# Patient Record
Sex: FEMALE | Race: WHITE | NOT HISPANIC OR LATINO | Employment: OTHER | URBAN - METROPOLITAN AREA
[De-identification: names, ages, dates, MRNs, and addresses within clinical notes are randomized per-mention and may not be internally consistent; named-entity substitution may affect disease eponyms.]

---

## 2017-01-07 DIAGNOSIS — J44.89 ASTHMA WITH COPD: ICD-10-CM

## 2017-01-08 RX ORDER — ALBUTEROL SULFATE 90 UG/1
AEROSOL, METERED RESPIRATORY (INHALATION)
Qty: 18 G | Refills: 11 | Status: ON HOLD | OUTPATIENT
Start: 2017-01-08 | End: 2017-03-11 | Stop reason: HOSPADM

## 2017-01-09 RX ORDER — ROSUVASTATIN CALCIUM 10 MG/1
TABLET, COATED ORAL
Qty: 30 TABLET | Refills: 9 | Status: ON HOLD | OUTPATIENT
Start: 2017-01-09 | End: 2017-03-11 | Stop reason: HOSPADM

## 2017-02-05 ENCOUNTER — NURSE TRIAGE (OUTPATIENT)
Dept: ADMINISTRATIVE | Facility: CLINIC | Age: 82
End: 2017-02-05

## 2017-02-05 NOTE — TELEPHONE ENCOUNTER
"  Reason for Disposition   [1] Longstanding confusion (e.g., dementia, stroke) AND [2] worsening    Answer Assessment - Initial Assessment Questions  1. LEVEL OF CONSCIOUSNESS: "How is he (she, the patient) acting right now?" (e.g., alert-oriented, confused, lethargic, stuporous, comatose)      confused  2. ONSET: "When did the confusion start?"  (minutes, hours, days)      yesterday  3. PATTERN "Does this come and go, or has it been constant since it started?"  "Is it present now?"     Comes and goes  4. ALCOHOL or DRUGS: "Has he been drinking alcohol or taking any drugs?"       no  5. NARCOTIC MEDICATIONS: "Has he been receiving any narcotic medications?" (e.g., morphine, Vicodin)   no  6. CAUSE: "What do you think is causing the confusion?"       Hx of dementia   7. OTHER SYMPTOMS: "Are there any other symptoms?" (e.g., difficulty breathing, headache, fever, weakness)     Not able to speak for a few seconds and blank stare and garbled speech    Protocols used: ST CONFUSION - DELIRIUM-A-AH    "

## 2017-02-06 RX ORDER — METOPROLOL SUCCINATE 25 MG/1
TABLET, EXTENDED RELEASE ORAL
Qty: 30 TABLET | Refills: 2 | Status: ON HOLD | OUTPATIENT
Start: 2017-02-06 | End: 2017-03-11 | Stop reason: HOSPADM

## 2017-02-07 ENCOUNTER — TELEPHONE (OUTPATIENT)
Dept: INTERNAL MEDICINE | Facility: CLINIC | Age: 82
End: 2017-02-07

## 2017-02-07 ENCOUNTER — LAB VISIT (OUTPATIENT)
Dept: LAB | Facility: HOSPITAL | Age: 82
End: 2017-02-07
Attending: FAMILY MEDICINE
Payer: MEDICARE

## 2017-02-07 ENCOUNTER — OFFICE VISIT (OUTPATIENT)
Dept: INTERNAL MEDICINE | Facility: CLINIC | Age: 82
End: 2017-02-07
Payer: MEDICARE

## 2017-02-07 VITALS
HEIGHT: 62 IN | TEMPERATURE: 99 F | BODY MASS INDEX: 23 KG/M2 | SYSTOLIC BLOOD PRESSURE: 116 MMHG | DIASTOLIC BLOOD PRESSURE: 80 MMHG | OXYGEN SATURATION: 97 % | WEIGHT: 125 LBS | HEART RATE: 66 BPM

## 2017-02-07 DIAGNOSIS — G47.9 SLEEP DISTURBANCE: ICD-10-CM

## 2017-02-07 DIAGNOSIS — E03.9 HYPOTHYROIDISM, UNSPECIFIED TYPE: Chronic | ICD-10-CM

## 2017-02-07 DIAGNOSIS — R40.4 ALTERED CONSCIOUSNESS: ICD-10-CM

## 2017-02-07 DIAGNOSIS — I10 ESSENTIAL HYPERTENSION: Chronic | ICD-10-CM

## 2017-02-07 DIAGNOSIS — R47.01 APHASIA: ICD-10-CM

## 2017-02-07 DIAGNOSIS — F33.0 MAJOR DEPRESSIVE DISORDER, RECURRENT EPISODE, MILD: ICD-10-CM

## 2017-02-07 DIAGNOSIS — R40.4 ALTERED CONSCIOUSNESS: Primary | ICD-10-CM

## 2017-02-07 LAB
ALBUMIN SERPL BCP-MCNC: 3.9 G/DL
ALP SERPL-CCNC: 55 U/L
ALT SERPL W/O P-5'-P-CCNC: 8 U/L
ANION GAP SERPL CALC-SCNC: 15 MMOL/L
AST SERPL-CCNC: 17 U/L
BASOPHILS # BLD AUTO: 0.03 K/UL
BASOPHILS NFR BLD: 0.4 %
BILIRUB SERPL-MCNC: 0.3 MG/DL
BUN SERPL-MCNC: 20 MG/DL
CALCIUM SERPL-MCNC: 9.5 MG/DL
CHLORIDE SERPL-SCNC: 102 MMOL/L
CO2 SERPL-SCNC: 20 MMOL/L
CREAT SERPL-MCNC: 0.8 MG/DL
DIFFERENTIAL METHOD: ABNORMAL
EOSINOPHIL # BLD AUTO: 0.1 K/UL
EOSINOPHIL NFR BLD: 0.8 %
ERYTHROCYTE [DISTWIDTH] IN BLOOD BY AUTOMATED COUNT: 14.9 %
EST. GFR  (AFRICAN AMERICAN): >60 ML/MIN/1.73 M^2
EST. GFR  (NON AFRICAN AMERICAN): >60 ML/MIN/1.73 M^2
GLUCOSE SERPL-MCNC: 88 MG/DL
HCT VFR BLD AUTO: 38.3 %
HGB BLD-MCNC: 12.5 G/DL
LYMPHOCYTES # BLD AUTO: 1.7 K/UL
LYMPHOCYTES NFR BLD: 21.2 %
MCH RBC QN AUTO: 31.2 PG
MCHC RBC AUTO-ENTMCNC: 32.6 %
MCV RBC AUTO: 96 FL
MONOCYTES # BLD AUTO: 0.6 K/UL
MONOCYTES NFR BLD: 8.2 %
NEUTROPHILS # BLD AUTO: 5.4 K/UL
NEUTROPHILS NFR BLD: 69.4 %
PLATELET # BLD AUTO: 184 K/UL
PMV BLD AUTO: 11.7 FL
POTASSIUM SERPL-SCNC: 4.2 MMOL/L
PROT SERPL-MCNC: 6.6 G/DL
RBC # BLD AUTO: 4.01 M/UL
SODIUM SERPL-SCNC: 137 MMOL/L
WBC # BLD AUTO: 7.8 K/UL

## 2017-02-07 PROCEDURE — 36415 COLL VENOUS BLD VENIPUNCTURE: CPT | Mod: PO

## 2017-02-07 PROCEDURE — 3074F SYST BP LT 130 MM HG: CPT | Mod: S$GLB,,, | Performed by: PHYSICIAN ASSISTANT

## 2017-02-07 PROCEDURE — 84480 ASSAY TRIIODOTHYRONINE (T3): CPT

## 2017-02-07 PROCEDURE — 84443 ASSAY THYROID STIM HORMONE: CPT

## 2017-02-07 PROCEDURE — 99999 PR PBB SHADOW E&M-EST. PATIENT-LVL V: CPT | Mod: PBBFAC,,, | Performed by: PHYSICIAN ASSISTANT

## 2017-02-07 PROCEDURE — 99213 OFFICE O/P EST LOW 20 MIN: CPT | Mod: S$PBB,,, | Performed by: PHYSICIAN ASSISTANT

## 2017-02-07 PROCEDURE — 3079F DIAST BP 80-89 MM HG: CPT | Mod: S$GLB,,, | Performed by: PHYSICIAN ASSISTANT

## 2017-02-07 PROCEDURE — 85025 COMPLETE CBC W/AUTO DIFF WBC: CPT | Mod: PO

## 2017-02-07 PROCEDURE — 84439 ASSAY OF FREE THYROXINE: CPT

## 2017-02-07 PROCEDURE — 1157F ADVNC CARE PLAN IN RCRD: CPT | Mod: S$GLB,,, | Performed by: PHYSICIAN ASSISTANT

## 2017-02-07 PROCEDURE — 1159F MED LIST DOCD IN RCRD: CPT | Mod: S$GLB,,, | Performed by: PHYSICIAN ASSISTANT

## 2017-02-07 PROCEDURE — 80053 COMPREHEN METABOLIC PANEL: CPT | Mod: PO

## 2017-02-07 PROCEDURE — 1160F RVW MEDS BY RX/DR IN RCRD: CPT | Mod: S$GLB,,, | Performed by: PHYSICIAN ASSISTANT

## 2017-02-07 RX ORDER — TRAZODONE HYDROCHLORIDE 50 MG/1
50 TABLET ORAL NIGHTLY
Qty: 30 TABLET | Refills: 5 | Status: SHIPPED | OUTPATIENT
Start: 2017-02-07 | End: 2017-03-06 | Stop reason: SDUPTHER

## 2017-02-07 RX ORDER — LEVOTHYROXINE SODIUM 25 UG/1
TABLET ORAL
Qty: 30 TABLET | Refills: 9 | Status: SHIPPED | OUTPATIENT
Start: 2017-02-07 | End: 2017-03-04 | Stop reason: SDUPTHER

## 2017-02-07 RX ORDER — SERTRALINE HYDROCHLORIDE 50 MG/1
50 TABLET, FILM COATED ORAL DAILY
Qty: 30 TABLET | Refills: 9 | Status: SHIPPED | OUTPATIENT
Start: 2017-02-07 | End: 2017-03-04 | Stop reason: SDUPTHER

## 2017-02-07 RX ORDER — VERAPAMIL HYDROCHLORIDE 240 MG/1
240 TABLET, FILM COATED, EXTENDED RELEASE ORAL DAILY
Qty: 30 TABLET | Refills: 11 | Status: ON HOLD | OUTPATIENT
Start: 2017-02-07 | End: 2017-03-11 | Stop reason: HOSPADM

## 2017-02-07 RX ORDER — TRAZODONE HYDROCHLORIDE 50 MG/1
50 TABLET ORAL NIGHTLY
Qty: 30 TABLET | Refills: 0 | Status: ON HOLD | OUTPATIENT
Start: 2017-02-07 | End: 2017-03-11 | Stop reason: HOSPADM

## 2017-02-07 RX ORDER — CALCIUM CARBONATE 750 MG/1
TABLET, CHEWABLE ORAL
Status: ON HOLD | COMMUNITY
Start: 2016-11-08 | End: 2017-03-11 | Stop reason: HOSPADM

## 2017-02-07 RX ORDER — DICLOFENAC SODIUM 16.05 MG/ML
2 SOLUTION TOPICAL
Status: ON HOLD | COMMUNITY
End: 2017-03-11 | Stop reason: HOSPADM

## 2017-02-07 NOTE — PROGRESS NOTES
Subjective:      Patient ID: Crystal Romero is a 82 y.o. female.    Chief Complaint: Annual Exam (daughter states mother has been disoriented/incoherent/with garbled speech) and Insomnia    HPI  Here today accompanied by her daughter for evaluation of sudden severe memory loss this past weekend. Has a history of mild-moderate memory loss, however this weekend she had an episode where she did not recognize her home or daughter who she lives with. Memory eventually came back within a couple hours. On Sunday, had an episode where she had a blank stare lasting 3-4 minutes as the daughter was looking at her giving her commands and asking if she was ok. Daughter states that it lasted a couple minutes, when she came to she was unable to speak, her mouth was moving but words were not coming out. Slowly improved. Did not go to the ER. This is the first time she has had an episode like this. No weakness or asymmetry reported by pts daughter who was with her during these episodes.   Also reports that she is unable to swallow verapamil. Blood pressure has been stable. Requesting smaller pill be sent to pharmacy.     Also reports insomnia. No daytime napping. Walks around at night. Denies sleep walking (daughter reports).   Has had a slow decrease in weight over the past year.   Also requesting refills on some of her medications today.     Review of Systems   Constitutional: Positive for appetite change. Negative for activity change, chills, diaphoresis, fatigue, fever and unexpected weight change.   HENT: Negative.    Eyes: Negative for visual disturbance.   Respiratory: Negative for cough, chest tightness, shortness of breath and wheezing.    Cardiovascular: Negative for chest pain, palpitations and leg swelling.   Gastrointestinal: Negative for abdominal distention, abdominal pain, anal bleeding, blood in stool, constipation, diarrhea, nausea, rectal pain and vomiting.   Musculoskeletal: Negative for myalgias.  "  Skin: Negative for rash.   Neurological: Positive for speech difficulty. Negative for dizziness, tremors, seizures, syncope, facial asymmetry, weakness, light-headedness, numbness and headaches.   Psychiatric/Behavioral: Positive for confusion, dysphoric mood and sleep disturbance. Negative for agitation, behavioral problems, hallucinations, self-injury and suicidal ideas. The patient is not nervous/anxious and is not hyperactive.      Objective:     Visit Vitals    /80 (BP Location: Right arm, Patient Position: Sitting, BP Method: Manual)    Pulse 66    Temp 99 °F (37.2 °C) (Tympanic)    Ht 5' 2" (1.575 m)    Wt 56.7 kg (125 lb)    SpO2 97%    BMI 22.86 kg/m2       Physical Exam   Constitutional: She is oriented to person, place, and time. She appears well-developed and well-nourished. No distress.   HENT:   Head: Normocephalic and atraumatic.   Eyes: Pupils are equal, round, and reactive to light.   Neck: Normal range of motion.   Cardiovascular: Normal rate, regular rhythm and normal heart sounds.  Exam reveals no gallop and no friction rub.    No murmur heard.  Pulmonary/Chest: Effort normal and breath sounds normal. No respiratory distress. She has no wheezes. She has no rales.   Lymphadenopathy:     She has no cervical adenopathy.   Neurological: She is alert and oriented to person, place, and time. She has normal strength. She displays no tremor. No cranial nerve deficit or sensory deficit. She exhibits normal muscle tone. Coordination and gait normal.   Skin: She is not diaphoretic.   Psychiatric: She has a normal mood and affect. Her behavior is normal. Judgment and thought content normal.   Nursing note and vitals reviewed.      Assessment:     1. Altered consciousness    2. Aphasia    3. Sleep disturbance    4. Major depressive disorder, recurrent episode, mild    5. Hypothyroidism, unspecified type    6. Essential hypertension      Plan:   Altered consciousness  -     CBC auto " differential; Future; Expected date: 2/7/17  -     Comprehensive metabolic panel; Future  -     Urine culture; Future  -     Urinalysis; Future; Expected date: 2/7/17  -     Ambulatory referral to Neurology  -     MRI Brain W WO Contrast; Future; Expected date: 2/7/17    Aphasia  Comments:  one episode that lasted under 30 minutes 2/5/2017  Orders:  -     Ambulatory referral to Neurology  -     MRI Brain W WO Contrast; Future; Expected date: 2/7/17  -no acute neuro findings on exam today.     Sleep disturbance  -     trazodone (DESYREL) 50 MG tablet; Take 1 tablet (50 mg total) by mouth every evening.  Dispense: 30 tablet; Refill: 5  -     Ambulatory referral to Neurology    Major depressive disorder, recurrent episode, mild  -     sertraline (ZOLOFT) 50 MG tablet; Take 1 tablet (50 mg total) by mouth once daily.  Dispense: 30 tablet; Refill: 9     -refilled as requested  Hypothyroidism, unspecified type  -     levothyroxine (SYNTHROID) 25 MCG tablet; TAKE ONE TABLET BY MOUTH ONE  TIME DAILY BEFORE BREAKFAST  Dispense: 30 tablet; Refill: 9     -refilled as requested  Essential hypertension   -     verapamil (CALAN-SR) 240 MG CR tablet; Take 1 tablet (240 mg total) by mouth once daily. Dispense smallest XR tablet for 240mg verapamil  Dispense: 30 tablet; Refill: 11     -changed from xr capsules to tablets      Return if symptoms worsen or fail to improve.

## 2017-02-07 NOTE — MR AVS SNAPSHOT
[facility] Internal Medicine  [address] [place] [address]  Phone: [phone]  Fax: [phone]                  Crystal Romero   2017 2:00 PM   Office Visit    Description:  Female : 1934   Provider:  Kelsie Claudio PA-C   Department:  [facility] - Internal Medicine           Reason for Visit     Annual Exam     Insomnia           Diagnoses this Visit        Comments    Altered consciousness    -  Primary     Major depressive disorder, recurrent episode, mild         Hypothyroidism, unspecified type         Sleep disturbance         Essential hypertension         Aphasia                To Do List           Future Appointments        Provider Department Dept Phone    2017 3:20 PM LABORATORY, [facility] [phone]    2017 3:40 PM SPECIMEN, [facility] [phone]    2017 4:15 PM [facility] [phone]    2017 4:00 PM Maria Guadalupe Herring MD [facility] Internal Medicine [phone]    2017 4:00 PM Geronimo James NP [facility] [phone]      Goals (5 Years of Data)     None      Follow-Up and Disposition     Return if symptoms worsen or fail to improve.       These Medications        Disp Refills Start End    verapamil (CALAN-SR) 240 MG CR tablet 30 tablet 11 2017    Take 1 tablet (240 mg total) by mouth once daily. Dispense smallest XR tablet for 240mg verapamil - Oral    Pharmacy: [id number] [place]  [address] [address] Ph #: [phone]       sertraline (ZOLOFT) 50 MG tablet 30 tablet 9 2017     Take 1 tablet (50 mg total) by mouth once daily. - Oral    Ph[facility]acy: [facility]  [address] [facility] Ph #: [phone]       levothyroxine (SYNTHROID) 25 MCG tablet 30 tablet 9 2017     TAKE ONE TABLET BY MOUTH ONE  TIME DAILY BEFORE BREAKFAST    Pharmacy: [id number]  [place] - [id number]  [facility] #: [phone]       trazodone (DESYREL) 50 MG tablet 30 tablet 5 2/7/2017     Take 1 tablet (50 mg total) by mouth every evening. One-half tablet by mouth at night - Oral    [id number] [place] [place] [facility] [address] [facility] #: [phone]         [facility]     [facility] On Call Nurse Care Line - 24/7 Assistance  Registered nurses in the [facility] provide clinical advisement, health education, appointment booking, and other advisory services.  Call for this free service at [phone].             Medications           Message regarding Medications     Verify the changes and/or additions to your medication regime listed below are the same as discussed with your clinician today.  If any of these changes or additions are incorrect, please notify your healthcare provider.        START taking these NEW medications        Refills    verapamil (CALAN-SR) 240 MG CR tablet 11    Sig: Take 1 tablet (240 mg total) by mouth once daily. Dispense smallest XR tablet for 240mg verapamil    Class: Normal    Route: Oral      CHANGE how you are taking these medications     Start Taking Instead of    sertraline (ZOLOFT) 50 MG tablet sertraline (ZOLOFT) 50 MG tablet    Dosage:  Take 1 tablet (50 mg total) by mouth once daily. Dosage:  TAKE ONE TABLET BY MOUTH ONE TIME DAILY    Reason for Change:  Reorder     trazodone (DESYREL) 50 MG tablet trazodone (DESYREL) 50 MG tablet    Dosage:  Take 1 tablet (50 mg total) by mouth every evening. One-half tablet by mouth at night Dosage:  Take 50 mg by mouth every evening. One-half tablet by mouth at night    Reason for Change:  Reorder       STOP taking these medications     verapamil (VERELAN) 240 MG C24P TAKE ONE CAPSULE BY MOUTH ONE TIME DAILY           Verify that the below list of medications is an accurate representation of the medications you are currently taking.  If none reported, the list may be blank. If incorrect, please  contact your healthcare provider. Carry this list with you in case of emergency.           Current Medications     acetaminophen (TYLENOL) 500 MG tablet Take 500 mg by mouth 2 (two) times daily.    albuterol (PROVENTIL) 5 mg/mL nebulizer solution Take 0.5 mLs (2.5 mg total) by nebulization every 6 (six) hours as needed.    blood pressure test kit-medium Kit Pt needs device to monitor blood pressure daily    blood pressure test kit-medium Kit Pt needs device to monitor blood pressure daily    budesonide (PULMICORT) 0.5 mg/2 mL nebulizer solution Take 0.5 mg by nebulization once daily.    cetirizine (ZYRTEC) 10 MG tablet Take 10 mg by mouth once daily.    diclofenac sodium 1.5 % Drop 2 %.    fluticasone (FLONASE) 50 mcg/actuation nasal spray 1 spray by Each Nare route once daily.    folic acid (FOLVITE) 1 MG tablet TAKE 1 TABLET (1 MG TOTAL) BY MOUTH ONCE DAILY.    hydroxychloroquine (PLAQUENIL) 200 mg tablet TAKE ONE TABLET BY MOUTH TWICE DAILY    inhalation device (AEROCHAMBER PLUS FLOW-VU) Use as directed for inhalation.    levothyroxine (SYNTHROID) 25 MCG tablet TAKE ONE TABLET BY MOUTH ONE  TIME DAILY BEFORE BREAKFAST    lisinopril 10 MG tablet Take 1 tablet (10 mg total) by mouth once daily.    methotrexate 2.5 MG Tab Take 7 tablets (17.5 mg total) by mouth every 7 days.    metoprolol succinate (TOPROL-XL) 25 MG 24 hr tablet TAKE ONE TABLET BY MOUTH ONE TIME DAILY    multivitamin with minerals tablet Take 1 tablet by mouth once daily. One a day vitamin for seniors    pantoprazole (PROTONIX) 40 MG tablet TAKE ONE TABLET BY MOUTH ONE TIME DAILY    rosuvastatin (CRESTOR) 10 MG tablet TAKE ONE TABLET BY MOUTH ONE TIME DAILY IN THE EVENING    sertraline (ZOLOFT) 50 MG tablet Take 1 tablet (50 mg total) by mouth once daily.    tramadol (ULTRAM) 50 mg tablet Take 1 tablet (50 mg total) by mouth every 6 (six) hours as needed for Pain.    trazodone (DESYREL) 50 MG tablet Take 1 tablet (50 mg total) by mouth every  "evening. One-half tablet by mouth at night    VENTOLIN HFA 90 mcg/actuation inhaler INHALE TWO PUFFS BY MOUTH EVERY SIX HOURS    verapamil (CALAN-SR) 240 MG CR tablet Take 1 tablet (240 mg total) by mouth once daily. Dispense smallest XR tablet for 240mg verapamil           Clinical Reference Information           Your Vitals Were     BP Pulse Temp Height Weight SpO2    116/80 (BP Location: Right arm, Patient Position: Sitting, BP Method: Manual) 66 99 °F (37.2 °C) (Tympanic) 5' 2" (1.575 m) 56.7 kg (125 lb) 97%    BMI                22.86 kg/m2          Blood Pressure          Most Recent Value    BP  116/80      Allergies as of 2/7/2017     Sulfa (Sulfonamide Antibiotics)      Immunizations Administered on Date of Encounter - 2/7/2017     None      Orders Placed During Today's Visit      Normal Orders This Visit    Ambulatory referral to Neurology     Future Labs/Procedures Expected by Expires    CBC auto differential  2/7/2017 2/7/2018    MRI Brain W WO Contrast  2/7/2017 2/8/2018    Urinalysis  2/7/2017 4/8/2018    Comprehensive metabolic panel  As directed 2/7/2018    Urine culture  As directed 2/7/2017      [facility] Sign-Up     Activating your [facility] account is as easy as 1-2-3!     1) Visit [email or web address], select Sign Up Now, enter this activation code and your date of birth, then select Next.  [email or web address]  Expires: 3/24/2017  3:15 PM      2) Create a username and password to use when you visit [facility] in the future and select a security question in case you lose your password and select Next.    3) Enter your e-mail address and click Sign Up!    Additional Information  If you have questions, please e-mail [email or web address] or call [phone] to talk to our [facility] staff. Remember, [facility] is NOT to be used for urgent needs. For medical emergencies, dial 911.         Language Assistance Services     ATTENTION: Language assistance services are available, free of charge. Please call " [phone].      ATENCIÓN: Si habla español, tiene a duncan disposición servicios gratuitos de asistencia lingüística. Llame al [phone].     CHÚ Ý: N?u b?n nói Ti?ng Vi?t, có các d?ch v? h? tr? ngôn ng? mi?n phí dành cho b?n. G?i s? [phone].         [facility] - Internal Medicine complies with applicable Federal civil rights laws and does not discriminate on the basis of race, color, national origin, age, disability, or sex.

## 2017-02-07 NOTE — TELEPHONE ENCOUNTER
Spoke with Maggie with [facility] pharmacy. Frequency for Trazadone 50mg clarified. Per provider TERRI Claudio PA-C patient is to take 1 tablet at bedtime.//ddw

## 2017-02-07 NOTE — TELEPHONE ENCOUNTER
----- Message from Pema Henry sent at 2/7/2017  3:33 PM CST -----  Contact: Maggie Curtis  She needs to verify the directions for Trazdone.  Call her at [phone].                              [email or web address]

## 2017-02-08 LAB
T3 SERPL-MCNC: 87 NG/DL
T4 FREE SERPL-MCNC: 1.09 NG/DL
TSH SERPL DL<=0.005 MIU/L-ACNC: 1.5 UIU/ML

## 2017-02-09 ENCOUNTER — HOSPITAL ENCOUNTER (OUTPATIENT)
Dept: RADIOLOGY | Facility: HOSPITAL | Age: 82
Discharge: HOME | End: 2017-02-09
Attending: FAMILY MEDICINE
Payer: MEDICARE

## 2017-02-09 DIAGNOSIS — R40.4 ALTERED CONSCIOUSNESS: ICD-10-CM

## 2017-02-09 DIAGNOSIS — R47.01 APHASIA: ICD-10-CM

## 2017-02-09 PROCEDURE — A9585 GADOBUTROL INJECTION: HCPCS | Mod: PO | Performed by: FAMILY MEDICINE

## 2017-02-09 PROCEDURE — 70553 MRI BRAIN STEM W/O & W/DYE: CPT | Mod: TC,PO

## 2017-02-09 PROCEDURE — 25500020 PHARM REV CODE 255: Mod: PO | Performed by: FAMILY MEDICINE

## 2017-02-09 PROCEDURE — 70553 MRI BRAIN STEM W/O & W/DYE: CPT | Mod: 26,,, | Performed by: RADIOLOGY

## 2017-02-09 RX ORDER — GADOBUTROL 604.72 MG/ML
5.5 INJECTION INTRAVENOUS
Status: COMPLETED | OUTPATIENT
Start: 2017-02-09 | End: 2017-02-09

## 2017-02-09 RX ADMIN — GADOBUTROL 5.5 ML: 604.72 INJECTION INTRAVENOUS at 04:02

## 2017-02-23 ENCOUNTER — OFFICE VISIT (OUTPATIENT)
Dept: RHEUMATOLOGY | Facility: CLINIC | Age: 82
End: 2017-02-23
Payer: MEDICARE

## 2017-02-23 VITALS
HEART RATE: 65 BPM | WEIGHT: 124.56 LBS | SYSTOLIC BLOOD PRESSURE: 159 MMHG | BODY MASS INDEX: 22.78 KG/M2 | DIASTOLIC BLOOD PRESSURE: 71 MMHG

## 2017-02-23 DIAGNOSIS — M75.01 BILATERAL ADHESIVE CAPSULITIS OF SHOULDERS: Primary | ICD-10-CM

## 2017-02-23 DIAGNOSIS — M75.02 BILATERAL ADHESIVE CAPSULITIS OF SHOULDERS: Primary | ICD-10-CM

## 2017-02-23 DIAGNOSIS — L40.50 PSORIASIS WITH ARTHROPATHY: ICD-10-CM

## 2017-02-23 DIAGNOSIS — M15.9 PRIMARY OSTEOARTHRITIS INVOLVING MULTIPLE JOINTS: ICD-10-CM

## 2017-02-23 PROCEDURE — 99999 PR PBB SHADOW E&M-EST. PATIENT-LVL III: CPT | Mod: PBBFAC,,, | Performed by: INTERNAL MEDICINE

## 2017-02-23 PROCEDURE — 3078F DIAST BP <80 MM HG: CPT | Mod: S$GLB,,, | Performed by: INTERNAL MEDICINE

## 2017-02-23 PROCEDURE — 1125F AMNT PAIN NOTED PAIN PRSNT: CPT | Mod: S$GLB,,, | Performed by: INTERNAL MEDICINE

## 2017-02-23 PROCEDURE — 3077F SYST BP >= 140 MM HG: CPT | Mod: S$GLB,,, | Performed by: INTERNAL MEDICINE

## 2017-02-23 PROCEDURE — 1160F RVW MEDS BY RX/DR IN RCRD: CPT | Mod: S$GLB,,, | Performed by: INTERNAL MEDICINE

## 2017-02-23 PROCEDURE — 1159F MED LIST DOCD IN RCRD: CPT | Mod: S$GLB,,, | Performed by: INTERNAL MEDICINE

## 2017-02-23 PROCEDURE — 99215 OFFICE O/P EST HI 40 MIN: CPT | Mod: 25,S$GLB,, | Performed by: INTERNAL MEDICINE

## 2017-02-23 PROCEDURE — 20610 DRAIN/INJ JOINT/BURSA W/O US: CPT | Mod: 50,S$GLB,, | Performed by: INTERNAL MEDICINE

## 2017-02-23 PROCEDURE — 1157F ADVNC CARE PLAN IN RCRD: CPT | Mod: S$GLB,,, | Performed by: INTERNAL MEDICINE

## 2017-02-23 RX ORDER — TRIAMCINOLONE ACETONIDE 40 MG/ML
40 INJECTION, SUSPENSION INTRA-ARTICULAR; INTRAMUSCULAR
Status: COMPLETED | OUTPATIENT
Start: 2017-02-23 | End: 2017-02-23

## 2017-02-23 RX ORDER — METHOTREXATE 2.5 MG/1
17.5 TABLET ORAL
Qty: 28 TABLET | Refills: 4 | Status: ON HOLD | OUTPATIENT
Start: 2017-02-23 | End: 2017-03-11 | Stop reason: HOSPADM

## 2017-02-23 RX ORDER — HYDROXYCHLOROQUINE SULFATE 200 MG/1
200 TABLET, FILM COATED ORAL 2 TIMES DAILY
Qty: 60 TABLET | Refills: 4 | Status: ON HOLD | OUTPATIENT
Start: 2017-02-23 | End: 2017-03-11 | Stop reason: HOSPADM

## 2017-02-23 RX ADMIN — TRIAMCINOLONE ACETONIDE 40 MG: 40 INJECTION, SUSPENSION INTRA-ARTICULAR; INTRAMUSCULAR at 04:02

## 2017-02-23 NOTE — MR AVS SNAPSHOT
[facility] - Rheumatology  [address] [place] [address]  Phone: [phone]  Fax: [phone]                  Crystal Romero   2017 3:00 PM   Office Visit    Description:  Female : 1934   Provider:  Keegan Yarbrough MD   Department:  [facility] - Rheumatology           Reason for Visit     Arthritis Flare           Diagnoses this Visit        Comments    Bilateral adhesive capsulitis of shoulders    -  Primary     Rheumatoid arthritis involving multiple sites with positive rheumatoid factor         Inflammatory arthritis                To Do List           Future Appointments        Provider Department Dept Phone    3/2/2017 4:00 PM PULMONARY LAB, [facility] [phone]    3/2/2017 4:20 PM Chuck Slater MD [facility] - Pulmonary Services [phone]    3/6/2017 4:00 PM Geronimo James NP [facility] Cardiology [phone]    3/24/2017 9:00 AM Jay Jay Cano MD [facility] - Neurology [phone]      Goals (5 Years of Data)     None      Follow-Up and Disposition     Return in about 3 months (around 2017).       These Medications        Disp Refills Start End    methotrexate 2.5 MG Tab 28 tablet 4 2017     Take 7 tablets (17.5 mg total) by mouth every 7 days. - Oral    Pharmacy: [id number] - [place] - [address] [facility] Ph #: [phone]       baicalin-catechin 500 mg Cap 60 each 11 2017     Take 500 mg by mouth 2 (two) times daily. - Oral    [facility]: [facility] [place]  [address] [address] Ph #: [phone]       hydroxychloroquine (PLAQUENIL) 200 mg tablet 60 tablet 4 2017     Take 1 tablet (200 mg total) by mouth 2 (two) times daily. - Oral    Pharmacy: [facility]  [address] [address] Ph #: [phone]         [facility]     [facility] Care [facility] -  Assistance  Registered nurses in the [facility]  provide clinical advisement, health education, appointment booking, and other advisory services.  Call for this free service at [phone].             Medications           Message regarding Medications     Verify the changes and/or additions to your medication regime listed below are the same as discussed with your clinician today.  If any of these changes or additions are incorrect, please notify your healthcare provider.        START taking these NEW medications        Refills    baicalin-catechin 500 mg Cap 11    Sig: Take 500 mg by mouth 2 (two) times daily.    Class: Normal    Route: Oral      CHANGE how you are taking these medications     Start Taking Instead of    hydroxychloroquine (PLAQUENIL) 200 mg tablet hydroxychloroquine (PLAQUENIL) 200 mg tablet    Dosage:  Take 1 tablet (200 mg total) by mouth 2 (two) times daily. Dosage:  TAKE ONE TABLET BY MOUTH TWICE DAILY    Reason for Change:  Reorder            Verify that the below list of medications is an accurate representation of the medications you are currently taking.  If none reported, the list may be blank. If incorrect, please contact your healthcare provider. Carry this list with you in case of emergency.           Current Medications     acetaminophen (TYLENOL) 500 MG tablet Take 500 mg by mouth 2 (two) times daily.    albuterol (PROVENTIL) 5 mg/mL nebulizer solution Take 0.5 mLs (2.5 mg total) by nebulization every 6 (six) hours as needed.    baicalin-catechin 500 mg Cap Take 500 mg by mouth 2 (two) times daily.    blood pressure test kit-medium Kit Pt needs device to monitor blood pressure daily    blood pressure test kit-medium Kit Pt needs device to monitor blood pressure daily    budesonide (PULMICORT) 0.5 mg/2 mL nebulizer solution Take 0.5 mg by nebulization once daily.    cetirizine (ZYRTEC) 10 MG tablet Take 10 mg by mouth once daily.    diclofenac sodium 1.5 % Drop 2 %.    fluticasone (FLONASE) 50 mcg/actuation nasal spray 1 spray  by Each Nare route once daily.    folic acid (FOLVITE) 1 MG tablet TAKE 1 TABLET (1 MG TOTAL) BY MOUTH ONCE DAILY.    hydroxychloroquine (PLAQUENIL) 200 mg tablet Take 1 tablet (200 mg total) by mouth 2 (two) times daily.    inhalation device (AEROCHAMBER PLUS FLOW-VU) Use as directed for inhalation.    levothyroxine (SYNTHROID) 25 MCG tablet TAKE ONE TABLET BY MOUTH ONE  TIME DAILY BEFORE BREAKFAST    lisinopril 10 MG tablet Take 1 tablet (10 mg total) by mouth once daily.    methotrexate 2.5 MG Tab Take 7 tablets (17.5 mg total) by mouth every 7 days.    metoprolol succinate (TOPROL-XL) 25 MG 24 hr tablet TAKE ONE TABLET BY MOUTH ONE TIME DAILY    multivitamin with minerals tablet Take 1 tablet by mouth once daily. One a day vitamin for seniors    pantoprazole (PROTONIX) 40 MG tablet TAKE ONE TABLET BY MOUTH ONE TIME DAILY    rosuvastatin (CRESTOR) 10 MG tablet TAKE ONE TABLET BY MOUTH ONE TIME DAILY IN THE EVENING    sertraline (ZOLOFT) 50 MG tablet Take 1 tablet (50 mg total) by mouth once daily.    tramadol (ULTRAM) 50 mg tablet Take 1 tablet (50 mg total) by mouth every 6 (six) hours as needed for Pain.    trazodone (DESYREL) 50 MG tablet Take 1 tablet (50 mg total) by mouth every evening. One-half tablet by mouth at night    trazodone (DESYREL) 50 MG tablet Take 1 tablet (50 mg total) by mouth every evening.    VENTOLIN HFA 90 mcg/actuation inhaler INHALE TWO PUFFS BY MOUTH EVERY SIX HOURS    verapamil (CALAN-SR) 240 MG CR tablet Take 1 tablet (240 mg total) by mouth once daily. Dispense smallest XR tablet for 240mg verapamil           Clinical Reference Information           Your Vitals Were     BP Pulse Weight BMI       159/71 65 56.5 kg (124 lb 9 oz) 22.78 kg/m2       Blood Pressure          Most Recent Value    BP  (!)  159/71      Allergies as of 2/23/2017     Sulfa (Sulfonamide Antibiotics)      Immunizations Administered on Date of Encounter - 2/23/2017     None      Orders Placed During Today's Visit      Recurring Lab Work Interval Expires    CBC auto differential   2/23/2018    Comprehensive metabolic panel   2/23/2018      [facility] Sign-Up     Activating your [facility] account is as easy as 1-2-3!     1) Visit [email or web address], select Sign Up Now, enter this activation code and your date of birth, then select Next.  [email or web address]  Expires: 3/24/2017  3:15 PM      2) Create a username and password to use when you visit [facility] in the future and select a security question in case you lose your password and select Next.    3) Enter your e-mail address and click Sign Up!    Additional Information  If you have questions, please e-mail [email or web address] or call [phone] to talk to our [facility] staff. Remember, [facility] is NOT to be used for urgent needs. For medical emergencies, dial 911.         Language Assistance Services     ATTENTION: Language assistance services are available, free of charge. Please call [phone].      ATENCIÓN: Si habla lacie, tiene a duncan disposición servicios gratuitos de asistencia lingüística. Llame al [phone].     MATT Ý: N?u b?n nói Ti?ng Vi?t, có các d?ch v? h? tr? ngôn ng? mi?n phí dành cho b?n. G?i s? [phone].         [facility] complies with applicable Federal civil rights laws and does not discriminate on the basis of race, color, national origin, age, disability, or sex.

## 2017-02-23 NOTE — ASSESSMENT & PLAN NOTE
Severe adhesive capsulitis of bilateral shoulder joints complicated by osteoarthritis and psoriatic arthropathy.  Inject Kenalog.

## 2017-02-23 NOTE — ASSESSMENT & PLAN NOTE
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  Non-steroidal anti-inflammatory medications contraindicated because of history of gastritis.  Try limbrel.

## 2017-02-23 NOTE — ASSESSMENT & PLAN NOTE
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  Safety labs from 2 weeks ago shows normal CBC, CMP.  Check safety labs for methotrexate every 12 weeks.

## 2017-02-23 NOTE — PROGRESS NOTES
RHEUMATOLOGY CLINIC FOLLOW UP VISIT- first visit with me.  Chief complaints:-  My shoulders hurt.    HPI:-  Crystal Blunt a 82 y.o. pleasant female comes in for a follow up visit with above chief complaints.  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  She is on methotrexate and hydroxychloroquine for psoriatic arthritis started by my partner Dr. Armendariz in the past.  She reports doing okay in regards to her small joints of hands and wrists without any significant swelling and thinks the methotrexate and hydroxychloroquine combination has been controlling them.  But she reports significant worsening pain in her bilateral shoulders with stiffness restricting any range of motion.  The pain got worse and after she underwent the recent MRI of her brain where she was waiting on on her sides still for a very long time.  No side effects from medications.    Review of Systems   Constitutional: Positive for malaise/fatigue. Negative for chills and fever.   HENT: Positive for hearing loss. Negative for nosebleeds and sore throat.    Eyes: Positive for blurred vision. Negative for photophobia and redness.   Respiratory: Negative for cough, sputum production and shortness of breath.    Cardiovascular: Negative for chest pain and leg swelling.   Gastrointestinal: Negative for abdominal pain, constipation and diarrhea.   Genitourinary: Negative for dysuria, frequency and urgency.   Musculoskeletal: Positive for back pain, joint pain, myalgias and neck pain. Negative for falls.   Skin: Negative for itching and rash.   Neurological: Positive for headaches. Negative for dizziness, tremors, seizures, loss of consciousness and weakness.   Endo/Heme/Allergies: Negative for environmental allergies. Does not bruise/bleed easily.   Psychiatric/Behavioral: Positive for memory  loss. Negative for hallucinations. The patient is nervous/anxious. The patient does not have insomnia.        Past Medical History   Diagnosis Date    H/O: GI bleed     Hiatal hernia     Hyperlipidemia     Hypertension     Hypothyroid     Rheumatoid arthritis(714.0)        Past Surgical History   Procedure Laterality Date    Tonsillectomy          Social History   Substance Use Topics    Smoking status: Former Smoker     Packs/day: 2.00     Types: Cigarettes     Quit date: 1/1/1950    Smokeless tobacco: None    Alcohol use Yes      Comment: daily , glass of wine daily        Family History   Problem Relation Age of Onset    Cancer Mother      breast, uterine       Review of patient's allergies indicates:   Allergen Reactions    Sulfa (sulfonamide antibiotics) Shortness Of Breath           Physical examination:-    Vitals:    02/23/17 1517   BP: (!) 159/71   Pulse: 65   Weight: 56.5 kg (124 lb 9 oz)   PainSc:   9   PainLoc: Generalized       Physical Exam   Constitutional: She is oriented to person, place, and time and well-developed, well-nourished, and in no distress.   HENT:   Head: Normocephalic.   Mouth/Throat: Oropharynx is clear and moist.   Eyes: Conjunctivae and EOM are normal. Pupils are equal, round, and reactive to light.   Neck: Normal range of motion. Neck supple.   Cardiovascular: Normal rate and intact distal pulses.    Pulmonary/Chest: Effort normal. No respiratory distress.   Abdominal: Soft. There is no tenderness.   Musculoskeletal:   Significant restriction in range of motion of bilateral shoulder joint secondary to pain.  Significant nodular changes over multiple small joints of hands and feet without any active synovitis or dactylitis.  No effusion over knee joints.   Neurological: She is alert and oriented to person, place, and time. No cranial nerve deficit.   Skin: Skin is warm. No rash noted. No erythema.   Psychiatric: Mood and affect normal.   Nursing note and vitals  reviewed.      Labs:-  Results for TITO STAPLES (MRN [id number]) as of 2/23/2017 16:13   Ref. Range 9/23/2014 07:50 11/24/2014 09:18 8/23/2016 15:20   Protein, Serum Latest Ref Range: 6.0 - 8.4 g/dL   5.9 (L)   Albumin grams/dl Latest Ref Range: 3.35 - 5.55 g/dL   3.73   Alpha-1 grams/dl Latest Ref Range: 0.17 - 0.41 g/dL   0.32   Alpha-2 grams/dl Latest Ref Range: 0.43 - 0.99 g/dL   0.63   Beta grams/dl Latest Ref Range: 0.50 - 1.10 g/dL   0.75   Gamma grams/dl Latest Ref Range: 0.67 - 1.58 g/dL   0.48 (L)   Pathologist Interpretation SPE Unknown   REVIEWED   Guthrie Free Light Chains Latest Ref Range: 0.33 - 1.94 mg/dL   1.31   Lambda Free Light Chains Latest Ref Range: 0.57 - 2.63 mg/dL   1.07   Kappa/Lambda FLC Ratio Latest Ref Range: 0.26 - 1.65    1.22   CCP Antibodies Latest Ref Range: <5.0 U/mL <0.5 <0.5    Rheumatoid Factor Latest Ref Range: 0.0 - 15.0 IU/mL 18.0 (H)           Radiographs:-  Independent visualization of images done.  Bone-on-bone osteoarthritis of bilateral shoulders.    Old and Outside medical records :-  Reviewed old and all outside medical records available in Care Everywhere.    Medication List with Changes/Refills   New Medications    BAICALIN-CATECHIN 500 MG CAP    Take 500 mg by mouth 2 (two) times daily.   Current Medications    ACETAMINOPHEN (TYLENOL) 500 MG TABLET    Take 500 mg by mouth 2 (two) times daily.    ALBUTEROL (PROVENTIL) 5 MG/ML NEBULIZER SOLUTION    Take 0.5 mLs (2.5 mg total) by nebulization every 6 (six) hours as needed.    BLOOD PRESSURE TEST KIT-MEDIUM KIT    Pt needs device to monitor blood pressure daily    BLOOD PRESSURE TEST KIT-MEDIUM KIT    Pt needs device to monitor blood pressure daily    BUDESONIDE (PULMICORT) 0.5 MG/2 ML NEBULIZER SOLUTION    Take 0.5 mg by nebulization once daily.    CETIRIZINE (ZYRTEC) 10 MG TABLET    Take 10 mg by mouth once daily.    DICLOFENAC SODIUM 1.5 % DROP    2 %.    FLUTICASONE (FLONASE) 50 MCG/ACTUATION NASAL SPRAY     1 spray by Each Nare route once daily.    FOLIC ACID (FOLVITE) 1 MG TABLET    TAKE 1 TABLET (1 MG TOTAL) BY MOUTH ONCE DAILY.    INHALATION DEVICE (AEROCHAMBER PLUS FLOW-VU)    Use as directed for inhalation.    LEVOTHYROXINE (SYNTHROID) 25 MCG TABLET    TAKE ONE TABLET BY MOUTH ONE  TIME DAILY BEFORE BREAKFAST    LISINOPRIL 10 MG TABLET    Take 1 tablet (10 mg total) by mouth once daily.    METOPROLOL SUCCINATE (TOPROL-XL) 25 MG 24 HR TABLET    TAKE ONE TABLET BY MOUTH ONE TIME DAILY    MULTIVITAMIN WITH MINERALS TABLET    Take 1 tablet by mouth once daily. One a day vitamin for seniors    PANTOPRAZOLE (PROTONIX) 40 MG TABLET    TAKE ONE TABLET BY MOUTH ONE TIME DAILY    ROSUVASTATIN (CRESTOR) 10 MG TABLET    TAKE ONE TABLET BY MOUTH ONE TIME DAILY IN THE EVENING    SERTRALINE (ZOLOFT) 50 MG TABLET    Take 1 tablet (50 mg total) by mouth once daily.    TRAMADOL (ULTRAM) 50 MG TABLET    Take 1 tablet (50 mg total) by mouth every 6 (six) hours as needed for Pain.    TRAZODONE (DESYREL) 50 MG TABLET    Take 1 tablet (50 mg total) by mouth every evening. One-half tablet by mouth at night    TRAZODONE (DESYREL) 50 MG TABLET    Take 1 tablet (50 mg total) by mouth every evening.    VENTOLIN HFA 90 MCG/ACTUATION INHALER    INHALE TWO PUFFS BY MOUTH EVERY SIX HOURS    VERAPAMIL (CALAN-SR) 240 MG CR TABLET    Take 1 tablet (240 mg total) by mouth once daily. Dispense smallest XR tablet for 240mg verapamil   Changed and/or Refilled Medications    Modified Medication Previous Medication    HYDROXYCHLOROQUINE (PLAQUENIL) 200 MG TABLET hydroxychloroquine (PLAQUENIL) 200 mg tablet       Take 1 tablet (200 mg total) by mouth 2 (two) times daily.    TAKE ONE TABLET BY MOUTH TWICE DAILY    METHOTREXATE 2.5 MG TAB methotrexate 2.5 MG Tab       Take 7 tablets (17.5 mg total) by mouth every 7 days.    Take 7 tablets (17.5 mg total) by mouth every 7 days.       Assessment/Plans:-  # Bilateral adhesive capsulitis of shoulders:-  Severe  adhesive capsulitis of bilateral shoulder joints complicated by osteoarthritis and psoriatic arthropathy.  Inject Kenalog.  - triamcinolone acetonide injection 40 mg; Inject 1 mL (40 mg total) into the articular space one time.  - triamcinolone acetonide injection 40 mg; Inject 1 mL (40 mg total) into the articular space one time.    # Osteoarthritis:-  Osteoarthritis of multiple joints with bone-on-bone osteoarthritis over shoulders and knees.  Non-steroidal anti-inflammatory medications contraindicated because of history of gastritis.  Try limbrel.  - baicalin-catechin 500 mg Cap; Take 500 mg by mouth 2 (two) times daily.  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  Safety labs from 2 weeks ago shows normal CBC, CMP.  Check safety labs for methotrexate every 12 weeks.  - methotrexate 2.5 MG Tab; Take 7 tablets (17.5 mg total) by mouth every 7 days.  Dispense: 28 tablet; Refill: 4  - baicalin-catechin 500 mg Cap; Take 500 mg by mouth 2 (two) times daily.  Dispense: 60 each; Refill: 11  - hydroxychloroquine (PLAQUENIL) 200 mg tablet; Take 1 tablet (200 mg total) by mouth 2 (two) times daily.  Dispense: 60 tablet; Refill: 4  - CBC auto differential; Standing  - Comprehensive metabolic panel; Standing    PROCEDURE NOTE:-  Name of the procedure: Injection of bilateral shoulder joints with kenalog .   Patient consent:   Indication, risks(including skin depigmentation, fat atrophy, infection, bleeding, nerve or tendon injury) and alternative to the procedure were explained to to the patient. Patient was given opportunity to ask questions . Then patient gave a verbal consent for the procedure.   Pertinent lab values: None indicated  Type of anesthesia: 2% Lidocaine   Description of procedure : Using sterile technique, the skin over both posterior shoulder joints were cleaned with alcohol  swab and then with chlorhexidine solution. After applying cold spray , the needle was inserted into the skin and into the joint space without any resistance with intermittent lidocaine injection and then 40mg kenalog was injected into the joint space without any resistance.   Complication: None  Estimated blood loss: None  Disposition: Patient tolerated the procedure without any complains and the site was dressed.There were no complications.  Discharge instructions of icing and stretching given.     # Return in about 3 months (around 5/23/2017).    Disclaimer: This note was prepared using voice recognition system and is likely to have sound alike errors and is not proof read.  Please call me with any questions.

## 2017-03-04 DIAGNOSIS — F33.0 MAJOR DEPRESSIVE DISORDER, RECURRENT EPISODE, MILD: ICD-10-CM

## 2017-03-04 DIAGNOSIS — E03.9 HYPOTHYROIDISM, UNSPECIFIED TYPE: Chronic | ICD-10-CM

## 2017-03-06 ENCOUNTER — TELEPHONE (OUTPATIENT)
Dept: CARDIOLOGY | Facility: CLINIC | Age: 82
End: 2017-03-06

## 2017-03-06 DIAGNOSIS — I25.10 CORONARY ARTERY DISEASE, ANGINA PRESENCE UNSPECIFIED, UNSPECIFIED VESSEL OR LESION TYPE, UNSPECIFIED WHETHER NATIVE OR TRANSPLANTED HEART: Primary | ICD-10-CM

## 2017-03-06 RX ORDER — SERTRALINE HYDROCHLORIDE 50 MG/1
TABLET, FILM COATED ORAL
Qty: 30 TABLET | Refills: 8 | Status: ON HOLD | OUTPATIENT
Start: 2017-03-06 | End: 2017-03-11 | Stop reason: HOSPADM

## 2017-03-06 RX ORDER — LEVOTHYROXINE SODIUM 25 UG/1
TABLET ORAL
Qty: 30 TABLET | Refills: 8 | Status: ON HOLD | OUTPATIENT
Start: 2017-03-06 | End: 2017-03-11 | Stop reason: HOSPADM

## 2017-03-10 ENCOUNTER — HOSPITAL ENCOUNTER (INPATIENT)
Facility: HOSPITAL | Age: 82
LOS: 1 days | Discharge: HOME | DRG: 640 | End: 2017-03-11
Attending: EMERGENCY MEDICINE | Admitting: INTERNAL MEDICINE
Payer: MEDICARE

## 2017-03-10 DIAGNOSIS — I50.9 HEART FAILURE: ICD-10-CM

## 2017-03-10 DIAGNOSIS — E87.1 HYPONATREMIA: Primary | ICD-10-CM

## 2017-03-10 DIAGNOSIS — I35.0 NONRHEUMATIC AORTIC VALVE STENOSIS: ICD-10-CM

## 2017-03-10 DIAGNOSIS — R55 SYNCOPE: ICD-10-CM

## 2017-03-10 PROBLEM — G93.40 ACUTE ENCEPHALOPATHY: Status: ACTIVE | Noted: 2017-03-10
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
## 2017-03-10 PROBLEM — F10.20 ETOH DEPENDENCE: Status: ACTIVE | Noted: 2017-03-10

## 2017-03-10 PROBLEM — E86.0 DEHYDRATION: Status: ACTIVE | Noted: 2017-03-10

## 2017-03-10 LAB
ALBUMIN SERPL BCP-MCNC: 4.1 G/DL
ALP SERPL-CCNC: 53 U/L
ALT SERPL W/O P-5'-P-CCNC: 20 U/L
ANION GAP SERPL CALC-SCNC: 16 MMOL/L
AST SERPL-CCNC: 47 U/L
BACTERIA #/AREA URNS HPF: NORMAL /HPF
BASOPHILS # BLD AUTO: 0.02 K/UL
BASOPHILS NFR BLD: 0.1 %
BILIRUB SERPL-MCNC: 0.8 MG/DL
BILIRUB UR QL STRIP: ABNORMAL
BNP SERPL-MCNC: 1208 PG/ML
BUN SERPL-MCNC: 11 MG/DL
CALCIUM SERPL-MCNC: 9.3 MG/DL
CHLORIDE SERPL-SCNC: 94 MMOL/L
CK SERPL-CCNC: 604 U/L
CLARITY UR: CLEAR
CO2 SERPL-SCNC: 22 MMOL/L
COLOR UR: YELLOW
CREAT SERPL-MCNC: 0.7 MG/DL
DIFFERENTIAL METHOD: ABNORMAL
EOSINOPHIL # BLD AUTO: 0 K/UL
EOSINOPHIL NFR BLD: 0 %
ERYTHROCYTE [DISTWIDTH] IN BLOOD BY AUTOMATED COUNT: 15.1 %
EST. GFR  (AFRICAN AMERICAN): >60 ML/MIN/1.73 M^2
EST. GFR  (NON AFRICAN AMERICAN): >60 ML/MIN/1.73 M^2
GLUCOSE SERPL-MCNC: 74 MG/DL
GLUCOSE UR QL STRIP: NEGATIVE
HCT VFR BLD AUTO: 42.2 %
HGB BLD-MCNC: 14 G/DL
HGB UR QL STRIP: NEGATIVE
HYALINE CASTS #/AREA URNS LPF: 0 /LPF
INR PPP: 0.9
KETONES UR QL STRIP: ABNORMAL
LEUKOCYTE ESTERASE UR QL STRIP: NEGATIVE
LYMPHOCYTES # BLD AUTO: 0.7 K/UL
LYMPHOCYTES NFR BLD: 4.2 %
MCH RBC QN AUTO: 30.6 PG
MCHC RBC AUTO-ENTMCNC: 33.2 %
MCV RBC AUTO: 92 FL
MICROSCOPIC COMMENT: NORMAL
MONOCYTES # BLD AUTO: 1.3 K/UL
MONOCYTES NFR BLD: 7.6 %
NEUTROPHILS # BLD AUTO: 15.6 K/UL
NEUTROPHILS NFR BLD: 88.1 %
NITRITE UR QL STRIP: NEGATIVE
PH UR STRIP: 6 [PH] (ref 5–8)
PLATELET # BLD AUTO: 166 K/UL
PMV BLD AUTO: 12.3 FL
POTASSIUM SERPL-SCNC: 4.6 MMOL/L
PROT SERPL-MCNC: 8.1 G/DL
PROT UR QL STRIP: ABNORMAL
PROTHROMBIN TIME: 9.7 SEC
RBC # BLD AUTO: 4.57 M/UL
RBC #/AREA URNS HPF: 0 /HPF (ref 0–4)
SODIUM SERPL-SCNC: 132 MMOL/L
SP GR UR STRIP: >=1.03 (ref 1–1.03)
SQUAMOUS #/AREA URNS HPF: 0 /HPF
TROPONIN I SERPL DL<=0.01 NG/ML-MCNC: 0.03 NG/ML
TROPONIN I SERPL DL<=0.01 NG/ML-MCNC: 0.03 NG/ML
URATE CRY URNS QL MICRO: NORMAL
URN SPEC COLLECT METH UR: ABNORMAL
UROBILINOGEN UR STRIP-ACNC: NEGATIVE EU/DL
WBC # BLD AUTO: 17.69 K/UL
WBC #/AREA URNS HPF: 0 /HPF (ref 0–5)

## 2017-03-10 PROCEDURE — 82550 ASSAY OF CK (CPK): CPT

## 2017-03-10 PROCEDURE — 85025 COMPLETE CBC W/AUTO DIFF WBC: CPT

## 2017-03-10 PROCEDURE — 87040 BLOOD CULTURE FOR BACTERIA: CPT

## 2017-03-10 PROCEDURE — 83880 ASSAY OF NATRIURETIC PEPTIDE: CPT

## 2017-03-10 PROCEDURE — 85610 PROTHROMBIN TIME: CPT

## 2017-03-10 PROCEDURE — 25000003 PHARM REV CODE 250: Performed by: EMERGENCY MEDICINE

## 2017-03-10 PROCEDURE — 87186 SC STD MICRODIL/AGAR DIL: CPT

## 2017-03-10 PROCEDURE — 84484 ASSAY OF TROPONIN QUANT: CPT

## 2017-03-10 PROCEDURE — 96360 HYDRATION IV INFUSION INIT: CPT

## 2017-03-10 PROCEDURE — 93005 ELECTROCARDIOGRAM TRACING: CPT

## 2017-03-10 PROCEDURE — 81000 URINALYSIS NONAUTO W/SCOPE: CPT

## 2017-03-10 PROCEDURE — 21400001 HC TELEMETRY ROOM

## 2017-03-10 PROCEDURE — G0378 HOSPITAL OBSERVATION PER HR: HCPCS

## 2017-03-10 PROCEDURE — 93010 ELECTROCARDIOGRAM REPORT: CPT | Mod: ,,, | Performed by: INTERNAL MEDICINE

## 2017-03-10 PROCEDURE — 99285 EMERGENCY DEPT VISIT HI MDM: CPT | Mod: 25

## 2017-03-10 PROCEDURE — 87077 CULTURE AEROBIC IDENTIFY: CPT

## 2017-03-10 PROCEDURE — 80053 COMPREHEN METABOLIC PANEL: CPT

## 2017-03-10 RX ORDER — SODIUM CHLORIDE 9 MG/ML
INJECTION, SOLUTION INTRAVENOUS CONTINUOUS
Status: DISCONTINUED | OUTPATIENT
Start: 2017-03-10 | End: 2017-03-11 | Stop reason: HOSPADM

## 2017-03-10 RX ORDER — TRAZODONE HYDROCHLORIDE 50 MG/1
50 TABLET ORAL NIGHTLY
Status: CANCELLED | OUTPATIENT
Start: 2017-03-10

## 2017-03-10 RX ADMIN — SODIUM CHLORIDE 500 ML: 0.9 INJECTION, SOLUTION INTRAVENOUS at 07:03

## 2017-03-10 NOTE — IP AVS SNAPSHOT
[address] [place] [address]           Patient Discharge Instructions     Our goal is to set you up for success. This packet includes information on your condition, medications, and your home care. It will help you to care for yourself so you don't get sicker and need to go back to the hospital.     Please ask your nurse if you have any questions.        There are many details to remember when preparing to leave the hospital. Here is what you will need to do:    1. Take your medicine. If you are prescribed medications, review your Medication List in the following pages. You may have new medications to  at the pharmacy and others that you'll need to stop taking. Review the instructions for how and when to take your medications. Talk with your doctor or nurses if you are unsure of what to do.     2. Go to your follow-up appointments. Specific follow-up information is listed in the following pages. Your may be contacted by a transition nurse or clinical provider about future appointments. Be sure we have all of the phone numbers to reach you, if needed. Please contact your provider's office if you are unable to make an appointment.     3. Watch for warning signs. Your doctor or nurse will give you detailed warning signs to watch for and when to call for assistance. These instructions may also include educational information about your condition. If you experience any of warning signs to your health, call your doctor.               ** Verify the list of medication(s) below is accurate and up to date. Carry this with you in case of emergency. If your medications have changed, please notify your healthcare provider.             Medication List      STOP taking these medications     acetaminophen 500 MG tablet   Commonly known as:  TYLENOL       albuterol 5 mg/mL nebulizer solution   Commonly known as:  PROVENTIL       baicalin-catechin 500 mg Cap       blood  pressure test kit-medium Kit   You were prescribed two or more home medications with a similar name. You should completely stop taking this medication.       budesonide 0.5 mg/2 mL nebulizer solution   Commonly known as:  PULMICORT       cetirizine 10 MG tablet   Commonly known as:  ZYRTEC       diclofenac sodium 1.5 % Drop       fluticasone 50 mcg/actuation nasal spray   Commonly known as:  FLONASE       folic acid 1 MG tablet   Commonly known as:  FOLVITE       hydroxychloroquine 200 mg tablet   Commonly known as:  PLAQUENIL       inhalation device   Commonly known as:  AEROCHAMBER PLUS FLOW-VU       levothyroxine 25 MCG tablet   Commonly known as:  SYNTHROID       lisinopril 10 MG tablet       methotrexate 2.5 MG Tab       metoprolol succinate 25 MG 24 hr tablet   Commonly known as:  TOPROL-XL       multivitamin with minerals tablet       pantoprazole 40 MG tablet   Commonly known as:  PROTONIX       rosuvastatin 10 MG tablet   Commonly known as:  CRESTOR       sertraline 50 MG tablet   Commonly known as:  ZOLOFT       tramadol 50 mg tablet   Commonly known as:  ULTRAM       trazodone 50 MG tablet   Commonly known as:  DESYREL       VENTOLIN HFA 90 mcg/actuation inhaler   Generic drug:  albuterol       verapamil 240 MG CR tablet   Commonly known as:  CALAN-SR                  Please bring to all follow up appointments:    1. A copy of your discharge instructions.  2. All medicines you are currently taking in their original bottles.  3. Identification and insurance card.    Please arrive 15 minutes ahead of scheduled appointment time.    Please call 24 hours in advance if you must reschedule your appointment and/or time.        Your Scheduled Appointments     Mar 24, 2017  9:00 AM CDT   Consult with Jay Jay Cano MD   [facility] ([facility]    [address] [facility] [address]   [phone]            Apr 19, 2017  8:00 AM CDT   Spirometry with PULMONARY LAB, [facility] ([facility])    [address] [place] [address]   [phone]            Apr 19, 2017  8:20 AM CDT   Established Patient Visit with Chuck Slater MD   [facility] - Pulmonary Services ([facility])    [address] [place] [address]   [phone]            May 25, 2017  4:00 PM CDT   Non-Fasting Lab with [facility]ORAT[facility], [facility] ([facility]    [address] [place] [address]   [phone]            May 25, 2017  4:30 PM CDT   Established Patient Visit with Keegan Yarbrough MD   [facility] - Rheumatology ([facility])    [address] [place] [address]   [phone]              Follow-up Information     Follow up with Geronimo James NP In 5 days.    Specialty:  Cardiology    Why:  Hospital follow up.  Symptomatic aortic stenosis.    Contact information:    [address]  [place] [address]  [phone]          Follow up with Maria Guadalupe Herring MD In 1 week.    Specialty:  Family Medicine    Why:  Hospital follow up.    Contact information:    [facility] [place] [address] [phone]          Discharge Instructions     Future Orders    Diet Cardiac     Diet Cardiac     Diet general     Questions:    Total calories:      Fat restriction, if any:      Protein restriction, if any:      Na restriction, if any:      Fluid restriction:      Additional restrictions:          Primary Diagnosis     Your primary diagnosis was:  Low Sodium Levels      Admission Information     Date & Time Provider Department CSN    3/10/2017  6:27 PM Wilfred Fitzpatrick MD [facility] [id number]      Care Providers     Provider Role Specialty Primary office phone    Wilfred Fitzpatrick MD Attending Provider Internal Medicine [phone]    Wilfred Fitzpatrick MD Team Attending  Internal Medicine [phone]    Jose Antonio Stoner MD Consulting Physician  Internal Medicine [phone]      Your Vitals Were     BP  "Pulse Temp Resp Height Weight    132/77 73 98.5 °F (36.9 °C) (Oral) 16 5' 2" (1.575 m) 54 kg (119 lb 0.8 oz)    SpO2 BMI             96% 21.77 kg/m2         Recent Lab Values     No lab values to display.      Pending Labs     Order Current Status    Blood culture Preliminary result    Blood culture Preliminary result      Allergies as of 3/11/2017        Reactions    Sulfa (Sulfonamide Antibiotics) Shortness Of Breath      [facility]     [facility] Nurse Care Line - 24/7 Assistance  Unless otherwise directed by your provider, please contact [facility], our nurse care line that is available for 24/7 assistance.     Registered nurses in the [facility] provide clinical advisement, health education, appointment booking, and other advisory services.  Call for this free service at [phone].        Advance Directives     An advance directive is a document which, in the event you are no longer able to make decisions for yourself, tells your healthcare team what kind of treatment you do or do not want to receive, or who you would like to make those decisions for you.  If you do not currently have an advance directive, [facility] encourages you to create one.  For more information call:  [phone]), [phone] ([phone]),  or log on to [email or web address].        Language Assistance Services     ATTENTION: Language assistance services are available, free of charge. Please call [phone].      ATENCIÓN: Si habla español, tiene a duncan disposición servicios gratuitos de asistencia lingüística. Llame al [phone].     CHÚ Ý: N?u b?n nói Ti?ng Vi?t, có các d?ch v? h? tr? ngôn ng? mi?n phí dành cho b?n. G?i s? [phone].        [facility] Sign-Up     Activating your [facility] account is as easy as 1-2-3!     1) Visit [email or web address], select Sign Up Now, enter this activation code and your date of birth, then select Next.  [email or web address]  Expires: 3/24/2017  " 3:15 PM      2) Create a username and password to use when you visit [facility] in the future and select a security question in case you lose your password and select Next.    3) Enter your e-mail address and click Sign Up!    Additional Information  If you have questions, please e-mail [email or web address] or call [phone] to talk to our [facility] staff. Remember, [facility] is NOT to be used for urgent needs. For medical emergencies, dial 911.          [facility]  complies with applicable Federal civil rights laws and does not discriminate on the basis of race, color, national origin, age, disability, or sex.

## 2017-03-11 VITALS
TEMPERATURE: 99 F | OXYGEN SATURATION: 98 % | HEIGHT: 62 IN | SYSTOLIC BLOOD PRESSURE: 132 MMHG | RESPIRATION RATE: 18 BRPM | WEIGHT: 119.06 LBS | BODY MASS INDEX: 21.91 KG/M2 | HEART RATE: 64 BPM | DIASTOLIC BLOOD PRESSURE: 77 MMHG

## 2017-03-11 LAB
ALBUMIN SERPL BCP-MCNC: 3.6 G/DL
ALP SERPL-CCNC: 51 U/L
ALT SERPL W/O P-5'-P-CCNC: 16 U/L
ANION GAP SERPL CALC-SCNC: 15 MMOL/L
AORTIC VALVE REGURGITATION: ABNORMAL
AORTIC VALVE STENOSIS: ABNORMAL
AST SERPL-CCNC: 28 U/L
BASOPHILS # BLD AUTO: 0.01 K/UL
BASOPHILS NFR BLD: 0.1 %
BILIRUB SERPL-MCNC: 0.7 MG/DL
BUN SERPL-MCNC: 11 MG/DL
CALCIUM SERPL-MCNC: 8.7 MG/DL
CHLORIDE SERPL-SCNC: 100 MMOL/L
CO2 SERPL-SCNC: 20 MMOL/L
CREAT SERPL-MCNC: 0.7 MG/DL
DIASTOLIC DYSFUNCTION: YES
DIFFERENTIAL METHOD: ABNORMAL
EOSINOPHIL # BLD AUTO: 0 K/UL
EOSINOPHIL NFR BLD: 0 %
ERYTHROCYTE [DISTWIDTH] IN BLOOD BY AUTOMATED COUNT: 15.1 %
EST. GFR  (AFRICAN AMERICAN): >60 ML/MIN/1.73 M^2
EST. GFR  (NON AFRICAN AMERICAN): >60 ML/MIN/1.73 M^2
ESTIMATED PA SYSTOLIC PRESSURE: 18.84
ETHANOL SERPL-MCNC: <10 MG/DL
GLUCOSE SERPL-MCNC: 71 MG/DL
HCT VFR BLD AUTO: 38.6 %
HGB BLD-MCNC: 12.6 G/DL
LYMPHOCYTES # BLD AUTO: 1.2 K/UL
LYMPHOCYTES NFR BLD: 9 %
MAGNESIUM SERPL-MCNC: 1.8 MG/DL
MCH RBC QN AUTO: 30.7 PG
MCHC RBC AUTO-ENTMCNC: 32.6 %
MCV RBC AUTO: 94 FL
MONOCYTES # BLD AUTO: 0.9 K/UL
MONOCYTES NFR BLD: 6.6 %
NEUTROPHILS # BLD AUTO: 11 K/UL
NEUTROPHILS NFR BLD: 84.3 %
PLATELET # BLD AUTO: 168 K/UL
PMV BLD AUTO: 12 FL
POTASSIUM SERPL-SCNC: 3 MMOL/L
PROT SERPL-MCNC: 6.3 G/DL
RBC # BLD AUTO: 4.11 M/UL
RETIRED EF AND QEF - SEE NOTES: 70 (ref 55–65)
SODIUM SERPL-SCNC: 135 MMOL/L
TRICUSPID VALVE REGURGITATION: ABNORMAL
WBC # BLD AUTO: 12.99 K/UL

## 2017-03-11 PROCEDURE — 63600175 PHARM REV CODE 636 W HCPCS: Performed by: INTERNAL MEDICINE

## 2017-03-11 PROCEDURE — 80053 COMPREHEN METABOLIC PANEL: CPT

## 2017-03-11 PROCEDURE — 25000242 PHARM REV CODE 250 ALT 637 W/ HCPCS: Performed by: NURSE PRACTITIONER

## 2017-03-11 PROCEDURE — 25000003 PHARM REV CODE 250: Performed by: INTERNAL MEDICINE

## 2017-03-11 PROCEDURE — 36415 COLL VENOUS BLD VENIPUNCTURE: CPT

## 2017-03-11 PROCEDURE — 25000003 PHARM REV CODE 250: Performed by: NURSE PRACTITIONER

## 2017-03-11 PROCEDURE — 94640 AIRWAY INHALATION TREATMENT: CPT

## 2017-03-11 PROCEDURE — G8988 SELF CARE GOAL STATUS: HCPCS | Mod: CJ

## 2017-03-11 PROCEDURE — 93306 TTE W/DOPPLER COMPLETE: CPT | Mod: 26,,, | Performed by: INTERNAL MEDICINE

## 2017-03-11 PROCEDURE — 25000003 PHARM REV CODE 250: Performed by: EMERGENCY MEDICINE

## 2017-03-11 PROCEDURE — 63600175 PHARM REV CODE 636 W HCPCS: Performed by: EMERGENCY MEDICINE

## 2017-03-11 PROCEDURE — 97165 OT EVAL LOW COMPLEX 30 MIN: CPT

## 2017-03-11 PROCEDURE — 83735 ASSAY OF MAGNESIUM: CPT

## 2017-03-11 PROCEDURE — 93306 TTE W/DOPPLER COMPLETE: CPT

## 2017-03-11 PROCEDURE — 80320 DRUG SCREEN QUANTALCOHOLS: CPT

## 2017-03-11 PROCEDURE — G8987 SELF CARE CURRENT STATUS: HCPCS | Mod: CK

## 2017-03-11 PROCEDURE — 85025 COMPLETE CBC W/AUTO DIFF WBC: CPT

## 2017-03-11 RX ORDER — ROSUVASTATIN CALCIUM 10 MG/1
10 TABLET, COATED ORAL NIGHTLY
Status: DISCONTINUED | OUTPATIENT
Start: 2017-03-11 | End: 2017-03-11 | Stop reason: HOSPADM

## 2017-03-11 RX ORDER — SODIUM CHLORIDE 9 MG/ML
INJECTION, SOLUTION INTRAVENOUS CONTINUOUS
Status: DISCONTINUED | OUTPATIENT
Start: 2017-03-11 | End: 2017-03-11 | Stop reason: HOSPADM

## 2017-03-11 RX ORDER — ACETAMINOPHEN 325 MG/1
650 TABLET ORAL EVERY 6 HOURS PRN
Status: DISCONTINUED | OUTPATIENT
Start: 2017-03-11 | End: 2017-03-11 | Stop reason: HOSPADM

## 2017-03-11 RX ORDER — IPRATROPIUM BROMIDE AND ALBUTEROL SULFATE 2.5; .5 MG/3ML; MG/3ML
3 SOLUTION RESPIRATORY (INHALATION)
Status: DISCONTINUED | OUTPATIENT
Start: 2017-03-11 | End: 2017-03-11 | Stop reason: HOSPADM

## 2017-03-11 RX ORDER — POTASSIUM CHLORIDE 7.45 MG/ML
20 INJECTION INTRAVENOUS ONCE
Status: DISCONTINUED | OUTPATIENT
Start: 2017-03-11 | End: 2017-03-11 | Stop reason: SDUPTHER

## 2017-03-11 RX ORDER — PANTOPRAZOLE SODIUM 40 MG/1
40 TABLET, DELAYED RELEASE ORAL DAILY
Status: DISCONTINUED | OUTPATIENT
Start: 2017-03-11 | End: 2017-03-11 | Stop reason: HOSPADM

## 2017-03-11 RX ORDER — FOLIC ACID 1 MG/1
1 TABLET ORAL DAILY
Status: DISCONTINUED | OUTPATIENT
Start: 2017-03-11 | End: 2017-03-11 | Stop reason: HOSPADM

## 2017-03-11 RX ORDER — THIAMINE HCL 100 MG
100 TABLET ORAL DAILY
Status: DISCONTINUED | OUTPATIENT
Start: 2017-03-11 | End: 2017-03-11 | Stop reason: HOSPADM

## 2017-03-11 RX ORDER — POLYETHYLENE GLYCOL 3350 17 G/17G
17 POWDER, FOR SOLUTION ORAL 2 TIMES DAILY PRN
Status: DISCONTINUED | OUTPATIENT
Start: 2017-03-11 | End: 2017-03-11 | Stop reason: HOSPADM

## 2017-03-11 RX ORDER — BUDESONIDE 0.5 MG/2ML
0.5 INHALANT ORAL EVERY 12 HOURS
Status: DISCONTINUED | OUTPATIENT
Start: 2017-03-11 | End: 2017-03-11 | Stop reason: HOSPADM

## 2017-03-11 RX ORDER — ENOXAPARIN SODIUM 100 MG/ML
40 INJECTION SUBCUTANEOUS EVERY 24 HOURS
Status: DISCONTINUED | OUTPATIENT
Start: 2017-03-11 | End: 2017-03-11 | Stop reason: HOSPADM

## 2017-03-11 RX ORDER — POTASSIUM CHLORIDE 7.45 MG/ML
10 INJECTION INTRAVENOUS ONCE
Status: COMPLETED | OUTPATIENT
Start: 2017-03-11 | End: 2017-03-11

## 2017-03-11 RX ORDER — METOPROLOL SUCCINATE 25 MG/1
25 TABLET, EXTENDED RELEASE ORAL DAILY
Status: DISCONTINUED | OUTPATIENT
Start: 2017-03-11 | End: 2017-03-11 | Stop reason: HOSPADM

## 2017-03-11 RX ORDER — RAMELTEON 8 MG/1
8 TABLET ORAL NIGHTLY PRN
Status: DISCONTINUED | OUTPATIENT
Start: 2017-03-11 | End: 2017-03-11 | Stop reason: HOSPADM

## 2017-03-11 RX ORDER — TRAMADOL HYDROCHLORIDE 50 MG/1
50 TABLET ORAL EVERY 6 HOURS PRN
Status: DISCONTINUED | OUTPATIENT
Start: 2017-03-11 | End: 2017-03-11 | Stop reason: HOSPADM

## 2017-03-11 RX ORDER — SERTRALINE HYDROCHLORIDE 50 MG/1
50 TABLET, FILM COATED ORAL DAILY
Status: DISCONTINUED | OUTPATIENT
Start: 2017-03-11 | End: 2017-03-11 | Stop reason: HOSPADM

## 2017-03-11 RX ORDER — CETIRIZINE HYDROCHLORIDE 10 MG/1
10 TABLET ORAL DAILY
Status: DISCONTINUED | OUTPATIENT
Start: 2017-03-11 | End: 2017-03-11 | Stop reason: HOSPADM

## 2017-03-11 RX ORDER — ONDANSETRON 2 MG/ML
4 INJECTION INTRAMUSCULAR; INTRAVENOUS EVERY 8 HOURS PRN
Status: DISCONTINUED | OUTPATIENT
Start: 2017-03-11 | End: 2017-03-11

## 2017-03-11 RX ORDER — IBUPROFEN 200 MG
24 TABLET ORAL
Status: DISCONTINUED | OUTPATIENT
Start: 2017-03-11 | End: 2017-03-11 | Stop reason: HOSPADM

## 2017-03-11 RX ORDER — HYDROXYCHLOROQUINE SULFATE 200 MG/1
200 TABLET, FILM COATED ORAL 2 TIMES DAILY
Status: DISCONTINUED | OUTPATIENT
Start: 2017-03-11 | End: 2017-03-11 | Stop reason: HOSPADM

## 2017-03-11 RX ORDER — SODIUM,POTASSIUM PHOSPHATES 280-250MG
1 POWDER IN PACKET (EA) ORAL
Status: DISCONTINUED | OUTPATIENT
Start: 2017-03-11 | End: 2017-03-11 | Stop reason: HOSPADM

## 2017-03-11 RX ORDER — MAGNESIUM SULFATE 1 G/100ML
1 INJECTION INTRAVENOUS
Status: DISPENSED | OUTPATIENT
Start: 2017-03-11 | End: 2017-03-11

## 2017-03-11 RX ORDER — LEVOTHYROXINE SODIUM 25 UG/1
25 TABLET ORAL
Status: DISCONTINUED | OUTPATIENT
Start: 2017-03-11 | End: 2017-03-11 | Stop reason: HOSPADM

## 2017-03-11 RX ORDER — ONDANSETRON 2 MG/ML
4 INJECTION INTRAMUSCULAR; INTRAVENOUS EVERY 12 HOURS PRN
Status: DISCONTINUED | OUTPATIENT
Start: 2017-03-11 | End: 2017-03-11 | Stop reason: HOSPADM

## 2017-03-11 RX ORDER — VERAPAMIL HYDROCHLORIDE 240 MG/1
240 TABLET, FILM COATED, EXTENDED RELEASE ORAL DAILY
Status: DISCONTINUED | OUTPATIENT
Start: 2017-03-11 | End: 2017-03-11 | Stop reason: HOSPADM

## 2017-03-11 RX ORDER — IBUPROFEN 200 MG
16 TABLET ORAL
Status: DISCONTINUED | OUTPATIENT
Start: 2017-03-11 | End: 2017-03-11 | Stop reason: HOSPADM

## 2017-03-11 RX ORDER — AMOXICILLIN 250 MG
1 CAPSULE ORAL 2 TIMES DAILY PRN
Status: DISCONTINUED | OUTPATIENT
Start: 2017-03-11 | End: 2017-03-11 | Stop reason: HOSPADM

## 2017-03-11 RX ORDER — GLUCAGON 1 MG
1 KIT INJECTION
Status: DISCONTINUED | OUTPATIENT
Start: 2017-03-11 | End: 2017-03-11 | Stop reason: HOSPADM

## 2017-03-11 RX ADMIN — POTASSIUM & SODIUM PHOSPHATES POWDER PACK 280-160-250 MG 1 PACKET: 280-160-250 PACK at 05:03

## 2017-03-11 RX ADMIN — SODIUM CHLORIDE: 0.9 INJECTION, SOLUTION INTRAVENOUS at 02:03

## 2017-03-11 RX ADMIN — IPRATROPIUM BROMIDE AND ALBUTEROL SULFATE 3 ML: .5; 3 SOLUTION RESPIRATORY (INHALATION) at 01:03

## 2017-03-11 RX ADMIN — IPRATROPIUM BROMIDE AND ALBUTEROL SULFATE 3 ML: .5; 3 SOLUTION RESPIRATORY (INHALATION) at 08:03

## 2017-03-11 RX ADMIN — FOLIC ACID 1 MG: 1 TABLET ORAL at 09:03

## 2017-03-11 RX ADMIN — VERAPAMIL HYDROCHLORIDE 240 MG: 240 TABLET, FILM COATED, EXTENDED RELEASE ORAL at 09:03

## 2017-03-11 RX ADMIN — POTASSIUM & SODIUM PHOSPHATES POWDER PACK 280-160-250 MG 1 PACKET: 280-160-250 PACK at 12:03

## 2017-03-11 RX ADMIN — CETIRIZINE HYDROCHLORIDE 10 MG: 10 TABLET, FILM COATED ORAL at 09:03

## 2017-03-11 RX ADMIN — ENOXAPARIN SODIUM 40 MG: 100 INJECTION SUBCUTANEOUS at 12:03

## 2017-03-11 RX ADMIN — BUDESONIDE 0.5 MG: 0.5 SUSPENSION RESPIRATORY (INHALATION) at 07:03

## 2017-03-11 RX ADMIN — MAGNESIUM SULFATE IN DEXTROSE 1 G: 10 INJECTION, SOLUTION INTRAVENOUS at 09:03

## 2017-03-11 RX ADMIN — PANTOPRAZOLE SODIUM 40 MG: 40 TABLET, DELAYED RELEASE ORAL at 09:03

## 2017-03-11 RX ADMIN — METOPROLOL SUCCINATE 25 MG: 25 TABLET, EXTENDED RELEASE ORAL at 09:03

## 2017-03-11 RX ADMIN — TRAMADOL HYDROCHLORIDE 50 MG: 50 TABLET, COATED ORAL at 02:03

## 2017-03-11 RX ADMIN — POTASSIUM CHLORIDE 10 MEQ: 10 INJECTION, SOLUTION INTRAVENOUS at 12:03

## 2017-03-11 RX ADMIN — POTASSIUM & SODIUM PHOSPHATES POWDER PACK 280-160-250 MG 1 PACKET: 280-160-250 PACK at 09:03

## 2017-03-11 RX ADMIN — SERTRALINE HYDROCHLORIDE 50 MG: 50 TABLET, FILM COATED ORAL at 09:03

## 2017-03-11 RX ADMIN — HYDROXYCHLOROQUINE SULFATE 200 MG: 200 TABLET, FILM COATED ORAL at 09:03

## 2017-03-11 RX ADMIN — ROSUVASTATIN CALCIUM 10 MG: 10 TABLET, FILM COATED ORAL at 02:03

## 2017-03-11 RX ADMIN — LEVOTHYROXINE SODIUM 25 MCG: 25 TABLET ORAL at 06:03

## 2017-03-11 RX ADMIN — BUDESONIDE 0.5 MG: 0.5 SUSPENSION RESPIRATORY (INHALATION) at 08:03

## 2017-03-11 RX ADMIN — IPRATROPIUM BROMIDE AND ALBUTEROL SULFATE 3 ML: .5; 3 SOLUTION RESPIRATORY (INHALATION) at 07:03

## 2017-03-11 RX ADMIN — Medication 100 MG: at 09:03

## 2017-03-11 NOTE — ASSESSMENT & PLAN NOTE
COPD exacerbation/Acute on chronic Respiratory Failure  -Duonebs  -bronchodilators  -Oxygen prn

## 2017-03-11 NOTE — PLAN OF CARE
Problem: Patient Care Overview  Goal: Plan of Care Review  Outcome: Ongoing (interventions implemented as appropriate)  Pt oriented to self and place, reoriented during hourly rounding. Stable on RA. NS @ 50 ml/hr. Incont pad changed. Pt remained free of falls during shift, bed alarm set, room near nurses station, call light in reach, room free of clutter, side rails up X2, pt on telemetry monitor SR, will continue to monitor.

## 2017-03-11 NOTE — PT/OT/SLP EVAL
Occupational Therapy  Evaluation    Crystal Romero   MRN: [id number]   Admitting Diagnosis: Hyponatremia    OT Date of Treatment: 17   OT Start Time: 0850  OT Stop Time: 905  OT Total Time (min): 15 min    Billable Minutes:  Evaluation 15    Diagnosis: Hyponatremia   OT Tx Dx: Debility    Past Medical History:   Diagnosis Date    Arthritis     Coronary artery disease     Depression     Encounter for blood transfusion     H/O: GI bleed     Hiatal hernia     Hyperlipidemia     Hypertension     Hypothyroid     Rheumatoid arthritis(714.0)     Stroke       Past Surgical History:   Procedure Laterality Date    TONSILLECTOMY         Referring physician: Dr. Frost  Date referred to OT: 3/11/17    General Precautions: Standard, fall  Orthopedic Precautions: N/A  Braces: N/A          Patient History:  Living Environment  Lives With: child(rosaura), adult  Living Arrangements: house  Home Layout: Able to live on 1st floor  Stair Railings at Home: none  Transportation Available: family or friend will provide  Living Environment Comment: Pt lives with daughter in 1-story home, no steps. Functionally independent prior to admit. Alone ~8 hr during week (daughter works)  Equipment Currently Used at Home: shower chair    Prior level of function:   Bed Mobility/Transfers: independent  Grooming: independent  Bathing: independent  Upper Body Dressing: independent  Lower Body Dressing: independent  Toileting: independent  Home Management Skills: independent  Homemaking Responsibilities: Yes  Driving License: No  Mode of Transportation: Family  Occupation: Retired     Dominant hand: right    Subjective:  Communicated with RN prior to session.    Chief Complaint: Impaired balance  Patient/Family stated goals: Return to PLOF    Pain Ratin/10              Pain Rating Post-Intervention: 0/10    Objective:  Patient found with: telemetry, peripheral IV    Cognitive Exam:  Oriented to: Person and Place  Follows  Commands/attention: Follows two-step commands  Communication: clear/fluent  Memory:  Impaired STM and Poor immediate recall  Safety awareness/insight to disability: impaired  Coping skills/emotional control: Appropriate to situation    Visual/perceptual:  Intact    Physical Exam:  Postural examination/scapula alignment: Rounded shoulder and Head forward  Skin integrity: Visible skin intact  Edema: None noted     Sensation:   Intact    Upper Extremity Range of Motion:  Right Upper Extremity: WFL except shoulder 50%  Left Upper Extremity: WFL except shoulder 50%    Upper Extremity Strength:  Right Upper Extremity: Grossly 3/5  Left Upper Extremity: Grossly 3/5   Strength: Fair +    Fine motor coordination:   Intact, with slight limitations due to arthritis       Functional Mobility:  Bed Mobility:  Rolling/Turning to Left: Minimum assistance  Rolling/Turning Right: Minimum assistance  Scooting/Bridging: Minimum Assistance  Supine to Sit: Minimum Assistance  Sit to Supine: Minimum Assistance    Transfers:  Sit <> Stand Assistance: Minimum Assistance  Sit <> Stand Assistive Device: No Assistive Device (HHA)  Bed <> Chair Technique: Stand Pivot  Bed <> Chair Transfer Assistance: Minimum Assistance  Bed <> Chair Assistive Device: No Assistive Device (HHA)    Functional Ambulation: Transferred bed to chair min A, with HHA.     Activities of Daily Living:  Feeding Level of Assistance: Set-up Assistance    LE Dressing Level of Assistance: Minimum assistance    Grooming Position: Seated  Grooming Level of Assistance: Supervision    Balance:   Static Sit: GOOD: Takes MODERATE challenges from all directions  Dynamic Sit: FAIR+: Maintains balance through MINIMAL excursions of active trunk motion  Static Stand: FAIR: Maintains without assist but unable to take challenges  Dynamic stand: FAIR: Needs CONTACT GUARD during gait    AM-PAC 6 CLICK ADL  How much help from another person does this patient currently need?  1 = Unable,  "Total/Dependent Assistance  2 = A lot, Maximum/Moderate Assistance  3 = A little, Minimum/Contact Guard/Supervision  4 = None, Modified [place]/Independent         AM-PAC Raw Score CMS "G-Code Modifier Level of Impairment Assistance   6 % Total / Unable   7 - 9 CM 80 - 100% Maximal Assist   10 - 14 CL 60 - 80% Moderate Assist   15 - 19 CK 40 - 60% Moderate Assist   20 - 22 CJ 20 - 40% Minimal Assist   23 CI 1-20% SBA / CGA   24 CH 0% Independent/ Mod I       Patient left up in chair with all lines intact, call button in reach and daughter present    Assessment:  Crystal Romero is a 82 y.o. female with a medical diagnosis of Hyponatremia and presents with debility.    Rehab identified problem list/impairments: Rehab identified problem list/impairments: impaired endurance, weakness, impaired self care skills, impaired functional mobilty, gait instability, impaired balance, decreased coordination, decreased safety awareness    Rehab potential is good.    Activity tolerance: Good    Discharge recommendations: Discharge Facility/Level Of Care Needs: home health OT     Barriers to discharge: Barriers to Discharge: None    Equipment recommendations: none     GOALS:   Occupational Therapy Goals        Problem: Occupational Therapy Goal    Goal Priority Disciplines Outcome Interventions   Occupational Therapy Goal     OT, PT/OT     Description:  Goals to be met by: 3/18/17     Patient will increase functional independence with ADLs by performing:    LE Dressing with Set-up Assistance.  Grooming while standing at sink with Set-up Assistance.  Toileting from toilet with Supervision for hygiene and clothing management.   Toilet transfer to toilet with Stand-by Assistance.  Upper extremity exercise program x10 reps per handout, with assistance as needed within available ranges.                PLAN:  Patient to be seen 3 x/week to address the above listed problems via self-care/home management, therapeutic " activities, therapeutic exercises  Plan of Care expires: 03/18/17  Plan of Care reviewed with: patient, daughter    OT G-codes  Functional Assessment Tool Used: FIM-ADL  Score: 4  Functional Limitation: Self care  Self Care Current Status (): CK  Self Care Goal Status (): NENA Hinojosa OT  03/11/2017

## 2017-03-11 NOTE — ED PROVIDER NOTES
"SCRIBE #1 NOTE: I, Shira Carrera, am scribing for, and in the presence of, Oleg Claudio Jr., MD. I have scribed the entire note.        History      Chief Complaint   Patient presents with    Fall     pt found on floor this evening - unwitnessed fall- c/o L shoulder pain with movement.         Review of patient's allergies indicates:   Allergen Reactions    Sulfa (sulfonamide antibiotics) Shortness Of Breath        HPI   HPI    3/10/2017, 7:11 PM   History obtained from the daughter      History of Present Illness: Crystal Romero is a 82 y.o. female patient, with a hx of dementia, who presents to the Emergency Department for an unwitnessed fall which onset suddenly earlier today. Daughter reports finding pt on the floor next to her bed.  Pt states that she does not remember falling and is not sure whether she hit her head or not. Pt is complaining of neck pain. Sx have been constant and moderate in severity. No modifying factors noted. Daughter states that pt has been weak and "not acting herself." No other associated sx included. Daughter denies any fever, chills, CP, SOB, n/v, visual disturbance, speech difficulty, or weakness/numbness. No further complaints at this time.       Arrival mode: EMS    PCP: Maria Guadalupe Herring MD       Past Medical History:  Past Medical History:   Diagnosis Date    Arthritis     Coronary artery disease     Depression     Encounter for blood transfusion     H/O: GI bleed     Hiatal hernia     Hyperlipidemia     Hypertension     Hypothyroid     Rheumatoid arthritis(714.0)     Stroke        Past Surgical History:  Past Surgical History:   Procedure Laterality Date    TONSILLECTOMY           Family History:  Family History   Problem Relation Age of Onset    Cancer Mother      breast, uterine       Social History:  Social History     Social History Main Topics    Smoking status: Former Smoker     Packs/day: 2.00     Types: Cigarettes     Quit date: 1/1/1950    " Smokeless tobacco: Unknown     Alcohol use 1.8 oz/week     3 Glasses of wine per week      Comment: daily , glass of wine daily     Drug use: No    Sexual activity: Unknown        ROS   Review of Systems   Constitutional: Negative for chills, diaphoresis and fever.        (+) generalized weakness   HENT: Negative for sore throat.    Eyes: Negative for visual disturbance.   Respiratory: Negative for shortness of breath.    Cardiovascular: Negative for chest pain.   Gastrointestinal: Negative for abdominal pain, blood in stool, constipation, diarrhea, nausea and vomiting.   Genitourinary: Negative for dysuria.   Musculoskeletal: Positive for neck pain. Negative for neck stiffness.   Skin: Negative for rash.   Neurological: Negative for dizziness, speech difficulty, weakness, light-headedness and numbness.   Hematological: Does not bruise/bleed easily.       Physical Exam    Initial Vitals   BP Pulse Resp Temp SpO2   03/10/17 1828 03/10/17 1828 03/10/17 1828 03/10/17 1828 03/10/17 1828   152/62 70 18 98.4 °F (36.9 °C) 97 %      Physical Exam  Nursing Notes and Vital Signs Reviewed.  Constitutional: Patient is in no acute distress. Awake and alert. Elderly.   Head:  Normocephalic. Occipital tenderness. Midface is stable. No facial tenderness.   Eyes: PERRL. EOM intact. Conjunctivae are not pale. No scleral icterus.  ENT: Mucous membranes are moist. Oropharynx is clear and symmetric.    Neck: Supple. Full ROM. No lymphadenopathy. C-spine is TTP. No deformities, step-offs, dislocations, or palpable fractures appreciated. No ecchymosis.   Cardiovascular: Regular rate. Regular rhythm. No murmurs, rubs, or gallops. Distal pulses are 2+ and symmetric.  Pulmonary/Chest: No respiratory distress. Clear to auscultation bilaterally. No wheezing, rales, or rhonchi. Chest wall is non-tender. No crepitus. No asymmetric rise. No flail segment.   Abdominal: Soft and non-distended.  There is no tenderness.  No rebound, guarding, or  rigidity.   Back: No midline bony tenderness, deformities, or step-offs of the T-spine or L-spine. Skin appears normal without abrasions or bruising. No erythema, induration, or fluctuance.   Musculoskeletal: Moves all extremities. No obvious deformities. Limited ROM to bilateral shoulders due to chronic diagnosis of frozen shoulder. Pelvis is non-tender and stable to compression. No deformities or bony tenderness of extremities.   Skin: Warm and dry. No abrasions, lacerations, or ecchymosis.   Neurological:  Alert, awake, and appropriate.  Normal speech. Strength is normal, equal, 5/5 in bilateral upper and lower extremities. No acute focal neurological deficits are appreciated.    ED Course    Procedures  ED Vital Signs:  Vitals:    03/10/17 1828 03/10/17 1839 03/10/17 1850 03/10/17 1851   BP: (!) 152/62 (!) 162/74  (!) 177/66   Pulse: 70 74 73 71   Resp: 18   16   Temp: 98.4 °F (36.9 °C)      TempSrc: Oral      SpO2: 97% 98%  98%       Abnormal Lab Results:  Labs Reviewed   CBC W/ AUTO DIFFERENTIAL - Abnormal; Notable for the following:        Result Value    WBC 17.69 (*)     RDW 15.1 (*)     Gran # 15.6 (*)     Lymph # 0.7 (*)     Mono # 1.3 (*)     Gran% 88.1 (*)     Lymph% 4.2 (*)     All other components within normal limits    Narrative:     PLEASE REVIEW ORDER START TIME BEFORE MARKING SPECIMEN  COLLECTED.   COMPREHENSIVE METABOLIC PANEL - Abnormal; Notable for the following:     Sodium 132 (*)     Chloride 94 (*)     CO2 22 (*)     Alkaline Phosphatase 53 (*)     AST 47 (*)     All other components within normal limits    Narrative:     PLEASE REVIEW ORDER START TIME BEFORE MARKING SPECIMEN  COLLECTED.   B-TYPE NATRIURETIC PEPTIDE - Abnormal; Notable for the following:     BNP 1208 (*)     All other components within normal limits    Narrative:     PLEASE REVIEW ORDER START TIME BEFORE MARKING SPECIMEN  COLLECTED.   CK - Abnormal; Notable for the following:      (*)     All other components within  normal limits   PROTIME-INR    Narrative:     PLEASE REVIEW ORDER START TIME BEFORE MARKING SPECIMEN  COLLECTED.   TROPONIN I    Narrative:     PLEASE REVIEW ORDER START TIME BEFORE MARKING SPECIMEN  COLLECTED.   TROPONIN I    Narrative:     PLEASE REVIEW ORDER START TIME BEFORE MARKING SPECIMEN  COLLECTED.   URINALYSIS   URINALYSIS   URINALYSIS        All Lab Results:  Results for orders placed or performed during the hospital encounter of 03/10/17   CBC auto differential   Result Value Ref Range    WBC 17.69 (H) 3.90 - 12.70 K/uL    RBC 4.57 4.00 - 5.40 M/uL    Hemoglobin 14.0 12.0 - 16.0 g/dL    Hematocrit 42.2 37.0 - 48.5 %    MCV 92 82 - 98 fL    MCH 30.6 27.0 - 31.0 pg    MCHC 33.2 32.0 - 36.0 %    RDW 15.1 (H) 11.5 - 14.5 %    Platelets 166 150 - 350 K/uL    MPV 12.3 9.2 - 12.9 fL    Gran # 15.6 (H) 1.8 - 7.7 K/uL    Lymph # 0.7 (L) 1.0 - 4.8 K/uL    Mono # 1.3 (H) 0.3 - 1.0 K/uL    Eos # 0.0 0.0 - 0.5 K/uL    Baso # 0.02 0.00 - 0.20 K/uL    Gran% 88.1 (H) 38.0 - 73.0 %    Lymph% 4.2 (L) 18.0 - 48.0 %    Mono% 7.6 4.0 - 15.0 %    Eosinophil% 0.0 0.0 - 8.0 %    Basophil% 0.1 0.0 - 1.9 %    Differential Method Automated    Comprehensive metabolic panel   Result Value Ref Range    Sodium 132 (L) 136 - 145 mmol/L    Potassium 4.6 3.5 - 5.1 mmol/L    Chloride 94 (L) 95 - 110 mmol/L    CO2 22 (L) 23 - 29 mmol/L    Glucose 74 70 - 110 mg/dL    BUN, Bld 11 8 - 23 mg/dL    Creatinine 0.7 0.5 - 1.4 mg/dL    Calcium 9.3 8.7 - 10.5 mg/dL    Total Protein 8.1 6.0 - 8.4 g/dL    Albumin 4.1 3.5 - 5.2 g/dL    Total Bilirubin 0.8 0.1 - 1.0 mg/dL    Alkaline Phosphatase 53 (L) 55 - 135 U/L    AST 47 (H) 10 - 40 U/L    ALT 20 10 - 44 U/L    Anion Gap 16 8 - 16 mmol/L    eGFR if African American >60 >60 mL/min/1.73 m^2    eGFR if non African American >60 >60 mL/min/1.73 m^2   Protime-INR   Result Value Ref Range    Prothrombin Time 9.7 9.0 - 12.5 sec    INR 0.9 0.8 - 1.2   Troponin I   Result Value Ref Range    Troponin I 0.025  0.000 - 0.026 ng/mL   Troponin I   Result Value Ref Range    Troponin I 0.025 0.000 - 0.026 ng/mL   B-Type natriuretic peptide (BNP)   Result Value Ref Range    BNP 1208 (H) 0 - 99 pg/mL   CK   Result Value Ref Range     (H) 20 - 180 U/L         Imaging Results:  Imaging Results         CT Cervical Spine Without Contrast (Final result) Result time:  03/10/17 20:05:00    Final result by Torsten Salas MD (03/10/17 20:05:00)    Impression:         No acute findings noted involving the cervical spine.  Multilevel disc space narrowing and osteoarthritis.    All CT scans at this facility use dose modulation, iterative reconstruction, and/or weight based dosing when appropriate to reduce radiation dose to as low as reasonably achievable.      Electronically signed by: TORSTEN SALAS MD  Date:     03/10/17  Time:    20:05     Narrative:    Exam: CT scan of the cervical spine without contrast    History:     Neck pain, syncope and collapse  There is disc space narrowing and osteoarthritis at C5-C6 and C6-C7.  No fracture or acute bony abnormality identified.  Arthritic changes involving facets at C3-C4 through C6-C7 bilaterally with multilevel bilateral bony foraminal narrowing.  No spinal canal stenosis is identified.            CT Head Without Contrast (Final result) Result time:  03/10/17 19:57:13    Final result by Torsten Salas MD (03/10/17 19:57:13)    Impression:         Cerebral atrophy. No acute intracranial findings are identified.    All CT scans at this facility use dose modulation, iterative reconstruction, and/or weight based dosing when appropriate to reduce radiation dose to as low as reasonably achievable.      Electronically signed by: TORSTEN SALAS MD  Date:     03/10/17  Time:    19:57     Narrative:    Exam: Noncontrast CT head    Clinical History:     Syncope and collapse    Findings:     Note made of generalized cerebral and cerebellar atrophy . Low density changes in the periventricular white matter  consistent with small vessel ischemic change. No intracranial hemorrhage or acute intracranial abnormality is identified. The calvarium is intact. Visualized paranasal sinuses and mastoid air cells are clear.            X-Ray Chest AP Portable (Final result) Result time:  03/10/17 19:16:11    Final result by Torsten Rosen MD (03/10/17 19:16:11)    Impression:     No acute cardiopulmonary disease.      Electronically signed by: TORSTEN ROSEN MD  Date:     03/10/17  Time:    19:16     Narrative:    Exam: Portable chest xray    History:    Syncope, fall    Findings:     The heart is normal and the lungs are clear.  Aortic atherosclerosis.  Hiatal hernia.             The EKG was ordered, reviewed, and independently interpreted by the ED provider.  Interpretation time: 19:20  Rate: 77 BPM  Rhythm: sinus rhythm with premature supraventricular complexes  Interpretation: Minimal voltage criteria for LVH. No STEMI.         The Emergency Provider reviewed the vital signs and test results, which are outlined above.    ED Discussion     8:20 PM: Dr. Claudio discussed pt's case with Myrna Wright NP ([facility]) who recommends ordering a UA and calling back concerning admission.     8:28 PM: Discussed case with Dr. Frost ([facility]). Dr. Frost agrees with current care and management of pt and accepts admission.   Admitting Service: [facility]   Admitting Physician: Dr. Frost  Admit to: Observation/Telemetry     8:29 PM: Re-evaluated pt. I have discussed test results, shared treatment plan, and the need for admission with patient and family at bedside. Pt and family express understanding at this time and agree with all information. All questions answered. Pt and family have no further questions or concerns at this time. Pt is ready for admit.      ED Medication(s):  Medications   sodium chloride 0.9% bolus 500 mL (500 mLs Intravenous New Bag 3/10/17 1959)       Medical Decision Making    Medical Decision  Making:   Clinical Tests:   Lab Tests: Ordered and Reviewed  Radiological Study: Ordered and Reviewed  Medical Tests: Ordered and Reviewed           Scribe Attestation:   Scribe #1: I performed the above scribed service and the documentation accurately describes the services I performed. I attest to the accuracy of the note.    Attending:   Physician Attestation Statement for Scribe #1: I, Oleg Claudio Jr., MD, personally performed the services described in this documentation, as scribed by Shira Carrera, in my presence, and it is both accurate and complete.          Clinical Impression       ICD-10-CM ICD-9-CM   1. Hyponatremia E87.1 276.1   2. Syncope R55 780.2       Disposition:   Disposition: Placed in Observation  Condition: Fair         Oleg Claudio Jr., MD  03/10/17 2035

## 2017-03-11 NOTE — SUBJECTIVE & OBJECTIVE
Past Medical History:   Diagnosis Date    Arthritis     Coronary artery disease     Depression     Encounter for blood transfusion     H/O: GI bleed     Hiatal hernia     Hyperlipidemia     Hypertension     Hypothyroid     Rheumatoid arthritis(714.0)     Stroke        Past Surgical History:   Procedure Laterality Date    TONSILLECTOMY         Review of patient's allergies indicates:   Allergen Reactions    Sulfa (sulfonamide antibiotics) Shortness Of Breath       No current facility-administered medications on file prior to encounter.      Current Outpatient Prescriptions on File Prior to Encounter   Medication Sig    acetaminophen (TYLENOL) 500 MG tablet Take 500 mg by mouth 2 (two) times daily.    albuterol (PROVENTIL) 5 mg/mL nebulizer solution Take 0.5 mLs (2.5 mg total) by nebulization every 6 (six) hours as needed.    baicalin-catechin 500 mg Cap Take 500 mg by mouth 2 (two) times daily.    blood pressure test kit-medium Kit Pt needs device to monitor blood pressure daily    blood pressure test kit-medium Kit Pt needs device to monitor blood pressure daily    budesonide (PULMICORT) 0.5 mg/2 mL nebulizer solution Take 0.5 mg by nebulization once daily.    cetirizine (ZYRTEC) 10 MG tablet Take 10 mg by mouth once daily.    diclofenac sodium 1.5 % Drop 2 %.    fluticasone (FLONASE) 50 mcg/actuation nasal spray 1 spray by Each Nare route once daily.    folic acid (FOLVITE) 1 MG tablet TAKE 1 TABLET (1 MG TOTAL) BY MOUTH ONCE DAILY.    hydroxychloroquine (PLAQUENIL) 200 mg tablet Take 1 tablet (200 mg total) by mouth 2 (two) times daily.    inhalation device (AEROCHAMBER PLUS FLOW-VU) Use as directed for inhalation.    levothyroxine (SYNTHROID) 25 MCG tablet TAKE ONE TABLET BY MOUTH ONE TIME DAILY BEFORE BREAKFAST    lisinopril 10 MG tablet Take 1 tablet (10 mg total) by mouth once daily.    methotrexate 2.5 MG Tab Take 7 tablets (17.5 mg total) by mouth every 7 days.    metoprolol  succinate (TOPROL-XL) 25 MG 24 hr tablet TAKE ONE TABLET BY MOUTH ONE TIME DAILY    multivitamin with minerals tablet Take 1 tablet by mouth once daily. One a day vitamin for seniors    pantoprazole (PROTONIX) 40 MG tablet TAKE ONE TABLET BY MOUTH ONE TIME DAILY    rosuvastatin (CRESTOR) 10 MG tablet TAKE ONE TABLET BY MOUTH ONE TIME DAILY IN THE EVENING    sertraline (ZOLOFT) 50 MG tablet TAKE ONE TABLET BY MOUTH ONE TIME DAILY    tramadol (ULTRAM) 50 mg tablet Take 1 tablet (50 mg total) by mouth every 6 (six) hours as needed for Pain.    trazodone (DESYREL) 50 MG tablet Take 1 tablet (50 mg total) by mouth every evening.    VENTOLIN HFA 90 mcg/actuation inhaler INHALE TWO PUFFS BY MOUTH EVERY SIX HOURS    verapamil (CALAN-SR) 240 MG CR tablet Take 1 tablet (240 mg total) by mouth once daily. Dispense smallest XR tablet for 240mg verapamil     Family History     Problem Relation (Age of Onset)    Cancer Mother        Social History Main Topics    Smoking status: Former Smoker     Packs/day: 2.00     Years: 16.00     Types: Cigarettes     Quit date: 1/1/1950    Smokeless tobacco: Not on file    Alcohol use 1.8 oz/week     3 Glasses of wine per week      Comment: daily 1/2 bottle wine    Drug use: No    Sexual activity: Not on file     Review of Systems   Reason unable to perform ROS: weak, disoriented, confused.     Objective:     Vital Signs (Most Recent):  Temp: 98.4 °F (36.9 °C) (03/10/17 1828)  Pulse: 71 (03/10/17 1851)  Resp: 16 (03/10/17 1851)  BP: (!) 177/66 (03/10/17 1851)  SpO2: 98 % (03/10/17 1851) Vital Signs (24h Range):  Temp:  [98.4 °F (36.9 °C)] 98.4 °F (36.9 °C)  Pulse:  [70-74] 71  Resp:  [16-18] 16  SpO2:  [97 %-98 %] 98 %  BP: (152-177)/(62-74) 177/66        There is no height or weight on file to calculate BMI.    Physical Exam   Constitutional: She is oriented to person, place, and time. She appears well-developed and well-nourished. No distress.   elderly   HENT:   Head:  Normocephalic and atraumatic.   Nose: Nose normal.   Eyes: Conjunctivae and EOM are normal. Pupils are equal, round, and reactive to light. Right eye exhibits no discharge. Left eye exhibits no discharge.   Neck: Normal range of motion. Neck supple. No JVD present. No tracheal deviation present. No thyromegaly present.   Cardiovascular: Normal rate and regular rhythm.  Exam reveals no gallop and no friction rub.    Murmur (3/6 SPENCER) heard.  Pulmonary/Chest: Effort normal. No stridor. No respiratory distress. She has no wheezes. She has no rales. She exhibits no tenderness.   Decreased breathe sounds   Abdominal: Soft. Bowel sounds are normal. She exhibits no distension and no mass. There is no tenderness. There is no guarding.   Musculoskeletal: Normal range of motion. She exhibits no edema, tenderness or deformity.   Lymphadenopathy:     She has no cervical adenopathy.   Neurological: She is alert and oriented to person, place, and time. She has normal reflexes. No cranial nerve deficit. Coordination normal.   Skin: Skin is warm and dry. No rash noted. She is not diaphoretic. No erythema. No pallor.   Psychiatric:   confused   Nursing note and vitals reviewed.       Significant Labs:   CBC:   Recent Labs  Lab 03/10/17  1901   WBC 17.69*   HGB 14.0   HCT 42.2        CMP:   Recent Labs  Lab 03/10/17  1901   *   K 4.6   CL 94*   CO2 22*   GLU 74   BUN 11   CREATININE 0.7   CALCIUM 9.3   PROT 8.1   ALBUMIN 4.1   BILITOT 0.8   ALKPHOS 53*   AST 47*   ALT 20   ANIONGAP 16   EGFRNONAA >60       Significant Imaging: I have reviewed all pertinent imaging results/findings within the past 24 hours.   Imaging Results         CT Cervical Spine Without Contrast (Final result) Result time:  03/10/17 20:05:00    Final result by Octaviano Rosen MD (03/10/17 20:05:00)    Impression:         No acute findings noted involving the cervical spine.  Multilevel disc space narrowing and osteoarthritis.    All CT scans at this  facility use dose modulation, iterative reconstruction, and/or weight based dosing when appropriate to reduce radiation dose to as low as reasonably achievable.      Electronically signed by: TORSTEN SALAS MD  Date:     03/10/17  Time:    20:05     Narrative:    Exam: CT scan of the cervical spine without contrast    History:     Neck pain, syncope and collapse  There is disc space narrowing and osteoarthritis at C5-C6 and C6-C7.  No fracture or acute bony abnormality identified.  Arthritic changes involving facets at C3-C4 through C6-C7 bilaterally with multilevel bilateral bony foraminal narrowing.  No spinal canal stenosis is identified.            CT Head Without Contrast (Final result) Result time:  03/10/17 19:57:13    Final result by Torsten Salas MD (03/10/17 19:57:13)    Impression:         Cerebral atrophy. No acute intracranial findings are identified.    All CT scans at this facility use dose modulation, iterative reconstruction, and/or weight based dosing when appropriate to reduce radiation dose to as low as reasonably achievable.      Electronically signed by: TORSTEN SALAS MD  Date:     03/10/17  Time:    19:57     Narrative:    Exam: Noncontrast CT head    Clinical History:     Syncope and collapse    Findings:     Note made of generalized cerebral and cerebellar atrophy . Low density changes in the periventricular white matter consistent with small vessel ischemic change. No intracranial hemorrhage or acute intracranial abnormality is identified. The calvarium is intact. Visualized paranasal sinuses and mastoid air cells are clear.            X-Ray Chest AP Portable (Final result) Result time:  03/10/17 19:16:11    Final result by Torsten Salas MD (03/10/17 19:16:11)    Impression:     No acute cardiopulmonary disease.      Electronically signed by: TORSTEN SALAS MD  Date:     03/10/17  Time:    19:16     Narrative:    Exam: Portable chest xray    History:    Syncope, fall    Findings:     The heart is  normal and the lungs are clear.  Aortic atherosclerosis.  Hiatal hernia.

## 2017-03-11 NOTE — ED NOTES
BLood drawn for cultures x 2.  Resting quietly, face flushed, skin hot and dry. T 99.7Ax. Daughter at bedside. Awaiting bed for admission.  Pt easily arouses with verbal stimuli, cooperative and alert.

## 2017-03-11 NOTE — ED NOTES
Pt awake and alert, cooperative, NAVAS to command.  Skin warm and dry. On cardiac monitor. Family at bedside.  HL patent to [facility]. SR up x 2. Dr. Claudio at bedside.  Orders noted.

## 2017-03-11 NOTE — PLAN OF CARE
Problem: Patient Care Overview  Goal: Plan of Care Review  Outcome: Ongoing (interventions implemented as appropriate)  Pt oriented to self and place, reoriented during hourly rounding. Stable on RA. NS @ 50 ml/hr. Incont pad changed. Pt c/o bilateral shoulder pain, encouraged pt to reposition, pt remained free of falls during shift, bed alarm set, room near nurses station, call light in reach, room free of clutter, side rails up X2, pt on telemetry monitor SR, will continue to monitor.

## 2017-03-11 NOTE — H&P
[facility] Hospital Medicine  History & Physical    Patient Name: Crystal Romero  MRN: [id number]  Admission Date: 3/10/2017  Attending Physician: Dr. Santiago Frost  Primary Care Provider: Maria Guadalupe Herring MD         Patient information was obtained from spouse/SO, past medical records and ER records.     Subjective:     Principal Problem:Hyponatremia    Chief Complaint:   Chief Complaint   Patient presents with    Fall     pt found on floor this evening - unwitnessed fall- c/o L shoulder pain with movement.          HPI: Crystal Romero is an 83 yo female with PMHx of HTN, HLP, RA, GI bleed, daily ETOH use who presented to ER with c/o unwitnessed fall. Her daughter came home and found her on the floor weak, altered mental status. Daughter reports patient drinks 1/2 bottle wine daily and has had a poor appetite over the past few days. In ER, WBC 17.6, Na+ 132, BNP 1208, . She is admitted with hyponatremia, dehydration, and fall.     Past Medical History:   Diagnosis Date    Arthritis     Coronary artery disease     Depression     Encounter for blood transfusion     H/O: GI bleed     Hiatal hernia     Hyperlipidemia     Hypertension     Hypothyroid     Rheumatoid arthritis(714.0)     Stroke        Past Surgical History:   Procedure Laterality Date    TONSILLECTOMY         Review of patient's allergies indicates:   Allergen Reactions    Sulfa (sulfonamide antibiotics) Shortness Of Breath       No current facility-administered medications on file prior to encounter.      Current Outpatient Prescriptions on File Prior to Encounter   Medication Sig    acetaminophen (TYLENOL) 500 MG tablet Take 500 mg by mouth 2 (two) times daily.    albuterol (PROVENTIL) 5 mg/mL nebulizer solution Take 0.5 mLs (2.5 mg total) by nebulization every 6 (six) hours as needed.    baicalin-catechin 500 mg Cap Take 500 mg by mouth 2 (two) times daily.    blood pressure test kit-medium Kit Pt needs  device to monitor blood pressure daily    blood pressure test kit-medium Kit Pt needs device to monitor blood pressure daily    budesonide (PULMICORT) 0.5 mg/2 mL nebulizer solution Take 0.5 mg by nebulization once daily.    cetirizine (ZYRTEC) 10 MG tablet Take 10 mg by mouth once daily.    diclofenac sodium 1.5 % Drop 2 %.    fluticasone (FLONASE) 50 mcg/actuation nasal spray 1 spray by Each Nare route once daily.    folic acid (FOLVITE) 1 MG tablet TAKE 1 TABLET (1 MG TOTAL) BY MOUTH ONCE DAILY.    hydroxychloroquine (PLAQUENIL) 200 mg tablet Take 1 tablet (200 mg total) by mouth 2 (two) times daily.    inhalation device (AEROCHAMBER PLUS FLOW-VU) Use as directed for inhalation.    levothyroxine (SYNTHROID) 25 MCG tablet TAKE ONE TABLET BY MOUTH ONE TIME DAILY BEFORE BREAKFAST    lisinopril 10 MG tablet Take 1 tablet (10 mg total) by mouth once daily.    methotrexate 2.5 MG Tab Take 7 tablets (17.5 mg total) by mouth every 7 days.    metoprolol succinate (TOPROL-XL) 25 MG 24 hr tablet TAKE ONE TABLET BY MOUTH ONE TIME DAILY    multivitamin with minerals tablet Take 1 tablet by mouth once daily. One a day vitamin for seniors    pantoprazole (PROTONIX) 40 MG tablet TAKE ONE TABLET BY MOUTH ONE TIME DAILY    rosuvastatin (CRESTOR) 10 MG tablet TAKE ONE TABLET BY MOUTH ONE TIME DAILY IN THE EVENING    sertraline (ZOLOFT) 50 MG tablet TAKE ONE TABLET BY MOUTH ONE TIME DAILY    tramadol (ULTRAM) 50 mg tablet Take 1 tablet (50 mg total) by mouth every 6 (six) hours as needed for Pain.    trazodone (DESYREL) 50 MG tablet Take 1 tablet (50 mg total) by mouth every evening.    VENTOLIN HFA 90 mcg/actuation inhaler INHALE TWO PUFFS BY MOUTH EVERY SIX HOURS    verapamil (CALAN-SR) 240 MG CR tablet Take 1 tablet (240 mg total) by mouth once daily. Dispense smallest XR tablet for 240mg verapamil     Family History     Problem Relation (Age of Onset)    Cancer Mother        Social History Main Topics     Smoking status: Former Smoker     Packs/day: 2.00     Years: 16.00     Types: Cigarettes     Quit date: 1/1/1950    Smokeless tobacco: Not on file    Alcohol use 1.8 oz/week     3 Glasses of wine per week      Comment: daily 1/2 bottle wine    Drug use: No    Sexual activity: Not on file     Review of Systems   Reason unable to perform ROS: weak, disoriented, confused.     Objective:     Vital Signs (Most Recent):  Temp: 98.4 °F (36.9 °C) (03/10/17 1828)  Pulse: 71 (03/10/17 1851)  Resp: 16 (03/10/17 1851)  BP: (!) 177/66 (03/10/17 1851)  SpO2: 98 % (03/10/17 1851) Vital Signs (24h Range):  Temp:  [98.4 °F (36.9 °C)] 98.4 °F (36.9 °C)  Pulse:  [70-74] 71  Resp:  [16-18] 16  SpO2:  [97 %-98 %] 98 %  BP: (152-177)/(62-74) 177/66        There is no height or weight on file to calculate BMI.    Physical Exam   Constitutional: She is oriented to person, place, and time. She appears well-developed and well-nourished. No distress.   elderly   HENT:   Head: Normocephalic and atraumatic.   Nose: Nose normal.   Eyes: Conjunctivae and EOM are normal. Pupils are equal, round, and reactive to light. Right eye exhibits no discharge. Left eye exhibits no discharge.   Neck: Normal range of motion. Neck supple. No JVD present. No tracheal deviation present. No thyromegaly present.   Cardiovascular: Normal rate and regular rhythm.  Exam reveals no gallop and no friction rub.    Murmur (3/6 SPENCER) heard.  Pulmonary/Chest: Effort normal. No stridor. No respiratory distress. She has no wheezes. She has no rales. She exhibits no tenderness.   Decreased breathe sounds   Abdominal: Soft. Bowel sounds are normal. She exhibits no distension and no mass. There is no tenderness. There is no guarding.   Musculoskeletal: Normal range of motion. She exhibits no edema, tenderness or deformity.   Lymphadenopathy:     She has no cervical adenopathy.   Neurological: She is alert and oriented to person, place, and time. She has normal reflexes. No  cranial nerve deficit. Coordination normal.   Skin: Skin is warm and dry. No rash noted. She is not diaphoretic. No erythema. No pallor.   Psychiatric:   confused   Nursing note and vitals reviewed.       Significant Labs:   CBC:   Recent Labs  Lab 03/10/17  1901   WBC 17.69*   HGB 14.0   HCT 42.2        CMP:   Recent Labs  Lab 03/10/17  1901   *   K 4.6   CL 94*   CO2 22*   GLU 74   BUN 11   CREATININE 0.7   CALCIUM 9.3   PROT 8.1   ALBUMIN 4.1   BILITOT 0.8   ALKPHOS 53*   AST 47*   ALT 20   ANIONGAP 16   EGFRNONAA >60       Significant Imaging: I have reviewed all pertinent imaging results/findings within the past 24 hours.   Imaging Results         CT Cervical Spine Without Contrast (Final result) Result time:  03/10/17 20:05:00    Final result by Torsten Salas MD (03/10/17 20:05:00)    Impression:         No acute findings noted involving the cervical spine.  Multilevel disc space narrowing and osteoarthritis.    All CT scans at this facility use dose modulation, iterative reconstruction, and/or weight based dosing when appropriate to reduce radiation dose to as low as reasonably achievable.      Electronically signed by: TORSTEN SALAS MD  Date:     03/10/17  Time:    20:05     Narrative:    Exam: CT scan of the cervical spine without contrast    History:     Neck pain, syncope and collapse  There is disc space narrowing and osteoarthritis at C5-C6 and C6-C7.  No fracture or acute bony abnormality identified.  Arthritic changes involving facets at C3-C4 through C6-C7 bilaterally with multilevel bilateral bony foraminal narrowing.  No spinal canal stenosis is identified.            CT Head Without Contrast (Final result) Result time:  03/10/17 19:57:13    Final result by Torsten Salas MD (03/10/17 19:57:13)    Impression:         Cerebral atrophy. No acute intracranial findings are identified.    All CT scans at this facility use dose modulation, iterative reconstruction, and/or weight based dosing when  appropriate to reduce radiation dose to as low as reasonably achievable.      Electronically signed by: TORSTEN SALAS MD  Date:     03/10/17  Time:    19:57     Narrative:    Exam: Noncontrast CT head    Clinical History:     Syncope and collapse    Findings:     Note made of generalized cerebral and cerebellar atrophy . Low density changes in the periventricular white matter consistent with small vessel ischemic change. No intracranial hemorrhage or acute intracranial abnormality is identified. The calvarium is intact. Visualized paranasal sinuses and mastoid air cells are clear.            X-Ray Chest AP Portable (Final result) Result time:  03/10/17 19:16:11    Final result by Torsten Salas MD (03/10/17 19:16:11)    Impression:     No acute cardiopulmonary disease.      Electronically signed by: TORSTEN SALAS MD  Date:     03/10/17  Time:    19:16     Narrative:    Exam: Portable chest xray    History:    Syncope, fall    Findings:     The heart is normal and the lungs are clear.  Aortic atherosclerosis.  Hiatal hernia.                Assessment/Plan:     * Hyponatremia  -gentle IV fluids        Dehydration  Likely due to ETOH  Gentle IV fluids      Acute encephalopathy  Fall precautions  Neuro checks  Labs in am        Fall  PT/OT eval      EtOH dependence  Monitor for DT's  -no librium for now      Asthma with COPD  COPD exacerbation/Acute on chronic Respiratory Failure  -Duonebs  -bronchodilators  -Oxygen prn             Bilateral adhesive capsulitis of shoulders  Outpatient management      VTE Risk Mitigation     None        Klarissa Wright NP  Department of Hospital Medicine   [facility]

## 2017-03-12 ENCOUNTER — DOCUMENTATION ONLY (OUTPATIENT)
Dept: HEPATOLOGY | Facility: HOSPITAL | Age: 82
End: 2017-03-12

## 2017-03-12 ENCOUNTER — TELEPHONE (OUTPATIENT)
Dept: HEPATOLOGY | Facility: HOSPITAL | Age: 82
End: 2017-03-12

## 2017-03-12 RX ORDER — LEVOFLOXACIN 750 MG/1
750 TABLET ORAL DAILY
Qty: 7 TABLET | Refills: 0 | Status: SHIPPED | OUTPATIENT
Start: 2017-03-12 | End: 2017-03-22

## 2017-03-12 NOTE — TELEPHONE ENCOUNTER
I spoke to patients daughter Abdiaziz at the number listed on face sheet. Taisha stated Ms. Romero was in her usual state of health although maybe a little tired. I told Taisha about the positive blood culture . I explained that this may be contaminant vs a true infection , and that I recommended she be brought to the ER for evaluation , and likely observation admission and re culture. I explained the risk of bacteremia and sepsis , and Taisha understood. She stated she did not want to bring her mother in immediately , and that she would consider coming in the AM . She understands risks/benefits of waiting vs eval.

## 2017-03-12 NOTE — PROGRESS NOTES
Spoke to the patient's daughter Shannan ([phone]) regarding the positive blood culture that resulted last night.  Her mom had a set of blood cultures drawn at admission and one of them showed gram positive cocci resembling strep.  I believe it is unlikely she has a true bacteremia given her workup and history during this admission.  Since arriving home, her mother has been tired but otherwise well.  Nevertheless, I talked to her about empirically treating for a possible occult pneumonia with 7 days of Levofloxacin 750 mg that I called in to a  Local pharmacy.  We also discussed common signs of illness to look for in her mother over the next few days and to continue with as planned follow up with her PCP this week.    Wilfred Fitzpatrick MD  Internal Medicine

## 2017-03-13 ENCOUNTER — OFFICE VISIT (OUTPATIENT)
Dept: INTERNAL MEDICINE | Facility: CLINIC | Age: 82
End: 2017-03-13
Payer: MEDICARE

## 2017-03-13 ENCOUNTER — LAB VISIT (OUTPATIENT)
Dept: LAB | Facility: HOSPITAL | Age: 82
End: 2017-03-13
Attending: FAMILY MEDICINE
Payer: MEDICARE

## 2017-03-13 VITALS
TEMPERATURE: 99 F | BODY MASS INDEX: 22.19 KG/M2 | HEART RATE: 89 BPM | HEIGHT: 62 IN | SYSTOLIC BLOOD PRESSURE: 132 MMHG | WEIGHT: 120.56 LBS | DIASTOLIC BLOOD PRESSURE: 86 MMHG | OXYGEN SATURATION: 96 %

## 2017-03-13 DIAGNOSIS — J44.89 ASTHMA WITH COPD: Chronic | ICD-10-CM

## 2017-03-13 DIAGNOSIS — M12.811 ROTATOR CUFF ARTHROPATHY, RIGHT: ICD-10-CM

## 2017-03-13 DIAGNOSIS — E87.1 HYPONATREMIA: Primary | ICD-10-CM

## 2017-03-13 DIAGNOSIS — Z09 HOSPITAL DISCHARGE FOLLOW-UP: ICD-10-CM

## 2017-03-13 DIAGNOSIS — E87.1 HYPONATREMIA: ICD-10-CM

## 2017-03-13 DIAGNOSIS — R78.81 POSITIVE BLOOD CULTURE: ICD-10-CM

## 2017-03-13 DIAGNOSIS — E86.0 DEHYDRATION: ICD-10-CM
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
## 2017-03-13 DIAGNOSIS — I10 ESSENTIAL HYPERTENSION: Chronic | ICD-10-CM

## 2017-03-13 DIAGNOSIS — I35.0 NONRHEUMATIC AORTIC VALVE STENOSIS: ICD-10-CM

## 2017-03-13 PROBLEM — G93.40 ACUTE ENCEPHALOPATHY: Status: RESOLVED | Noted: 2017-03-10 | Resolved: 2017-03-13

## 2017-03-13 LAB
ANION GAP SERPL CALC-SCNC: 12 MMOL/L
BASOPHILS # BLD AUTO: 0.02 K/UL
BASOPHILS NFR BLD: 0.3 %
BNP SERPL-MCNC: 150 PG/ML
BUN SERPL-MCNC: 13 MG/DL
CALCIUM SERPL-MCNC: 9.3 MG/DL
CHLORIDE SERPL-SCNC: 101 MMOL/L
CK SERPL-CCNC: 713 U/L
CO2 SERPL-SCNC: 27 MMOL/L
CREAT SERPL-MCNC: 0.9 MG/DL
DIFFERENTIAL METHOD: ABNORMAL
EOSINOPHIL # BLD AUTO: 0 K/UL
EOSINOPHIL NFR BLD: 0.1 %
ERYTHROCYTE [DISTWIDTH] IN BLOOD BY AUTOMATED COUNT: 15.1 %
EST. GFR  (AFRICAN AMERICAN): >60 ML/MIN/1.73 M^2
EST. GFR  (NON AFRICAN AMERICAN): 59.7 ML/MIN/1.73 M^2
GLUCOSE SERPL-MCNC: 77 MG/DL
HCT VFR BLD AUTO: 42 %
HGB BLD-MCNC: 13.6 G/DL
LYMPHOCYTES # BLD AUTO: 0.8 K/UL
LYMPHOCYTES NFR BLD: 11.5 %
MCH RBC QN AUTO: 30.3 PG
MCHC RBC AUTO-ENTMCNC: 32.4 %
MCV RBC AUTO: 94 FL
MONOCYTES # BLD AUTO: 0.7 K/UL
MONOCYTES NFR BLD: 10.3 %
NEUTROPHILS # BLD AUTO: 5.2 K/UL
NEUTROPHILS NFR BLD: 77.7 %
PLATELET # BLD AUTO: 175 K/UL
PMV BLD AUTO: 12.9 FL
POTASSIUM SERPL-SCNC: 3.7 MMOL/L
RBC # BLD AUTO: 4.49 M/UL
SODIUM SERPL-SCNC: 140 MMOL/L
WBC # BLD AUTO: 6.68 K/UL

## 2017-03-13 PROCEDURE — 83880 ASSAY OF NATRIURETIC PEPTIDE: CPT

## 2017-03-13 PROCEDURE — 99999 PR PBB SHADOW E&M-EST. PATIENT-LVL III: CPT | Mod: PBBFAC,,, | Performed by: FAMILY MEDICINE

## 2017-03-13 PROCEDURE — 3075F SYST BP GE 130 - 139MM HG: CPT | Mod: S$GLB,,, | Performed by: FAMILY MEDICINE

## 2017-03-13 PROCEDURE — 1157F ADVNC CARE PLAN IN RCRD: CPT | Mod: S$GLB,,, | Performed by: FAMILY MEDICINE

## 2017-03-13 PROCEDURE — 80048 BASIC METABOLIC PNL TOTAL CA: CPT

## 2017-03-13 PROCEDURE — 82550 ASSAY OF CK (CPK): CPT

## 2017-03-13 PROCEDURE — 1159F MED LIST DOCD IN RCRD: CPT | Mod: S$GLB,,, | Performed by: FAMILY MEDICINE

## 2017-03-13 PROCEDURE — 3079F DIAST BP 80-89 MM HG: CPT | Mod: S$GLB,,, | Performed by: FAMILY MEDICINE

## 2017-03-13 PROCEDURE — 85025 COMPLETE CBC W/AUTO DIFF WBC: CPT

## 2017-03-13 PROCEDURE — 99214 OFFICE O/P EST MOD 30 MIN: CPT | Mod: S$GLB,,, | Performed by: FAMILY MEDICINE

## 2017-03-13 PROCEDURE — 36415 COLL VENOUS BLD VENIPUNCTURE: CPT | Mod: PO

## 2017-03-13 PROCEDURE — 1160F RVW MEDS BY RX/DR IN RCRD: CPT | Mod: S$GLB,,, | Performed by: FAMILY MEDICINE

## 2017-03-13 RX ORDER — METOPROLOL SUCCINATE 25 MG/1
25 TABLET, EXTENDED RELEASE ORAL DAILY
COMMUNITY
End: 2017-04-23 | Stop reason: SDUPTHER

## 2017-03-13 RX ORDER — FLUTICASONE PROPIONATE 50 MCG
1 SPRAY, SUSPENSION (ML) NASAL DAILY
COMMUNITY
End: 2017-04-19 | Stop reason: SDUPTHER

## 2017-03-13 RX ORDER — VERAPAMIL HYDROCHLORIDE 240 MG/1
240 CAPSULE, EXTENDED RELEASE ORAL DAILY
COMMUNITY
End: 2017-07-11 | Stop reason: SDUPTHER

## 2017-03-13 RX ORDER — DICLOFENAC SODIUM 16.05 MG/ML
2 SOLUTION TOPICAL
COMMUNITY
End: 2017-12-26 | Stop reason: ALTCHOICE

## 2017-03-13 RX ORDER — SERTRALINE HYDROCHLORIDE 50 MG/1
50 TABLET, FILM COATED ORAL DAILY
COMMUNITY
End: 2018-03-18 | Stop reason: SDUPTHER

## 2017-03-13 RX ORDER — ALBUTEROL SULFATE 5 MG/ML
2.5 SOLUTION RESPIRATORY (INHALATION) EVERY 6 HOURS PRN
COMMUNITY
End: 2017-10-19

## 2017-03-13 RX ORDER — TRAMADOL HYDROCHLORIDE 50 MG/1
50 TABLET ORAL 2 TIMES DAILY PRN
COMMUNITY
End: 2017-05-24 | Stop reason: SDUPTHER

## 2017-03-13 RX ORDER — FOLIC ACID 1 MG/1
1 TABLET ORAL DAILY
COMMUNITY
End: 2017-07-14 | Stop reason: SDUPTHER

## 2017-03-13 RX ORDER — ROSUVASTATIN CALCIUM 10 MG/1
10 TABLET, COATED ORAL DAILY
COMMUNITY
End: 2017-12-09 | Stop reason: SDUPTHER

## 2017-03-13 RX ORDER — PANTOPRAZOLE SODIUM 40 MG/1
40 TABLET, DELAYED RELEASE ORAL DAILY
COMMUNITY
End: 2017-04-09 | Stop reason: SDUPTHER

## 2017-03-13 RX ORDER — BUDESONIDE 0.5 MG/2ML
0.5 INHALANT ORAL DAILY
COMMUNITY
End: 2017-10-19

## 2017-03-13 RX ORDER — LISINOPRIL 10 MG/1
10 TABLET ORAL DAILY
COMMUNITY
End: 2017-07-11 | Stop reason: SDUPTHER

## 2017-03-13 RX ORDER — METHOTREXATE 2.5 MG/1
2.5 TABLET ORAL
COMMUNITY
End: 2017-07-14 | Stop reason: SDUPTHER

## 2017-03-13 RX ORDER — ACETAMINOPHEN 500 MG
500 TABLET ORAL EVERY 6 HOURS PRN
COMMUNITY

## 2017-03-13 RX ORDER — LEVOTHYROXINE SODIUM 25 UG/1
25 TABLET ORAL DAILY
COMMUNITY
End: 2018-03-18 | Stop reason: SDUPTHER

## 2017-03-13 RX ORDER — CETIRIZINE HYDROCHLORIDE 10 MG/1
10 TABLET ORAL DAILY
COMMUNITY
End: 2018-11-13

## 2017-03-13 RX ORDER — HYDROXYCHLOROQUINE SULFATE 200 MG/1
200 TABLET, FILM COATED ORAL DAILY
COMMUNITY
End: 2017-07-14 | Stop reason: ALTCHOICE

## 2017-03-13 RX ORDER — TRAZODONE HYDROCHLORIDE 50 MG/1
50 TABLET ORAL NIGHTLY
COMMUNITY
End: 2017-10-23 | Stop reason: SDUPTHER

## 2017-03-13 NOTE — PROGRESS NOTES
Subjective:       Patient ID: Crystal Romero is a 82 y.o. female.    Chief Complaint: Hospital Follow Up    HPI Comments: 82-year-old female patient accompanied by her daughter with Patient Active Problem List:     Rheumatoid arthritis     Hiatal hernia     Asthma with COPD     Hyperlipidemia     Hypertension     Diastolic dysfunction     Osteoarthritis     Rotator cuff arthropathy     H/O: GI bleed     Arthritis of left wrist     Dysplastic colon polyp     Hypothyroid     Nonrheumatic aortic valve stenosis     Nonrheumatic aortic valve insufficiency     Impaired cognitionpatient had     Gastroesophageal reflux disease without esophagitis     Major depressive disorder, recurrent episode, mild     Psoriasis with arthropathy     Sleep disturbance     Bilateral adhesive capsulitis of shoulders     Hyponatremia     Dehydration     Fall     EtOH dependence  Here for hospital discharge follow-up from 3/10/17-3/11/17 .  Patient had unwitnessed fall for which she has been taken to ER , secondary to decreased appetite had complete workup done and was admitted for hyponatremia  dehydration .  Patient had blood cultures positive for strep , and was started on Levaquin yesterday , initially was advised by the hospitalist to come in and get admitted again , but patient refused , and was started on Levaquin 750 mg for a week yesterday , patient took first dose yesterday evening and second dose this morning .   Denies of any fever , but has been having mild shortness of breath .   Patient has appointment with cardiology tomorrow , and has appointment with neurology next week .   Patient reports that she has been feeling better today , denies of any leg swelling , orthopnea , chest pain or palpitations   Denies of any wheezing        Review of Systems   Constitutional: Positive for appetite change. Negative for chills, fatigue, fever and unexpected weight change.   HENT: Negative for congestion.    Eyes: Negative for  "visual disturbance.   Respiratory: Positive for shortness of breath.         With exertion   Cardiovascular: Negative for chest pain.   Gastrointestinal: Negative for abdominal pain, nausea and vomiting.   Musculoskeletal: Positive for myalgias.   Skin: Negative for rash.   Neurological: Positive for syncope. Negative for light-headedness and headaches.   Psychiatric/Behavioral: Negative for sleep disturbance.         /86  Pulse 89  Temp 99.3 °F (37.4 °C) (Tympanic)   Ht 5' 2" (1.575 m)  Wt 54.7 kg (120 lb 9.5 oz)  SpO2 96%  BMI 22.06 kg/m2  Objective:      Physical Exam   Constitutional: She is oriented to person, place, and time. She appears well-developed and well-nourished.   HENT:   Head: Normocephalic and atraumatic.   Mouth/Throat: Oropharynx is clear and moist.   Cardiovascular: Normal rate and regular rhythm.    Murmur heard.  Pulmonary/Chest: Effort normal and breath sounds normal. She has no wheezes.   Abdominal: Soft. Bowel sounds are normal. There is no tenderness.   Musculoskeletal: She exhibits no edema or tenderness.   Neurological: She is alert and oriented to person, place, and time.   Skin: Skin is warm and dry. No rash noted.   Psychiatric: She has a normal mood and affect.         Assessment:       1. Hyponatremia    2. Dehydration    3. Fall, initial encounter    4. Positive blood culture    5. Hospital discharge follow-up    6. Asthma with COPD    7. Essential hypertension    8. Rotator cuff arthropathy, right    9. Nonrheumatic aortic valve stenosis        Plan:   Hyponatremia  -     Basic metabolic panel; Future; Expected date: 3/13/17  Dehydration  -     CBC auto differential; Future; Expected date: 3/13/17  -     Basic metabolic panel; Future; Expected date: 3/13/17  -     CK; Future; Expected date: 3/13/17  -     Brain natriuretic peptide; Future; Expected date: 3/13/17  Fall, initial encounter  Positive blood culture  Hospital discharge follow-up  Asthma with COPD  Essential " hypertension  Rotator cuff arthropathy, right  Nonrheumatic aortic valve stenosis    Reviewed hospital records in detail, patient was advised to take Levaquin as prescribed.  Will recheck labs today encouraged to keep appointments with cardiology tomorrow and urology next week.  Pulmonary exam look stable today  Advised to take fall precautions and be cautious with gait.   Drink adequate fluids  Vital signs stable today with normal blood pressure  If any worsening patient to call us back immediately  Continue current regimen

## 2017-03-13 NOTE — MR AVS SNAPSHOT
[facility] Internal Medicine  [address] [place] [address]  Phone: [phone]  Fax: [phone]                  Crystal Romero   3/13/2017 10:00 AM   Office Visit    Description:  Female : 1934   Provider:  Maria Guadalupe Herring MD   Department:  [facility] - Internal Medicine           Reason for Visit     Hospital Follow Up           Diagnoses this Visit        Comments    Hyponatremia    -  Primary     Dehydration         Fall, initial encounter         Positive blood culture         Hospital discharge follow-up         Asthma with COPD         Essential hypertension         Rotator cuff arthropathy, right         Nonrheumatic aortic valve stenosis                To Do List           Future Appointments        Provider Department Dept Phone    3/13/2017 2:05 PM LABORATORY, [facility] [phone]    3/14/2017 8:00 AM Orlando Hassan MD [facility] Cardiology [phone]    3/24/2017 9:00 AM Jay Jay Cano MD [facility] - Neurology [phone]    2017 8:00 AM PULMONARY LAB, [facility]- Pulmonary Function [facility] [phone]    2017 8:20 AM Chuck Slater MD [facility] Pulmonary Services [phone]      Goals (5 Years of Data)     None      Follow-Up and Disposition     Return if symptoms worsen or fail to improve.      [facility]     [facility] Care [facility] -  Assistance  Registered nurses in the [facility] provide clinical advisement, health education, appointment booking, and other advisory services.  Call for this free service at [phone].             Medications           Message regarding Medications     Verify the changes and/or additions to your medication regime listed below are the same as discussed with your clinician today.  If any of these changes or additions are incorrect, please notify your healthcare provider.             Verify that the below list of medications is an accurate representation of the medications  you are currently taking.  If none reported, the list may be blank. If incorrect, please contact your healthcare provider. Carry this list with you in case of emergency.           Current Medications     acetaminophen (TYLENOL) 500 MG tablet Take 500 mg by mouth every 6 (six) hours as needed for Pain.    albuterol (PROVENTIL) 5 mg/mL nebulizer solution Take 2.5 mg by nebulization every 6 (six) hours as needed for Wheezing. Rescue    baicalin-catechin-citrated znc 500-50 mg Cap Take by mouth 2 (two) times daily.    budesonide (PULMICORT) 0.5 mg/2 mL nebulizer solution Take 0.5 mg by nebulization once daily. Controller    cetirizine (ZYRTEC) 10 MG tablet Take 10 mg by mouth once daily.    diclofenac sodium 1.5 % Drop Apply 2 % topically as needed.    fluticasone (FLONASE) 50 mcg/actuation nasal spray 1 spray by Each Nare route once daily.    folic acid (FOLVITE) 1 MG tablet Take 1 mg by mouth once daily.    hydroxychloroquine (PLAQUENIL) 200 mg tablet Take 200 mg by mouth once daily.    INHALER, ASSIST DEVICES (AEROCHAMBER PLUS FLOW-VU MISC) by Misc.(Non-Drug; Combo Route) route.    levoFLOXacin (LEVAQUIN) 750 MG tablet Take 1 tablet (750 mg total) by mouth once daily.    levothyroxine (SYNTHROID) 25 MCG tablet Take 25 mcg by mouth once daily.    lisinopril 10 MG tablet Take 10 mg by mouth once daily.    methotrexate 2.5 MG Tab Take 2.5 mg by mouth every 7 days.    metoprolol succinate (TOPROL-XL) 25 MG 24 hr tablet Take 25 mg by mouth once daily.    pantoprazole (PROTONIX) 40 MG tablet Take 40 mg by mouth once daily.    rosuvastatin (CRESTOR) 10 MG tablet Take 10 mg by mouth once daily.    sertraline (ZOLOFT) 50 MG tablet Take 50 mg by mouth once daily.    tramadol (ULTRAM) 50 mg tablet Take 50 mg by mouth every 6 (six) hours as needed for Pain.    trazodone (DESYREL) 50 MG tablet Take 50 mg by mouth every evening.    verapamil (VERELAN) 240 MG C24P Take 240 mg by mouth once daily.           Clinical Reference  "Information           Your Vitals Were     BP Pulse Temp Height Weight SpO2    132/86 89 99.3 °F (37.4 °C) (Tympanic) 5' 2" (1.575 m) 54.7 kg (120 lb 9.5 oz) 96%    BMI                22.06 kg/m2          Blood Pressure          Most Recent Value    BP  132/86      Allergies as of 3/13/2017     Sulfa (Sulfonamide Antibiotics)      Immunizations Administered on Date of Encounter - 3/13/2017     None      Orders Placed During Today's Visit     Future Labs/Procedures Expected by Expires    Basic metabolic panel  3/13/2017 5/12/2018    Brain natriuretic peptide  3/13/2017 5/12/2018    CBC auto differential  3/13/2017 5/12/2018    CK  3/13/2017 5/12/2018      [facility] Sign-Up     Activating your [facility] account is as easy as 1-2-3!     1) Visit [email or web address], select Sign Up Now, enter this activation code and your date of birth, then select Next.  [email or web address]  Expires: 3/24/2017  4:15 PM      2) Create a username and password to use when you visit [facility] in the future and select a security question in case you lose your password and select Next.    3) Enter your e-mail address and click Sign Up!    Additional Information  If you have questions, please e-mail [email or web address] or call [phone] to talk to our [facility] staff. Remember, [facility] is NOT to be used for urgent needs. For medical emergencies, dial 911.         Language Assistance Services     ATTENTION: Language assistance services are available, free of charge. Please call [phone].      ATENCIÓN: Si habla español, tiene a duncan disposición servicios gratuitos de asistencia lingüística. Llame al [phone].     CHÚ Ý: N?u b?n nói Ti?ng Vi?t, có các d?ch v? h? tr? ngôn ng? mi?n phí dành cho b?n. G?i s? [phone].         [facility] complies with applicable Federal civil rights laws and does not discriminate on the basis of race, color, national origin, age, disability, or sex.        "

## 2017-03-14 ENCOUNTER — OFFICE VISIT (OUTPATIENT)
Dept: CARDIOLOGY | Facility: CLINIC | Age: 82
End: 2017-03-14
Payer: MEDICARE

## 2017-03-14 ENCOUNTER — TELEPHONE (OUTPATIENT)
Dept: INTERNAL MEDICINE | Facility: CLINIC | Age: 82
End: 2017-03-14

## 2017-03-14 VITALS
TEMPERATURE: 98 F | WEIGHT: 115.75 LBS | HEIGHT: 62 IN | SYSTOLIC BLOOD PRESSURE: 136 MMHG | HEART RATE: 80 BPM | DIASTOLIC BLOOD PRESSURE: 70 MMHG | BODY MASS INDEX: 21.3 KG/M2

## 2017-03-14 DIAGNOSIS — I35.1 NONRHEUMATIC AORTIC VALVE INSUFFICIENCY: ICD-10-CM

## 2017-03-14 DIAGNOSIS — E78.2 MIXED HYPERLIPIDEMIA: Chronic | ICD-10-CM

## 2017-03-14 DIAGNOSIS — R41.89 IMPAIRED COGNITION: ICD-10-CM

## 2017-03-14 DIAGNOSIS — I35.0 NONRHEUMATIC AORTIC VALVE STENOSIS: Primary | ICD-10-CM
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
## 2017-03-14 DIAGNOSIS — I10 ESSENTIAL HYPERTENSION: Chronic | ICD-10-CM

## 2017-03-14 LAB
BACTERIA BLD CULT: NORMAL

## 2017-03-14 PROCEDURE — 3075F SYST BP GE 130 - 139MM HG: CPT | Mod: S$GLB,,, | Performed by: INTERNAL MEDICINE

## 2017-03-14 PROCEDURE — 99999 PR PBB SHADOW E&M-EST. PATIENT-LVL III: CPT | Mod: PBBFAC,,, | Performed by: INTERNAL MEDICINE

## 2017-03-14 PROCEDURE — 3078F DIAST BP <80 MM HG: CPT | Mod: S$GLB,,, | Performed by: INTERNAL MEDICINE

## 2017-03-14 PROCEDURE — 1160F RVW MEDS BY RX/DR IN RCRD: CPT | Mod: S$GLB,,, | Performed by: INTERNAL MEDICINE

## 2017-03-14 PROCEDURE — 99214 OFFICE O/P EST MOD 30 MIN: CPT | Mod: S$GLB,,, | Performed by: INTERNAL MEDICINE

## 2017-03-14 PROCEDURE — 1157F ADVNC CARE PLAN IN RCRD: CPT | Mod: S$GLB,,, | Performed by: INTERNAL MEDICINE

## 2017-03-14 PROCEDURE — 1159F MED LIST DOCD IN RCRD: CPT | Mod: S$GLB,,, | Performed by: INTERNAL MEDICINE

## 2017-03-14 NOTE — TELEPHONE ENCOUNTER
----- Message from Maria Guadalupe Herring MD sent at 3/14/2017  8:00 AM CDT -----  Please notify patient that her muscle enzyme levels remain elevated, otherwise sodium and potassium levels have started to normalize.    fluid levels are within the normal range.  Drink adequate fluids  Follow-up with cardiology as scheduled today

## 2017-03-14 NOTE — PROGRESS NOTES
Subjective:   Patient ID:  Crsytal Romero is a 82 y.o. female who presents for follow up of Loss of Consciousness      HPI Comments: 82, yo female, came in for discharge f/u.  PMH moderate AI, and moderate AS, dementia 3 yrs, HTN, HLD, RA, TIA x3 in 4 months  In , was admitted to [facility] for unwitnessed fall with confusion at least for few hours at home. Blood culture + strep. Elevated BNP and CK. Na level was low. Elevated WBC. CXR clear. Had IVF and ATx. Fever and low Na resolved.  Today, denied leg swelling, MOLINA, chest pain, and fainting. Has Poor appetite, and no fever/cough.   Repeat ECHO in , showed normal EF, DD, moderate AI and functional severe AS. MG 40.         Past Medical History:   Diagnosis Date    Arthritis     Coronary artery disease     Depression     Encounter for blood transfusion     H/O: GI bleed     Hiatal hernia     Hyperlipidemia     Hypertension     Hypothyroid     Rheumatoid arthritis(714.0)     Stroke     Syncope and collapse        Past Surgical History:   Procedure Laterality Date    TONSILLECTOMY         Social History   Substance Use Topics    Smoking status: Former Smoker     Packs/day: 2.00     Years: 16.00     Types: Cigarettes     Quit date: 1/1/1950    Smokeless tobacco: None    Alcohol use 1.8 oz/week     3 Glasses of wine per week      Comment: daily 1/2 bottle wine       Family History   Problem Relation Age of Onset    Cancer Mother      breast, uterine         Review of Systems   Constitution: Positive for weakness.   HENT: Negative.    Eyes: Negative.    Cardiovascular: Positive for dyspnea on exertion. Negative for chest pain.   Respiratory: Negative.    Endocrine: Negative.    Hematologic/Lymphatic: Negative.    Skin: Negative.    Musculoskeletal: Positive for back pain and joint pain.   Gastrointestinal: Negative.    Genitourinary: Negative.    Psychiatric/Behavioral: Negative.    Allergic/Immunologic: Negative.        Objective:    Physical Exam   Constitutional: She is oriented to person, place, and time. She appears well-nourished.   HENT:   Head: Normocephalic.   Eyes: Pupils are equal, round, and reactive to light.   Neck: Normal carotid pulses and no JVD present. Carotid bruit is not present. No thyromegaly present.   Cardiovascular: Normal rate, regular rhythm and normal pulses.   No extrasystoles are present. PMI is not displaced.  Exam reveals no gallop and no S3.    Murmur heard.  Pulses:       Radial pulses are 2+ on the right side, and 2+ on the left side.   3/6 ESM on RUSB, S2 not audible.   Pulmonary/Chest: Breath sounds normal. No stridor. No respiratory distress.   Abdominal: Soft. Bowel sounds are normal. There is no tenderness. There is no rebound.   Musculoskeletal: Normal range of motion.   Neurological: She is alert and oriented to person, place, and time.   Skin: Skin is intact. No rash noted.   Psychiatric: Her behavior is normal.       Lab Results   Component Value Date    CHOL 180 06/17/2016    CHOL 210 (H) 02/03/2015    CHOL 197 07/25/2014     Lab Results   Component Value Date    HDL 87 (H) 06/17/2016    HDL 75 02/03/2015    HDL 88 (H) 07/25/2014     Lab Results   Component Value Date    LDLCALC 78.6 06/17/2016    LDLCALC 113.4 02/03/2015    LDLCALC 90.4 07/25/2014     Lab Results   Component Value Date    TRIG 72 06/17/2016    TRIG 108 02/03/2015    TRIG 93 07/25/2014     Lab Results   Component Value Date    CHOLHDL 48.3 06/17/2016    CHOLHDL 35.7 02/03/2015    CHOLHDL 44.7 07/25/2014       Chemistry        Component Value Date/Time     03/13/2017 1106    K 3.7 03/13/2017 1106     03/13/2017 1106    CO2 27 03/13/2017 1106    BUN 13 03/13/2017 1106    CREATININE 0.9 03/13/2017 1106    GLU 77 03/13/2017 1106        Component Value Date/Time    CALCIUM 9.3 03/13/2017 1106    ALKPHOS 51 (L) 03/11/2017 0447    AST 28 03/11/2017 0447    ALT 16 03/11/2017 0447    BILITOT 0.7 03/11/2017 0447          Lab  Results   Component Value Date    TSH 1.504 02/07/2017     Lab Results   Component Value Date    INR 0.9 03/10/2017    INR 1.0 09/23/2014     Lab Results   Component Value Date    WBC 6.68 03/13/2017    HGB 13.6 03/13/2017    HCT 42.0 03/13/2017    MCV 94 03/13/2017     03/13/2017     BMP  Sodium   Date Value Ref Range Status   03/13/2017 140 136 - 145 mmol/L Final     Potassium   Date Value Ref Range Status   03/13/2017 3.7 3.5 - 5.1 mmol/L Final     Chloride   Date Value Ref Range Status   03/13/2017 101 95 - 110 mmol/L Final     CO2   Date Value Ref Range Status   03/13/2017 27 23 - 29 mmol/L Final     BUN, Bld   Date Value Ref Range Status   03/13/2017 13 8 - 23 mg/dL Final     Creatinine   Date Value Ref Range Status   03/13/2017 0.9 0.5 - 1.4 mg/dL Final     Calcium   Date Value Ref Range Status   03/13/2017 9.3 8.7 - 10.5 mg/dL Final     Anion Gap   Date Value Ref Range Status   03/13/2017 12 8 - 16 mmol/L Final     eGFR if    Date Value Ref Range Status   03/13/2017 >60.0 >60 mL/min/1.73 m^2 Final     eGFR if non    Date Value Ref Range Status   03/13/2017 59.7 (A) >60 mL/min/1.73 m^2 Final     Comment:     Calculation used to obtain the estimated glomerular filtration  rate (eGFR) is the CKD-EPI equation. Since race is unknown   in our information system, the eGFR values for   -American and Non--American patients are given   for each creatinine result.       Estimated Creatinine Clearance: 38.1 mL/min (based on Cr of 0.9).     Assessment:      1. Nonrheumatic aortic valve stenosis    2. Nonrheumatic aortic valve insufficiency    3. Essential hypertension    4. Mixed hyperlipidemia    5. Fall, subsequent encounter    6. Impaired cognition        Multifactorial etiologies for falling.  Pt and family refused valve intervention.   Clinically no CHF.  Plan:   Continue  mg daily, toproXl 25 mg, lisinopril 10 mg and Crestor 10 mg .  Fall precaution.  DASh  and fluid restriction.  RTC in 4 months

## 2017-03-15 ENCOUNTER — TELEPHONE (OUTPATIENT)
Dept: PEDIATRICS | Facility: CLINIC | Age: 82
End: 2017-03-15

## 2017-03-15 ENCOUNTER — TELEPHONE (OUTPATIENT)
Dept: HEPATOLOGY | Facility: HOSPITAL | Age: 82
End: 2017-03-15

## 2017-03-15 NOTE — TELEPHONE ENCOUNTER
Spoke with pt daughter she stated that patient was seen at [facility] and her blood cultures grew ENTEROCOCCUS FAECALIS and Dr Fitzpatrick stated that he wanted her mom to go back to the ER for treatment , Shannan stated that her mom is not interested in going back to the ER she wants to know if there is an oral medicine she can take? Her mom has dementia and it takes her a while to get back to her routine when she gets home and the hospital stresses her

## 2017-03-15 NOTE — TELEPHONE ENCOUNTER
----- Message from Laura Hung sent at 3/15/2017  4:35 PM CDT -----  Contact: Shannan - Daughter  States  advised that pt has tested positive for having stomach bacteria , it may be in blood, daughter wants to be advised, can be reached at [phone]///[email or web address]

## 2017-03-16 LAB — BACTERIA BLD CULT: NORMAL

## 2017-03-16 NOTE — TELEPHONE ENCOUNTER
MD Wilfred Jurado MD Cc: ANA LILIA Lerma Staff        Caller: Unspecified (Yesterday, 11:54 PM)                     Discussed with patient and son-in-law today over the phone few minutes ago , and reinforced regarding patient going to ER for admission secondary to blood cultures growing Enterococcus faecalis, sensitive to only IV antibiotics.  Son-in-law mentioned that her wife has gone out of town and will be back tomorrow, and he will monitor her today.  Patient is not interested to come into ER as she has been feeling better, and has finished Levaquin as prescribed at the time of discharge.  Patient feels stressed out when she comes to the ER and does not want to go through that again with her heart condition.  Son-in-law mentioned that he will monitor her today and if she starts to run any fever or any change in mental status will bring in sooner of will make decisions  tomorrow after patient's daughter comes into town.   Patient does understand the risk of not being treated at this time, including son-in-law.     Dr. Herring            Previous Messages       ----- Message -----      From: Wilfred Fitzpatrick MD      Sent: 3/16/2017  12:01 AM        To: Maria Guadalupe Herring MD     ----- Message from Wilfred Fitzpatrick MD sent at 3/16/2017 12:01 AM CDT -----   I called Shannan, Ms. Romero's daughter, and informed her that her mother's blood culture ultimately showed E.faecalis and that I also discussed the case with our Infectious Disease doctor and recommended her mother come back to the ER to be admitted.  I spoke to her earlier in the week to inform her that the blood culture showed gram positive cocci and that this was likely a contaminant but the final culture is different.  I explained to her that there is still a chance it could be a contaminant but there is also good reason to think it could be genuine given her mother's history of alcohol use and aortic valve disease.  We also  discussed her mother's current health and she said her mother has been getting better each day since discharge although she is sleeping more than usual.  Given that her mother seems to be well I told her it would be safe for her to bring her mother back tomorrow morning if she wanted some time to make arrangements and she seemed to prefer that option.  I also told her I would inform the staff to expect her in the ER and we might be able to expedite her admission.     Wilfred Fitzpatrick MD   Internal Medicine

## 2017-03-25 NOTE — DISCHARGE SUMMARY
[facility] Hospital Medicine  Discharge Summary      Patient Name: Crystal Romero  MRN: [id number]  Admission Date: 3/10/2017  Hospital Length of Stay: 1 days  Discharge Date and Time: 3/11/2017  9:43 PM  Attending Physician: No att. providers found   Discharging Provider: Wilfred Fitzpatrick MD  Primary Care Provider: Maria Guadalupe Herring MD      HPI:   Crystal Romero is an 81 yo female with PMHx of HTN, HLP, RA, GI bleed, daily ETOH use who presented to ER with c/o unwitnessed fall. Her daughter came home and found her on the floor weak, altered mental status. Daughter reports patient drinks 1/2 bottle wine daily and has had a poor appetite over the past few days. In ER, WBC 17.6, Na+ 132, BNP 1208, . She is admitted with hyponatremia, dehydration, and fall.     * No surgery found *      Indwelling Lines/Drains at time of discharge:   Lines/Drains/Airways          No matching active lines, drains, or airways        Hospital Course:   Admitted for observation and workup.  Started on IV normal saline to correct dehydration and hyponatremia.  Repeated orthostatic vital signs which were normal.  Symptomatically improved.  Her daughter cares for her at home and she has an advanced dementia at baseline.  She also continues to drink wine most days.  Her cardiac echo showed a progression of Aortic stenosis to severe.  Her syncopal episode could well be cardiogenic from aortic stenosis in the setting of volume depletion.  As was noted in previous encounters with her Cardiologist, she does not want any interventions.  I have seen and examined Ms. Romero on the day of discharge and deemed her safe to return home.     Consults:     Significant Diagnostic Studies: Labs: CMP No results for input(s): NA, K, CL, CO2, GLU, BUN, CREATININE, CALCIUM, PROT, ALBUMIN, BILITOT, ALKPHOS, AST, ALT, ANIONGAP, ESTGFRAFRICA, EGFRNONAA in the last 48 hours. and CBC No results for input(s): WBC, HGB, HCT, PLT  in the last 48 hours.    Pending Diagnostic Studies:     None        Final Active Diagnoses:    Diagnosis Date Noted POA    PRINCIPAL PROBLEM:  Hyponatremia [E87.1] 03/10/2017 Yes    Dehydration [E86.0] 03/10/2017 Yes    Fall [W19.XXXA] 03/10/2017 Yes    EtOH dependence [F10.20] 03/10/2017 Yes    Bilateral adhesive capsulitis of shoulders [M75.01, M75.02] 02/23/2017 Yes    Nonrheumatic aortic valve stenosis [I35.0] 12/28/2015 Yes    Asthma with COPD [J44.9, J45.909] 07/18/2013 Yes     Chronic      Problems Resolved During this Admission:    Diagnosis Date Noted Date Resolved POA    Acute encephalopathy [G93.40] 03/10/2017 03/13/2017 Unknown      No new Assessment & Plan notes have been filed under this hospital service since the last note was generated.  Service: Hospital Medicine      Discharged Condition: good    Disposition: Home or Self Care    Follow Up:  Follow-up Information     Follow up with Geronimo James NP In 5 days.    Specialty:  Cardiology    Why:  Hospital follow up.  Symptomatic aortic stenosis.    Contact information:    [address]  [place] [address] [phone]          Follow up with Maria Guadalupe Herring MD In 1 week.    Specialty:  Family Medicine    Why:  Hospital follow up.    Contact information:    [address] [address] [phone]          Patient Instructions:     Diet general     Diet Cardiac     Diet Cardiac       Medications:  Reconciled Home Medications:   Discharge Medication List as of 3/11/2017  6:58 PM      STOP taking these medications       acetaminophen (TYLENOL) 500 MG tablet Comments:   Reason for Stopping:         albuterol (PROVENTIL) 5 mg/mL nebulizer solution Comments:   Reason for Stopping:         baicalin-catechin 500 mg Cap Comments:   Reason for Stopping:         blood pressure test kit-medium Kit Comments:   Reason for Stopping:         blood pressure test kit-medium Kit Comments:   Reason for Stopping:         budesonide  (PULMICORT) 0.5 mg/2 mL nebulizer solution Comments:   Reason for Stopping:         cetirizine (ZYRTEC) 10 MG tablet Comments:   Reason for Stopping:         diclofenac sodium 1.5 % Drop Comments:   Reason for Stopping:         fluticasone (FLONASE) 50 mcg/actuation nasal spray Comments:   Reason for Stopping:         folic acid (FOLVITE) 1 MG tablet Comments:   Reason for Stopping:         hydroxychloroquine (PLAQUENIL) 200 mg tablet Comments:   Reason for Stopping:         inhalation device (AEROCHAMBER PLUS FLOW-VU) Comments:   Reason for Stopping:         levothyroxine (SYNTHROID) 25 MCG tablet Comments:   Reason for Stopping:         lisinopril 10 MG tablet Comments:   Reason for Stopping:         methotrexate 2.5 MG Tab Comments:   Reason for Stopping:         metoprolol succinate (TOPROL-XL) 25 MG 24 hr tablet Comments:   Reason for Stopping:         multivitamin with minerals tablet Comments:   Reason for Stopping:         pantoprazole (PROTONIX) 40 MG tablet Comments:   Reason for Stopping:         rosuvastatin (CRESTOR) 10 MG tablet Comments:   Reason for Stopping:         sertraline (ZOLOFT) 50 MG tablet Comments:   Reason for Stopping:         tramadol (ULTRAM) 50 mg tablet Comments:   Reason for Stopping:         trazodone (DESYREL) 50 MG tablet Comments:   Reason for Stopping:         VENTOLIN HFA 90 mcg/actuation inhaler Comments:   Reason for Stopping:         verapamil (CALAN-SR) 240 MG CR tablet Comments:   Reason for Stopping:             Time spent on the discharge of patient: 30 minutes    Wilfred Fitzpatrick MD  Department of Hospital Medicine  [facility]

## 2017-04-10 RX ORDER — PANTOPRAZOLE SODIUM 40 MG/1
TABLET, DELAYED RELEASE ORAL
Qty: 30 TABLET | Refills: 2 | Status: SHIPPED | OUTPATIENT
Start: 2017-04-10 | End: 2017-07-11 | Stop reason: SDUPTHER

## 2017-04-19 ENCOUNTER — PROCEDURE VISIT (OUTPATIENT)
Dept: PULMONOLOGY | Facility: CLINIC | Age: 82
End: 2017-04-19
Payer: MEDICARE

## 2017-04-19 ENCOUNTER — OFFICE VISIT (OUTPATIENT)
Dept: PULMONOLOGY | Facility: CLINIC | Age: 82
End: 2017-04-19
Payer: MEDICARE

## 2017-04-19 ENCOUNTER — OFFICE VISIT (OUTPATIENT)
Dept: NEUROLOGY | Facility: CLINIC | Age: 82
End: 2017-04-19
Payer: MEDICARE

## 2017-04-19 ENCOUNTER — LAB VISIT (OUTPATIENT)
Dept: LAB | Facility: HOSPITAL | Age: 82
End: 2017-04-19
Attending: PSYCHIATRY & NEUROLOGY
Payer: MEDICARE

## 2017-04-19 VITALS
SYSTOLIC BLOOD PRESSURE: 180 MMHG | BODY MASS INDEX: 23.08 KG/M2 | HEART RATE: 60 BPM | WEIGHT: 125.44 LBS | DIASTOLIC BLOOD PRESSURE: 90 MMHG | HEIGHT: 62 IN

## 2017-04-19 VITALS
OXYGEN SATURATION: 97 % | HEIGHT: 62 IN | DIASTOLIC BLOOD PRESSURE: 74 MMHG | SYSTOLIC BLOOD PRESSURE: 122 MMHG | HEART RATE: 60 BPM | BODY MASS INDEX: 23 KG/M2 | WEIGHT: 125 LBS

## 2017-04-19 DIAGNOSIS — R78.81 BACTEREMIA DUE TO ENTEROCOCCUS: ICD-10-CM

## 2017-04-19 DIAGNOSIS — R41.3 MEMORY LOSS: ICD-10-CM

## 2017-04-19 DIAGNOSIS — B95.2 BACTEREMIA DUE TO ENTEROCOCCUS: ICD-10-CM

## 2017-04-19 DIAGNOSIS — J43.8 OTHER EMPHYSEMA: Primary | ICD-10-CM

## 2017-04-19 DIAGNOSIS — I67.3 BINSWANGER'S DEMENTIA: ICD-10-CM

## 2017-04-19 DIAGNOSIS — F01.50 DEMENTIA, VASCULAR, MIXED, WITHOUT BEHAVIORAL DISTURBANCE: ICD-10-CM

## 2017-04-19 DIAGNOSIS — R41.89 IMPAIRED COGNITION: ICD-10-CM

## 2017-04-19 DIAGNOSIS — M62.81 HAND MUSCLE WEAKNESS: ICD-10-CM

## 2017-04-19 DIAGNOSIS — R41.89 IMPAIRED COGNITION: Primary | ICD-10-CM

## 2017-04-19 DIAGNOSIS — J44.89 ASTHMA WITH COPD: ICD-10-CM

## 2017-04-19 DIAGNOSIS — J30.1 SEASONAL ALLERGIC RHINITIS DUE TO POLLEN: ICD-10-CM

## 2017-04-19 PROBLEM — J43.9 PULMONARY EMPHYSEMA: Status: ACTIVE | Noted: 2017-04-19

## 2017-04-19 LAB
FOLATE SERPL-MCNC: 16.9 NG/ML
VIT B12 SERPL-MCNC: 266 PG/ML

## 2017-04-19 PROCEDURE — 99205 OFFICE O/P NEW HI 60 MIN: CPT | Mod: S$GLB,,, | Performed by: PSYCHIATRY & NEUROLOGY

## 2017-04-19 PROCEDURE — 3077F SYST BP >= 140 MM HG: CPT | Mod: S$GLB,,, | Performed by: PSYCHIATRY & NEUROLOGY

## 2017-04-19 PROCEDURE — 99999 PR PBB SHADOW E&M-EST. PATIENT-LVL IV: CPT | Mod: PBBFAC,,, | Performed by: INTERNAL MEDICINE

## 2017-04-19 PROCEDURE — 1159F MED LIST DOCD IN RCRD: CPT | Mod: S$GLB,,, | Performed by: PSYCHIATRY & NEUROLOGY

## 2017-04-19 PROCEDURE — 94060 EVALUATION OF WHEEZING: CPT | Mod: S$GLB,,, | Performed by: INTERNAL MEDICINE

## 2017-04-19 PROCEDURE — 1159F MED LIST DOCD IN RCRD: CPT | Mod: S$GLB,,, | Performed by: INTERNAL MEDICINE

## 2017-04-19 PROCEDURE — 99214 OFFICE O/P EST MOD 30 MIN: CPT | Mod: 25,S$GLB,, | Performed by: INTERNAL MEDICINE

## 2017-04-19 PROCEDURE — 1160F RVW MEDS BY RX/DR IN RCRD: CPT | Mod: S$GLB,,, | Performed by: INTERNAL MEDICINE

## 2017-04-19 PROCEDURE — 1125F AMNT PAIN NOTED PAIN PRSNT: CPT | Mod: S$GLB,,, | Performed by: PSYCHIATRY & NEUROLOGY

## 2017-04-19 PROCEDURE — 99999 PR PBB SHADOW E&M-EST. PATIENT-LVL IV: CPT | Mod: PBBFAC,,, | Performed by: PSYCHIATRY & NEUROLOGY

## 2017-04-19 PROCEDURE — 3078F DIAST BP <80 MM HG: CPT | Mod: S$GLB,,, | Performed by: INTERNAL MEDICINE

## 2017-04-19 PROCEDURE — 1125F AMNT PAIN NOTED PAIN PRSNT: CPT | Mod: S$GLB,,, | Performed by: INTERNAL MEDICINE

## 2017-04-19 PROCEDURE — 3080F DIAST BP >= 90 MM HG: CPT | Mod: S$GLB,,, | Performed by: PSYCHIATRY & NEUROLOGY

## 2017-04-19 PROCEDURE — 1160F RVW MEDS BY RX/DR IN RCRD: CPT | Mod: S$GLB,,, | Performed by: PSYCHIATRY & NEUROLOGY

## 2017-04-19 PROCEDURE — 3074F SYST BP LT 130 MM HG: CPT | Mod: S$GLB,,, | Performed by: INTERNAL MEDICINE

## 2017-04-19 RX ORDER — FLUTICASONE PROPIONATE 50 MCG
2 SPRAY, SUSPENSION (ML) NASAL DAILY
Qty: 1 BOTTLE | Refills: 11 | Status: SHIPPED | OUTPATIENT
Start: 2017-04-19 | End: 2018-06-13 | Stop reason: SDUPTHER

## 2017-04-19 RX ORDER — DONEPEZIL HYDROCHLORIDE 5 MG/1
5 TABLET, FILM COATED ORAL NIGHTLY
Qty: 30 TABLET | Refills: 11 | Status: SHIPPED | OUTPATIENT
Start: 2017-04-19 | End: 2017-06-19 | Stop reason: SDUPTHER

## 2017-04-19 NOTE — MR AVS SNAPSHOT
[facility]  [address]  [place] [address]  Phone: [phone]  Fax: [phone]                  Crystal Romero   2017 3:40 PM   Office Visit    Description:  Female : 1934   Provider:  Chuck Slater MD   Department:  [facility]           Reason for Visit     Asthma           Diagnoses this Visit        Comments    Other emphysema    -  Primary     Seasonal allergic rhinitis due to pollen                To Do List           Future Appointments        Provider Department Dept Phone    2017 8:45 AM [facility] MRI [facility] [phone]    2017 4:00 PM LABORATORY, [facility] [phone]    2017 4:30 PM Keegan Yarbrough MD [facility] - Rheumatology [phone]    2017 4:20 PM Mendez Rosa MD [facility] Neurology [phone]    2018 10:00 AM [facility] XR2 [facility] [phone]      Goals (5 Years of Data)     None      Follow-Up and Disposition     Return in about 1 year (around 2018) for CXR.       These Medications        Disp Refills Start End    fluticasone (FLONASE) 50 mcg/actuation nasal spray 1 Bottle 11 2017     2 sprays by Each Nare route once daily. - Each Nare    Pharmacy: [id number] - [place] - [id number] [facility] Ph #: [phone]         [facility]     [facility] Nurse Care Line -  Assistance  Unless otherwise directed by your provider, please contact [facility], our nurse care line that is available for  assistance.     Registered nurses in the [facility] provide: appointment scheduling, clinical advisement, health education, and other advisory services.  Call: [phone] (toll free)               Medications           Message regarding Medications     Verify the changes and/or additions to your medication regime listed below are the same as discussed with your clinician today.  If  any of these changes or additions are incorrect, please notify your healthcare provider.        CHANGE how you are taking these medications     Start Taking Instead of    fluticasone (FLONASE) 50 mcg/actuation nasal spray fluticasone (FLONASE) 50 mcg/actuation nasal spray    Dosage:  2 sprays by Each Nare route once daily. Dosage:  1 spray by Each Nare route once daily.    Reason for Change:  Reorder            Verify that the below list of medications is an accurate representation of the medications you are currently taking.  If none reported, the list may be blank. If incorrect, please contact your healthcare provider. Carry this list with you in case of emergency.           Current Medications     acetaminophen (TYLENOL) 500 MG tablet Take 500 mg by mouth every 6 (six) hours as needed for Pain.    albuterol (PROVENTIL) 5 mg/mL nebulizer solution Take 2.5 mg by nebulization every 6 (six) hours as needed for Wheezing. Rescue    baicalin-catechin-citrated znc 500-50 mg Cap Take by mouth 2 (two) times daily.    budesonide (PULMICORT) 0.5 mg/2 mL nebulizer solution Take 0.5 mg by nebulization once daily. Controller    cetirizine (ZYRTEC) 10 MG tablet Take 10 mg by mouth once daily.    diclofenac sodium 1.5 % Drop Apply 2 % topically as needed.    donepezil (ARICEPT) 5 MG tablet Take 1 tablet (5 mg total) by mouth every evening.    fluticasone (FLONASE) 50 mcg/actuation nasal spray 2 sprays by Each Nare route once daily.    folic acid (FOLVITE) 1 MG tablet Take 1 mg by mouth once daily.    hydroxychloroquine (PLAQUENIL) 200 mg tablet Take 200 mg by mouth once daily.    INHALER, ASSIST DEVICES (AEROCHAMBER PLUS FLOW-VU MISC) by Misc.(Non-Drug; Combo Route) route.    levothyroxine (SYNTHROID) 25 MCG tablet Take 25 mcg by mouth once daily.    lisinopril 10 MG tablet Take 10 mg by mouth once daily.    methotrexate 2.5 MG Tab Take 2.5 mg by mouth every 7 days.    metoprolol succinate (TOPROL-XL) 25 MG 24 hr tablet Take 25  "mg by mouth once daily.    pantoprazole (PROTONIX) 40 MG tablet TAKE ONE TABLET BY MOUTH ONE TIME DAILY    rosuvastatin (CRESTOR) 10 MG tablet Take 10 mg by mouth once daily.    sertraline (ZOLOFT) 50 MG tablet Take 50 mg by mouth once daily.    tramadol (ULTRAM) 50 mg tablet Take 50 mg by mouth 2 (two) times daily as needed for Pain.     trazodone (DESYREL) 50 MG tablet Take 50 mg by mouth every evening.    verapamil (VERELAN) 240 MG C24P Take 240 mg by mouth once daily.           Clinical Reference Information           Your Vitals Were     BP Pulse Height Weight SpO2 BMI    122/74 60 5' 2" (1.575 m) 56.7 kg (125 lb) 97% 22.86 kg/m2      Blood Pressure          Most Recent Value    BP  122/74      Allergies as of 4/19/2017     Sulfa (Sulfonamide Antibiotics)      Immunizations Administered on Date of Encounter - 4/19/2017     None      Orders Placed During Today's Visit     Future Labs/Procedures Expected by Expires    X-Ray Chest PA And Lateral  As directed 4/19/2018      [facility] Sign-Up     Activating your [facility] account is as easy as 1-2-3!     1) Visit [email or web address], select Sign Up Now, enter this activation code and your date of birth, then select Next.  [email or web address]  Expires: 6/3/2017 10:32 AM      2) Create a username and password to use when you visit [facility] in the future and select a security question in case you lose your password and select Next.    3) Enter your e-mail address and click Sign Up!    Additional Information  If you have questions, please e-mail [email or web address] or call [phone] to talk to our [facility] staff. Remember, [facility] is NOT to be used for urgent needs. For medical emergencies, dial 911.         Language Assistance Services     ATTENTION: Language assistance services are available, free of charge. Please call [phone].      ATENCIÓN: Si habla español, tiene a duncan disposición servicios gratuitos de asistencia lingüística. Llame al " [phone].     MATT Ý: N?u b?n nói Ti?ng Vi?t, có các d?ch v? h? tr? ngôn ng? mi?n phí dành cho b?n. G?i s? [phone].         [facility] complies with applicable Federal civil rights laws and does not discriminate on the basis of race, color, national origin, age, disability, or sex.

## 2017-04-19 NOTE — LETTER
April 19, 2017      Maria Guadalupe Herring MD  [address] [facility] [address] [address]           [facility]  [address] [place], [place] [address]on [facility] [address]  Phone: [phone]  Fax: [phone]          Patient: Crystal Romero   MR Number: [id number]   YOB: 1934   Date of Visit: 4/19/2017       Dear Dr. Maria Guadalupe Herring:    Thank you for referring Crystal Romero to me for evaluation. Attached you will find relevant portions of my assessment and plan of care.    If you have questions, please do not hesitate to call me. I look forward to following Crystal Romero along with you.    Sincerely,    Mendez Rosa MD    Enclosure  CC:  No Recipients    If you would like to receive this communication electronically, please contact [email or web address] or [phone] to request more information on [employer] [facility] access.    For providers and/or their staff who would like to refer a patient to [facility], please contact us through our one-stop-shop provider referral line, [facility], at [phone].    If you feel you have received this communication in error or would no longer like to receive these types of communications, please e-mail [email or web address]

## 2017-04-19 NOTE — PROGRESS NOTES
Subjective:       Patient ID: Crystal Romero is a 82 y.o. female.    Chief Complaint: She       Asthma    HPI     See in Emergency Room for syncope, hospitalized overnight and released  Called about positive blood culture  Did not want to be re-hospitalized  Took oral antibiotics for 7 days - levofloxin  No fever now - feeling well.  Hx obtained from daughter  Patient is demented and unable to give history      Blood cultures positive for enterococcus  1mo ago      Blood Culture, Routine Gram stain aer bottle: Gram positive cocci in chains resembling Strep    Blood Culture, Routine Results called to and read back by:Dr Urban Frost 03/11/2017 22:55   Blood Culture, Routine ENTEROCOCCUS FAECALIS   Resulting Agency [facility]   Susceptibility       Enterococcus faecalis       CULTURE, BLOOD       Ampicillin <=2  Sensitive       Gentamicin Synergy Screen >500  Resistant       Tetracycline >8  Resistant       Vancomycin 2  Sensitive         REVIEW OF HOSPITALIZATION:  [facility] Hospital Medicine  Discharge Summary        Patient Name: Crystal Romero  MRN: [id number]  Admission Date: 3/10/2017  Hospital Length of Stay: 1 days  Discharge Date and Time: 3/11/2017 9:43 PM  Attending Physician: No att. providers found   Discharging Provider: Wilfred Fitzpatrick MD  Primary Care Provider: Maria Guadlaupe Herring MD     HPI:   Crystal Romero is an 83 yo female with PMHx of HTN, HLP, RA, GI bleed, daily ETOH use who presented to ER with c/o unwitnessed fall. Her daughter came home and found her on the floor weak, altered mental status. Daughter reports patient drinks 1/2 bottle wine daily and has had a poor appetite over the past few days. In ER, WBC 17.6, Na+ 132, BNP 1208, . She is admitted with hyponatremia, dehydration, and fall.      * No surgery found *       Indwelling Lines/Drains at time of discharge:   Lines/Drains/Airways            No matching active lines, drains, or airways          Hospital Course:   Admitted for observation and workup. Started on IV normal saline to correct dehydration and hyponatremia. Repeated orthostatic vital signs which were normal. Symptomatically improved. Her daughter cares for her at home and she has an advanced dementia at baseline. She also continues to drink wine most days. Her cardiac echo showed a progression of Aortic stenosis to severe. Her syncopal episode could well be cardiogenic from aortic stenosis in the setting of volume depletion. As was noted in previous encounters with her Cardiologist, she does not want any interventions. I have seen and examined Ms. Romero on the day of discharge and deemed her safe to return home.      Past Medical History:   Diagnosis Date    Arthritis     Coronary artery disease     Depression     Encounter for blood transfusion     H/O: GI bleed     Hiatal hernia     Hyperlipidemia     Hypertension     Hypothyroid     Rheumatoid arthritis     Stroke     Syncope and collapse      Past Surgical History:   Procedure Laterality Date    TONSILLECTOMY       Social History     Social History    Marital status:      Spouse name: N/A    Number of children: 2    Years of education: N/A     Occupational History    Not on file.     Social History Main Topics    Smoking status: Former Smoker     Packs/day: 2.00     Years: 16.00     Types: Cigarettes     Quit date: 1/1/1950    Smokeless tobacco: Never Used    Alcohol use 1.8 oz/week     3 Glasses of wine per week      Comment: daily 1/2 bottle wine    Drug use: No    Sexual activity: No     Other Topics Concern    Not on file     Social History Narrative    Lives with daughter     Review of Systems   Constitutional: Positive for fatigue. Negative for fever.   Respiratory: Positive for dyspnea on extertion.    Musculoskeletal: Positive for arthralgias.   All other systems reviewed and are negative.      Objective:      Physical Exam   Constitutional: She  is oriented to person, place, and time. She appears well-developed and well-nourished.   HENT:   Head: Normocephalic and atraumatic.   Mouth/Throat: Oropharyngeal exudate present.   Eyes: Conjunctivae are normal. Pupils are equal, round, and reactive to light.   Neck: Neck supple. No JVD present. No tracheal deviation present. No thyromegaly present.   Cardiovascular: Normal rate and regular rhythm.    Murmur heard.   Systolic murmur is present with a grade of 3/6   Pulmonary/Chest: Effort normal. No respiratory distress. She has decreased breath sounds. She has wheezes in the right lower field and the left lower field. She has no rhonchi. She has no rales. She exhibits no tenderness.   Abdominal: Soft. Bowel sounds are normal.   Musculoskeletal: Normal range of motion. She exhibits no edema.   Lymphadenopathy:     She has no cervical adenopathy.   Neurological: She is alert and oriented to person, place, and time.   Skin: Skin is warm and dry. Bruising noted. No petechiae noted. There is pallor.   Nursing note and vitals reviewed.    Personal Diagnostic Review  Chest x-ray: hyperinflation - stable  Radiology Result       Name:  :  Patient MRN:   Crystal Romero 1934 [id number]   Account Number: Room & Bed Accession Number:   [id number]   [id number]   Authorizing Physician: Patient Class: Diagnosis:   Oleg Owenschristelle Morales. Emergency     Procedure: Exam Date: Reason for Exam:   X-Ray Chest AP Portable 03/10/2017 fall, syncope          RESULTS:  Exam: Portable chest xray     History: Syncope, fall     Findings: The heart is normal and the lungs are clear. Aortic atherosclerosis. Hiatal hernia.  IMPRESSION:   No acute cardiopulmonary disease.        Electronically signed by: TORSTEN ROSEN MD  Date:     03/10/17  Time:    19:16        Signed By: Torsten Rosen MD on 3/10/2017 7:16 PM              No flowsheet data found.      Assessment:       1. Other emphysema    2. Bacteremia due to Enterococcus - 3/10/2017     3. Seasonal allergic rhinitis due to pollen        Outpatient Encounter Prescriptions as of 4/19/2017   Medication Sig Dispense Refill    acetaminophen (TYLENOL) 500 MG tablet Take 500 mg by mouth every 6 (six) hours as needed for Pain.      albuterol (PROVENTIL) 5 mg/mL nebulizer solution Take 2.5 mg by nebulization every 6 (six) hours as needed for Wheezing. Rescue      baicalin-catechin-citrated znc 500-50 mg Cap Take by mouth 2 (two) times daily.      budesonide (PULMICORT) 0.5 mg/2 mL nebulizer solution Take 0.5 mg by nebulization once daily. Controller      cetirizine (ZYRTEC) 10 MG tablet Take 10 mg by mouth once daily.      diclofenac sodium 1.5 % Drop Apply 2 % topically as needed.      fluticasone (FLONASE) 50 mcg/actuation nasal spray 2 sprays by Each Nare route once daily. 1 Bottle 11    folic acid (FOLVITE) 1 MG tablet Take 1 mg by mouth once daily.      hydroxychloroquine (PLAQUENIL) 200 mg tablet Take 200 mg by mouth once daily.      INHALER, ASSIST DEVICES (AEROCHAMBER PLUS FLOW-VU MISC) by Misc.(Non-Drug; Combo Route) route.      levothyroxine (SYNTHROID) 25 MCG tablet Take 25 mcg by mouth once daily.      lisinopril 10 MG tablet Take 10 mg by mouth once daily.      methotrexate 2.5 MG Tab Take 2.5 mg by mouth every 7 days.      metoprolol succinate (TOPROL-XL) 25 MG 24 hr tablet Take 25 mg by mouth once daily.      pantoprazole (PROTONIX) 40 MG tablet TAKE ONE TABLET BY MOUTH ONE TIME DAILY 30 tablet 2    rosuvastatin (CRESTOR) 10 MG tablet Take 10 mg by mouth once daily.      sertraline (ZOLOFT) 50 MG tablet Take 50 mg by mouth once daily.      tramadol (ULTRAM) 50 mg tablet Take 50 mg by mouth 2 (two) times daily as needed for Pain.       trazodone (DESYREL) 50 MG tablet Take 50 mg by mouth every evening.      verapamil (VERELAN) 240 MG C24P Take 240 mg by mouth once daily.      [DISCONTINUED] fluticasone (FLONASE) 50 mcg/actuation nasal spray 1 spray by Each Nare route  once daily.      donepezil (ARICEPT) 5 MG tablet Take 1 tablet (5 mg total) by mouth every evening. 30 tablet 11     No facility-administered encounter medications on file as of 2017.      Orders Placed This Encounter   Procedures    X-Ray Chest PA And Lateral     Standing Status:   Future     Standing Expiration Date:   2018     Order Specific Question:   Reason for Exam:     Answer:   SOB     Plan:       Requested Prescriptions     Signed Prescriptions Disp Refills    fluticasone (FLONASE) 50 mcg/actuation nasal spray 1 Bottle 11     Si sprays by Each Nare route once daily.     Other emphysema  -     X-Ray Chest PA And Lateral; Future    Bacteremia due to Enterococcus - 3/10/2017    Seasonal allergic rhinitis due to pollen  -     fluticasone (FLONASE) 50 mcg/actuation nasal spray; 2 sprays by Each Nare route once daily.  Dispense: 1 Bottle; Refill: 11           Return in about 1 year (around 2018) for CXR.    Review of medical records from hospital. Medical records from hospital were reviewed during office visit - these included but were not limited to review of radiographic studies and /or radiologists reports, laboratory studies, discharge summaries, procedure notes, consultations and other transcribed notes.        MEDICAL DECISION MAKING: Moderate to high complexity.  Overall, the multiple problems listed are of moderate to high severity that may impact quality of life and activities of daily living. Side effects of medications, treatment plan as well as options and alternatives reviewed and discussed with patient. There was counseling of patient concerning these issues.    Total time spent in face to face counseling and coordination of care - 40  minutes over 50% of time was used in discussion of prognosis, risks, benefits of treatment, instructions and compliance with regimen . Discussion with other physicians or health care providers (DME, NP, pharmacy, respiratory therapy)  occurred.

## 2017-04-19 NOTE — MR AVS SNAPSHOT
[facility]  [address] [facility] [address]  Phone: [phone]  Fax: [phone]                  Crystal Romero   2017 9:00 AM   Office Visit    Description:  Female : 1934   Provider:  Mendez Rosa MD   Department:  [facility]           Reason for Visit     Memory Loss           Diagnoses this Visit        Comments    Impaired cognition    -  Primary     Dementia, vascular, mixed, without behavioral disturbance         Binswanger's dementia         Hand muscle weakness         Memory loss                To Do List           Future Appointments        Provider Department Dept Phone    2017 1:00 PM LAB, SAME DAY [facility] [phone]    2017 3:10 PM PULMONARY LAB, [facility] [phone]    2017 3:40 PM Chuck Slater MD [facility] [phone]    2017 8:45 AM [facility] [phone]    2017 4:00 PM LABORATORY, [facility] [phone]      Goals (5 Years of Data)     None      Follow-Up and Disposition     Return in about 1 month (around 2017).       These Medications        Disp Refills Start End    donepezil (ARICEPT) 5 MG tablet 30 tablet 11 2017    Take 1 tablet (5 mg total) by mouth every evening. - Oral    Pharmacy: [id number] - [facility] [address] [facility] Ph #: [phone]         [facility]     [facility] Nurse Care Line - 24/ Assistance  Unless otherwise directed by your provider, please contact [facility], our nurse care line that is available for / assistance.     Registered nurses in the [facility] provide: appointment scheduling, clinical advisement, health education, and other advisory services.  Call: [phone] (toll free)               Medications           Message regarding Medications     Verify the  changes and/or additions to your medication regime listed below are the same as discussed with your clinician today.  If any of these changes or additions are incorrect, please notify your healthcare provider.        START taking these NEW medications        Refills    donepezil (ARICEPT) 5 MG tablet 11    Sig: Take 1 tablet (5 mg total) by mouth every evening.    Class: Normal    Route: Oral           Verify that the below list of medications is an accurate representation of the medications you are currently taking.  If none reported, the list may be blank. If incorrect, please contact your healthcare provider. Carry this list with you in case of emergency.           Current Medications     acetaminophen (TYLENOL) 500 MG tablet Take 500 mg by mouth every 6 (six) hours as needed for Pain.    albuterol (PROVENTIL) 5 mg/mL nebulizer solution Take 2.5 mg by nebulization every 6 (six) hours as needed for Wheezing. Rescue    baicalin-catechin-citrated znc 500-50 mg Cap Take by mouth 2 (two) times daily.    budesonide (PULMICORT) 0.5 mg/2 mL nebulizer solution Take 0.5 mg by nebulization once daily. Controller    cetirizine (ZYRTEC) 10 MG tablet Take 10 mg by mouth once daily.    diclofenac sodium 1.5 % Drop Apply 2 % topically as needed.    donepezil (ARICEPT) 5 MG tablet Take 1 tablet (5 mg total) by mouth every evening.    fluticasone (FLONASE) 50 mcg/actuation nasal spray 1 spray by Each Nare route once daily.    folic acid (FOLVITE) 1 MG tablet Take 1 mg by mouth once daily.    hydroxychloroquine (PLAQUENIL) 200 mg tablet Take 200 mg by mouth once daily.    INHALER, ASSIST DEVICES (AEROCHAMBER PLUS FLOW-VU MISC) by Misc.(Non-Drug; Combo Route) route.    levothyroxine (SYNTHROID) 25 MCG tablet Take 25 mcg by mouth once daily.    lisinopril 10 MG tablet Take 10 mg by mouth once daily.    methotrexate 2.5 MG Tab Take 2.5 mg by mouth every 7 days.    metoprolol succinate (TOPROL-XL) 25 MG 24 hr tablet Take 25 mg by mouth  "once daily.    pantoprazole (PROTONIX) 40 MG tablet TAKE ONE TABLET BY MOUTH ONE TIME DAILY    rosuvastatin (CRESTOR) 10 MG tablet Take 10 mg by mouth once daily.    sertraline (ZOLOFT) 50 MG tablet Take 50 mg by mouth once daily.    tramadol (ULTRAM) 50 mg tablet Take 50 mg by mouth 2 (two) times daily as needed for Pain.     trazodone (DESYREL) 50 MG tablet Take 50 mg by mouth every evening.    verapamil (VERELAN) 240 MG C24P Take 240 mg by mouth once daily.           Clinical Reference Information           Your Vitals Were     BP Pulse Height Weight BMI    180/90 60 5' 2" (1.575 m) 56.9 kg (125 lb 7.1 oz) 22.94 kg/m2      Blood Pressure          Most Recent Value    BP  (!)  180/90      Allergies as of 4/19/2017     Sulfa (Sulfonamide Antibiotics)      Immunizations Administered on Date of Encounter - 4/19/2017     None      Orders Placed During Today's Visit     Future Labs/Procedures Expected by Expires    Folate  4/19/2017 6/18/2018    Methylmalonic acid, serum  4/19/2017 6/18/2018    MRI Cervical Spine Without Contrast  4/19/2017 4/19/2018    Vitamin B12  4/19/2017 6/18/2018      [facility] Sign-Up     Activating your [facility] account is as easy as 1-2-3!     1) Visit [email or web address], select Sign Up Now, enter this activation code and your date of birth, then select Next.  [email or web address]  Expires: 6/3/2017 10:32 AM      2) Create a username and password to use when you visit [facility] in the future and select a security question in case you lose your password and select Next.    3) Enter your e-mail address and click Sign Up!    Additional Information  If you have questions, please e-mail [email or web address] or call [phone] to talk to our [facility] staff. Remember, [facility] is NOT to be used for urgent needs. For medical emergencies, dial 911.         Language Assistance Services     ATTENTION: Language assistance services are available, free of charge. Please call [phone].      ATENCIÓN: Si " habla lacie, tiene a duncan disposición servicios gratuitos de asistencia lingüística. Llame al [phone].     CHÚ Ý: N?u b?n nói Ti?ng Vi?t, có các d?ch v? h? tr? ngôn ng? mi?n phí dành cho b?n. G?i s? [phone].         [facility] complies with applicable Federal civil rights laws and does not discriminate on the basis of race, color, national origin, age, disability, or sex.

## 2017-04-19 NOTE — PROGRESS NOTES
Consult Requested By: No att. providers found  Reason for Consult: Dementia    SUBJECTIVE:       HPI:   Dementia  She is here for evaluation and treatment of cognitive problems. She is accompanied by patient and daughter. Primary caregiver is patient and daughter. The family and the patient identify problems with changes in short term memory, day/night changes, getting disoriented outside of familiar environment, repetition of questions and wandering. Family and patient report problems with none. Family and patient are concerned about  none, however, they are not concerned about medication errors, wandering, driving, cooking, inability to maintain adequate nutrition and she under close supervision of her daughter.. Medication administration: family monitors medication usage    Daughter said that last 5 and half years she is showing declining cognition. 2 years ago, she became disoriented and was taken to the hospital. She used to drink for long time and her father, brother all were alcoholic.  Functional Assessment:   Activities of Daily Living (ADLs):    She is independent in the following: ambulation, bathing and hygiene, feeding, continence, grooming, toileting and dressing  Requires assistance with the following: none  Instrumental Activities of Daily Living (IADLs):    She is independent in the following: knitting   Requires assistance with the following: driving.      Past Medical History:   Diagnosis Date    Arthritis     Coronary artery disease     Depression     Encounter for blood transfusion     H/O: GI bleed     Hiatal hernia     Hyperlipidemia     Hypertension     Hypothyroid     Rheumatoid arthritis     Stroke     Syncope and collapse      Past Surgical History:   Procedure Laterality Date    TONSILLECTOMY       Family History   Problem Relation Age of Onset    Cancer Mother      breast, uterine     Social History   Substance Use Topics    Smoking status: Former Smoker     Packs/day:  2.00     Years: 16.00     Types: Cigarettes     Quit date: 1/1/1950    Smokeless tobacco: Never Used    Alcohol use 1.8 oz/week     3 Glasses of wine per week      Comment: daily 1/2 bottle wine     Review of patient's allergies indicates:   Allergen Reactions    Sulfa (sulfonamide antibiotics) Shortness Of Breath       Review of Systems   Constitutional: Positive for malaise/fatigue. Negative for fever and weight loss.   HENT: Negative for hearing loss.    Eyes: Negative for blurred vision, double vision, photophobia and pain.   Respiratory: Negative for cough.    Cardiovascular: Negative for chest pain.   Gastrointestinal: Negative for abdominal pain, nausea and vomiting.   Genitourinary: Negative for dysuria, frequency and urgency.   Musculoskeletal: Positive for joint pain, myalgias and neck pain. Negative for back pain and falls.   Skin: Negative for itching and rash.   Neurological: Positive for sensory change. Negative for tingling and headaches.   Psychiatric/Behavioral: Positive for memory loss. Negative for depression.       OBJECTIVE:     Vital Signs (Most Recent)  Pulse: 60 (04/19/17 0912)  BP: (!) 180/90 (04/19/17 0912)    Physical Exam   Constitutional: She is oriented to person, place, and time. She appears well-developed and well-nourished.   HENT:   Head: Normocephalic and atraumatic.   Eyes: Conjunctivae and EOM are normal. Pupils are equal, round, and reactive to light.   Neck: Normal range of motion. Neck supple. No JVD present. No tracheal deviation present. No thyromegaly present.   Cardiovascular: Normal rate, regular rhythm and normal heart sounds.    Pulmonary/Chest: Effort normal and breath sounds normal.   Abdominal: She exhibits no distension. There is no tenderness.   Musculoskeletal: She exhibits no edema.        Right shoulder: She exhibits decreased range of motion, bony tenderness, pain and spasm.        Left shoulder: She exhibits decreased range of motion, tenderness, bony  "tenderness, pain and spasm.   Bilateral shoulder frozen.   Neurological: She is alert and oriented to person, place, and time. She has normal strength and normal reflexes. She displays normal reflexes. No cranial nerve deficit or sensory deficit. She exhibits normal muscle tone. She displays a negative Romberg sign. Coordination and gait normal.   Reflex Scores:       Tricep reflexes are 2+ on the right side and 2+ on the left side.       Bicep reflexes are 2+ on the right side and 2+ on the left side.       Brachioradialis reflexes are 2+ on the right side and 2+ on the left side.       Patellar reflexes are 2+ on the right side and 2+ on the left side.       Achilles reflexes are 2+ on the right side and 2+ on the left side.  MINI-MENTAL STATE EXAM    What is the (year), (season), (date), (day), (month)?4 points   Where are we (state), (parish), (town), (hospital), (floor)? 4 points  Name 3 objects: 1 second to say each, then ask the patient to repeat all three after you have said them.  Repeat initially until he/she learns all three. 3 points  Serial 7's. 1 point for each answer, stop after 5.  Alternatively, spell "world" backwards.  Must be in the correct sequence.  5 points  Show 2 common objects (pencil and watch).  Ask patient to name. 2 points  Ask the patient to repeat No ifs, ands or buts. 1 point  Follow a 3 stage command: "Take this paper in your right hand, fold it in half, and put it on the floor". 3 points  Read and obey: "Close your eyes". 1 poinr  Ask the patient to recall the three words you previously asked. 0 points  Give pt. paper and ask to write a sentence. 1 point  Show pt. drawing of intersecting pentagons. Ask pt. to draw. 1 point  What was the mini mental exam score? 25 /30  Level of consciousness: alert  Describe if abnormal:   Fund of knowledge: Normal  General appearance: Normal     Skin: Skin is warm and dry. No rash noted.   Psychiatric: She has a normal mood and affect. Her speech " is normal and behavior is normal. Judgment and thought content normal. Cognition and memory are impaired. She does not express inappropriate judgment. She exhibits abnormal recent memory.         Strength  Deltoids Triceps Biceps Wrist Extension Wrist Flexion Hand    Upper: R 5/5 5/5 5/5 5/5 5/5 4/5    L 5/5 5/5 5/5 5/5 5/5 4/5     Iliopsoas Quadriceps Knee  Flexion Tibialis  anterior Gastro- cnemius EHL   Lower: R 5/5 5/5 5/5 5/5 5/5 5/5    L 5/5 5/5 5/5 5/5 5/5 5/5     Laboratory:  Lab Results   Component Value Date    WBC 6.68 03/13/2017    HGB 13.6 03/13/2017    HCT 42.0 03/13/2017     03/13/2017    CHOL 180 06/17/2016    TRIG 72 06/17/2016    HDL 87 (H) 06/17/2016    ALT 16 03/11/2017    AST 28 03/11/2017     03/13/2017    K 3.7 03/13/2017     03/13/2017    CREATININE 0.9 03/13/2017    BUN 13 03/13/2017    CO2 27 03/13/2017    TSH 1.504 02/07/2017    INR 0.9 03/10/2017         Diagnostic Results:        MRI of the brain: 2/9/2017          Impression           No evidence of recent infarction or other acute intracranial findings.    Fairly extensive nonspecific white matter signal changes, likely representing sequela of chronic microvascular ischemic disease.    Moderate diffuse parenchymal atrophy.           ASSESSMENT/PLAN:     Primary Diagnoses:  1. 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   2. History of alcohol abuse.  3. Severe osteoarthritis under care of Rheumatology.  4. Gait problem and hand weakness .      Patient Active Problem List   Diagnosis    Rheumatoid arthritis    Hiatal hernia    Asthma with COPD    Hyperlipidemia    Hypertension    Diastolic dysfunction    Osteoarthritis    Rotator cuff arthropathy    H/O: GI bleed    Arthritis of left wrist    Dysplastic colon polyp    Hypothyroid    Nonrheumatic aortic valve stenosis    Nonrheumatic aortic valve insufficiency    Impaired cognition    Gastroesophageal reflux disease without esophagitis    Major  depressive disorder, recurrent episode, mild    Psoriasis with arthropathy    Sleep disturbance    Bilateral adhesive capsulitis of shoulders    Hyponatremia    Dehydration    Fall    EtOH dependence        Plan: Aricept , Blood work and MRI of c-spine.  Time spent: 30 minutes in face to face discussion concerning diagnosis, prognosis, review of lab and test results, benefits of treatment as well as management of disease, counseling of patient and coordination of care between various health care providers . Greater than half the time spent was used for coordination of care and counseling of patient.

## 2017-04-22 LAB — METHYLMALONATE SERPL-SCNC: 0.19 UMOL/L

## 2017-04-23 RX ORDER — METOPROLOL SUCCINATE 25 MG/1
TABLET, EXTENDED RELEASE ORAL
Qty: 30 TABLET | Refills: 3 | Status: SHIPPED | OUTPATIENT
Start: 2017-04-23 | End: 2017-08-12 | Stop reason: SDUPTHER

## 2017-04-25 ENCOUNTER — TELEPHONE (OUTPATIENT)
Dept: NEUROLOGY | Facility: CLINIC | Age: 82
End: 2017-04-25

## 2017-04-28 LAB
POST FEF 25 75: 0.41 L/S (ref 0.61–1.76)
POST FET 100: 8.16 S
POST FEV1 FVC: 51 %
POST FEV1: 1.07 L (ref 1.39–1.94)
POST FIF 50: 2.81 L/S
POST FVC: 2.08 L (ref 1.94–2.58)
POST PEF: 2.74 L/S (ref 3.47–5.06)
PRE FEF 25 75: 0.28 L/S (ref 0.61–1.76)
PRE FET 100: 9.7 S
PRE FEV1 FVC: 42 %
PRE FEV1: 0.78 L (ref 1.39–1.94)
PRE FIF 50: 1.75 L/S
PRE FVC: 1.86 L (ref 1.94–2.58)
PRE PEF: 1.9 L/S (ref 3.47–5.06)
PREDICTED FEV1 FVC: 73.38 % (ref 68.49–78.28)
PREDICTED FEV1: 1.67 L (ref 1.39–1.94)
PREDICTED FVC: 2.26 L (ref 1.94–2.58)
PROVOCATION PROTOCOL: ABNORMAL

## 2017-05-05 ENCOUNTER — TELEPHONE (OUTPATIENT)
Dept: RADIOLOGY | Facility: HOSPITAL | Age: 82
End: 2017-05-05

## 2017-05-24 ENCOUNTER — TELEPHONE (OUTPATIENT)
Dept: INTERNAL MEDICINE | Facility: CLINIC | Age: 82
End: 2017-05-24

## 2017-05-24 RX ORDER — TRAMADOL HYDROCHLORIDE 50 MG/1
50 TABLET ORAL EVERY 12 HOURS PRN
Qty: 30 TABLET | Refills: 0 | Status: SHIPPED | OUTPATIENT
Start: 2017-05-24 | End: 2017-07-14 | Stop reason: SDUPTHER

## 2017-05-24 NOTE — TELEPHONE ENCOUNTER
Pt is requesting for Dr Herring to refill the medication Tramadal which  that was previously prescribed by Dr Lita Fowler at [facility], hat dr will not refill it because she is in a different practice now. Please advise

## 2017-05-24 NOTE — TELEPHONE ENCOUNTER
----- Message from Love Sorenson sent at 5/24/2017  9:03 AM CDT -----  Contact: Daughter  Shannan, daughter [phone], Calling about mother, patient, and Rx for Tramadal that was previously prescribed by Dr Lita Fowler at [facility], doctor has changed her practice and will no longer see this patient or prescribe medication. Could you please refill the script. Patient will be out of medication in five days.    Tramadol HCL 50 mg , one tablet by mouth every six hours as needed for pain. Qty of 90    [id number]   [address] [facility] [address]  Phone: [phone] Fax: [phone]    Please advise. Thanks.

## 2017-05-24 NOTE — TELEPHONE ENCOUNTER
Is patient taking tramadol for her rheumatoid arthritis, if so we can establish care with another rheumatologist here, for further refills.   Only 30 tablets  can be prescribed to her today

## 2017-05-24 NOTE — TELEPHONE ENCOUNTER
Spoke to pt daughter. informed pt daughter that a 30 day refill was given but pt will have to see a rheumatologist for further refills. Pt has a appt with DR. RICCI bryan at [facility]. Pt daughter verbalized understanding

## 2017-06-19 ENCOUNTER — OFFICE VISIT (OUTPATIENT)
Dept: NEUROLOGY | Facility: CLINIC | Age: 82
End: 2017-06-19
Payer: MEDICARE

## 2017-06-19 VITALS
HEART RATE: 60 BPM | BODY MASS INDEX: 22.88 KG/M2 | DIASTOLIC BLOOD PRESSURE: 74 MMHG | SYSTOLIC BLOOD PRESSURE: 138 MMHG | HEIGHT: 62 IN | WEIGHT: 124.31 LBS

## 2017-06-19 DIAGNOSIS — R41.89 IMPAIRED COGNITION: ICD-10-CM

## 2017-06-19 DIAGNOSIS — F01.50 DEMENTIA, VASCULAR, MIXED, WITHOUT BEHAVIORAL DISTURBANCE: ICD-10-CM

## 2017-06-19 DIAGNOSIS — I67.3 BINSWANGER'S DEMENTIA: ICD-10-CM

## 2017-06-19 DIAGNOSIS — R41.3 MEMORY LOSS: ICD-10-CM

## 2017-06-19 DIAGNOSIS — M62.81 HAND MUSCLE WEAKNESS: ICD-10-CM

## 2017-06-19 PROCEDURE — 99215 OFFICE O/P EST HI 40 MIN: CPT | Mod: S$GLB,,, | Performed by: PSYCHIATRY & NEUROLOGY

## 2017-06-19 PROCEDURE — 99999 PR PBB SHADOW E&M-EST. PATIENT-LVL IV: CPT | Mod: PBBFAC,,, | Performed by: PSYCHIATRY & NEUROLOGY

## 2017-06-19 PROCEDURE — 1126F AMNT PAIN NOTED NONE PRSNT: CPT | Mod: S$GLB,,, | Performed by: PSYCHIATRY & NEUROLOGY

## 2017-06-19 PROCEDURE — 1159F MED LIST DOCD IN RCRD: CPT | Mod: S$GLB,,, | Performed by: PSYCHIATRY & NEUROLOGY

## 2017-06-19 RX ORDER — DONEPEZIL HYDROCHLORIDE 10 MG/1
10 TABLET, FILM COATED ORAL NIGHTLY
Qty: 30 TABLET | Refills: 11 | Status: SHIPPED | OUTPATIENT
Start: 2017-06-19 | End: 2018-06-13 | Stop reason: SDUPTHER

## 2017-06-19 NOTE — PROGRESS NOTES
Follow up: Dementia    SUBJECTIVE:       HPI:   She is stable. C-spine MRI was not done .    Past Medical History:   Diagnosis Date    Arthritis     Coronary artery disease     Depression     Encounter for blood transfusion     H/O: GI bleed     Hiatal hernia     Hyperlipidemia     Hypertension     Hypothyroid     Rheumatoid arthritis     Stroke     Syncope and collapse      Past Surgical History:   Procedure Laterality Date    TONSILLECTOMY       Family History   Problem Relation Age of Onset    Cancer Mother      breast, uterine     Social History   Substance Use Topics    Smoking status: Former Smoker     Packs/day: 2.00     Years: 16.00     Types: Cigarettes     Quit date: 1/1/1950    Smokeless tobacco: Never Used    Alcohol use 1.8 oz/week     3 Glasses of wine per week      Comment: daily 1/2 bottle wine     Review of patient's allergies indicates:   Allergen Reactions    Sulfa (sulfonamide antibiotics) Shortness Of Breath       Review of Systems   Constitutional: Positive for malaise/fatigue. Negative for fever and weight loss.   HENT: Negative for hearing loss.    Eyes: Negative for blurred vision, double vision, photophobia and pain.   Respiratory: Negative for cough.    Cardiovascular: Negative for chest pain.   Gastrointestinal: Negative for abdominal pain, nausea and vomiting.   Genitourinary: Negative for dysuria, frequency and urgency.   Musculoskeletal: Positive for joint pain, myalgias and neck pain. Negative for back pain and falls.   Skin: Negative for itching and rash.   Neurological: Positive for sensory change. Negative for tingling and headaches.   Psychiatric/Behavioral: Positive for memory loss. Negative for depression.       OBJECTIVE:     Physical Exam   Constitutional: She is oriented to person, place, and time. She appears well-developed and well-nourished.   HENT:   Head: Normocephalic and atraumatic.   Eyes: Conjunctivae and EOM are normal. Pupils are equal, round, and  reactive to light.   Neck: Normal range of motion. Neck supple. No JVD present. No tracheal deviation present. No thyromegaly present.   Cardiovascular: Normal rate, regular rhythm and normal heart sounds.    Pulmonary/Chest: Effort normal and breath sounds normal.   Abdominal: She exhibits no distension. There is no tenderness.   Musculoskeletal: She exhibits no edema.        Right shoulder: She exhibits decreased range of motion, bony tenderness, pain and spasm.        Left shoulder: She exhibits decreased range of motion, tenderness, bony tenderness, pain and spasm.   Bilateral shoulder frozen.   Neurological: She is alert and oriented to person, place, and time. She has normal strength and normal reflexes. She displays normal reflexes. No cranial nerve deficit or sensory deficit. She exhibits normal muscle tone. She displays a negative Romberg sign. Coordination and gait normal.   Reflex Scores:       Tricep reflexes are 2+ on the right side and 2+ on the left side.       Bicep reflexes are 2+ on the right side and 2+ on the left side.       Brachioradialis reflexes are 2+ on the right side and 2+ on the left side.       Patellar reflexes are 2+ on the right side and 2+ on the left side.       Achilles reflexes are 2+ on the right side and 2+ on the left side.  Skin: Skin is warm and dry. No rash noted.   Psychiatric: She has a normal mood and affect. Her speech is normal and behavior is normal. Judgment and thought content normal. Cognition and memory are impaired. She does not express inappropriate judgment. She exhibits abnormal recent memory.         Strength  Deltoids Triceps Biceps Wrist Extension Wrist Flexion Hand    Upper: R 5/5 5/5 5/5 5/5 5/5 4/5    L 5/5 5/5 5/5 5/5 5/5 4/5     Iliopsoas Quadriceps Knee  Flexion Tibialis  anterior Gastro- cnemius EHL   Lower: R 5/5 5/5 5/5 5/5 5/5 5/5    L 5/5 5/5 5/5 5/5 5/5 5/5     Laboratory:  Lab Results   Component Value Date    WBC 6.68 03/13/2017    HGB  13.6 03/13/2017    HCT 42.0 03/13/2017     03/13/2017    CHOL 180 06/17/2016    TRIG 72 06/17/2016    HDL 87 (H) 06/17/2016    ALT 16 03/11/2017    AST 28 03/11/2017     03/13/2017    K 3.7 03/13/2017     03/13/2017    CREATININE 0.9 03/13/2017    BUN 13 03/13/2017    CO2 27 03/13/2017    TSH 1.504 02/07/2017    INR 0.9 03/10/2017   Results for TITO STAPLES (MRN [id number]) as of 6/19/2017 11:13   Ref. Range 2/7/2017 15:30 3/10/2017 19:01 3/11/2017 04:47 3/13/2017 11:06 4/19/2017 10:38   Folate Latest Ref Range: 4.0 - 24.0 ng/mL     16.9   Vitamin B-12 Latest Ref Range: 210 - 950 pg/mL     266   Methlymalonic Acid Latest Ref Range: <0.40 umol/L     0.19   Protime Latest Ref Range: 9.0 - 12.5 sec  9.7      Coumadin Monitoring INR Latest Ref Range: 0.8 - 1.2   0.9            Diagnostic Results:        MRI of the brain: 2/9/2017      Impression           No evidence of recent infarction or other acute intracranial findings.    Fairly extensive nonspecific white matter signal changes, likely representing sequela of chronic microvascular ischemic disease.    Moderate diffuse parenchymal atrophy.           ASSESSMENT/PLAN:     Primary Diagnoses:  1. 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   2. History of alcohol abuse.  3. Severe osteoarthritis under care of Rheumatology.  4. Gait problem and hand weakness .      Patient Active Problem List   Diagnosis    Rheumatoid arthritis    Hiatal hernia    Asthma with COPD    Hyperlipidemia    Hypertension    Diastolic dysfunction    Osteoarthritis    Rotator cuff arthropathy    H/O: GI bleed    Arthritis of left wrist    Dysplastic colon polyp    Hypothyroid    Nonrheumatic aortic valve stenosis    Nonrheumatic aortic valve insufficiency    Impaired cognition    Gastroesophageal reflux disease without esophagitis    Major depressive disorder, recurrent episode, mild    Psoriasis with arthropathy    Sleep disturbance     Bilateral adhesive capsulitis of shoulders    Hyponatremia    Dehydration    Fall    EtOH dependence        Plan: continue Aricept and will do  MRI of c-spine.  Time spent: 30 minutes in face to face discussion concerning diagnosis, prognosis, review of lab and test results, benefits of treatment as well as management of disease, counseling of patient and coordination of care between various health care providers . Greater than half the time spent was used for coordination of care and counseling of patient.

## 2017-06-30 ENCOUNTER — TELEPHONE (OUTPATIENT)
Dept: RADIOLOGY | Facility: HOSPITAL | Age: 82
End: 2017-06-30

## 2017-07-03 ENCOUNTER — HOSPITAL ENCOUNTER (OUTPATIENT)
Dept: RADIOLOGY | Facility: HOSPITAL | Age: 82
Discharge: HOME | End: 2017-07-03
Attending: PSYCHIATRY & NEUROLOGY
Payer: MEDICARE

## 2017-07-03 DIAGNOSIS — I67.3 BINSWANGER'S DEMENTIA: ICD-10-CM

## 2017-07-03 DIAGNOSIS — R41.89 IMPAIRED COGNITION: ICD-10-CM

## 2017-07-03 DIAGNOSIS — R41.3 MEMORY LOSS: ICD-10-CM

## 2017-07-03 DIAGNOSIS — F01.50 DEMENTIA, VASCULAR, MIXED, WITHOUT BEHAVIORAL DISTURBANCE: ICD-10-CM

## 2017-07-03 DIAGNOSIS — M62.81 HAND MUSCLE WEAKNESS: ICD-10-CM

## 2017-07-03 PROCEDURE — 72141 MRI NECK SPINE W/O DYE: CPT | Mod: 26,,, | Performed by: RADIOLOGY

## 2017-07-03 PROCEDURE — 72141 MRI NECK SPINE W/O DYE: CPT | Mod: TC,PO

## 2017-07-05 ENCOUNTER — TELEPHONE (OUTPATIENT)
Dept: NEUROLOGY | Facility: CLINIC | Age: 82
End: 2017-07-05

## 2017-07-05 NOTE — TELEPHONE ENCOUNTER
----- Message from Amena Hung sent at 7/5/2017 11:36 AM CDT -----  Shannan, daughter, #[phone] is returning a missed call

## 2017-07-05 NOTE — TELEPHONE ENCOUNTER
----- Message from Mendez Rosa MD sent at 7/4/2017  9:50 AM CDT -----  Your test result shows some age related changes . Will talk on return visits.

## 2017-07-07 ENCOUNTER — PATIENT MESSAGE (OUTPATIENT)
Dept: INTERNAL MEDICINE | Facility: CLINIC | Age: 82
End: 2017-07-07

## 2017-07-11 ENCOUNTER — LAB VISIT (OUTPATIENT)
Dept: LAB | Facility: HOSPITAL | Age: 82
End: 2017-07-11
Attending: FAMILY MEDICINE
Payer: MEDICARE

## 2017-07-11 ENCOUNTER — OFFICE VISIT (OUTPATIENT)
Dept: INTERNAL MEDICINE | Facility: CLINIC | Age: 82
End: 2017-07-11
Payer: MEDICARE

## 2017-07-11 VITALS
TEMPERATURE: 98 F | BODY MASS INDEX: 22.11 KG/M2 | SYSTOLIC BLOOD PRESSURE: 180 MMHG | HEIGHT: 62 IN | DIASTOLIC BLOOD PRESSURE: 80 MMHG | HEART RATE: 68 BPM | OXYGEN SATURATION: 98 % | WEIGHT: 120.13 LBS

## 2017-07-11 DIAGNOSIS — R53.1 WEAKNESS: Primary | ICD-10-CM

## 2017-07-11 DIAGNOSIS — K21.9 GASTROESOPHAGEAL REFLUX DISEASE, ESOPHAGITIS PRESENCE NOT SPECIFIED: ICD-10-CM

## 2017-07-11 DIAGNOSIS — I10 ESSENTIAL HYPERTENSION: ICD-10-CM

## 2017-07-11 DIAGNOSIS — E78.2 MIXED HYPERLIPIDEMIA: Chronic | ICD-10-CM

## 2017-07-11 DIAGNOSIS — R63.0 ANOREXIA: ICD-10-CM

## 2017-07-11 DIAGNOSIS — R53.1 WEAKNESS: ICD-10-CM

## 2017-07-11 DIAGNOSIS — R19.7 DIARRHEA, UNSPECIFIED TYPE: ICD-10-CM

## 2017-07-11 DIAGNOSIS — E03.9 HYPOTHYROIDISM, UNSPECIFIED TYPE: Chronic | ICD-10-CM

## 2017-07-11 DIAGNOSIS — J44.89 ASTHMA WITH COPD: Chronic | ICD-10-CM

## 2017-07-11 LAB
BACTERIA #/AREA URNS HPF: NORMAL /HPF
BILIRUB UR QL STRIP: ABNORMAL
CLARITY UR: ABNORMAL
COLOR UR: ABNORMAL
GLUCOSE UR QL STRIP: NEGATIVE
HGB UR QL STRIP: NEGATIVE
HYALINE CASTS #/AREA URNS LPF: NORMAL /LPF
KETONES UR QL STRIP: ABNORMAL
LEUKOCYTE ESTERASE UR QL STRIP: NEGATIVE
MICROSCOPIC COMMENT: NORMAL
NITRITE UR QL STRIP: NEGATIVE
PH UR STRIP: 6 [PH] (ref 5–8)
PROT UR QL STRIP: ABNORMAL
RBC #/AREA URNS HPF: 4 /HPF (ref 0–4)
SP GR UR STRIP: >=1.03 (ref 1–1.03)
SQUAMOUS #/AREA URNS HPF: 5 /HPF
URN SPEC COLLECT METH UR: ABNORMAL
WBC #/AREA URNS HPF: 2 /HPF (ref 0–5)

## 2017-07-11 PROCEDURE — 81000 URINALYSIS NONAUTO W/SCOPE: CPT | Mod: PO

## 2017-07-11 PROCEDURE — 1126F AMNT PAIN NOTED NONE PRSNT: CPT | Mod: S$GLB,,, | Performed by: PHYSICIAN ASSISTANT

## 2017-07-11 PROCEDURE — 1159F MED LIST DOCD IN RCRD: CPT | Mod: S$GLB,,, | Performed by: PHYSICIAN ASSISTANT

## 2017-07-11 PROCEDURE — 99999 PR PBB SHADOW E&M-EST. PATIENT-LVL V: CPT | Mod: PBBFAC,,, | Performed by: PHYSICIAN ASSISTANT

## 2017-07-11 PROCEDURE — 99213 OFFICE O/P EST LOW 20 MIN: CPT | Mod: S$GLB,,, | Performed by: PHYSICIAN ASSISTANT

## 2017-07-11 RX ORDER — LISINOPRIL 10 MG/1
10 TABLET ORAL DAILY
Qty: 90 TABLET | Refills: 0 | Status: SHIPPED | OUTPATIENT
Start: 2017-07-11 | End: 2017-08-14 | Stop reason: SDUPTHER

## 2017-07-11 RX ORDER — PANTOPRAZOLE SODIUM 40 MG/1
40 TABLET, DELAYED RELEASE ORAL DAILY
Qty: 90 TABLET | Refills: 0 | Status: SHIPPED | OUTPATIENT
Start: 2017-07-11 | End: 2017-07-15 | Stop reason: SDUPTHER

## 2017-07-11 RX ORDER — VERAPAMIL HYDROCHLORIDE 240 MG/1
240 CAPSULE, EXTENDED RELEASE ORAL DAILY
Qty: 90 CAPSULE | Refills: 0 | Status: SHIPPED | OUTPATIENT
Start: 2017-07-11 | End: 2017-10-23 | Stop reason: SDUPTHER

## 2017-07-11 NOTE — PROGRESS NOTES
Subjective:      Patient ID: Crystal Romero is a 82 y.o. female.    Chief Complaint: Fatigue; Anorexia; Diarrhea; and Hypertension    Pt has had diarrhea for the past two days. Watery. No blood or mucous.       Fatigue   This is a recurrent problem. The current episode started in the past 7 days. The problem occurs constantly. The problem has been unchanged. Associated symptoms include anorexia, a change in bowel habit (diarrhea), fatigue and weakness. Pertinent negatives include no abdominal pain, arthralgias, chest pain, chills, congestion, coughing, diaphoresis, fever, headaches, joint swelling, myalgias, nausea, neck pain, numbness, rash, sore throat, swollen glands, urinary symptoms, vertigo, visual change or vomiting. Nothing aggravates the symptoms. She has tried nothing for the symptoms.   Hypertension   This is a chronic problem. The current episode started today. Associated symptoms include malaise/fatigue. Pertinent negatives include no anxiety, blurred vision, chest pain, headaches, neck pain, orthopnea, palpitations, peripheral edema, PND, shortness of breath or sweats. Agents associated with hypertension include thyroid hormones. Risk factors for coronary artery disease include sedentary lifestyle and dyslipidemia. Past treatments include ACE inhibitors and calcium channel blockers. The current treatment provides moderate improvement. Compliance problems include psychosocial issues.  Hypertensive end-organ damage includes a thyroid problem.      Unknown if patient took her blood pressure medication this morning. Refills requested as well.   Scheduled to see cardiology on Thursday.    Patient Active Problem List   Diagnosis    Rheumatoid arthritis    Hiatal hernia    Asthma with COPD    Hyperlipidemia    Hypertension    Diastolic dysfunction    Osteoarthritis    Rotator cuff arthropathy    H/O: GI bleed    Arthritis of left wrist    Dysplastic colon polyp    Hypothyroid     Nonrheumatic aortic valve stenosis    Nonrheumatic aortic valve insufficiency    Impaired cognition    Gastroesophageal reflux disease without esophagitis    Major depressive disorder, recurrent episode, mild    Psoriasis with arthropathy    Sleep disturbance    Bilateral adhesive capsulitis of shoulders    Hyponatremia    Dehydration    Fall    EtOH dependence    Bacteremia due to Enterococcus    Pulmonary emphysema       Review of Systems   Constitutional: Positive for activity change, appetite change, fatigue, malaise/fatigue and unexpected weight change. Negative for chills, diaphoresis and fever.   HENT: Positive for postnasal drip and rhinorrhea. Negative for congestion, hearing loss, sore throat, trouble swallowing and voice change.    Eyes: Negative.  Negative for blurred vision and visual disturbance.   Respiratory: Negative.  Negative for cough, choking, chest tightness and shortness of breath.    Cardiovascular: Negative for chest pain, palpitations, orthopnea, leg swelling and PND.   Gastrointestinal: Positive for anorexia, change in bowel habit (diarrhea) and diarrhea. Negative for abdominal distention, abdominal pain, blood in stool, constipation, nausea, rectal pain and vomiting.   Endocrine: Negative for cold intolerance, heat intolerance, polydipsia and polyuria.   Genitourinary: Negative.  Negative for difficulty urinating and frequency.   Musculoskeletal: Negative for arthralgias, back pain, gait problem, joint swelling, myalgias and neck pain.   Skin: Negative for color change, pallor, rash and wound.   Neurological: Positive for weakness. Negative for dizziness, vertigo, tremors, light-headedness, numbness and headaches.   Hematological: Negative for adenopathy.   Psychiatric/Behavioral: Positive for confusion and decreased concentration. Negative for agitation, behavioral problems, self-injury, sleep disturbance and suicidal ideas. The patient is not nervous/anxious.   "    Objective:   BP (!) 180/80   Pulse 68   Temp 97.9 °F (36.6 °C) (Tympanic)   Ht 5' 2" (1.575 m)   Wt 54.5 kg (120 lb 2.4 oz)   SpO2 98%   BMI 21.98 kg/m²     Physical Exam   Constitutional: She is oriented to person, place, and time. She appears well-developed and well-nourished.   HENT:   Head: Normocephalic and atraumatic.   Right Ear: External ear normal.   Left Ear: External ear normal.   Nose: Nose normal.   Mouth/Throat: Oropharynx is clear and moist.   Eyes: Conjunctivae and EOM are normal. Pupils are equal, round, and reactive to light.   Neck: Normal range of motion. Neck supple.   Cardiovascular: Normal rate and regular rhythm.  Exam reveals no gallop and no friction rub.    Murmur heard.  Pulmonary/Chest: Effort normal. No respiratory distress. She has wheezes (faint). She has no rales. She exhibits no tenderness.   Abdominal: Soft. She exhibits no distension. There is no tenderness. There is no guarding.   Musculoskeletal: Normal range of motion.        Right ankle: She exhibits no swelling.        Left ankle: She exhibits no swelling.        Right lower leg: She exhibits no swelling.        Left lower leg: She exhibits no swelling.   Lymphadenopathy:     She has no cervical adenopathy.   Neurological: She is alert and oriented to person, place, and time.   Skin: Skin is warm and dry.   Psychiatric: She has a normal mood and affect. Her behavior is normal. Judgment and thought content normal.   Vitals reviewed.    Lab Visit on 07/11/2017   Component Date Value Ref Range Status    WBC 07/11/2017 8.66  3.90 - 12.70 K/uL Final    RBC 07/11/2017 4.26  4.00 - 5.40 M/uL Final    Hemoglobin 07/11/2017 12.9  12.0 - 16.0 g/dL Final    Hematocrit 07/11/2017 40.3  37.0 - 48.5 % Final    MCV 07/11/2017 95  82 - 98 fL Final    MCH 07/11/2017 30.3  27.0 - 31.0 pg Final    MCHC 07/11/2017 32.0  32.0 - 36.0 % Final    RDW 07/11/2017 13.9  11.5 - 14.5 % Final    Platelets 07/11/2017 254  150 - 350 K/uL " Final    MPV 07/11/2017 11.1  9.2 - 12.9 fL Final    Gran # 07/11/2017 6.4  1.8 - 7.7 K/uL Final    Lymph # 07/11/2017 1.4  1.0 - 4.8 K/uL Final    Mono # 07/11/2017 0.8  0.3 - 1.0 K/uL Final    Eos # 07/11/2017 0.0  0.0 - 0.5 K/uL Final    Baso # 07/11/2017 0.02  0.00 - 0.20 K/uL Final    Gran% 07/11/2017 73.9* 38.0 - 73.0 % Final    Lymph% 07/11/2017 16.3* 18.0 - 48.0 % Final    Mono% 07/11/2017 9.5  4.0 - 15.0 % Final    Eosinophil% 07/11/2017 0.1  0.0 - 8.0 % Final    Basophil% 07/11/2017 0.2  0.0 - 1.9 % Final    Differential Method 07/11/2017 Automated   Final    Sodium 07/11/2017 139  136 - 145 mmol/L Final    Potassium 07/11/2017 3.5  3.5 - 5.1 mmol/L Final    Chloride 07/11/2017 98  95 - 110 mmol/L Final    CO2 07/11/2017 29  23 - 29 mmol/L Final    Glucose 07/11/2017 116* 70 - 110 mg/dL Final    BUN, Bld 07/11/2017 6* 8 - 23 mg/dL Final    Creatinine 07/11/2017 0.8  0.5 - 1.4 mg/dL Final    Calcium 07/11/2017 9.5  8.7 - 10.5 mg/dL Final    Total Protein 07/11/2017 6.7  6.0 - 8.4 g/dL Final    Albumin 07/11/2017 3.3* 3.5 - 5.2 g/dL Final    Total Bilirubin 07/11/2017 0.4  0.1 - 1.0 mg/dL Final    Comment: For infants and newborns, interpretation of results should be based  on gestational age, weight and in agreement with clinical  observations.  Premature Infant recommended reference ranges:  Up to 24 hours.............<8.0 mg/dL  Up to 48 hours............<12.0 mg/dL  3-5 days..................<15.0 mg/dL  6-29 days.................<15.0 mg/dL      Alkaline Phosphatase 07/11/2017 61  55 - 135 U/L Final    AST 07/11/2017 12  10 - 40 U/L Final    ALT 07/11/2017 6* 10 - 44 U/L Final    Anion Gap 07/11/2017 12  8 - 16 mmol/L Final    eGFR if African American 07/11/2017 >60  >60 mL/min/1.73 m^2 Final    eGFR if non African American 07/11/2017 >60  >60 mL/min/1.73 m^2 Final    Comment: Calculation used to obtain the estimated glomerular filtration  rate (eGFR) is the CKD-EPI  equation. Since race is unknown   in our information system, the eGFR values for   -American and Non--American patients are given   for each creatinine result.      Cholesterol 07/11/2017 169  120 - 199 mg/dL Final    Comment: The National Cholesterol Education Program (NCEP) has set the  following guidelines (reference ranges) for Cholesterol:  Optimal.....................<200 mg/dL  Borderline High.............200-239 mg/dL  High........................> or = 240 mg/dL      Triglycerides 07/11/2017 79  30 - 150 mg/dL Final    Comment: The National Cholesterol Education Program (NCEP) has set the  following guidelines (reference values) for triglycerides:  Normal......................<150 mg/dL  Borderline High.............150-199 mg/dL  High........................200-499 mg/dL      HDL 07/11/2017 58  40 - 75 mg/dL Final    Comment: The National Cholesterol Education Program (NCEP) has set the  following guidelines (reference values) for HDL Cholesterol:  Low...............<40 mg/dL  Optimal...........>60 mg/dL      LDL Cholesterol 07/11/2017 95.2  63.0 - 159.0 mg/dL Final    Comment: The National Cholesterol Education Program (NCEP) has set the  following guidelines (reference values) for LDL Cholesterol:  Optimal.......................<130 mg/dL  Borderline High...............130-159 mg/dL  High..........................160-189 mg/dL  Very High.....................>190 mg/dL      HDL/Chol Ratio 07/11/2017 34.3  20.0 - 50.0 % Final    Total Cholesterol/HDL Ratio 07/11/2017 2.9  2.0 - 5.0 Final    Non-HDL Cholesterol 07/11/2017 111  mg/dL Final    Comment: Risk category and Non-HDL cholesterol goals:  Coronary heart disease (CHD)or equivalent (10-year risk of CHD >20%):  Non-HDL cholesterol goal     <130 mg/dL  Two or more CHD risk factors and 10-year risk of CHD <= 20%:  Non-HDL cholesterol goal     <160 mg/dL  0 to 1 CHD risk factor:  Non-HDL cholesterol goal     <190 mg/dL     Lab Visit on  07/11/2017   Component Date Value Ref Range Status    Specimen UA 07/11/2017 Urine, Clean Catch   Final    Color, UA 07/11/2017 Delma  Yellow, Straw, Delma Final    Appearance, UA 07/11/2017 Hazy* Clear Final    pH, UA 07/11/2017 6.0  5.0 - 8.0 Final    Specific Gravity, UA 07/11/2017 >=1.030* 1.005 - 1.030 Final    Protein, UA 07/11/2017 1+* Negative Final    Comment: Recommend a 24 hour urine protein or a urine   protein/creatinine ratio if globulin induced proteinuria is  clinically suspected.      Glucose, UA 07/11/2017 Negative  Negative Final    Ketones, UA 07/11/2017 Trace* Negative Final    Bilirubin (UA) 07/11/2017 1+* Negative Final    Comment: Positive urine bilirubin is not confirmed. Correlate with   serum bilirubin and clinical presentation.      Occult Blood UA 07/11/2017 Negative  Negative Final    Nitrite, UA 07/11/2017 Negative  Negative Final    Leukocytes, UA 07/11/2017 Negative  Negative Final    RBC, UA 07/11/2017 4  0 - 4 /hpf Final    WBC, UA 07/11/2017 2  0 - 5 /hpf Final    Bacteria, UA 07/11/2017 Rare  None-Occ /hpf Final    Squam Epithel, UA 07/11/2017 5  /hpf Final    Hyaline Casts, UA 07/11/2017 none  0-1/lpf /lpf Final    Microscopic Comment 07/11/2017 SEE COMMENT   Final    Comment: Other formed elements not mentioned in the report are not   present in the microscopic examination.          Assessment:     1. Weakness    2. Diarrhea, unspecified type    3. Anorexia    4. Gastroesophageal reflux disease, esophagitis presence not specified    5. Essential hypertension    6. Hypothyroidism, unspecified type    7. Mixed hyperlipidemia    8. Asthma with COPD      Plan:   Weakness  -     Urinalysis; Future; Expected date: 07/11/2017  -     CBC auto differential; Future; Expected date: 07/11/2017  -     TSH; Future    Diarrhea, unspecified type  -     Urinalysis; Future; Expected date: 07/11/2017  -     Comprehensive metabolic panel; Future  -     TSH; Future  -bland diet.  Increase fluids. Broth, mashed potatoes.   -no acute findings in labs today.     Anorexia  -     TSH; Future  -chicken broth. Try to increase fluids with electrolytes.     Gastroesophageal reflux disease, esophagitis presence not specified  -     pantoprazole (PROTONIX) 40 MG tablet; Take 1 tablet (40 mg total) by mouth once daily.  Dispense: 90 tablet; Refill: 0    Essential hypertension  -     lisinopril 10 MG tablet; Take 1 tablet (10 mg total) by mouth once daily.  Dispense: 90 tablet; Refill: 0  -     verapamil (VERELAN) 240 MG C24P; Take 1 capsule (240 mg total) by mouth once daily.  Dispense: 90 capsule; Refill: 0  -     Lipid panel; Future; Expected date: 07/11/2017  -scheduled to see cardiology on Thursday. Will monitor blood pressure for now.     Hypothyroidism, unspecified type  -     TSH; Future    Mixed hyperlipidemia  -     Lipid panel; Future; Expected date: 07/11/2017    Asthma with COPD  -faint wheezing on exam today. Pt denies any shortness of breath.     Return if symptoms worsen or fail to improve.

## 2017-07-11 NOTE — PATIENT INSTRUCTIONS
Treating Diarrhea    Diarrhea happens when you have loose, watery, or frequent bowel movements. It is a common problem with many causes. Most cases of diarrhea clear up on their own. But certain cases may need treatment. Be sure to see your healthcare provider if your symptoms do not improve within a few days.  Getting relief  Treatment of diarrhea depends on its cause. Diarrhea caused by bacterial or parasite infection is often treated with antibiotics. Diarrhea caused by other factors, such as a stomach virus, often improves with simple home treatment. The tips below may also help relieve your symptoms.  · Drink plenty of fluids. This helps prevent too much fluid loss (dehydration). Water, clear soups, and electrolyte solutions are good choices. Avoid alcohol, coffee, tea, and milk. These can irritate your intestines and make symptoms worse.  · Suck on ice chips if drinking makes you queasy.  · Return to your normal diet slowly. You may want to eat bland foods at first, such as rice and toast. Also, you may need to avoid certain foods for a while, such as dairy products. These can make symptoms worse. Ask your healthcare provider if there are any other foods you should avoid.  · If you were prescribed antibiotics, take them as directed.  · Do not take anti-diarrhea medicines without asking your healthcare provider first.  Call your healthcare provider   Call your healthcare provider if you have any of the following:   · A fever of 100.4°F (38.0°C) or higher, or as directed by your healthcare provider  · Severe pain  · Worsening diarrhea or diarrhea for more than 2 days  · Bloody vomit or stool  · Signs of dehydration (dizziness, dry mouth and tongue, rapid pulse, dark urine)  Date Last Reviewed: 7/1/2016 © 2000-2016 [employer]. [address] [place] [address], [place] [address]. All rights reserved. This information is not intended as a substitute for professional medical care. Always follow your  healthcare professional's instructions.        Diet for Vomiting or Diarrhea (Adult)    Your symptoms may return or get worse after eating certain foods listed below. If this happens, stop eating these foods until your symptoms ease and you feel better.  Once the vomiting stops, follow the steps below.   During the first 12 to 24 hours  During the first 12 to 24 hours, follow this diet:  · Drinks. Plain water, sport drinks like electrolyte solutions, soft drinks without caffeine, mineral water (plain or flavored), clear fruit juices, and decaffeinated tea and coffee.  · Soups. Clear broth.  · Desserts. Plain gelatin, popsicles, and fruit juice bars. As you feel better, you may add 6 to 8 ounces of yogurt per day. If you have diarrhea, don't have foods or drinks that contain sugar, high-fructose corn syrup, or sugar alcohols.  During the next 24 hours  During the next 24 hours you may add the following to the above:  · Hot cereal, plain toast, bread, rolls, and crackers  · Plain noodles, rice, mashed potatoes, and chicken noodle or rice soup  · Unsweetened canned fruit (but not pineapple) and bananas  Don't eat more than 15 grams of fat a day. Do this by staying away from margarine, butter, oils, mayonnaise, sauces, gravies, fried foods, peanut butter, meat, poultry, and fish.  Don't eat much fiber. Stay away from raw or cooked vegetables, fresh fruits (except bananas), and bran cereals.  Limit how much caffeine and chocolate you have. Do not use any spices or seasonings except salt.  During the next 24 hours  Slowly go back to your normal diet, as you feel better and your symptoms ease.  Date Last Reviewed: 8/1/2016  © 8172-7517 [employer]. [address] [place] [address], [place] [address]. All rights reserved. This information is not intended as a substitute for professional medical care. Always follow your healthcare professional's instructions.

## 2017-07-13 ENCOUNTER — TELEPHONE (OUTPATIENT)
Dept: RHEUMATOLOGY | Facility: CLINIC | Age: 82
End: 2017-07-13

## 2017-07-13 ENCOUNTER — OFFICE VISIT (OUTPATIENT)
Dept: CARDIOLOGY | Facility: CLINIC | Age: 82
End: 2017-07-13
Payer: MEDICARE

## 2017-07-13 VITALS
DIASTOLIC BLOOD PRESSURE: 74 MMHG | WEIGHT: 121.06 LBS | HEART RATE: 64 BPM | BODY MASS INDEX: 22.28 KG/M2 | SYSTOLIC BLOOD PRESSURE: 124 MMHG | HEIGHT: 62 IN

## 2017-07-13 DIAGNOSIS — I35.0 NONRHEUMATIC AORTIC VALVE STENOSIS: ICD-10-CM

## 2017-07-13 DIAGNOSIS — R41.89 IMPAIRED COGNITION: ICD-10-CM

## 2017-07-13 DIAGNOSIS — I10 ESSENTIAL HYPERTENSION: Primary | Chronic | ICD-10-CM

## 2017-07-13 DIAGNOSIS — R63.0 POOR APPETITE: ICD-10-CM

## 2017-07-13 PROCEDURE — 99214 OFFICE O/P EST MOD 30 MIN: CPT | Mod: S$GLB,,, | Performed by: INTERNAL MEDICINE

## 2017-07-13 PROCEDURE — 99999 PR PBB SHADOW E&M-EST. PATIENT-LVL III: CPT | Mod: PBBFAC,,, | Performed by: INTERNAL MEDICINE

## 2017-07-13 PROCEDURE — 1159F MED LIST DOCD IN RCRD: CPT | Mod: S$GLB,,, | Performed by: INTERNAL MEDICINE

## 2017-07-13 RX ORDER — METHYLPHENIDATE HYDROCHLORIDE 5 MG/1
2.5 TABLET ORAL DAILY
Qty: 10 TABLET | Refills: 0 | Status: SHIPPED | OUTPATIENT
Start: 2017-07-13 | End: 2017-10-19

## 2017-07-13 NOTE — PROGRESS NOTES
Subjective:   Patient ID:  Crystal Romero is a 82 y.o. female who presents for follow up of Aortic Stenosis      82, yo female, came in for discharge f/u.  PMH moderate AI, and moderate AS, dementia 3 yrs, HTN, HLD, RA, TIA x3 in 4 months  In , was admitted to [facility] for unwitnessed fall with confusion at least for few hours at home. Blood culture + strep. Elevated BNP and CK. Na level was low. Elevated WBC. CXR clear. Had IVF and ATx. Fever and low Na resolved.  C/o fatigue, decreased appetite related to dementia.   Denied chest pain, dyspnea  Repeat ECHO in , showed normal EF, DD, moderate AI and functional severe AS. MG 40.           Past Medical History:   Diagnosis Date    Arthritis     Coronary artery disease     Depression     Encounter for blood transfusion     H/O: GI bleed     Hiatal hernia     Hyperlipidemia     Hypertension     Hypothyroid     Rheumatoid arthritis(714.0)     Stroke     Syncope and collapse        Past Surgical History:   Procedure Laterality Date    TONSILLECTOMY         Social History   Substance Use Topics    Smoking status: Former Smoker     Packs/day: 2.00     Years: 16.00     Types: Cigarettes     Quit date: 1/1/1950    Smokeless tobacco: Never Used    Alcohol use 1.8 oz/week     3 Glasses of wine per week      Comment: daily 1/2 bottle wine       Family History   Problem Relation Age of Onset    Cancer Mother      breast, uterine         Review of Systems   Constitution: Positive for decreased appetite and fever.   HENT: Negative.    Eyes: Negative.    Cardiovascular: Positive for dyspnea on exertion. Negative for chest pain.   Respiratory: Negative.    Endocrine: Negative.    Hematologic/Lymphatic: Negative.    Skin: Negative.    Musculoskeletal: Positive for back pain and joint pain.   Gastrointestinal: Negative.    Genitourinary: Negative.    Psychiatric/Behavioral: Negative.    Allergic/Immunologic: Negative.        Objective:   Physical  Exam   Constitutional: She is oriented to person, place, and time. She appears well-nourished.   HENT:   Head: Normocephalic.   Eyes: Pupils are equal, round, and reactive to light.   Neck: Normal carotid pulses and no JVD present. Carotid bruit is not present. No thyromegaly present.   Cardiovascular: Normal rate, regular rhythm and normal pulses.   No extrasystoles are present. PMI is not displaced.  Exam reveals no gallop and no S3.    Murmur heard.  Pulses:       Radial pulses are 2+ on the right side, and 2+ on the left side.   3/6 ESM on RUSB, S2 not audible.   Pulmonary/Chest: Breath sounds normal. No stridor. No respiratory distress.   Abdominal: Soft. Bowel sounds are normal. There is no tenderness. There is no rebound.   Musculoskeletal: Normal range of motion.   Neurological: She is alert and oriented to person, place, and time.   Skin: Skin is intact. No rash noted.   Psychiatric: Her behavior is normal.       Lab Results   Component Value Date    CHOL 169 07/11/2017    CHOL 180 06/17/2016    CHOL 210 (H) 02/03/2015     Lab Results   Component Value Date    HDL 58 07/11/2017    HDL 87 (H) 06/17/2016    HDL 75 02/03/2015     Lab Results   Component Value Date    LDLCALC 95.2 07/11/2017    LDLCALC 78.6 06/17/2016    LDLCALC 113.4 02/03/2015     Lab Results   Component Value Date    TRIG 79 07/11/2017    TRIG 72 06/17/2016    TRIG 108 02/03/2015     Lab Results   Component Value Date    CHOLHDL 34.3 07/11/2017    CHOLHDL 48.3 06/17/2016    CHOLHDL 35.7 02/03/2015       Chemistry        Component Value Date/Time     07/11/2017 1052    K 3.5 07/11/2017 1052    CL 98 07/11/2017 1052    CO2 29 07/11/2017 1052    BUN 6 (L) 07/11/2017 1052    CREATININE 0.8 07/11/2017 1052     (H) 07/11/2017 1052        Component Value Date/Time    CALCIUM 9.5 07/11/2017 1052    ALKPHOS 61 07/11/2017 1052    AST 12 07/11/2017 1052    ALT 6 (L) 07/11/2017 1052    BILITOT 0.4 07/11/2017 1052    ESTGFRAFRICA >60  07/11/2017 1052    EGFRNONAA >60 07/11/2017 1052          Lab Results   Component Value Date    TSH 1.010 07/11/2017     Lab Results   Component Value Date    INR 0.9 03/10/2017    INR 1.0 09/23/2014     Lab Results   Component Value Date    WBC 8.66 07/11/2017    HGB 12.9 07/11/2017    HCT 40.3 07/11/2017    MCV 95 07/11/2017     07/11/2017     BMP  Sodium   Date Value Ref Range Status   07/11/2017 139 136 - 145 mmol/L Final     Potassium   Date Value Ref Range Status   07/11/2017 3.5 3.5 - 5.1 mmol/L Final     Chloride   Date Value Ref Range Status   07/11/2017 98 95 - 110 mmol/L Final     CO2   Date Value Ref Range Status   07/11/2017 29 23 - 29 mmol/L Final     BUN, Bld   Date Value Ref Range Status   07/11/2017 6 (L) 8 - 23 mg/dL Final     Creatinine   Date Value Ref Range Status   07/11/2017 0.8 0.5 - 1.4 mg/dL Final     Calcium   Date Value Ref Range Status   07/11/2017 9.5 8.7 - 10.5 mg/dL Final     Anion Gap   Date Value Ref Range Status   07/11/2017 12 8 - 16 mmol/L Final     eGFR if    Date Value Ref Range Status   07/11/2017 >60 >60 mL/min/1.73 m^2 Final     eGFR if non    Date Value Ref Range Status   07/11/2017 >60 >60 mL/min/1.73 m^2 Final     Comment:     Calculation used to obtain the estimated glomerular filtration  rate (eGFR) is the CKD-EPI equation. Since race is unknown   in our information system, the eGFR values for   -American and Non--American patients are given   for each creatinine result.       Estimated Creatinine Clearance: 42.9 mL/min (based on Cr of 0.8).     Assessment:      1. Essential hypertension    2. Nonrheumatic aortic valve stenosis    3. Impaired cognition    4. Poor appetite      Declined the possibility of surgical repair of aortic valve if indicated.  Plan:   Add Ritalin 2.5 mg daily for poor appetite  Explained the benefits and risk of medication, her daughter voiced the understanding.  Continue current meds.  Leslie.  Risk modification.   RTC in 6 months    I have reviewed all pertinent labs and cardiac studies. Plans and recommendations have been formulated under my direct supervision. All questions answered and patient voiced understanding. Patient to continue current medications.

## 2017-07-14 ENCOUNTER — OFFICE VISIT (OUTPATIENT)
Dept: RHEUMATOLOGY | Facility: CLINIC | Age: 82
End: 2017-07-14
Payer: MEDICARE

## 2017-07-14 ENCOUNTER — LAB VISIT (OUTPATIENT)
Dept: LAB | Facility: HOSPITAL | Age: 82
End: 2017-07-14
Attending: INTERNAL MEDICINE
Payer: MEDICARE

## 2017-07-14 VITALS
SYSTOLIC BLOOD PRESSURE: 152 MMHG | BODY MASS INDEX: 21.98 KG/M2 | HEART RATE: 55 BPM | DIASTOLIC BLOOD PRESSURE: 77 MMHG | WEIGHT: 120.13 LBS

## 2017-07-14 DIAGNOSIS — L40.50 PSORIASIS WITH ARTHROPATHY: Primary | ICD-10-CM

## 2017-07-14 DIAGNOSIS — L40.50 PSORIASIS WITH ARTHROPATHY: ICD-10-CM

## 2017-07-14 PROCEDURE — 1159F MED LIST DOCD IN RCRD: CPT | Mod: S$GLB,,, | Performed by: INTERNAL MEDICINE

## 2017-07-14 PROCEDURE — 99214 OFFICE O/P EST MOD 30 MIN: CPT | Mod: S$GLB,,, | Performed by: INTERNAL MEDICINE

## 2017-07-14 PROCEDURE — 99999 PR PBB SHADOW E&M-EST. PATIENT-LVL III: CPT | Mod: PBBFAC,,, | Performed by: INTERNAL MEDICINE

## 2017-07-14 PROCEDURE — 1126F AMNT PAIN NOTED NONE PRSNT: CPT | Mod: S$GLB,,, | Performed by: INTERNAL MEDICINE

## 2017-07-14 RX ORDER — TRAMADOL HYDROCHLORIDE 50 MG/1
50 TABLET ORAL
Qty: 90 TABLET | Refills: 1 | Status: SHIPPED | OUTPATIENT
Start: 2017-07-14 | End: 2018-01-17 | Stop reason: SDUPTHER

## 2017-07-14 RX ORDER — FOLIC ACID 1 MG/1
1 TABLET ORAL DAILY
Qty: 90 TABLET | Refills: 1 | Status: SHIPPED | OUTPATIENT
Start: 2017-07-14 | End: 2018-01-15 | Stop reason: SDUPTHER

## 2017-07-14 RX ORDER — METHOTREXATE 2.5 MG/1
5 TABLET ORAL
Qty: 24 TABLET | Refills: 1 | Status: SHIPPED | OUTPATIENT
Start: 2017-07-14 | End: 2018-06-14 | Stop reason: ALTCHOICE

## 2017-07-14 NOTE — ASSESSMENT & PLAN NOTE
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  With recent worsening of her cognition and high risk for infections along with no significant inflammatory arthritis on exam, discontinue Plaquenil and reduce methotrexate to only 5 mg every week.  Continue daily folic acid.  Continue tramadol at bedtime.  I will write a prescription since her doctor retired from practice.  Take tramadol cautiously once at bedtime.  Refer to hand surgeon for stenosing tenosynovitis over right hand associated with contractures.

## 2017-07-14 NOTE — PROGRESS NOTES
RHEUMATOLOGY CLINIC FOLLOW UP VISIT  Chief complaints:-  Follow-up for arthritis.  History obtained from her daughter because of patient's worsening dementia.    HPI:-  Crystal Blunt a 82 y.o. pleasant female comes in for a follow up visit with above chief complaints.  She has psoriatic arthritis diagnosed by prior rheumatologists here in the rheumatology clinic and she is on methotrexate and Plaquenil therapy.  Her daughter reports that her dementia have been worsening in the past months.  No change in her joint pain and no swelling in the past year.  She has contracture deformities over her right hand from trigger finger.      Review of Systems   Constitutional: Positive for malaise/fatigue. Negative for chills and fever.   HENT: Positive for hearing loss. Negative for nosebleeds and sore throat.    Eyes: Positive for blurred vision. Negative for photophobia and redness.   Respiratory: Negative for cough, sputum production and shortness of breath.    Cardiovascular: Negative for chest pain and leg swelling.   Gastrointestinal: Negative for abdominal pain, constipation and diarrhea.   Genitourinary: Negative for dysuria, frequency and urgency.   Musculoskeletal: Positive for back pain and joint pain. Negative for falls, myalgias and neck pain.   Skin: Negative for itching and rash.   Neurological: Positive for headaches. Negative for dizziness, tremors, seizures, loss of consciousness and weakness.   Endo/Heme/Allergies: Negative for environmental allergies. Does not bruise/bleed easily.   Psychiatric/Behavioral: Positive for memory loss. Negative for hallucinations. The patient is nervous/anxious. The patient does not have insomnia.        Past Medical History:   Diagnosis Date    Arthritis     Coronary artery disease     Depression     Encounter for blood transfusion     H/O: GI bleed     Hiatal hernia     Hyperlipidemia     Hypertension      Hypothyroid     Rheumatoid arthritis(714.0)     Stroke     Syncope and collapse        Past Surgical History:   Procedure Laterality Date    TONSILLECTOMY          Social History   Substance Use Topics    Smoking status: Former Smoker     Packs/day: 2.00     Years: 16.00     Types: Cigarettes     Quit date: 1/1/1950    Smokeless tobacco: Never Used    Alcohol use 1.8 oz/week     3 Glasses of wine per week      Comment: daily 1/2 bottle wine       Family History   Problem Relation Age of Onset    Cancer Mother      breast, uterine       Review of patient's allergies indicates:   Allergen Reactions    Sulfa (sulfonamide antibiotics) Shortness Of Breath           Physical examination:-    Vitals:    07/14/17 1632   BP: (!) 152/77   Pulse: (!) 55   Weight: 54.5 kg (120 lb 2.4 oz)   PainSc: 0-No pain       Physical Exam   Constitutional: She is oriented to person, place, and time and well-developed, well-nourished, and in no distress.   HENT:   Head: Normocephalic.   Mouth/Throat: Oropharynx is clear and moist.   Eyes: Conjunctivae and EOM are normal. Pupils are equal, round, and reactive to light.   Neck: Normal range of motion. Neck supple.   Cardiovascular: Normal rate and intact distal pulses.    Pulmonary/Chest: Effort normal. No respiratory distress.   Abdominal: Soft. There is no tenderness.   Musculoskeletal:   Significant restriction in range of motion of bilateral shoulder joints.  Significant nodular changes over multiple small joints of hands and feet without any active synovitis or dactylitis.  No effusion over knee joints.   Neurological: She is alert and oriented to person, place, and time. No cranial nerve deficit.   Skin: Skin is warm. No rash noted. No erythema.   Psychiatric: Mood and affect normal.   Nursing note and vitals reviewed.        Medication List with Changes/Refills   Current Medications    ACETAMINOPHEN (TYLENOL) 500 MG TABLET    Take 500 mg by mouth every 6 (six) hours as  needed for Pain.    ALBUTEROL (PROVENTIL) 5 MG/ML NEBULIZER SOLUTION    Take 2.5 mg by nebulization every 6 (six) hours as needed for Wheezing. Rescue    BAICALIN-CATECHIN-CITRATED -50 MG CAP    Take by mouth 2 (two) times daily.    BUDESONIDE (PULMICORT) 0.5 MG/2 ML NEBULIZER SOLUTION    Take 0.5 mg by nebulization once daily. Controller    CETIRIZINE (ZYRTEC) 10 MG TABLET    Take 10 mg by mouth once daily.    DICLOFENAC SODIUM 1.5 % DROP    Apply 2 % topically as needed.    DONEPEZIL (ARICEPT) 10 MG TABLET    Take 1 tablet (10 mg total) by mouth every evening.    FLUTICASONE (FLONASE) 50 MCG/ACTUATION NASAL SPRAY    2 sprays by Each Nare route once daily.    INHALER, ASSIST DEVICES (AEROCHAMBER PLUS FLOW-VU MISC)    by Misc.(Non-Drug; Combo Route) route.    LEVOTHYROXINE (SYNTHROID) 25 MCG TABLET    Take 25 mcg by mouth once daily.    LISINOPRIL 10 MG TABLET    Take 1 tablet (10 mg total) by mouth once daily.    METHYLPHENIDATE (RITALIN) 5 MG TABLET    Take 0.5 tablets (2.5 mg total) by mouth once daily at 6am.    METOPROLOL SUCCINATE (TOPROL-XL) 25 MG 24 HR TABLET    TAKE ONE TABLET BY MOUTH ONE TIME DAILY    PANTOPRAZOLE (PROTONIX) 40 MG TABLET    Take 1 tablet (40 mg total) by mouth once daily.    ROSUVASTATIN (CRESTOR) 10 MG TABLET    Take 10 mg by mouth once daily.    SERTRALINE (ZOLOFT) 50 MG TABLET    Take 50 mg by mouth once daily.    TRAZODONE (DESYREL) 50 MG TABLET    Take 50 mg by mouth every evening.    VERAPAMIL (VERELAN) 240 MG C24P    Take 1 capsule (240 mg total) by mouth once daily.    VITAMIN B COMPLEX (SUPER B COMPLEX-B-12 ORAL)    Take by mouth.   Changed and/or Refilled Medications    Modified Medication Previous Medication    FOLIC ACID (FOLVITE) 1 MG TABLET folic acid (FOLVITE) 1 MG tablet       Take 1 tablet (1 mg total) by mouth once daily.    Take 1 mg by mouth once daily.    METHOTREXATE 2.5 MG TAB methotrexate 2.5 MG Tab       Take 2 tablets (5 mg total) by mouth every 7 days.     Take 2.5 mg by mouth every 7 days.    TRAMADOL (ULTRAM) 50 MG TABLET tramadol (ULTRAM) 50 mg tablet       Take 1 tablet (50 mg total) by mouth every 24 hours as needed for Pain.    Take 1 tablet (50 mg total) by mouth every 12 (twelve) hours as needed for Pain.   Discontinued Medications    HYDROXYCHLOROQUINE (PLAQUENIL) 200 MG TABLET    Take 200 mg by mouth once daily.       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  With recent worsening of her cognition and high risk for infections along with no significant inflammatory arthritis on exam, discontinue Plaquenil and reduce methotrexate to only 5 mg every week.  Continue daily folic acid.  Continue tramadol at bedtime.  I will write a prescription since her doctor retired from practice.  Take tramadol cautiously once at bedtime.  Refer to hand surgeon for stenosing tenosynovitis over right hand associated with contractures.    - methotrexate 2.5 MG Tab; Take 2 tablets (5 mg total) by mouth every 7 days.  Dispense: 24 tablet; Refill: 1  - folic acid (FOLVITE) 1 MG tablet; Take 1 tablet (1 mg total) by mouth once daily.  Dispense: 90 tablet; Refill: 1  - tramadol (ULTRAM) 50 mg tablet; Take 1 tablet (50 mg total) by mouth every 24 hours as needed for Pain.  Dispense: 90 tablet; Refill: 1     # Return in about 4 months (around 11/14/2017).    Disclaimer: This note was prepared using voice recognition system and is likely to have sound alike errors and is not proof read.  Please call me with any questions.

## 2017-07-15 DIAGNOSIS — K21.9 GASTROESOPHAGEAL REFLUX DISEASE, ESOPHAGITIS PRESENCE NOT SPECIFIED: ICD-10-CM

## 2017-07-15 LAB
ALBUMIN SERPL BCP-MCNC: 3.4 G/DL
ALP SERPL-CCNC: 59 U/L
ALT SERPL W/O P-5'-P-CCNC: 5 U/L
ANION GAP SERPL CALC-SCNC: 10 MMOL/L
AST SERPL-CCNC: 10 U/L
BASOPHILS # BLD AUTO: 0.02 K/UL
BASOPHILS NFR BLD: 0.3 %
BILIRUB SERPL-MCNC: 0.2 MG/DL
BUN SERPL-MCNC: 6 MG/DL
CALCIUM SERPL-MCNC: 9.1 MG/DL
CHLORIDE SERPL-SCNC: 103 MMOL/L
CO2 SERPL-SCNC: 29 MMOL/L
CREAT SERPL-MCNC: 0.8 MG/DL
DIFFERENTIAL METHOD: ABNORMAL
EOSINOPHIL # BLD AUTO: 0.1 K/UL
EOSINOPHIL NFR BLD: 0.8 %
ERYTHROCYTE [DISTWIDTH] IN BLOOD BY AUTOMATED COUNT: 14.1 %
EST. GFR  (AFRICAN AMERICAN): >60 ML/MIN/1.73 M^2
EST. GFR  (NON AFRICAN AMERICAN): >60 ML/MIN/1.73 M^2
GLUCOSE SERPL-MCNC: 87 MG/DL
HCT VFR BLD AUTO: 40.6 %
HGB BLD-MCNC: 12.9 G/DL
LYMPHOCYTES # BLD AUTO: 1.7 K/UL
LYMPHOCYTES NFR BLD: 21.2 %
MCH RBC QN AUTO: 30.7 PG
MCHC RBC AUTO-ENTMCNC: 31.8 %
MCV RBC AUTO: 97 FL
MONOCYTES # BLD AUTO: 0.7 K/UL
MONOCYTES NFR BLD: 9.4 %
NEUTROPHILS # BLD AUTO: 5.3 K/UL
NEUTROPHILS NFR BLD: 68.6 %
PLATELET # BLD AUTO: 316 K/UL
PMV BLD AUTO: 11.8 FL
POTASSIUM SERPL-SCNC: 3.4 MMOL/L
PROT SERPL-MCNC: 6.4 G/DL
RBC # BLD AUTO: 4.2 M/UL
SODIUM SERPL-SCNC: 142 MMOL/L
WBC # BLD AUTO: 7.77 K/UL

## 2017-07-15 RX ORDER — PANTOPRAZOLE SODIUM 40 MG/1
TABLET, DELAYED RELEASE ORAL
Qty: 30 TABLET | Refills: 1 | Status: SHIPPED | OUTPATIENT
Start: 2017-07-15 | End: 2017-10-11 | Stop reason: SDUPTHER

## 2017-07-17 ENCOUNTER — TELEPHONE (OUTPATIENT)
Dept: RHEUMATOLOGY | Facility: CLINIC | Age: 82
End: 2017-07-17

## 2017-07-17 DIAGNOSIS — M62.40 CONTRACTURE OF TENDON SHEATH: Primary | ICD-10-CM

## 2017-07-21 ENCOUNTER — TELEPHONE (OUTPATIENT)
Dept: RHEUMATOLOGY | Facility: CLINIC | Age: 82
End: 2017-07-21

## 2017-07-21 NOTE — TELEPHONE ENCOUNTER
----- Message from Neal Christiansen LPN sent at 7/21/2017  9:10 AM CDT -----  Regarding: Notification of Unviewed Test Results  Contact: [phone]  Entered by Keegan Yarbrough MD at 7/17/2017  4:37 PM  [facility] labs.

## 2017-08-13 RX ORDER — METOPROLOL SUCCINATE 25 MG/1
TABLET, EXTENDED RELEASE ORAL
Qty: 30 TABLET | Refills: 2 | Status: SHIPPED | OUTPATIENT
Start: 2017-08-13 | End: 2017-11-12 | Stop reason: SDUPTHER

## 2017-08-14 DIAGNOSIS — I10 ESSENTIAL HYPERTENSION: ICD-10-CM

## 2017-08-14 RX ORDER — LISINOPRIL 10 MG/1
10 TABLET ORAL DAILY
Qty: 90 TABLET | Refills: 0 | Status: SHIPPED | OUTPATIENT
Start: 2017-08-14 | End: 2017-12-08 | Stop reason: SDUPTHER

## 2017-10-11 DIAGNOSIS — K21.9 GASTROESOPHAGEAL REFLUX DISEASE, ESOPHAGITIS PRESENCE NOT SPECIFIED: ICD-10-CM

## 2017-10-11 RX ORDER — PANTOPRAZOLE SODIUM 40 MG/1
40 TABLET, DELAYED RELEASE ORAL DAILY
Qty: 30 TABLET | Refills: 1 | Status: SHIPPED | OUTPATIENT
Start: 2017-10-11 | End: 2018-02-11 | Stop reason: SDUPTHER

## 2017-10-19 ENCOUNTER — TELEPHONE (OUTPATIENT)
Dept: INTERNAL MEDICINE | Facility: CLINIC | Age: 82
End: 2017-10-19

## 2017-10-19 ENCOUNTER — OFFICE VISIT (OUTPATIENT)
Dept: INTERNAL MEDICINE | Facility: CLINIC | Age: 82
End: 2017-10-19
Payer: MEDICARE

## 2017-10-19 ENCOUNTER — HOSPITAL ENCOUNTER (OUTPATIENT)
Dept: RADIOLOGY | Facility: HOSPITAL | Age: 82
Discharge: HOME | End: 2017-10-19
Attending: PHYSICIAN ASSISTANT
Payer: MEDICARE

## 2017-10-19 VITALS
SYSTOLIC BLOOD PRESSURE: 146 MMHG | OXYGEN SATURATION: 98 % | DIASTOLIC BLOOD PRESSURE: 106 MMHG | HEIGHT: 62 IN | HEART RATE: 50 BPM | TEMPERATURE: 98 F | BODY MASS INDEX: 22.28 KG/M2 | WEIGHT: 121.06 LBS

## 2017-10-19 DIAGNOSIS — J01.90 ACUTE SINUSITIS, RECURRENCE NOT SPECIFIED, UNSPECIFIED LOCATION: Primary | ICD-10-CM

## 2017-10-19 DIAGNOSIS — J01.90 ACUTE SINUSITIS, RECURRENCE NOT SPECIFIED, UNSPECIFIED LOCATION: ICD-10-CM

## 2017-10-19 DIAGNOSIS — R11.10 VOMITING, INTRACTABILITY OF VOMITING NOT SPECIFIED, PRESENCE OF NAUSEA NOT SPECIFIED, UNSPECIFIED VOMITING TYPE: ICD-10-CM

## 2017-10-19 PROCEDURE — 99999 PR PBB SHADOW E&M-EST. PATIENT-LVL V: CPT | Mod: PBBFAC,,, | Performed by: PHYSICIAN ASSISTANT

## 2017-10-19 PROCEDURE — 99213 OFFICE O/P EST LOW 20 MIN: CPT | Mod: S$GLB,,, | Performed by: PHYSICIAN ASSISTANT

## 2017-10-19 PROCEDURE — 71020 XR CHEST PA AND LATERAL: CPT | Mod: TC,PO

## 2017-10-19 PROCEDURE — 71020 XR CHEST PA AND LATERAL: CPT | Mod: 26,,, | Performed by: RADIOLOGY

## 2017-10-19 RX ORDER — ALBUTEROL SULFATE 90 UG/1
2 AEROSOL, METERED RESPIRATORY (INHALATION)
COMMUNITY
End: 2018-02-11 | Stop reason: SDUPTHER

## 2017-10-19 NOTE — TELEPHONE ENCOUNTER
Spoke to patient's daughter. States she was informed by provider that if pt does not feel well tomorrow labs can be ordered. Pt's daughter request labs to be put in today due to difficulty in getting pt to office due to fatigue. States pt had trouble with obtaining urine lab but that she will take her to have her xray then return to give the urine lab a second try. Please advise.//ddw

## 2017-10-19 NOTE — TELEPHONE ENCOUNTER
----- Message from Kalyn Chavez sent at 10/19/2017  4:26 PM CDT -----  Contact: self   Patient would like to consult with nurse regarding blood work.. Please call back at [phone].        Thanks,  Kalyn Chavez

## 2017-10-19 NOTE — PROGRESS NOTES
Subjective:      Patient ID: Crystal Romero is a 82 y.o. female.    Chief Complaint: Cough; Fever; Rhinitis; Diarrhea; and Anorexia    URI    This is a new problem. The current episode started in the past 7 days. The problem has been gradually worsening. There has been no fever (low grade fever). Associated symptoms include congestion, coughing, diarrhea, nausea, rhinorrhea, a sore throat and vomiting. Pertinent negatives include no abdominal pain, chest pain, dysuria, ear pain, headaches, joint pain, joint swelling, neck pain, plugged ear sensation, rash, sinus pain, sneezing, swollen glands or wheezing. Treatments tried: coricidin HBP, ricola. The treatment provided mild relief.    Blood pressure elevated today. Did not take her blood pressure medication today because gagging due to drainage.     Review of Systems   Constitutional: Positive for activity change, appetite change, chills, fatigue and fever (under 100.3). Negative for diaphoresis and unexpected weight change.   HENT: Positive for congestion, postnasal drip, rhinorrhea, sinus pressure and sore throat. Negative for dental problem, drooling, ear discharge, ear pain, facial swelling, hearing loss, mouth sores, nosebleeds, sinus pain, sneezing and trouble swallowing.    Eyes: Negative for pain, discharge, redness, itching and visual disturbance.   Respiratory: Positive for cough. Negative for chest tightness, shortness of breath and wheezing.    Cardiovascular: Negative for chest pain, palpitations and leg swelling.   Gastrointestinal: Positive for diarrhea, nausea and vomiting. Negative for abdominal pain and constipation.   Endocrine: Negative.    Genitourinary: Negative.  Negative for difficulty urinating and dysuria.   Musculoskeletal: Positive for myalgias. Negative for joint pain, neck pain and neck stiffness.   Skin: Negative for rash.   Allergic/Immunologic: Negative for environmental allergies, food allergies and immunocompromised  "state.   Neurological: Negative for dizziness, weakness and headaches.   Psychiatric/Behavioral: Positive for behavioral problems.     Objective:   BP (!) 146/106   Pulse (!) 50   Temp 97.6 °F (36.4 °C) (Tympanic)   Ht 5' 2" (1.575 m)   Wt 54.9 kg (121 lb 0.5 oz)   SpO2 98%   BMI 22.14 kg/m²     Physical Exam   Constitutional: She is oriented to person, place, and time. She appears well-developed and well-nourished.  Non-toxic appearance. She does not have a sickly appearance. She does not appear ill. No distress.   HENT:   Head: Normocephalic and atraumatic.   Right Ear: Hearing, tympanic membrane, external ear and ear canal normal. No tenderness.   Left Ear: Hearing, tympanic membrane, external ear and ear canal normal. No tenderness.   Nose: Mucosal edema and rhinorrhea present. No sinus tenderness. Right sinus exhibits no maxillary sinus tenderness and no frontal sinus tenderness. Left sinus exhibits no maxillary sinus tenderness and no frontal sinus tenderness.   Mouth/Throat: Uvula is midline and mucous membranes are normal. No oral lesions. Normal dentition. No dental abscesses, uvula swelling or dental caries. Oropharyngeal exudate and posterior oropharyngeal erythema present. No posterior oropharyngeal edema or tonsillar abscesses. No tonsillar exudate.   Eyes: Conjunctivae, EOM and lids are normal. Pupils are equal, round, and reactive to light. Right eye exhibits discharge (clear). Left eye exhibits discharge (clear).   Neck: Normal range of motion and full passive range of motion without pain. Neck supple.   Cardiovascular: Normal rate and regular rhythm.  Exam reveals no gallop and no friction rub.    Murmur heard.  Pulmonary/Chest: Effort normal. No accessory muscle usage. No respiratory distress. She has decreased breath sounds. She has no wheezes. She has no rales.   Lymphadenopathy:     She has no cervical adenopathy.   Neurological: She is alert and oriented to person, place, and time. "   Skin: Skin is warm. Capillary refill takes less than 2 seconds. No rash noted. No erythema. No pallor.   Psychiatric: She has a normal mood and affect. Her behavior is normal. Judgment and thought content normal.   Nursing note and vitals reviewed.    Pt actively vomiting clear foul smelling fluid (nasal drainage) after examining her mouth. (gag reflex hypersensitive)    Assessment:     1. Acute sinusitis, recurrence not specified, unspecified location    2. Vomiting, intractability of vomiting not specified, presence of nausea not specified, unspecified vomiting type      Plan:   Acute sinusitis, recurrence not specified, unspecified location  -     X-Ray Chest PA And Lateral; Future; Expected date: 10/19/2017  -     Urinalysis; Future; Expected date: 10/19/2017    Vomiting, intractability of vomiting not specified, presence of nausea not specified, unspecified vomiting type  -     CBC auto differential; Future; Expected date: 10/19/2017  -     Comprehensive metabolic panel; Future    -Monitor closely. If symptoms worsen, RTC or go to ER if severe.  -increase fluids. Pedialyte.    -discussed importance of taking medications as prescribed. Will recheck blood pressure in 1 week.     Return in about 7 days (around 10/26/2017), or if symptoms worsen or fail to improve.

## 2017-10-20 ENCOUNTER — TELEPHONE (OUTPATIENT)
Dept: INTERNAL MEDICINE | Facility: CLINIC | Age: 82
End: 2017-10-20

## 2017-10-20 NOTE — TELEPHONE ENCOUNTER
Patient has been advised to go to ER for critical potassium level-2.6. Provider is aware of lab value and that pt is at [facility] ER.

## 2017-10-20 NOTE — TELEPHONE ENCOUNTER
Patient's daughter called. Stated that an on-call doctor called her and told her to get the pt to the ER due to critical lab for potassium level-2.6. Patient's daughter request that xray results also be given. Please advise.//ddw

## 2017-10-23 ENCOUNTER — TELEPHONE (OUTPATIENT)
Dept: FAMILY MEDICINE | Facility: CLINIC | Age: 82
End: 2017-10-23

## 2017-10-23 DIAGNOSIS — I10 ESSENTIAL HYPERTENSION: ICD-10-CM

## 2017-10-23 RX ORDER — VERAPAMIL HYDROCHLORIDE 240 MG/1
240 CAPSULE, EXTENDED RELEASE ORAL DAILY
Qty: 90 CAPSULE | Refills: 0 | Status: SHIPPED | OUTPATIENT
Start: 2017-10-23 | End: 2018-02-04 | Stop reason: SDUPTHER

## 2017-10-23 RX ORDER — TRAZODONE HYDROCHLORIDE 50 MG/1
50 TABLET ORAL NIGHTLY
Qty: 30 TABLET | Refills: 0 | Status: SHIPPED | OUTPATIENT
Start: 2017-10-23 | End: 2017-12-02 | Stop reason: SDUPTHER

## 2017-10-23 NOTE — TELEPHONE ENCOUNTER
Trinidad Davis MD at 10/19/2017  6:30 PM     Status: Sign at close encounter      Spoke with pt daughter regarding low K . Er nikita recommended . She will take her

## 2017-10-23 NOTE — TELEPHONE ENCOUNTER
----- Message from Citlalli Rodriguez sent at 10/23/2017  3:17 PM CDT -----  Contact: Shannan/daughter/caretaker  States a request was sent from the pharmacy on 10/15. States she was just discharged from the hospital last night and she needs these medications (verapamil and trazodone). Pt uses     [id number] - [place], [place] - [address] [address]  [address]  [place] [address]  Phone: [phone] Fax: [phone]    Please call Shannan at [phone]. Thank you

## 2017-10-30 ENCOUNTER — LAB VISIT (OUTPATIENT)
Dept: LAB | Facility: HOSPITAL | Age: 82
End: 2017-10-30
Attending: FAMILY MEDICINE
Payer: MEDICARE

## 2017-10-30 ENCOUNTER — OFFICE VISIT (OUTPATIENT)
Dept: INTERNAL MEDICINE | Facility: CLINIC | Age: 82
End: 2017-10-30
Payer: MEDICARE

## 2017-10-30 VITALS
SYSTOLIC BLOOD PRESSURE: 139 MMHG | DIASTOLIC BLOOD PRESSURE: 79 MMHG | BODY MASS INDEX: 22.52 KG/M2 | HEART RATE: 62 BPM | WEIGHT: 122.38 LBS | OXYGEN SATURATION: 98 % | TEMPERATURE: 98 F | HEIGHT: 62 IN

## 2017-10-30 DIAGNOSIS — I10 ESSENTIAL HYPERTENSION: Chronic | ICD-10-CM

## 2017-10-30 DIAGNOSIS — Z23 NEED FOR PROPHYLACTIC VACCINATION WITH STREPTOCOCCUS PNEUMONIAE (PNEUMOCOCCUS) AND INFLUENZA VACCINES: ICD-10-CM

## 2017-10-30 DIAGNOSIS — E87.6 HYPOKALEMIA: Primary | ICD-10-CM

## 2017-10-30 DIAGNOSIS — Z09 HOSPITAL DISCHARGE FOLLOW-UP: ICD-10-CM

## 2017-10-30 DIAGNOSIS — E87.1 HYPONATREMIA: ICD-10-CM

## 2017-10-30 DIAGNOSIS — R41.89 IMPAIRED COGNITION: ICD-10-CM

## 2017-10-30 DIAGNOSIS — I35.0 NONRHEUMATIC AORTIC VALVE STENOSIS: ICD-10-CM

## 2017-10-30 DIAGNOSIS — E87.6 HYPOKALEMIA: ICD-10-CM

## 2017-10-30 PROCEDURE — 99214 OFFICE O/P EST MOD 30 MIN: CPT | Mod: 25,S$GLB,, | Performed by: FAMILY MEDICINE

## 2017-10-30 PROCEDURE — 99999 PR PBB SHADOW E&M-EST. PATIENT-LVL III: CPT | Mod: PBBFAC,,, | Performed by: FAMILY MEDICINE

## 2017-10-30 PROCEDURE — 90732 PPSV23 VACC 2 YRS+ SUBQ/IM: CPT | Mod: S$GLB,,, | Performed by: FAMILY MEDICINE

## 2017-10-30 PROCEDURE — 36415 COLL VENOUS BLD VENIPUNCTURE: CPT | Mod: PO

## 2017-10-30 PROCEDURE — 80048 BASIC METABOLIC PNL TOTAL CA: CPT

## 2017-10-30 PROCEDURE — 83735 ASSAY OF MAGNESIUM: CPT

## 2017-10-30 PROCEDURE — G0009 ADMIN PNEUMOCOCCAL VACCINE: HCPCS | Mod: S$GLB,,, | Performed by: FAMILY MEDICINE

## 2017-10-30 NOTE — PROGRESS NOTES
Subjective:       Patient ID: Crystal Romero is a 82 y.o. female.    Chief Complaint: Follow-up (hospitalf/u)    82-year-old female patient accompanied by her daughter with Patient Active Problem List:     Rheumatoid arthritis     Hiatal hernia     Asthma with COPD     Hyperlipidemia     Hypertension     Diastolic dysfunction     Osteoarthritis     Rotator cuff arthropathy     H/O: GI bleed     Arthritis of left wrist     Dysplastic colon polyp     Hypothyroid     Nonrheumatic aortic valve stenosis     Nonrheumatic aortic valve insufficiency     Impaired cognition     Gastroesophageal reflux disease without esophagitis     Major depressive disorder, recurrent episode, mild     Psoriasis with arthropathy     Sleep disturbance     Bilateral adhesive capsulitis of shoulders     Hyponatremia     Dehydration     Fall     EtOH dependence     Bacteremia due to Enterococcus     Pulmonary emphysema     Poor appetite     Contracture of tendon sheath  Here for hospital discharge follow-up from  [facility],  10/22/17-10/26/17.  Patient was seen here and had labs done for sinus infection, was advised to go to ER for low potassium.  Patient denies of any leg cramps, chest pain or palpitations, reports that she was treated for low potassium and low sodium and magnesium, and was sent home after giving flu shot.  Patient was not discharged on any new medications or potassium supplements.   Patient's daughter informs that she had EKG done during hospitalization and was normal.   Did not have any medical records from [facility] for review today    Patient has been clinically doing well informs that her appetite has improved some when compared to last week, was sleeping more but has been doing a little bit better lately.  Denies of any other complaints today  Patient having impaired cognition, most of the history is obtained by her daughter          Review of Systems   Constitutional: Negative for chills,  "fatigue and fever.   HENT: Negative for congestion, postnasal drip and sinus pressure.    Eyes: Negative for visual disturbance.   Respiratory: Negative for cough and shortness of breath.    Cardiovascular: Negative for chest pain, palpitations and leg swelling.   Gastrointestinal: Negative for abdominal pain, nausea and vomiting.   Musculoskeletal: Negative for myalgias.   Skin: Negative for rash.   Neurological: Negative for light-headedness and headaches.   Psychiatric/Behavioral: Positive for sleep disturbance. Negative for decreased concentration.         BP (!) 141/79 (BP Location: Right arm, Patient Position: Sitting)   Pulse 62   Temp 98.3 °F (36.8 °C) (Tympanic)   Ht 5' 2" (1.575 m)   Wt 55.5 kg (122 lb 5.7 oz)   SpO2 98%   BMI 22.38 kg/m²   Objective:      Physical Exam   Constitutional: She is oriented to person, place, and time. She appears well-developed and well-nourished.   HENT:   Head: Normocephalic and atraumatic.   Mouth/Throat: Oropharynx is clear and moist.   Cardiovascular: Normal rate and regular rhythm.    Murmur heard.  Positive for systolic murmur   Pulmonary/Chest: Effort normal and breath sounds normal. She has no wheezes.   Abdominal: Soft. Bowel sounds are normal. There is no tenderness.   Musculoskeletal: She exhibits no edema.   Neurological: She is alert and oriented to person, place, and time.   Skin: Skin is warm and dry. No rash noted.   Psychiatric: She has a normal mood and affect.         Assessment:       1. Hypokalemia    2. Hyponatremia    3. Hospital discharge follow-up    4. Essential hypertension    5. Nonrheumatic aortic valve stenosis    6. Impaired cognition    7. Need for prophylactic vaccination with Streptococcus pneumoniae (Pneumococcus) and Influenza vaccines        Plan:   Hypokalemia  -     Basic metabolic panel; Future; Expected date: 10/30/2017  -     Magnesium; Future; Expected date: 11/13/2017  Hyponatremia  -     Basic metabolic panel; Future; " Expected date: 10/30/2017  -     Magnesium; Future; Expected date: 11/13/2017  Hospital discharge follow-up    Reviewed most recent labs done here showing low potassium and sodium, will plan to repeat labs.   Will try to obtain records from [facility] for further evaluation  Patient was encouraged to eat protein rich diet and potassium supplements.     Essential hypertension-blood pressure stable today on verapamil 240 mg and lisinopril 10 mg daily     Nonrheumatic aortic valve stenosis-patient was advised to follow-up with cardiology, clinically asymptomatic at this time    Impaired cognition-no worsening noted    Need for prophylactic vaccination with Streptococcus pneumoniae (Pneumococcus) and Influenza vaccines  -     (In Office Administered) Pneumococcal Polysaccharide Vaccine (23 Valent) (SQ/IM)  Pneumovax given today to finish pneumococcal series    Patient's daughter informed that she has got  flu shot at [facility]. at the time of discharge recently    Reviewed hospital records from [facility] in [facility] showing patient was admitted for metabolic encephalopathy with hyponatremia and hypokalemia, patient had sodium of 118 and potassium of 2.2, during hospitalization.   EKG showed normal sinus rhythm rightward axis with left anterior fascicular block and with minimal voltage criteria for LVH, U waves noted.  No ST segment changes noted, diffuse flattening of T waves, consistent with hypokalemia.   Patient was discharged home with no new medications and was advised to follow-up with PCP  Patient was given flu shot and Pneumovax on 10/20/17 at the time of discharge

## 2017-10-31 LAB
ANION GAP SERPL CALC-SCNC: 10 MMOL/L
BUN SERPL-MCNC: 12 MG/DL
CALCIUM SERPL-MCNC: 9.2 MG/DL
CHLORIDE SERPL-SCNC: 104 MMOL/L
CO2 SERPL-SCNC: 26 MMOL/L
CREAT SERPL-MCNC: 0.8 MG/DL
EST. GFR  (AFRICAN AMERICAN): >60 ML/MIN/1.73 M^2
EST. GFR  (NON AFRICAN AMERICAN): >60 ML/MIN/1.73 M^2
GLUCOSE SERPL-MCNC: 87 MG/DL
MAGNESIUM SERPL-MCNC: 2 MG/DL
POTASSIUM SERPL-SCNC: 4.2 MMOL/L
SODIUM SERPL-SCNC: 140 MMOL/L

## 2017-11-12 RX ORDER — METOPROLOL SUCCINATE 25 MG/1
TABLET, EXTENDED RELEASE ORAL
Qty: 30 TABLET | Refills: 1 | Status: SHIPPED | OUTPATIENT
Start: 2017-11-12 | End: 2018-01-15 | Stop reason: SDUPTHER

## 2017-12-03 RX ORDER — TRAZODONE HYDROCHLORIDE 50 MG/1
TABLET ORAL
Qty: 30 TABLET | Refills: 0 | Status: SHIPPED | OUTPATIENT
Start: 2017-12-03 | End: 2017-12-09 | Stop reason: SDUPTHER

## 2017-12-08 DIAGNOSIS — I10 ESSENTIAL HYPERTENSION: ICD-10-CM

## 2017-12-08 RX ORDER — LISINOPRIL 10 MG/1
10 TABLET ORAL DAILY
Qty: 90 TABLET | Refills: 0 | Status: SHIPPED | OUTPATIENT
Start: 2017-12-08 | End: 2018-01-09 | Stop reason: SDUPTHER

## 2017-12-09 RX ORDER — ROSUVASTATIN CALCIUM 10 MG/1
TABLET, COATED ORAL
Qty: 30 TABLET | Refills: 8 | Status: SHIPPED | OUTPATIENT
Start: 2017-12-09 | End: 2018-06-13 | Stop reason: SDUPTHER

## 2017-12-09 RX ORDER — TRAZODONE HYDROCHLORIDE 50 MG/1
TABLET ORAL
Qty: 30 TABLET | Refills: 0 | Status: SHIPPED | OUTPATIENT
Start: 2017-12-09 | End: 2018-02-11 | Stop reason: SDUPTHER

## 2017-12-26 ENCOUNTER — OFFICE VISIT (OUTPATIENT)
Dept: NEUROLOGY | Facility: CLINIC | Age: 82
End: 2017-12-26
Payer: MEDICARE

## 2017-12-26 ENCOUNTER — CLINICAL SUPPORT (OUTPATIENT)
Dept: CARDIOLOGY | Facility: CLINIC | Age: 82
End: 2017-12-26
Payer: MEDICARE

## 2017-12-26 ENCOUNTER — OFFICE VISIT (OUTPATIENT)
Dept: INTERNAL MEDICINE | Facility: CLINIC | Age: 82
End: 2017-12-26
Payer: MEDICARE

## 2017-12-26 ENCOUNTER — HOSPITAL ENCOUNTER (OUTPATIENT)
Dept: RADIOLOGY | Facility: HOSPITAL | Age: 82
Discharge: HOME | End: 2017-12-26
Attending: FAMILY MEDICINE
Payer: MEDICARE

## 2017-12-26 VITALS
OXYGEN SATURATION: 97 % | WEIGHT: 125.44 LBS | HEIGHT: 62 IN | HEART RATE: 72 BPM | DIASTOLIC BLOOD PRESSURE: 86 MMHG | TEMPERATURE: 99 F | SYSTOLIC BLOOD PRESSURE: 136 MMHG | BODY MASS INDEX: 23.08 KG/M2

## 2017-12-26 VITALS
WEIGHT: 125.88 LBS | HEART RATE: 72 BPM | SYSTOLIC BLOOD PRESSURE: 170 MMHG | BODY MASS INDEX: 23.17 KG/M2 | HEIGHT: 62 IN | DIASTOLIC BLOOD PRESSURE: 90 MMHG

## 2017-12-26 DIAGNOSIS — R41.89 IMPAIRED COGNITION: ICD-10-CM

## 2017-12-26 DIAGNOSIS — R53.1 GENERALIZED WEAKNESS: ICD-10-CM

## 2017-12-26 DIAGNOSIS — F33.0 MAJOR DEPRESSIVE DISORDER, RECURRENT EPISODE, MILD: ICD-10-CM

## 2017-12-26 DIAGNOSIS — J44.89 ASTHMA WITH COPD: Chronic | ICD-10-CM

## 2017-12-26 DIAGNOSIS — E03.9 HYPOTHYROIDISM, UNSPECIFIED TYPE: Chronic | ICD-10-CM

## 2017-12-26 DIAGNOSIS — I10 ESSENTIAL HYPERTENSION: Chronic | ICD-10-CM

## 2017-12-26 DIAGNOSIS — F01.50 DEMENTIA, VASCULAR, MIXED, WITHOUT BEHAVIORAL DISTURBANCE: ICD-10-CM

## 2017-12-26 DIAGNOSIS — R41.82 ALTERED MENTAL STATUS, UNSPECIFIED ALTERED MENTAL STATUS TYPE: Primary | ICD-10-CM

## 2017-12-26 DIAGNOSIS — R41.82 ALTERED MENTAL STATUS, UNSPECIFIED ALTERED MENTAL STATUS TYPE: ICD-10-CM

## 2017-12-26 DIAGNOSIS — R41.89 IMPAIRED COGNITION: Primary | ICD-10-CM

## 2017-12-26 PROBLEM — B95.2 BACTEREMIA DUE TO ENTEROCOCCUS: Status: RESOLVED | Noted: 2017-04-19 | Resolved: 2017-12-26

## 2017-12-26 PROBLEM — E87.1 HYPONATREMIA: Status: RESOLVED | Noted: 2017-03-10 | Resolved: 2017-12-26

## 2017-12-26 PROBLEM — R78.81 BACTEREMIA DUE TO ENTEROCOCCUS: Status: RESOLVED | Noted: 2017-04-19 | Resolved: 2017-12-26

## 2017-12-26 PROBLEM — E86.0 DEHYDRATION: Status: RESOLVED | Noted: 2017-03-10 | Resolved: 2017-12-26

## 2017-12-26 PROBLEM — F10.20 ETOH DEPENDENCE: Status: RESOLVED | Noted: 2017-03-10 | Resolved: 2017-12-26
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
## 2017-12-26 PROBLEM — R63.0 POOR APPETITE: Status: RESOLVED | Noted: 2017-07-13 | Resolved: 2017-12-26

## 2017-12-26 PROCEDURE — 99214 OFFICE O/P EST MOD 30 MIN: CPT | Mod: S$GLB,,, | Performed by: FAMILY MEDICINE

## 2017-12-26 PROCEDURE — 93000 ELECTROCARDIOGRAM COMPLETE: CPT | Mod: S$GLB,,, | Performed by: INTERNAL MEDICINE

## 2017-12-26 PROCEDURE — 71020 XR CHEST PA AND LATERAL: CPT | Mod: TC,PO

## 2017-12-26 PROCEDURE — 99999 PR PBB SHADOW E&M-EST. PATIENT-LVL IV: CPT | Mod: PBBFAC,,, | Performed by: PSYCHIATRY & NEUROLOGY

## 2017-12-26 PROCEDURE — 99215 OFFICE O/P EST HI 40 MIN: CPT | Mod: S$GLB,,, | Performed by: PSYCHIATRY & NEUROLOGY

## 2017-12-26 PROCEDURE — 99999 PR PBB SHADOW E&M-EST. PATIENT-LVL III: CPT | Mod: PBBFAC,,, | Performed by: FAMILY MEDICINE

## 2017-12-26 PROCEDURE — 71020 XR CHEST PA AND LATERAL: CPT | Mod: 26,,, | Performed by: RADIOLOGY

## 2017-12-26 NOTE — PROGRESS NOTES
Subjective:       Patient ID: Crystal Romero is a 83 y.o. female.    Chief Complaint: Anorexia (x 1 week)    83-year-old female patient accompanied by her daughter with Patient Active Problem List:     Rheumatoid arthritis     Hiatal hernia     Asthma with COPD     Hyperlipidemia     Hypertension     Diastolic dysfunction     Osteoarthritis     Rotator cuff arthropathy     H/O: GI bleed     Arthritis of left wrist     Dysplastic colon polyp     Hypothyroid     Nonrheumatic aortic valve stenosis     Nonrheumatic aortic valve insufficiency     Impaired cognition     Gastroesophageal reflux disease without esophagitis     Major depressive disorder, recurrent episode, mild     Psoriasis with arthropathy     Sleep disturbance     Bilateral adhesive capsulitis of shoulders     Pulmonary emphysema     Contracture of tendon sheath  Reports that she was not eating any, and was having change in altered mental status, was not sleeping, and was not in her usual behavior of weeks ago, but gradually since making her drink adequate fluids, has been started to improve a little bit with drinking water and eating some.   Patient daughter reports that she has been gradually getting better but she was weak, and not sure what triggered.   Patient has appointment with neurology today  Has been taking her medications regularly        Review of Systems   Constitutional: Positive for appetite change and fatigue. Negative for chills and fever.   Eyes: Negative for visual disturbance.   Respiratory: Negative for cough.    Cardiovascular: Negative for chest pain and leg swelling.   Gastrointestinal: Negative for abdominal pain, nausea and vomiting.        Decreased appetite   Musculoskeletal: Negative for myalgias.   Skin: Negative for rash.   Neurological: Negative for light-headedness and headaches.   Psychiatric/Behavioral: Positive for confusion and sleep disturbance.         /86 (BP Location: Left arm, Patient Position:  "Sitting)   Pulse 72   Temp 99 °F (37.2 °C) (Tympanic)   Ht 5' 2" (1.575 m)   Wt 56.9 kg (125 lb 7.1 oz)   SpO2 97%   BMI 22.94 kg/m²   Objective:      Physical Exam   Constitutional: She is oriented to person, place, and time. She appears well-developed and well-nourished.   HENT:   Head: Normocephalic and atraumatic.   Right Ear: External ear normal.   Left Ear: External ear normal.   Mouth/Throat: Oropharynx is clear and moist.   Neck: Neck supple.   Cardiovascular: Normal rate, regular rhythm and normal heart sounds.    No murmur heard.  Pulmonary/Chest: Effort normal and breath sounds normal. She has no wheezes.   Abdominal: Soft. Bowel sounds are normal. There is no tenderness.   Musculoskeletal: She exhibits no edema.   Lymphadenopathy:     She has no cervical adenopathy.   Neurological: She is alert and oriented to person, place, and time.   Skin: Skin is warm and dry. No rash noted.   Psychiatric: She has a normal mood and affect.         Assessment:       1. Altered mental status, unspecified altered mental status type    2. Generalized weakness    3. Essential hypertension    4. Hypothyroidism, unspecified type    5. Asthma with COPD    6. Major depressive disorder, recurrent episode, mild    7. Impaired cognition        Plan:   Altered mental status, unspecified altered mental status type  -     CBC auto differential; Future; Expected date: 12/26/2017  -     Basic metabolic panel; Future; Expected date: 12/26/2017  -     Urinalysis; Future; Expected date: 12/26/2017  -     X-Ray Chest PA And Lateral; Future; Expected date: 12/26/2017  -     EKG 12-lead; Future  Generalized weakness  -     Basic metabolic panel; Future; Expected date: 12/26/2017  -     Urinalysis; Future; Expected date: 12/26/2017  Patient gradually improving at this time.   Encouraged to drink adequate fluids and eat appropriately, try over-the-counter protein supplements again like Ensure or  Glucerna .   Will check further labs to " identify any source of infection     Essential hypertension  -     Basic metabolic panel; Future; Expected date: 12/26/2017  -     Hypertension Digital Medicine (HDMP) Enrollment Order   blood pressure mildly elevated currently taking lisinopril 10 mg and metoprolol 100 mg, verapamil 240 mg daily  Will enroll in Hypertension Digital  program and advised to follow-up with cardiology as scheduled     Hypothyroidism, unspecified type  -     TSH; Future; Expected date: 12/26/2017    Asthma with COPD  -     X-Ray Chest PA And Lateral; Future; Expected date: 12/26/2017   stable    Major depressive disorder, recurrent episode, mild- currently taking Zoloft and trazodone     Impaired cognition-on Aricept for to be followed by neurology today

## 2017-12-26 NOTE — PROGRESS NOTES
Follow up: Dementia    SUBJECTIVE:       HPI:    Recently couple of days, she did not sleep and was confused. Her eating habit has declined.    Past Medical History:   Diagnosis Date    Arthritis     Coronary artery disease     Depression     Encounter for blood transfusion     H/O: GI bleed     Hiatal hernia     Hyperlipidemia     Hypertension     Hypothyroid     Rheumatoid arthritis     Stroke     Syncope and collapse      Past Surgical History:   Procedure Laterality Date    TONSILLECTOMY       Family History   Problem Relation Age of Onset    Cancer Mother      breast, uterine     Social History   Substance Use Topics    Smoking status: Former Smoker     Packs/day: 2.00     Years: 16.00     Types: Cigarettes     Quit date: 1/1/1950    Smokeless tobacco: Never Used    Alcohol use 1.8 oz/week     3 Glasses of wine per week      Comment: daily 1/2 bottle wine     Review of patient's allergies indicates:   Allergen Reactions    Sulfa (sulfonamide antibiotics) Shortness Of Breath       Review of Systems   Constitutional: Positive for malaise/fatigue. Negative for fever and weight loss.   HENT: Negative for hearing loss.    Eyes: Negative for blurred vision, double vision, photophobia and pain.   Respiratory: Negative for cough.    Cardiovascular: Negative for chest pain.   Gastrointestinal: Negative for abdominal pain, nausea and vomiting.   Genitourinary: Negative for dysuria, frequency and urgency.   Musculoskeletal: Positive for joint pain, myalgias and neck pain. Negative for back pain and falls.   Skin: Negative for itching and rash.   Neurological: Positive for sensory change. Negative for tingling and headaches.   Psychiatric/Behavioral: Positive for memory loss. Negative for depression.       OBJECTIVE:     Physical Exam   Constitutional: She is oriented to person, place, and time. She appears well-developed and well-nourished.   HENT:   Head: Normocephalic and atraumatic.   Eyes:  Conjunctivae and EOM are normal. Pupils are equal, round, and reactive to light.   Neck: Normal range of motion. Neck supple. No JVD present. No tracheal deviation present. No thyromegaly present.   Cardiovascular: Normal rate, regular rhythm and normal heart sounds.    Pulmonary/Chest: Effort normal and breath sounds normal.   Abdominal: She exhibits no distension. There is no tenderness.   Musculoskeletal: She exhibits no edema.        Right shoulder: She exhibits decreased range of motion, bony tenderness, pain and spasm.        Left shoulder: She exhibits decreased range of motion, tenderness, bony tenderness, pain and spasm.   Bilateral shoulder frozen.   Neurological: She is alert and oriented to person, place, and time. She has normal strength and normal reflexes. She displays normal reflexes. No cranial nerve deficit or sensory deficit. She exhibits normal muscle tone. She displays a negative Romberg sign. Coordination and gait normal.   Reflex Scores:       Tricep reflexes are 2+ on the right side and 2+ on the left side.       Bicep reflexes are 2+ on the right side and 2+ on the left side.       Brachioradialis reflexes are 2+ on the right side and 2+ on the left side.       Patellar reflexes are 2+ on the right side and 2+ on the left side.       Achilles reflexes are 2+ on the right side and 2+ on the left side.  Skin: Skin is warm and dry. No rash noted.   Psychiatric: She has a normal mood and affect. Her speech is normal and behavior is normal. Judgment and thought content normal. Cognition and memory are impaired. She does not express inappropriate judgment. She exhibits abnormal recent memory.         Strength  Deltoids Triceps Biceps Wrist Extension Wrist Flexion Hand    Upper: R 5/5 5/5 5/5 5/5 5/5 4/5    L 5/5 5/5 5/5 5/5 5/5 4/5     Iliopsoas Quadriceps Knee  Flexion Tibialis  anterior Gastro- cnemius EHL   Lower: R 5/5 5/5 5/5 5/5 5/5 5/5    L 5/5 5/5 5/5 5/5 5/5 5/5     Laboratory:  Lab  Results   Component Value Date    WBC 6.68 03/13/2017    HGB 13.6 03/13/2017    HCT 42.0 03/13/2017     03/13/2017    CHOL 180 06/17/2016    TRIG 72 06/17/2016    HDL 87 (H) 06/17/2016    ALT 16 03/11/2017    AST 28 03/11/2017     03/13/2017    K 3.7 03/13/2017     03/13/2017    CREATININE 0.9 03/13/2017    BUN 13 03/13/2017    CO2 27 03/13/2017    TSH 1.504 02/07/2017    INR 0.9 03/10/2017   Results for TITO STAPLES (MRN [id number]) as of 6/19/2017 11:13   Ref. Range 2/7/2017 15:30 3/10/2017 19:01 3/11/2017 04:47 3/13/2017 11:06 4/19/2017 10:38   Folate Latest Ref Range: 4.0 - 24.0 ng/mL     16.9   Vitamin B-12 Latest Ref Range: 210 - 950 pg/mL     266   Methlymalonic Acid Latest Ref Range: <0.40 umol/L     0.19   Protime Latest Ref Range: 9.0 - 12.5 sec  9.7      Coumadin Monitoring INR Latest Ref Range: 0.8 - 1.2   0.9            Diagnostic Results:        MRI of the brain: 2/9/2017      Impression           No evidence of recent infarction or other acute intracranial findings.    Fairly extensive nonspecific white matter signal changes, likely representing sequela of chronic microvascular ischemic disease.    Moderate diffuse parenchymal atrophy.           ASSESSMENT/PLAN:     Primary Diagnoses:  1. 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   2. History of alcohol abuse.  3. Severe osteoarthritis under care of Rheumatology.  4. Gait problem and hand weakness .      Patient Active Problem List   Diagnosis    Rheumatoid arthritis    Hiatal hernia    Asthma with COPD    Hyperlipidemia    Hypertension    Diastolic dysfunction    Osteoarthritis    Rotator cuff arthropathy    H/O: GI bleed    Arthritis of left wrist    Dysplastic colon polyp    Hypothyroid    Nonrheumatic aortic valve stenosis    Nonrheumatic aortic valve insufficiency    Impaired cognition    Gastroesophageal reflux disease without esophagitis    Major depressive disorder, recurrent episode, mild     Psoriasis with arthropathy    Sleep disturbance    Bilateral adhesive capsulitis of shoulders    Hyponatremia    Dehydration    Fall    EtOH dependence        Plan: continue Aricept   Time spent: 30 minutes in face to face discussion concerning diagnosis, prognosis, review of lab and test results, benefits of treatment as well as management of disease, counseling of patient and coordination of care between various health care providers . Greater than half the time spent was used for coordination of care and counseling of patient.

## 2018-01-09 ENCOUNTER — TELEPHONE (OUTPATIENT)
Dept: CARDIOLOGY | Facility: CLINIC | Age: 83
End: 2018-01-09

## 2018-01-09 ENCOUNTER — OFFICE VISIT (OUTPATIENT)
Dept: CARDIOLOGY | Facility: CLINIC | Age: 83
End: 2018-01-09
Payer: MEDICARE

## 2018-01-09 VITALS
DIASTOLIC BLOOD PRESSURE: 90 MMHG | HEIGHT: 61 IN | BODY MASS INDEX: 25.18 KG/M2 | WEIGHT: 133.38 LBS | HEART RATE: 64 BPM | SYSTOLIC BLOOD PRESSURE: 170 MMHG

## 2018-01-09 DIAGNOSIS — R41.89 IMPAIRED COGNITION: ICD-10-CM

## 2018-01-09 DIAGNOSIS — I35.0 NONRHEUMATIC AORTIC VALVE STENOSIS: Primary | ICD-10-CM

## 2018-01-09 DIAGNOSIS — E78.2 MIXED HYPERLIPIDEMIA: Chronic | ICD-10-CM

## 2018-01-09 DIAGNOSIS — I35.1 NONRHEUMATIC AORTIC VALVE INSUFFICIENCY: ICD-10-CM

## 2018-01-09 DIAGNOSIS — I10 ESSENTIAL HYPERTENSION: Chronic | ICD-10-CM

## 2018-01-09 PROCEDURE — 99999 PR PBB SHADOW E&M-EST. PATIENT-LVL III: CPT | Mod: PBBFAC,,, | Performed by: INTERNAL MEDICINE

## 2018-01-09 PROCEDURE — 99214 OFFICE O/P EST MOD 30 MIN: CPT | Mod: S$GLB,,, | Performed by: INTERNAL MEDICINE

## 2018-01-09 RX ORDER — LISINOPRIL 10 MG/1
10 TABLET ORAL 2 TIMES DAILY
Qty: 180 TABLET | Refills: 3 | Status: SHIPPED | OUTPATIENT
Start: 2018-01-09 | End: 2018-03-18 | Stop reason: SDUPTHER

## 2018-01-09 NOTE — TELEPHONE ENCOUNTER
----- Message from Orlando Hassan MD sent at 1/9/2018  2:28 PM CST -----  Contact: [facility]  [place].  ----- Message -----  From: Kristy Ramos MA  Sent: 1/9/2018   1:49 PM  To: MD MORIS Coulter....they are sending her to [facility] as they no longer have [facility].    ----- Message -----  From: Fidelia Loaiza  Sent: 1/9/2018   1:08 PM  To: [facility] Staff    Ms Rahman has a question about a referral that was sent to them, please call her back at [phone]. Thank you

## 2018-01-09 NOTE — PROGRESS NOTES
Subjective:   Patient ID:  Crystal Romero is a 83 y.o. female who presents for follow up of Hypertension and Follow-up      82, yo female, came in for routine f/u. Severe dementia and discussed the her Shannan Lockwood.  PMH moderate AI, and moderate AS, dementia 3 yrs, HTN, HLD, RA, TIA x3 in 4 months  In , was admitted to [facility] for unwitnessed fall with confusion at least for few hours at home. Blood culture + strep. Elevated BNP and CK. Na level was low. Elevated WBC. CXR clear. Had IVF and ATx. Fever and low Na resolved.  C/o fatigue, decreased appetite related to dementia.   Denied chest pain, dyspnea  Repeat ECHO in , showed normal EF, DD, moderate AI and functional severe AS. MG 40.   Refused surgery for valve.  Decreased activity, appetite, sleeps 18 hours a day.  No leg swelling, chest pain,and syncope  Dyspnea during the dressing. Easily tired,           Past Medical History:   Diagnosis Date    Arthritis     Coronary artery disease     Depression     Encounter for blood transfusion     H/O: GI bleed     Hiatal hernia     Hyperlipidemia     Hypertension     Hypothyroid     Rheumatoid arthritis(714.0)     Stroke     Syncope and collapse        Past Surgical History:   Procedure Laterality Date    TONSILLECTOMY         Social History   Substance Use Topics    Smoking status: Former Smoker     Packs/day: 2.00     Years: 16.00     Types: Cigarettes     Quit date: 1/1/1950    Smokeless tobacco: Never Used    Alcohol use 1.8 oz/week     3 Glasses of wine per week      Comment: daily 1/2 bottle wine       Family History   Problem Relation Age of Onset    Cancer Mother      breast, uterine         Review of Systems   Constitution: Positive for decreased appetite and fever.   HENT: Negative.    Eyes: Negative.    Cardiovascular: Positive for dyspnea on exertion. Negative for chest pain.   Respiratory: Negative.    Endocrine: Negative.    Hematologic/Lymphatic: Negative.     Skin: Negative.    Musculoskeletal: Positive for back pain and joint pain.   Gastrointestinal: Negative.    Genitourinary: Negative.    Psychiatric/Behavioral: Negative.    Allergic/Immunologic: Negative.        Objective:   Physical Exam   Constitutional: She is oriented to person, place, and time. She appears well-nourished.   HENT:   Head: Normocephalic.   Eyes: Pupils are equal, round, and reactive to light.   Neck: Normal carotid pulses and no JVD present. Carotid bruit is not present. No thyromegaly present.   Cardiovascular: Normal rate, regular rhythm and normal pulses.   No extrasystoles are present. PMI is not displaced.  Exam reveals no gallop and no S3.    Murmur heard.  Pulses:       Radial pulses are 2+ on the right side, and 2+ on the left side.   3/6 ESM on RUSB, S2 not audible.   Pulmonary/Chest: Breath sounds normal. No stridor. No respiratory distress.   Abdominal: Soft. Bowel sounds are normal. There is no tenderness. There is no rebound.   Musculoskeletal: Normal range of motion.   Neurological: She is alert and oriented to person, place, and time.   Skin: Skin is intact. No rash noted.   Psychiatric: Her behavior is normal.       Lab Results   Component Value Date    CHOL 169 07/11/2017    CHOL 180 06/17/2016    CHOL 210 (H) 02/03/2015     Lab Results   Component Value Date    HDL 58 07/11/2017    HDL 87 (H) 06/17/2016    HDL 75 02/03/2015     Lab Results   Component Value Date    LDLCALC 95.2 07/11/2017    LDLCALC 78.6 06/17/2016    LDLCALC 113.4 02/03/2015     Lab Results   Component Value Date    TRIG 79 07/11/2017    TRIG 72 06/17/2016    TRIG 108 02/03/2015     Lab Results   Component Value Date    CHOLHDL 34.3 07/11/2017    CHOLHDL 48.3 06/17/2016    CHOLHDL 35.7 02/03/2015       Chemistry        Component Value Date/Time     12/26/2017 1628    K 4.4 12/26/2017 1628     12/26/2017 1628    CO2 28 12/26/2017 1628    BUN 25 (H) 12/26/2017 1628    CREATININE 0.9 12/26/2017 1628     GLU 94 12/26/2017 1628        Component Value Date/Time    CALCIUM 9.7 12/26/2017 1628    ALKPHOS 52 (L) 10/19/2017 1713    AST 20 10/19/2017 1713    ALT 13 10/19/2017 1713    BILITOT 0.8 10/19/2017 1713    ESTGFRAFRICA >60.0 12/26/2017 1628    EGFRNONAA 59.3 (A) 12/26/2017 1628          Lab Results   Component Value Date    TSH 1.029 12/26/2017     Lab Results   Component Value Date    INR 0.9 03/10/2017    INR 1.0 09/23/2014     Lab Results   Component Value Date    WBC 8.54 12/26/2017    HGB 12.3 12/26/2017    HCT 40.6 12/26/2017    MCV 95 12/26/2017     12/26/2017     BMP  Sodium   Date Value Ref Range Status   12/26/2017 144 136 - 145 mmol/L Final     Potassium   Date Value Ref Range Status   12/26/2017 4.4 3.5 - 5.1 mmol/L Final     Chloride   Date Value Ref Range Status   12/26/2017 106 95 - 110 mmol/L Final     CO2   Date Value Ref Range Status   12/26/2017 28 23 - 29 mmol/L Final     BUN, Bld   Date Value Ref Range Status   12/26/2017 25 (H) 8 - 23 mg/dL Final     Creatinine   Date Value Ref Range Status   12/26/2017 0.9 0.5 - 1.4 mg/dL Final     Calcium   Date Value Ref Range Status   12/26/2017 9.7 8.7 - 10.5 mg/dL Final     Anion Gap   Date Value Ref Range Status   12/26/2017 10 8 - 16 mmol/L Final     eGFR if    Date Value Ref Range Status   12/26/2017 >60.0 >60 mL/min/1.73 m^2 Final     eGFR if non    Date Value Ref Range Status   12/26/2017 59.3 (A) >60 mL/min/1.73 m^2 Final     Comment:     Calculation used to obtain the estimated glomerular filtration  rate (eGFR) is the CKD-EPI equation.        CrCl cannot be calculated (Patient's most recent lab result is older than the maximum 7 days allowed.).     Assessment:      1. Nonrheumatic aortic valve stenosis    2. Nonrheumatic aortic valve insufficiency    3. Essential hypertension    4. Mixed hyperlipidemia    5. Impaired cognition      Decreased exercise and functional capacity, due to severe aortic disorders  and advanced dementia    Plan:   Increase lisinopril to 10 mg bid for better HTN control, check BP at home  Discussed the code status and advised DNR  Refer to home health  Improve the nutrition  Fall precaution  RTC in 6 months

## 2018-01-15 DIAGNOSIS — L40.50 PSORIASIS WITH ARTHROPATHY: ICD-10-CM

## 2018-01-15 RX ORDER — METOPROLOL SUCCINATE 25 MG/1
TABLET, EXTENDED RELEASE ORAL
Qty: 30 TABLET | Refills: 0 | Status: SHIPPED | OUTPATIENT
Start: 2018-01-15 | End: 2018-02-11 | Stop reason: SDUPTHER

## 2018-01-17 RX ORDER — FOLIC ACID 1 MG/1
TABLET ORAL
Qty: 90 TABLET | Refills: 0 | Status: SHIPPED | OUTPATIENT
Start: 2018-01-17 | End: 2018-05-13 | Stop reason: SDUPTHER

## 2018-01-17 RX ORDER — TRAMADOL HYDROCHLORIDE 50 MG/1
TABLET ORAL
Qty: 90 TABLET | Refills: 0 | Status: SHIPPED | OUTPATIENT
Start: 2018-01-17 | End: 2018-04-14 | Stop reason: SDUPTHER

## 2018-02-01 ENCOUNTER — TELEPHONE (OUTPATIENT)
Dept: INTERNAL MEDICINE | Facility: CLINIC | Age: 83
End: 2018-02-01

## 2018-02-01 NOTE — TELEPHONE ENCOUNTER
Leigha from [facility] called regarding pt Crystal Romero. Just wanted to inform that pt's daughter stated that will be out of town and they wouldn't be able to see her this week. TNJ

## 2018-02-02 ENCOUNTER — TELEPHONE (OUTPATIENT)
Dept: INTERNAL MEDICINE | Facility: CLINIC | Age: 83
End: 2018-02-02

## 2018-02-02 NOTE — TELEPHONE ENCOUNTER
Spoke with Leigha at [facility], informed that Dr. Herring said it was ok to skip nurse visit with pt due to her being out of town. Also informed that in order to have a referral for OT she would a copy of the evaluation notes, Leigha states that she doesn't have the evaluation notes because she hasn't been able to see the pt yet but the pt's daughter just requested OT referral. Informed that pt would need to come in for evaluation first. TITI

## 2018-02-04 DIAGNOSIS — I10 ESSENTIAL HYPERTENSION: ICD-10-CM

## 2018-02-04 RX ORDER — VERAPAMIL HYDROCHLORIDE 240 MG/1
CAPSULE, EXTENDED RELEASE ORAL
Qty: 90 CAPSULE | Refills: 0 | Status: SHIPPED | OUTPATIENT
Start: 2018-02-04 | End: 2018-05-13 | Stop reason: SDUPTHER

## 2018-02-09 ENCOUNTER — TELEPHONE (OUTPATIENT)
Dept: CARDIOLOGY | Facility: CLINIC | Age: 83
End: 2018-02-09

## 2018-02-09 NOTE — TELEPHONE ENCOUNTER
Spoke with  nurse Rosa who gave blood pressure of 175/100 this morning after pt had taken bp meds.  They do not know how the blood vessel in her eye ruptured.  It happened over night.  Pt later said she scratched it but pt also has dementia.

## 2018-02-09 NOTE — TELEPHONE ENCOUNTER
----- Message from Marie Bennett sent at 2/9/2018  9:24 AM CST -----  Contact: [facility]  She's calling to advise that the pt has taken her medication this morning but her bp is 175/100, she also has a ruptured blood vessel in her left eye, no other changes to report, please advise [phone]

## 2018-02-11 DIAGNOSIS — K21.9 GASTROESOPHAGEAL REFLUX DISEASE, ESOPHAGITIS PRESENCE NOT SPECIFIED: ICD-10-CM

## 2018-02-11 RX ORDER — PANTOPRAZOLE SODIUM 40 MG/1
TABLET, DELAYED RELEASE ORAL
Qty: 30 TABLET | Refills: 3 | Status: SHIPPED | OUTPATIENT
Start: 2018-02-11 | End: 2018-06-13 | Stop reason: SDUPTHER

## 2018-02-11 RX ORDER — METOPROLOL SUCCINATE 25 MG/1
TABLET, EXTENDED RELEASE ORAL
Qty: 30 TABLET | Refills: 3 | Status: SHIPPED | OUTPATIENT
Start: 2018-02-11 | End: 2018-06-13 | Stop reason: SDUPTHER

## 2018-02-11 RX ORDER — TRAZODONE HYDROCHLORIDE 50 MG/1
TABLET ORAL
Qty: 30 TABLET | Refills: 3 | Status: SHIPPED | OUTPATIENT
Start: 2018-02-11 | End: 2018-04-23 | Stop reason: DRUGHIGH

## 2018-02-12 RX ORDER — ALBUTEROL SULFATE 90 UG/1
AEROSOL, METERED RESPIRATORY (INHALATION)
Qty: 18 G | Refills: 0 | Status: SHIPPED | OUTPATIENT
Start: 2018-02-12 | End: 2018-06-13 | Stop reason: SDUPTHER

## 2018-02-12 RX ORDER — ALBUTEROL SULFATE 90 UG/1
AEROSOL, METERED RESPIRATORY (INHALATION)
Qty: 18 G | Refills: 10 | Status: SHIPPED | OUTPATIENT
Start: 2018-02-12 | End: 2018-02-19 | Stop reason: SDUPTHER

## 2018-02-20 RX ORDER — ALBUTEROL SULFATE 90 UG/1
2 AEROSOL, METERED RESPIRATORY (INHALATION) EVERY 4 HOURS PRN
Qty: 18 G | Refills: 10 | Status: SHIPPED | OUTPATIENT
Start: 2018-02-20 | End: 2018-08-30

## 2018-03-18 DIAGNOSIS — I10 ESSENTIAL HYPERTENSION: Chronic | ICD-10-CM

## 2018-03-18 RX ORDER — LISINOPRIL 10 MG/1
TABLET ORAL
Qty: 90 TABLET | Refills: 1 | Status: SHIPPED | OUTPATIENT
Start: 2018-03-18 | End: 2018-08-06 | Stop reason: SDUPTHER

## 2018-03-18 RX ORDER — LEVOTHYROXINE SODIUM 25 UG/1
TABLET ORAL
Qty: 90 TABLET | Refills: 1 | Status: SHIPPED | OUTPATIENT
Start: 2018-03-18 | End: 2018-06-13 | Stop reason: SDUPTHER

## 2018-03-18 RX ORDER — SERTRALINE HYDROCHLORIDE 50 MG/1
TABLET, FILM COATED ORAL
Qty: 90 TABLET | Refills: 1 | Status: SHIPPED | OUTPATIENT
Start: 2018-03-18 | End: 2018-06-13 | Stop reason: SDUPTHER

## 2018-04-03 ENCOUNTER — HOSPITAL ENCOUNTER (OUTPATIENT)
Dept: RADIOLOGY | Facility: HOSPITAL | Age: 83
Discharge: HOME | End: 2018-04-03
Attending: FAMILY MEDICINE
Payer: MEDICARE

## 2018-04-03 ENCOUNTER — OFFICE VISIT (OUTPATIENT)
Dept: INTERNAL MEDICINE | Facility: CLINIC | Age: 83
End: 2018-04-03
Payer: MEDICARE

## 2018-04-03 VITALS
DIASTOLIC BLOOD PRESSURE: 74 MMHG | TEMPERATURE: 97 F | SYSTOLIC BLOOD PRESSURE: 122 MMHG | BODY MASS INDEX: 24.64 KG/M2 | HEIGHT: 61 IN | OXYGEN SATURATION: 95 % | HEART RATE: 62 BPM | WEIGHT: 130.5 LBS

## 2018-04-03 DIAGNOSIS — E03.9 HYPOTHYROIDISM, UNSPECIFIED TYPE: Chronic | ICD-10-CM

## 2018-04-03 DIAGNOSIS — F51.05 INSOMNIA SECONDARY TO DEPRESSION WITH ANXIETY: ICD-10-CM

## 2018-04-03 DIAGNOSIS — Z12.11 COLON CANCER SCREENING: ICD-10-CM

## 2018-04-03 DIAGNOSIS — I35.1 NONRHEUMATIC AORTIC VALVE INSUFFICIENCY: ICD-10-CM

## 2018-04-03 DIAGNOSIS — L40.50 PSORIASIS WITH ARTHROPATHY: ICD-10-CM

## 2018-04-03 DIAGNOSIS — M15.9 PRIMARY OSTEOARTHRITIS INVOLVING MULTIPLE JOINTS: ICD-10-CM

## 2018-04-03 DIAGNOSIS — F33.0 MAJOR DEPRESSIVE DISORDER, RECURRENT EPISODE, MILD: ICD-10-CM

## 2018-04-03 DIAGNOSIS — Z00.00 ROUTINE GENERAL MEDICAL EXAMINATION AT A HEALTH CARE FACILITY: Primary | ICD-10-CM

## 2018-04-03 DIAGNOSIS — M06.9 RHEUMATOID ARTHRITIS OF HAND, UNSPECIFIED LATERALITY, UNSPECIFIED RHEUMATOID FACTOR PRESENCE: Chronic | ICD-10-CM

## 2018-04-03 DIAGNOSIS — K21.9 GASTROESOPHAGEAL REFLUX DISEASE WITHOUT ESOPHAGITIS: ICD-10-CM

## 2018-04-03 DIAGNOSIS — J44.89 ASTHMA WITH COPD: Chronic | ICD-10-CM

## 2018-04-03 DIAGNOSIS — E78.2 MIXED HYPERLIPIDEMIA: Chronic | ICD-10-CM

## 2018-04-03 DIAGNOSIS — M75.02 BILATERAL ADHESIVE CAPSULITIS OF SHOULDERS: ICD-10-CM

## 2018-04-03 DIAGNOSIS — F41.8 INSOMNIA SECONDARY TO DEPRESSION WITH ANXIETY: ICD-10-CM

## 2018-04-03 DIAGNOSIS — R82.90 ABNORMAL FINDING IN URINE: ICD-10-CM

## 2018-04-03 DIAGNOSIS — I10 ESSENTIAL HYPERTENSION: Chronic | ICD-10-CM

## 2018-04-03 DIAGNOSIS — R41.89 IMPAIRED COGNITION: ICD-10-CM

## 2018-04-03 DIAGNOSIS — I35.0 NONRHEUMATIC AORTIC VALVE STENOSIS: ICD-10-CM

## 2018-04-03 DIAGNOSIS — M75.01 BILATERAL ADHESIVE CAPSULITIS OF SHOULDERS: ICD-10-CM

## 2018-04-03 PROBLEM — M62.40 CONTRACTURE OF TENDON SHEATH: Status: RESOLVED | Noted: 2017-07-17 | Resolved: 2018-04-03

## 2018-04-03 PROCEDURE — 99999 PR PBB SHADOW E&M-EST. PATIENT-LVL IV: CPT | Mod: PBBFAC,,, | Performed by: FAMILY MEDICINE

## 2018-04-03 PROCEDURE — 71046 X-RAY EXAM CHEST 2 VIEWS: CPT | Mod: 26,,, | Performed by: RADIOLOGY

## 2018-04-03 PROCEDURE — 99397 PER PM REEVAL EST PAT 65+ YR: CPT | Mod: S$GLB,,, | Performed by: FAMILY MEDICINE

## 2018-04-03 PROCEDURE — 71046 X-RAY EXAM CHEST 2 VIEWS: CPT | Mod: TC,FY,PO

## 2018-04-03 RX ORDER — SODIUM, POTASSIUM,MAG SULFATES 17.5-3.13G
SOLUTION, RECONSTITUTED, ORAL ORAL
Qty: 1 BOTTLE | Refills: 0 | Status: CANCELLED | OUTPATIENT
Start: 2018-04-03

## 2018-04-03 NOTE — PROGRESS NOTES
Subjective:       Patient ID: Crystal Romero is a 83 y.o. female.    Chief Complaint: Anxiety (trouble sleeping)     83-year-old female patient with Patient Active Problem List:     Rheumatoid arthritis     Hiatal hernia     Asthma with COPD     Hyperlipidemia     Hypertension     Diastolic dysfunction     Osteoarthritis     Rotator cuff arthropathy     H/O: GI bleed     Dysplastic colon polyp     Hypothyroid     Nonrheumatic aortic valve stenosis     Nonrheumatic aortic valve insufficiency     Impaired cognition     Gastroesophageal reflux disease without esophagitis     Major depressive disorder, recurrent episode, mild     Psoriasis with arthropathy     Sleep disturbance     Bilateral adhesive capsulitis of shoulders     Pulmonary emphysema     Insomnia secondary to depression with anxiety  Here for routine annual physicals.  Patient reports that she's been taking her medications regularly but has been having excessive anxiety and having trouble falling asleep, occasionally talking irrelevantly, patient reports that she goes into these episodes off and on.  Denies of any coughing, trouble with bowel movements or urine.  Denies of any burning sensation with urine.  Continues to have bilateral shoulder pains but has been chronic  Denies of any trouble with breathing  Patient has been followed by cardiology secondary to valve disturbances  Denies of any depression worsening at this time.   Patient has appointment with neurology in June for follow-up on cognitive deficits currently taking Aricept 10 mg daily      Review of Systems   Constitutional: Negative for fatigue and fever.   Eyes: Negative for visual disturbance.   Respiratory: Negative for cough and shortness of breath.    Cardiovascular: Negative for chest pain and leg swelling.   Gastrointestinal: Negative for abdominal pain, nausea and vomiting.   Genitourinary: Negative for dysuria.   Musculoskeletal: Positive for arthralgias. Negative for  "myalgias.   Skin: Negative for rash.   Neurological: Negative for light-headedness and headaches.   Psychiatric/Behavioral: Positive for confusion, dysphoric mood, hallucinations and sleep disturbance. The patient is nervous/anxious.          /74 (BP Location: Right arm, Patient Position: Sitting)   Pulse 62   Temp 97.3 °F (36.3 °C) (Tympanic)   Ht 5' 1" (1.549 m)   Wt 59.2 kg (130 lb 8.2 oz)   SpO2 95%   BMI 24.66 kg/m²    Objective:      Physical Exam   Constitutional: She is oriented to person, place, and time. She appears well-developed and well-nourished.   HENT:   Head: Normocephalic and atraumatic.   Mouth/Throat: Oropharynx is clear and moist.   Cardiovascular: Normal rate and regular rhythm.    Murmur heard.  Pulmonary/Chest: Effort normal and breath sounds normal. She has no wheezes.   Abdominal: Soft. Bowel sounds are normal. There is no tenderness.   No CVA tenderness noted bilaterally   Musculoskeletal: She exhibits tenderness. She exhibits no edema.   Positive for bilateral shoulder tenderness   Neurological: She is alert and oriented to person, place, and time.   Skin: Skin is warm and dry. No rash noted.   Psychiatric: She has a normal mood and affect.   Patient doing well today         Assessment:       1. Routine general medical examination at a health care facility    2. Essential hypertension    3. Mixed hyperlipidemia    4. Hypothyroidism, unspecified type    5. Gastroesophageal reflux disease without esophagitis    6. Asthma with COPD    7. Rheumatoid arthritis of hand, unspecified laterality, unspecified rheumatoid factor presence    8. Primary osteoarthritis involving multiple joints    9. Major depressive disorder, recurrent episode, mild    10. Colon cancer screening    11. Insomnia secondary to depression with anxiety    12. Nonrheumatic aortic valve stenosis    13. Nonrheumatic aortic valve insufficiency    14. Impaired cognition    15. Psoriasis with arthropathy    16. " Bilateral adhesive capsulitis of shoulders    17. Abnormal finding in urine         Plan:   Routine general medical examination at a health care facility  -     CBC auto differential; Future; Expected date: 04/03/2018  -     Comprehensive metabolic panel; Future; Expected date: 04/03/2018  -     Lipid panel; Future; Expected date: 04/03/2018  -     TSH; Future; Expected date: 04/03/2018  Vital signs stable today.  Clinical exam stable.  Encouraged to maintain lifestyle modifications with low-fat and low-cholesterol diet and stay physically active  Patient refuses colonoscopy      Essential hypertension  -     Comprehensive metabolic panel; Future; Expected date: 04/03/2018  -     Lipid panel; Future; Expected date: 04/03/2018  -     TSH; Future; Expected date: 04/03/2018  Blood pressure stable today currently on lisinopril 10 mg, verapamil 240 mg, metoprolol 25 mg    Mixed hyperlipidemia  -     Lipid panel; Future; Expected date: 04/03/2018  Currently on Crestor 10 mg daily    Hypothyroidism, unspecified type  -     TSH; Future; Expected date: 04/03/2018  Currently on levothyroxine 25 µg by mouth daily    Gastroesophageal reflux disease without esophagitis-on pantoprazole 40 mg daily    Asthma with COPD  -     CBC auto differential; Future; Expected date: 04/03/2018  -     X-Ray Chest PA And Lateral; Future; Expected date: 04/03/2018  Stable on albuterol inhaler as needed    Rheumatoid arthritis of hand, unspecified laterality, unspecified rheumatoid factor presence  Psoriasis with arthropathy  Bilateral adhesive capsulitis of shoulders  -     CBC auto differential; Future; Expected date: 04/03/2018  Followed by rheumatology currently taking methotrexate and folic acid    Primary osteoarthritis involving multiple joints- followed by rheumatology    Major depressive disorder, recurrent episode, mild  Insomnia secondary to depression with anxiety  -     Urinalysis; Future; Expected date: 04/03/2018  -     Urine  culture; Future; Expected date: 04/03/2018  Currently on Zoloft 50 mg daily and trazodone 50 mg  Advised to increase trazodone  mg daily in the evening and see if anxiety and insomnia improves  If no response patient should see neurology sooner      Nonrheumatic aortic valve stenosis  Nonrheumatic aortic valve insufficiency  As per cardiology    Impaired cognition- currently taking Aricept 10 mg, followed by Dr. Rosa    Abnormal finding in urine   -     Urine culture; Future; Expected date: 04/03/2018

## 2018-04-03 NOTE — PATIENT INSTRUCTIONS
Increase trazodone from 50 to 100 mg at bedtime and see if there is any improvement with anxiety and sleep disturbances

## 2018-04-04 ENCOUNTER — LAB VISIT (OUTPATIENT)
Dept: LAB | Facility: HOSPITAL | Age: 83
End: 2018-04-04
Attending: FAMILY MEDICINE
Payer: MEDICARE

## 2018-04-04 ENCOUNTER — PATIENT MESSAGE (OUTPATIENT)
Dept: PULMONOLOGY | Facility: CLINIC | Age: 83
End: 2018-04-04

## 2018-04-04 DIAGNOSIS — J44.89 ASTHMA WITH COPD: Chronic | ICD-10-CM

## 2018-04-04 DIAGNOSIS — M06.9 RHEUMATOID ARTHRITIS OF HAND, UNSPECIFIED LATERALITY, UNSPECIFIED RHEUMATOID FACTOR PRESENCE: Chronic | ICD-10-CM

## 2018-04-04 DIAGNOSIS — I10 ESSENTIAL HYPERTENSION: Chronic | ICD-10-CM

## 2018-04-04 DIAGNOSIS — E78.2 MIXED HYPERLIPIDEMIA: Chronic | ICD-10-CM

## 2018-04-04 DIAGNOSIS — E03.9 HYPOTHYROIDISM, UNSPECIFIED TYPE: Chronic | ICD-10-CM

## 2018-04-04 DIAGNOSIS — Z00.00 ROUTINE GENERAL MEDICAL EXAMINATION AT A HEALTH CARE FACILITY: ICD-10-CM

## 2018-04-04 LAB
ALBUMIN SERPL BCP-MCNC: 3.7 G/DL
ALP SERPL-CCNC: 52 U/L
ALT SERPL W/O P-5'-P-CCNC: 8 U/L
ANION GAP SERPL CALC-SCNC: 14 MMOL/L
AST SERPL-CCNC: 16 U/L
BASOPHILS # BLD AUTO: 0.04 K/UL
BASOPHILS NFR BLD: 0.9 %
BILIRUB SERPL-MCNC: 0.4 MG/DL
BUN SERPL-MCNC: 12 MG/DL
CALCIUM SERPL-MCNC: 9.4 MG/DL
CHLORIDE SERPL-SCNC: 103 MMOL/L
CHOLEST SERPL-MCNC: 211 MG/DL
CHOLEST/HDLC SERPL: 2.9 {RATIO}
CO2 SERPL-SCNC: 24 MMOL/L
CREAT SERPL-MCNC: 0.9 MG/DL
DIFFERENTIAL METHOD: ABNORMAL
EOSINOPHIL # BLD AUTO: 0.1 K/UL
EOSINOPHIL NFR BLD: 1.5 %
ERYTHROCYTE [DISTWIDTH] IN BLOOD BY AUTOMATED COUNT: 13.5 %
EST. GFR  (AFRICAN AMERICAN): >60 ML/MIN/1.73 M^2
EST. GFR  (NON AFRICAN AMERICAN): 59.3 ML/MIN/1.73 M^2
GLUCOSE SERPL-MCNC: 87 MG/DL
HCT VFR BLD AUTO: 40.9 %
HDLC SERPL-MCNC: 72 MG/DL
HDLC SERPL: 34.1 %
HGB BLD-MCNC: 12.1 G/DL
IMM GRANULOCYTES # BLD AUTO: 0.01 K/UL
IMM GRANULOCYTES NFR BLD AUTO: 0.2 %
LDLC SERPL CALC-MCNC: 122.6 MG/DL
LYMPHOCYTES # BLD AUTO: 2 K/UL
LYMPHOCYTES NFR BLD: 42.8 %
MCH RBC QN AUTO: 26.4 PG
MCHC RBC AUTO-ENTMCNC: 29.6 G/DL
MCV RBC AUTO: 89 FL
MONOCYTES # BLD AUTO: 0.6 K/UL
MONOCYTES NFR BLD: 12.8 %
NEUTROPHILS # BLD AUTO: 2 K/UL
NEUTROPHILS NFR BLD: 41.8 %
NONHDLC SERPL-MCNC: 139 MG/DL
NRBC BLD-RTO: 0 /100 WBC
PLATELET # BLD AUTO: 206 K/UL
PMV BLD AUTO: 11.6 FL
POTASSIUM SERPL-SCNC: 3.2 MMOL/L
PROT SERPL-MCNC: 6.5 G/DL
RBC # BLD AUTO: 4.58 M/UL
SODIUM SERPL-SCNC: 141 MMOL/L
TRIGL SERPL-MCNC: 82 MG/DL
TSH SERPL DL<=0.005 MIU/L-ACNC: 1.55 UIU/ML
WBC # BLD AUTO: 4.7 K/UL

## 2018-04-04 PROCEDURE — 84443 ASSAY THYROID STIM HORMONE: CPT

## 2018-04-04 PROCEDURE — 80053 COMPREHEN METABOLIC PANEL: CPT

## 2018-04-04 PROCEDURE — 36415 COLL VENOUS BLD VENIPUNCTURE: CPT | Mod: PO

## 2018-04-04 PROCEDURE — 85025 COMPLETE CBC W/AUTO DIFF WBC: CPT

## 2018-04-04 PROCEDURE — 80061 LIPID PANEL: CPT

## 2018-04-05 ENCOUNTER — OFFICE VISIT (OUTPATIENT)
Dept: NEUROLOGY | Facility: CLINIC | Age: 83
End: 2018-04-05
Payer: MEDICARE

## 2018-04-05 ENCOUNTER — OFFICE VISIT (OUTPATIENT)
Dept: PULMONOLOGY | Facility: CLINIC | Age: 83
End: 2018-04-05
Payer: MEDICARE

## 2018-04-05 VITALS
BODY MASS INDEX: 25.02 KG/M2 | HEIGHT: 61 IN | DIASTOLIC BLOOD PRESSURE: 74 MMHG | RESPIRATION RATE: 24 BRPM | WEIGHT: 132.5 LBS | SYSTOLIC BLOOD PRESSURE: 136 MMHG | OXYGEN SATURATION: 97 % | HEART RATE: 64 BPM

## 2018-04-05 VITALS
HEIGHT: 62 IN | BODY MASS INDEX: 24.38 KG/M2 | SYSTOLIC BLOOD PRESSURE: 156 MMHG | HEART RATE: 70 BPM | DIASTOLIC BLOOD PRESSURE: 82 MMHG | WEIGHT: 132.5 LBS

## 2018-04-05 DIAGNOSIS — E87.6 HYPOKALEMIA: Primary | ICD-10-CM

## 2018-04-05 DIAGNOSIS — R41.89 IMPAIRED COGNITION: ICD-10-CM

## 2018-04-05 DIAGNOSIS — G47.9 SLEEP DISTURBANCE: Primary | ICD-10-CM

## 2018-04-05 DIAGNOSIS — I67.3 BINSWANGER'S DEMENTIA: ICD-10-CM

## 2018-04-05 DIAGNOSIS — J43.8 OTHER EMPHYSEMA: ICD-10-CM

## 2018-04-05 PROCEDURE — 99999 PR PBB SHADOW E&M-EST. PATIENT-LVL IV: CPT | Mod: PBBFAC,,, | Performed by: PSYCHIATRY & NEUROLOGY

## 2018-04-05 PROCEDURE — 3079F DIAST BP 80-89 MM HG: CPT | Mod: CPTII,S$GLB,, | Performed by: PSYCHIATRY & NEUROLOGY

## 2018-04-05 PROCEDURE — 99999 PR PBB SHADOW E&M-EST. PATIENT-LVL V: CPT | Mod: PBBFAC,,, | Performed by: INTERNAL MEDICINE

## 2018-04-05 PROCEDURE — 99213 OFFICE O/P EST LOW 20 MIN: CPT | Mod: S$GLB,,, | Performed by: INTERNAL MEDICINE

## 2018-04-05 PROCEDURE — 3074F SYST BP LT 130 MM HG: CPT | Mod: CPTII,S$GLB,, | Performed by: PSYCHIATRY & NEUROLOGY

## 2018-04-05 PROCEDURE — 3075F SYST BP GE 130 - 139MM HG: CPT | Mod: CPTII,S$GLB,, | Performed by: INTERNAL MEDICINE

## 2018-04-05 PROCEDURE — 3078F DIAST BP <80 MM HG: CPT | Mod: CPTII,S$GLB,, | Performed by: INTERNAL MEDICINE

## 2018-04-05 PROCEDURE — 99214 OFFICE O/P EST MOD 30 MIN: CPT | Mod: S$GLB,,, | Performed by: PSYCHIATRY & NEUROLOGY

## 2018-04-05 RX ORDER — POTASSIUM CHLORIDE 750 MG/1
10 TABLET, EXTENDED RELEASE ORAL DAILY
Qty: 30 TABLET | Refills: 2 | Status: SHIPPED | OUTPATIENT
Start: 2018-04-05 | End: 2018-04-23 | Stop reason: SDUPTHER

## 2018-04-05 NOTE — PROGRESS NOTES
Subjective:       Patient ID: Crystal Romero is a 83 y.o. female.    Chief Complaint: She       Asthma    HPI     NO problems swallowing  No chest pain    Insomnia:  Roams around at night - awakens frequently   Taking trazadone for insomnia    COPD  She presents for evaluation and treatment of COPD. The patient is not currently have symptoms / an exacerbation. The patient has COPD for approximately 10 years. Symptoms in previous episodes have included dyspnea, cough and wheezing, and typically last 2 weeks. Previous episodes have been exacerbated by moderate activity. Current treatment includes albuterol inhaler, which generally provides some relief of symptoms.  She uses 1 pillows at night. Patient currently is not on home oxygen therapy.. The patient is having no constitutional symptoms, denying fever, chills, anorexia, or weight loss. The patient has not been hospitalized for this condition before. Reports reduced exercise tolerarnce      Past Medical History:   Diagnosis Date    Arthritis     Coronary artery disease     Depression     Encounter for blood transfusion     H/O: GI bleed     Hiatal hernia     Hyperlipidemia     Hypertension     Hypothyroid     Rheumatoid arthritis(714.0)     Stroke     Syncope and collapse      Past Surgical History:   Procedure Laterality Date    TONSILLECTOMY       Social History     Social History    Marital status:      Spouse name: N/A    Number of children: 2    Years of education: N/A     Occupational History    Not on file.     Social History Main Topics    Smoking status: Former Smoker     Packs/day: 2.00     Years: 16.00     Types: Cigarettes     Quit date: 1/1/1950    Smokeless tobacco: Never Used    Alcohol use 1.8 oz/week     3 Glasses of wine per week      Comment: daily 1/2 bottle wine    Drug use: No    Sexual activity: No     Other Topics Concern    Not on file     Social History Narrative    Lives with daughter     Review of  Systems   Constitutional: Positive for fatigue. Negative for fever.   HENT: Positive for postnasal drip, rhinorrhea and congestion.    Eyes: Negative for redness and itching.   Respiratory: Positive for cough, sputum production, shortness of breath, dyspnea on extertion, use of rescue inhaler and Paroxysmal Nocturnal Dyspnea.    Cardiovascular: Negative for chest pain, palpitations and leg swelling.   Genitourinary: Negative for difficulty urinating and hematuria.   Endocrine: Negative for cold intolerance and heat intolerance.    Skin: Negative for rash.   Gastrointestinal: Negative for nausea and abdominal pain.   Neurological: Negative for dizziness, syncope, weakness and light-headedness.   Hematological: Negative for adenopathy. Does not bruise/bleed easily.   Psychiatric/Behavioral: Positive for sleep disturbance. The patient is nervous/anxious.        Objective:      Physical Exam   Constitutional: She is oriented to person, place, and time. She appears well-developed and well-nourished.   HENT:   Head: Normocephalic and atraumatic.   Mouth/Throat: Oropharyngeal exudate present.   Eyes: Conjunctivae are normal. Pupils are equal, round, and reactive to light.   Neck: Neck supple. No JVD present. No tracheal deviation present. No thyromegaly present.   Cardiovascular: Normal rate, regular rhythm and normal heart sounds.    Pulmonary/Chest: Effort normal. No respiratory distress. She has decreased breath sounds. She has wheezes in the right lower field and the left lower field. She has no rhonchi. She has no rales. She exhibits no tenderness.   Abdominal: Soft. Bowel sounds are normal.   Musculoskeletal: Normal range of motion. She exhibits no edema.   Lymphadenopathy:     She has no cervical adenopathy.   Neurological: She is alert and oriented to person, place, and time.   Skin: Skin is warm and dry.   Nursing note and vitals reviewed.    Personal Diagnostic Review  Chest x-ray: hyperinflation    Spirometry:  mod obstruction  Office Spirometry Results:     No flowsheet data found.  Pulmonary Studies Review 4/5/2018   SpO2 -   Height 62.000   Weight 2119.94   BMI (Calculated) 24.3   Predicted Distance 242.48   Predicted Distance Meters (Calculated) 381.96         Assessment:       1. Sleep disturbance    2. Other emphysema        Outpatient Encounter Prescriptions as of 4/5/2018   Medication Sig Dispense Refill    acetaminophen (TYLENOL) 500 MG tablet Take 500 mg by mouth every 6 (six) hours as needed for Pain.      albuterol (VENTOLIN HFA) 90 mcg/actuation inhaler Inhale 2 puffs into the lungs every 4 (four) hours as needed for Wheezing or Shortness of Breath. Rescue 18 g 10    cetirizine (ZYRTEC) 10 MG tablet Take 10 mg by mouth once daily.      donepezil (ARICEPT) 10 MG tablet Take 1 tablet (10 mg total) by mouth every evening. 30 tablet 11    fluticasone (FLONASE) 50 mcg/actuation nasal spray 2 sprays by Each Nare route once daily. 1 Bottle 11    folic acid (FOLVITE) 1 MG tablet TAKE ONE TABLET BY MOUTH ONE TIME DAILY 90 tablet 0    INHALER, ASSIST DEVICES (AEROCHAMBER PLUS FLOW-VU MISC) by Misc.(Non-Drug; Combo Route) route.      levothyroxine (SYNTHROID) 25 MCG tablet TAKE ONE TABLET BY MOUTH ONE TIME DAILY BEFORE BREAKFAST 90 tablet 1    lisinopril 10 MG tablet TAKE ONE TABLET BY MOUTH ONE TIME DAILY (Patient taking differently: TAKE ONE TABLET BY MOUTH TWICE DAILY) 90 tablet 1    methotrexate 2.5 MG Tab Take 2 tablets (5 mg total) by mouth every 7 days. 24 tablet 1    metoprolol succinate (TOPROL-XL) 25 MG 24 hr tablet TAKE ONE TABLET BY MOUTH ONE TIME DAILY 30 tablet 3    pantoprazole (PROTONIX) 40 MG tablet TAKE ONE TABLET BY MOUTH ONE TIME DAILY 30 tablet 3    potassium chloride (KLOR-CON) 10 MEQ TbSR Take 1 tablet (10 mEq total) by mouth once daily. 30 tablet 2    rosuvastatin (CRESTOR) 10 MG tablet TAKE ONE TABLET BY MOUTH ONE TIME DAILY IN THE EVENING 30 tablet 8    sertraline (ZOLOFT) 50 MG  tablet TAKE ONE TABLET BY MOUTH ONE TIME DAILY 90 tablet 1    traMADol (ULTRAM) 50 mg tablet TAKE ONE TABLET BY MOUTH DAILY AS NEEDED FOR PAIN 90 tablet 0    traZODone (DESYREL) 50 MG tablet TAKE ONE TABLET BY MOUTH IN THE EVENING 30 tablet 3    VENTOLIN HFA 90 mcg/actuation inhaler INHALE TWO PUFFS BY MOUTH EVERY SIX HOURS 18 g 0    verapamil (VERELAN) 240 MG C24P TAKE ONE CAPSULE BY MOUTH ONE TIME DAILY 90 capsule 0    VITAMIN B COMPLEX (SUPER B COMPLEX-B-12 ORAL) Take by mouth.       No facility-administered encounter medications on file as of 4/5/2018.      No orders of the defined types were placed in this encounter.    Plan:       Requested Prescriptions      No prescriptions requested or ordered in this encounter     Sleep disturbance    Other emphysema           Follow-up if symptoms worsen or fail to improve.    MEDICAL DECISION MAKING: Moderate to high complexity.  Overall, the multiple problems listed are of moderate to high severity that may impact quality of life and activities of daily living. Side effects of medications, treatment plan as well as options and alternatives reviewed and discussed with patient. There was counseling of patient concerning these issues.    Total time spent in face to face counseling and coordination of care - 45  minutes over 50% of time was used in discussion of prognosis, risks, benefits of treatment, instructions and compliance with regimen . Discussion with other physicians or health care providers (DME, NP, pharmacy, respiratory therapy) occurred.

## 2018-04-05 NOTE — PATIENT INSTRUCTIONS
Melatonin oral solid dosage forms  What is this medicine?  MELATONIN (anant horacio solano) is a dietary supplement. It is mostly promoted to help maintain normal sleep patterns. The FDA has not approved this supplement for any medical use.  This supplement may be used for other purposes; ask your health care provider or pharmacist if you have questions.  How should I use this medicine?  Take this supplement by mouth with a glass of water. Do not take with food. This supplement is usually taken 1 or 2 hours before bedtime. After taking this supplement, limit your activities to those needed to prepare for bed. Some products may be chewed or dissolved in the mouth before swallowing. Some tablets or capsules must be swallowed whole; do not cut, crush or chew. Follow the directions on the package labeling, or take as directed by your health care professional. Do not take this supplement more often than directed.  Talk to your pediatrician regarding the use of this supplement in children. Special care may be needed. This supplement is not recommended for use in children without a prescription.  What side effects may I notice from receiving this medicine?  Side effects that you should report to your doctor or health care professional as soon as possible:  · allergic reactions like skin rash, itching or hives, swelling of the face, lips, or tongue  · breathing problems  · change in sex drive or performance  · confusion  · depressed mood, irritable, or other changes in moods or behaviors  · fast, irregular heartbeat  · feeling faint or lightheaded, falls  · irregular or missed menstrual periods  · leakage of milk from the nipples in a person who is not breast-feeding  · signs and symptoms of liver injury like dark yellow or brown urine; general ill feeling or flu-like symptoms; light-colored stools; loss of appetite; nausea; right upper belly pain; unusually weak or tired; yellowing of the eyes or  skin  · sleep-walking  · trouble staying awake or alert during the day  · unusual activities while you are still asleep like driving, eating, making phone calls  · unusual bleeding or bruising  Side effects that usually do not require medical attention (report to your doctor or health care professional if they continue or are bothersome):  · dizziness  · drowsiness  · headache  · tiredness  · unusual dreams or nightmares  · upset stomach  What may interact with this medicine?  Do not take this medicine with any of the following medications:  · fluvoxamine  · ramelteon  · tasimelteon  This medicine may also interact with the following medications:  · alcohol  · atazanavir  · caffeine  · carbamazepine  · certain antibiotics like ciprofloxacin, enoxacin  · certain medicines for depression, anxiety, or psychotic disturbances  · cimetidine  · female hormones, like estrogens and birth control pills, patches, rings, or injections  · methoxsalen  · nifedipine  · other medications for sleep  · other herbal or dietary supplements  · phenobarbital  · rifampin  · smoking tobacco  · tamoxifen  · treatments for cancer, organ transplant, or immune disorders  What if I miss a dose?  If you miss taking your dose at the usual time, skip that dose. If it is almost time for your next dose, take only that dose. Do not take double or extra doses.  Where should I keep my medicine?  Keep out of the reach of children.  Store at room temperature or as directed on the package label. Protect from moisture. Throw away any unused supplement after the expiration date.  What should I tell my health care provider before I take this medicine?  They need to know if you have any of these conditions:  · asthma  · cancer  · depression or mental illness  · diabetes  · hormone problems  · if you often drink alcohol  · immune system problems  · liver disease  · organ transplant  · seizure disorder  · an unusual or allergic reaction to melatonin, other  medicines, foods, dyes, or preservatives  · pregnant or trying to get pregnant  · breast-feeding  What should I watch for while using this medicine?  See your doctor if your symptoms do not get better or if they get worse. Do not take this supplement for more than 2 weeks unless your doctor tells you to.  You may get drowsy or dizzy. Do not drive, use machinery, or do anything that needs mental alertness until you know how this medicine affects you. Do not stand or sit up quickly, especially if you are an older patient. This reduces the risk of dizzy or fainting spells. Alcohol may interfere with the effect of this medicine. Avoid alcoholic drinks.  Talk to your doctor before you use this supplement if you are currently being treated for an emotional, mental, or sleep problem. This medicine may interfere with your treatment.  Herbal or dietary supplements are not regulated like medicines. Rigid  standards are not required for dietary supplements. The purity and strength of these products can vary. The safety and effect of this dietary supplement for a certain disease or illness is not well known. This product is not intended to diagnose, treat, cure or prevent any disease.  The Food and Drug Administration suggests the following to help consumers protect themselves:  · Always read product labels and follow directions.  · Natural does not mean a product is safe for humans to take.  · Look for products that include USP after the ingredient name. This means that the  followed the standards of the [place] Pharmacopoeia.  · Supplements made or sold by a nationally known food or drug company are more likely to be made under tight controls. You can write to the company for more information about how the product was made.  NOTE:This sheet is a summary. It may not cover all possible information. If you have questions about this medicine, talk to your doctor, pharmacist, or health care provider.  Copyright© 2017 [employer]

## 2018-04-05 NOTE — PROGRESS NOTES
Follow up: Dementia    SUBJECTIVE:       HPI:   This is an 83-year-old lady coming to me for dementia.  Today, she is here with   her daughter.  Daughter is telling that some days she becomes very restless,   agitated, confused, but some days she becomes so much in deep sleep  that she needs to shaken   Up .  Some days she suddenly start hallucinating.  So, she has been seen by   Dr. Herring lately and Dr. Herring increased the trazodone from 50 to 100.    Otherwise, she is calm and quite in between time.    Past Medical History:   Diagnosis Date    Arthritis     Coronary artery disease     Depression     Encounter for blood transfusion     H/O: GI bleed     Hiatal hernia     Hyperlipidemia     Hypertension     Hypothyroid     Rheumatoid arthritis     Stroke     Syncope and collapse      Past Surgical History:   Procedure Laterality Date    TONSILLECTOMY       Family History   Problem Relation Age of Onset    Cancer Mother      breast, uterine     Social History   Substance Use Topics    Smoking status: Former Smoker     Packs/day: 2.00     Years: 16.00     Types: Cigarettes     Quit date: 1/1/1950    Smokeless tobacco: Never Used    Alcohol use 1.8 oz/week     3 Glasses of wine per week      Comment: daily 1/2 bottle wine     Review of patient's allergies indicates:   Allergen Reactions    Sulfa (sulfonamide antibiotics) Shortness Of Breath       Review of Systems   Constitutional: Positive for malaise/fatigue. Negative for fever and weight loss.   HENT: Negative for hearing loss.    Eyes: Negative for blurred vision, double vision, photophobia and pain.   Respiratory: Negative for cough.    Cardiovascular: Negative for chest pain.   Gastrointestinal: Negative for abdominal pain, nausea and vomiting.   Genitourinary: Negative for dysuria, frequency and urgency.   Musculoskeletal: Positive for joint pain, myalgias and neck pain. Negative for back pain and falls.   Skin: Negative for itching and rash.    Neurological: Positive for sensory change. Negative for tingling and headaches.   Psychiatric/Behavioral: Positive for memory loss. Negative for depression.       OBJECTIVE:     Physical Exam   Constitutional: She is oriented to person, place, and time. She appears well-developed and well-nourished.   HENT:   Head: Normocephalic and atraumatic.   Eyes: Conjunctivae and EOM are normal. Pupils are equal, round, and reactive to light.   Neck: Normal range of motion. Neck supple. No JVD present. No tracheal deviation present. No thyromegaly present.   Cardiovascular: Normal rate, regular rhythm and normal heart sounds.    Pulmonary/Chest: Effort normal and breath sounds normal.   Abdominal: She exhibits no distension. There is no tenderness.   Musculoskeletal: She exhibits no edema.        Right shoulder: She exhibits decreased range of motion, bony tenderness, pain and spasm.        Left shoulder: She exhibits decreased range of motion, tenderness, bony tenderness, pain and spasm.   Bilateral shoulder frozen.   Neurological: She is alert and oriented to person, place, and time. She has normal strength and normal reflexes. She displays normal reflexes. No cranial nerve deficit or sensory deficit. She exhibits normal muscle tone. She displays a negative Romberg sign. Coordination and gait normal.   Reflex Scores:       Tricep reflexes are 2+ on the right side and 2+ on the left side.       Bicep reflexes are 2+ on the right side and 2+ on the left side.       Brachioradialis reflexes are 2+ on the right side and 2+ on the left side.       Patellar reflexes are 2+ on the right side and 2+ on the left side.       Achilles reflexes are 2+ on the right side and 2+ on the left side.  Skin: Skin is warm and dry. No rash noted.   Psychiatric: She has a normal mood and affect. Her speech is normal and behavior is normal. Judgment and thought content normal. Cognition and memory are impaired. She does not express inappropriate  judgment. She exhibits abnormal recent memory.         Strength  Deltoids Triceps Biceps Wrist Extension Wrist Flexion Hand    Upper: R 5/5 5/5 5/5 5/5 5/5 4/5    L 5/5 5/5 5/5 5/5 5/5 4/5     Iliopsoas Quadriceps Knee  Flexion Tibialis  anterior Gastro- cnemius EHL   Lower: R 5/5 5/5 5/5 5/5 5/5 5/5    L 5/5 5/5 5/5 5/5 5/5 5/5       Results for TITO STAPLES (MRN [id number]) as of 6/19/2017 11:13   Ref. Range 2/7/2017 15:30 3/10/2017 19:01 3/11/2017 04:47 3/13/2017 11:06 4/19/2017 10:38   Folate Latest Ref Range: 4.0 - 24.0 ng/mL     16.9   Vitamin B-12 Latest Ref Range: 210 - 950 pg/mL     266   Methlymalonic Acid Latest Ref Range: <0.40 umol/L     0.19   Protime Latest Ref Range: 9.0 - 12.5 sec  9.7      Coumadin Monitoring INR Latest Ref Range: 0.8 - 1.2   0.9            Diagnostic Results:        MRI of the brain: 2/9/2017      Impression           No evidence of recent infarction or other acute intracranial findings.    Fairly extensive nonspecific white matter signal changes, likely representing sequela of chronic microvascular ischemic disease.    Moderate diffuse parenchymal atrophy.           ASSESSMENT/PLAN:     Primary Diagnoses:  1. 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   2. History of alcohol abuse.  3. Severe osteoarthritis under care of Rheumatology.  4. Gait problem and hand weakness .      Patient Active Problem List   Diagnosis    Rheumatoid arthritis    Hiatal hernia    Asthma with COPD    Hyperlipidemia    Hypertension    Diastolic dysfunction    Osteoarthritis    Rotator cuff arthropathy    H/O: GI bleed    Arthritis of left wrist    Dysplastic colon polyp    Hypothyroid    Nonrheumatic aortic valve stenosis    Nonrheumatic aortic valve insufficiency    Impaired cognition    Gastroesophageal reflux disease without esophagitis    Major depressive disorder, recurrent episode, mild    Psoriasis with arthropathy    Sleep disturbance    Bilateral adhesive  capsulitis of shoulders    Hyponatremia    Dehydration    Fall    EtOH dependence         Plan: Cont. Same medication. Use Trazodone 100 mg when she is agitated.  Otherwise use 50 mg as usual.  Time spent: 30 minutes in face to face discussion concerning diagnosis, prognosis, review of lab and test results, benefits of treatment as well as management of disease, counseling of patient and coordination of care between various health care providers . Greater than half the time spent was used for coordination of care and counseling of patient.

## 2018-04-16 RX ORDER — TRAMADOL HYDROCHLORIDE 50 MG/1
TABLET ORAL
Qty: 90 TABLET | Refills: 0 | Status: SHIPPED | OUTPATIENT
Start: 2018-04-16 | End: 2018-06-13 | Stop reason: SDUPTHER

## 2018-04-16 RX ORDER — TRAMADOL HYDROCHLORIDE 50 MG/1
TABLET ORAL
Qty: 90 TABLET | Refills: 0 | OUTPATIENT
Start: 2018-04-16

## 2018-04-22 ENCOUNTER — PATIENT MESSAGE (OUTPATIENT)
Dept: INTERNAL MEDICINE | Facility: CLINIC | Age: 83
End: 2018-04-22

## 2018-04-23 DIAGNOSIS — F41.9 INSOMNIA SECONDARY TO ANXIETY: Primary | ICD-10-CM

## 2018-04-23 DIAGNOSIS — F51.05 INSOMNIA SECONDARY TO ANXIETY: Primary | ICD-10-CM

## 2018-04-23 DIAGNOSIS — E87.6 HYPOKALEMIA: ICD-10-CM

## 2018-04-23 RX ORDER — POTASSIUM CHLORIDE 750 MG/1
10 TABLET, EXTENDED RELEASE ORAL 2 TIMES DAILY
Qty: 60 TABLET | Refills: 3 | Status: SHIPPED | OUTPATIENT
Start: 2018-04-23 | End: 2018-06-20 | Stop reason: SDUPTHER

## 2018-04-23 RX ORDER — TRAZODONE HYDROCHLORIDE 100 MG/1
100 TABLET ORAL NIGHTLY
Qty: 30 TABLET | Refills: 4 | Status: SHIPPED | OUTPATIENT
Start: 2018-04-23 | End: 2018-06-13 | Stop reason: SDUPTHER

## 2018-05-01 ENCOUNTER — OFFICE VISIT (OUTPATIENT)
Dept: CARDIOLOGY | Facility: CLINIC | Age: 83
End: 2018-05-01
Payer: MEDICARE

## 2018-05-01 VITALS — DIASTOLIC BLOOD PRESSURE: 82 MMHG | HEART RATE: 62 BPM | SYSTOLIC BLOOD PRESSURE: 156 MMHG

## 2018-05-01 DIAGNOSIS — R56.9 SEIZURE: ICD-10-CM

## 2018-05-01 DIAGNOSIS — E78.2 MIXED HYPERLIPIDEMIA: Chronic | ICD-10-CM

## 2018-05-01 DIAGNOSIS — R41.89 IMPAIRED COGNITION: ICD-10-CM

## 2018-05-01 DIAGNOSIS — I10 ESSENTIAL HYPERTENSION: Chronic | ICD-10-CM

## 2018-05-01 DIAGNOSIS — I35.0 NONRHEUMATIC AORTIC VALVE STENOSIS: Primary | ICD-10-CM

## 2018-05-01 PROCEDURE — 99999 PR PBB SHADOW E&M-EST. PATIENT-LVL III: CPT | Mod: PBBFAC,,, | Performed by: INTERNAL MEDICINE

## 2018-05-01 PROCEDURE — 3077F SYST BP >= 140 MM HG: CPT | Mod: CPTII,S$GLB,, | Performed by: INTERNAL MEDICINE

## 2018-05-01 PROCEDURE — 3079F DIAST BP 80-89 MM HG: CPT | Mod: CPTII,S$GLB,, | Performed by: INTERNAL MEDICINE

## 2018-05-01 PROCEDURE — 99214 OFFICE O/P EST MOD 30 MIN: CPT | Mod: S$GLB,,, | Performed by: INTERNAL MEDICINE

## 2018-05-01 NOTE — PROGRESS NOTES
Subjective:   Patient ID:  Crystal Romero is a 83 y.o. female who presents for follow up of Fatigue and Hypertension      82, yo female, came in for routine f/u. Severe dementia and discussed with the her Shannan Lockwood.  PMH moderate AI, and moderate AS, dementia 3 yrs, HTN, HLD, RA, TIA x3 in 4 months  In , was admitted to [facility] for unwitnessed fall with confusion at least for few hours at home. Blood culture + strep. Elevated BNP and CK. Na level was low. Elevated WBC. CXR clear. Had IVF and ATx. Fever and low Na resolved.  C/o fatigue, decreased appetite related to dementia.   Denied chest pain, dyspnea  Per her daughter, 3 days ago, after diner, had four extremities shaking for 3 minutes, had incontinence of BM, unintelligible words. She had confusion. The episode was about 30 minutes. No color change and chest pain/dyspnea.   Confused at night.    Repeat ECHO in , showed normal EF, DD, moderate AI and functional severe AS. MG 40 mmHg.  Refused surgery for valve.  Decreased activity, appetite, sleeps 18 hours a day.  No leg swelling, chest pain,and syncope  Dyspnea during the dressing. Easily tired,           Past Medical History:   Diagnosis Date    Arthritis     Coronary artery disease     Depression     Encounter for blood transfusion     H/O: GI bleed     Hiatal hernia     Hyperlipidemia     Hypertension     Hypothyroid     Rheumatoid arthritis(714.0)     Stroke     Syncope and collapse        Past Surgical History:   Procedure Laterality Date    TONSILLECTOMY         Social History   Substance Use Topics    Smoking status: Former Smoker     Packs/day: 2.00     Years: 16.00     Types: Cigarettes     Quit date: 1/1/1950    Smokeless tobacco: Never Used    Alcohol use 1.8 oz/week     3 Glasses of wine per week      Comment: daily 1/2 bottle wine       Family History   Problem Relation Age of Onset    Cancer Mother      breast, uterine         Review of Systems    Constitution: Positive for decreased appetite and fever.   HENT: Negative.    Eyes: Negative.    Cardiovascular: Positive for dyspnea on exertion. Negative for chest pain.   Respiratory: Negative.    Endocrine: Negative.    Hematologic/Lymphatic: Negative.    Skin: Negative.    Musculoskeletal: Positive for back pain and joint pain.   Gastrointestinal: Negative.    Genitourinary: Negative.    Psychiatric/Behavioral: Positive for hallucinations and memory loss.   Allergic/Immunologic: Negative.        Objective:   Physical Exam   Constitutional: She is oriented to person, place, and time. She appears well-nourished.   HENT:   Head: Normocephalic.   Eyes: Pupils are equal, round, and reactive to light.   Neck: Normal carotid pulses and no JVD present. Carotid bruit is not present. No thyromegaly present.   Cardiovascular: Normal rate, regular rhythm and normal pulses.   No extrasystoles are present. PMI is not displaced.  Exam reveals no gallop and no S3.    Murmur heard.  Pulses:       Radial pulses are 2+ on the right side, and 2+ on the left side.   3/6 ESM on RUSB, S2 not audible.   Pulmonary/Chest: Breath sounds normal. No stridor. No respiratory distress.   Abdominal: Soft. Bowel sounds are normal. There is no tenderness. There is no rebound.   Musculoskeletal: Normal range of motion.   Neurological: She is alert and oriented to person, place, and time.   Skin: Skin is intact. No rash noted.   Psychiatric: Her behavior is normal.       Lab Results   Component Value Date    CHOL 211 (H) 04/04/2018    CHOL 169 07/11/2017    CHOL 180 06/17/2016     Lab Results   Component Value Date    HDL 72 04/04/2018    HDL 58 07/11/2017    HDL 87 (H) 06/17/2016     Lab Results   Component Value Date    LDLCALC 122.6 04/04/2018    LDLCALC 95.2 07/11/2017    LDLCALC 78.6 06/17/2016     Lab Results   Component Value Date    TRIG 82 04/04/2018    TRIG 79 07/11/2017    TRIG 72 06/17/2016     Lab Results   Component Value Date     CHOLHDL 34.1 04/04/2018    CHOLHDL 34.3 07/11/2017    CHOLHDL 48.3 06/17/2016       Chemistry        Component Value Date/Time     04/04/2018 1554    K 3.2 (L) 04/04/2018 1554     04/04/2018 1554    CO2 24 04/04/2018 1554    BUN 12 04/04/2018 1554    CREATININE 0.9 04/04/2018 1554    GLU 87 04/04/2018 1554        Component Value Date/Time    CALCIUM 9.4 04/04/2018 1554    ALKPHOS 52 (L) 04/04/2018 1554    AST 16 04/04/2018 1554    ALT 8 (L) 04/04/2018 1554    BILITOT 0.4 04/04/2018 1554    ESTGFRAFRICA >60.0 04/04/2018 1554    EGFRNONAA 59.3 (A) 04/04/2018 1554          Lab Results   Component Value Date    TSH 1.552 04/04/2018     Lab Results   Component Value Date    INR 0.9 03/10/2017    INR 1.0 09/23/2014     Lab Results   Component Value Date    WBC 4.70 04/04/2018    HGB 12.1 04/04/2018    HCT 40.9 04/04/2018    MCV 89 04/04/2018     04/04/2018     BMP  Sodium   Date Value Ref Range Status   04/04/2018 141 136 - 145 mmol/L Final     Potassium   Date Value Ref Range Status   04/04/2018 3.2 (L) 3.5 - 5.1 mmol/L Final     Chloride   Date Value Ref Range Status   04/04/2018 103 95 - 110 mmol/L Final     CO2   Date Value Ref Range Status   04/04/2018 24 23 - 29 mmol/L Final     BUN, Bld   Date Value Ref Range Status   04/04/2018 12 8 - 23 mg/dL Final     Creatinine   Date Value Ref Range Status   04/04/2018 0.9 0.5 - 1.4 mg/dL Final     Calcium   Date Value Ref Range Status   04/04/2018 9.4 8.7 - 10.5 mg/dL Final     Anion Gap   Date Value Ref Range Status   04/04/2018 14 8 - 16 mmol/L Final     eGFR if    Date Value Ref Range Status   04/04/2018 >60.0 >60 mL/min/1.73 m^2 Final     eGFR if non    Date Value Ref Range Status   04/04/2018 59.3 (A) >60 mL/min/1.73 m^2 Final     Comment:     Calculation used to obtain the estimated glomerular filtration  rate (eGFR) is the CKD-EPI equation.        CrCl cannot be calculated (Patient's most recent lab result is older  than the maximum 7 days allowed.).     Assessment:      1. Nonrheumatic aortic valve stenosis    2. Essential hypertension    3. Mixed hyperlipidemia    4. Impaired cognition    5. Seizure      Sound like an episode of seizure.  Severe AS stable. Refused surgery.  BP noted.  Plan:   Advise to contact neurologist.  Continue supportive care.  Continue ACEi, BB, CCb and statin  RTC in 3 months

## 2018-05-02 ENCOUNTER — LAB VISIT (OUTPATIENT)
Dept: LAB | Facility: HOSPITAL | Age: 83
End: 2018-05-02
Attending: PSYCHIATRY & NEUROLOGY
Payer: MEDICARE

## 2018-05-02 ENCOUNTER — OFFICE VISIT (OUTPATIENT)
Dept: NEUROLOGY | Facility: CLINIC | Age: 83
End: 2018-05-02
Payer: MEDICARE

## 2018-05-02 ENCOUNTER — PATIENT MESSAGE (OUTPATIENT)
Dept: NEUROLOGY | Facility: CLINIC | Age: 83
End: 2018-05-02

## 2018-05-02 VITALS
DIASTOLIC BLOOD PRESSURE: 90 MMHG | RESPIRATION RATE: 18 BRPM | WEIGHT: 129.63 LBS | BODY MASS INDEX: 24.47 KG/M2 | HEART RATE: 58 BPM | HEIGHT: 61 IN | SYSTOLIC BLOOD PRESSURE: 200 MMHG

## 2018-05-02 DIAGNOSIS — R56.9 NEW ONSET SEIZURE: Primary | ICD-10-CM

## 2018-05-02 DIAGNOSIS — R56.9 NEW ONSET SEIZURE: ICD-10-CM

## 2018-05-02 DIAGNOSIS — I77.6 VASCULITIS, CNS: ICD-10-CM

## 2018-05-02 LAB
CREAT SERPL-MCNC: 0.8 MG/DL
EST. GFR  (AFRICAN AMERICAN): >60 ML/MIN/1.73 M^2
EST. GFR  (NON AFRICAN AMERICAN): >60 ML/MIN/1.73 M^2

## 2018-05-02 PROCEDURE — 82565 ASSAY OF CREATININE: CPT

## 2018-05-02 PROCEDURE — 36415 COLL VENOUS BLD VENIPUNCTURE: CPT | Mod: PO

## 2018-05-02 PROCEDURE — 3080F DIAST BP >= 90 MM HG: CPT | Mod: CPTII,S$GLB,, | Performed by: PSYCHIATRY & NEUROLOGY

## 2018-05-02 PROCEDURE — 3077F SYST BP >= 140 MM HG: CPT | Mod: CPTII,S$GLB,, | Performed by: PSYCHIATRY & NEUROLOGY

## 2018-05-02 PROCEDURE — 99215 OFFICE O/P EST HI 40 MIN: CPT | Mod: S$GLB,,, | Performed by: PSYCHIATRY & NEUROLOGY

## 2018-05-02 PROCEDURE — 99999 PR PBB SHADOW E&M-EST. PATIENT-LVL V: CPT | Mod: PBBFAC,,, | Performed by: PSYCHIATRY & NEUROLOGY

## 2018-05-02 NOTE — PROGRESS NOTES
Progress note  Neurology      Neurology follow up:  For: New onset seizure    SUBJECTIVE:      HPI:       HISTORY OF PRESENT ILLNESS:  This is an 83-year-old lady seen in the clinic by   me for dementia, but on Sunday, 04/29/2018, she had seizure-like activities.    Daughter said that about 8:30 p.m., she was sitting, suddenly she started   shaking her both hands and legs and she becomes disoriented, confused, and she   has some bladder and bowel incontinence.  She became dizzied.  Whole thing   lasted may be 5-10 minutes, but when she came back to sense, she took maybe more   than 15 minutes.  She did not have any seizure in the past.  Daughter is   concerned about this new event.  She has ended her home health recently and she   wants to reinstate the home health service.      AK/SANTO  dd: 05/02/2018 14:15:13 (CDT)  td: 05/03/2018 13:56:32 (CDT)  Doc ID   #[id number]  Job ID #[id number]    CC:     This office note has been dictated.    Past Medical History:   Diagnosis Date    Arthritis     Coronary artery disease     Depression     Encounter for blood transfusion     H/O: GI bleed     Hiatal hernia     Hyperlipidemia     Hypertension     Hypothyroid     Rheumatoid arthritis(714.0)     Stroke     Syncope and collapse      Past Surgical History:   Procedure Laterality Date    TONSILLECTOMY       Family History   Problem Relation Age of Onset    Cancer Mother      breast, uterine     Social History   Substance Use Topics    Smoking status: Former Smoker     Packs/day: 2.00     Years: 16.00     Types: Cigarettes     Quit date: 1/1/1950    Smokeless tobacco: Never Used    Alcohol use 1.8 oz/week     3 Glasses of wine per week      Comment: daily 1/2 bottle wine       Review of patient's allergies indicates:   Allergen Reactions    Sulfa (sulfonamide antibiotics) Shortness Of Breath      Patient Active Problem List   Diagnosis    Rheumatoid arthritis    Hiatal hernia    Asthma with COPD    Hyperlipidemia     Hypertension    Diastolic dysfunction    Osteoarthritis    Rotator cuff arthropathy    H/O: GI bleed    Dysplastic colon polyp    Hypothyroid    Nonrheumatic aortic valve stenosis    Nonrheumatic aortic valve insufficiency    Impaired cognition    Gastroesophageal reflux disease without esophagitis    Major depressive disorder, recurrent episode, mild    Psoriasis with arthropathy    Sleep disturbance    Bilateral adhesive capsulitis of shoulders    Pulmonary emphysema    Insomnia secondary to depression with anxiety    Binswanger's dementia    Seizure         Review of Systems   Constitutional: Positive for malaise/fatigue. Negative for fever and weight loss.   HENT: Negative for hearing loss.    Eyes: Negative for blurred vision, double vision, photophobia and pain.   Respiratory: Negative for cough.    Cardiovascular: Negative for chest pain.   Gastrointestinal: Negative for abdominal pain, nausea and vomiting.   Genitourinary: Negative for dysuria, frequency and urgency.   Musculoskeletal: Positive for joint pain, myalgias and neck pain. Negative for back pain and falls.   Skin: Negative for itching and rash.   Neurological: Positive for sensory change and seizures. Negative for tingling and headaches.   Psychiatric/Behavioral: Positive for memory loss. Negative for depression.         OBJECTIVE:     Vital Signs (Most Recent)  Pulse: (!) 58 (05/02/18 1355)  Resp: 18 (05/02/18 1355)  BP: (!) 200/90 (05/02/18 1355)    Physical Exam   Constitutional: She is oriented to person, place, and time. She appears well-developed and well-nourished.   HENT:   Head: Normocephalic and atraumatic.   Eyes: Conjunctivae and EOM are normal. Pupils are equal, round, and reactive to light.   Neck: Normal range of motion. Neck supple. No JVD present. No tracheal deviation present. No thyromegaly present.   Cardiovascular: Normal rate, regular rhythm and normal heart sounds.    Pulmonary/Chest: Effort normal and  breath sounds normal.   Abdominal: She exhibits no distension. There is no tenderness.   Musculoskeletal: She exhibits no edema.        Right shoulder: She exhibits decreased range of motion, bony tenderness, pain and spasm.        Left shoulder: She exhibits decreased range of motion, tenderness, bony tenderness, pain and spasm.   Bilateral shoulder frozen.   Neurological: She is alert and oriented to person, place, and time. She has normal strength and normal reflexes. She displays normal reflexes. No cranial nerve deficit or sensory deficit. She exhibits normal muscle tone. She displays a negative Romberg sign. Coordination and gait normal.   Reflex Scores:       Tricep reflexes are 2+ on the right side and 2+ on the left side.       Bicep reflexes are 2+ on the right side and 2+ on the left side.       Brachioradialis reflexes are 2+ on the right side and 2+ on the left side.       Patellar reflexes are 2+ on the right side and 2+ on the left side.       Achilles reflexes are 2+ on the right side and 2+ on the left side.  Skin: Skin is warm and dry. No rash noted.   Psychiatric: She has a normal mood and affect. Her speech is normal and behavior is normal. Judgment and thought content normal. Cognition and memory are impaired. She does not express inappropriate judgment. She exhibits abnormal recent memory.       Strength  Deltoids Triceps Biceps Wrist Extension Wrist Flexion Hand    Upper: R 5/5 5/5 5/5 5/5 5/5 5/5    L 5/5 5/5 5/5 5/5 5/5 5/5     Iliopsoas Quadriceps Knee  Flexion Tibialis  anterior Gastro- cnemius EHL   Lower: R 5/5 5/5 5/5 5/5 5/5 5/5    L 5/5 5/5 5/5 5/5 5/5 5/5         Laboratory:  Lab Results   Component Value Date    WBC 4.70 04/04/2018    HGB 12.1 04/04/2018    HCT 40.9 04/04/2018     04/04/2018    CHOL 211 (H) 04/04/2018    TRIG 82 04/04/2018    HDL 72 04/04/2018    ALT 8 (L) 04/04/2018    AST 16 04/04/2018     04/04/2018    K 3.2 (L) 04/04/2018     04/04/2018     CREATININE 0.9 04/04/2018    BUN 12 04/04/2018    CO2 24 04/04/2018    TSH 1.552 04/04/2018    INR 0.9 03/10/2017             ASSESSMENT/PLAN:     Assessment:   1.New onset seizure   2. Dementia , possibly AD type.    Patient Active Problem List   Diagnosis    Rheumatoid arthritis    Hiatal hernia    Asthma with COPD    Hyperlipidemia    Hypertension    Diastolic dysfunction    Osteoarthritis    Rotator cuff arthropathy    H/O: GI bleed    Dysplastic colon polyp    Hypothyroid    Nonrheumatic aortic valve stenosis    Nonrheumatic aortic valve insufficiency    Impaired cognition    Gastroesophageal reflux disease without esophagitis    Major depressive disorder, recurrent episode, mild    Psoriasis with arthropathy    Sleep disturbance    Bilateral adhesive capsulitis of shoulders    Pulmonary emphysema    Insomnia secondary to depression with anxiety    Binswanger's dementia    Seizure         Plan: EEG, MRI and home health service.  Time spent: 30 minutes in face to face discussion concerning diagnosis, prognosis, review of lab and test results, benefits of treatment as well as management of disease, counseling of patient and coordination of care between various health care providers . Greater than half the time spent was used for coordination of care and counseling of patient. This note may have some spelling or wording mistakes which might have been overlooked during proof reading.

## 2018-05-04 ENCOUNTER — TELEPHONE (OUTPATIENT)
Dept: RADIOLOGY | Facility: HOSPITAL | Age: 83
End: 2018-05-04

## 2018-05-07 ENCOUNTER — HOSPITAL ENCOUNTER (OUTPATIENT)
Dept: RADIOLOGY | Facility: HOSPITAL | Age: 83
Discharge: HOME | End: 2018-05-07
Attending: PSYCHIATRY & NEUROLOGY
Payer: MEDICARE

## 2018-05-07 DIAGNOSIS — I77.6 VASCULITIS, CNS: ICD-10-CM

## 2018-05-07 PROCEDURE — 70553 MRI BRAIN STEM W/O & W/DYE: CPT | Mod: 26,,, | Performed by: RADIOLOGY

## 2018-05-07 PROCEDURE — 70553 MRI BRAIN STEM W/O & W/DYE: CPT | Mod: TC,PO

## 2018-05-07 PROCEDURE — A9585 GADOBUTROL INJECTION: HCPCS | Mod: PO | Performed by: PSYCHIATRY & NEUROLOGY

## 2018-05-07 PROCEDURE — 25500020 PHARM REV CODE 255: Mod: PO | Performed by: PSYCHIATRY & NEUROLOGY

## 2018-05-07 RX ORDER — GADOBUTROL 604.72 MG/ML
5.5 INJECTION INTRAVENOUS
Status: COMPLETED | OUTPATIENT
Start: 2018-05-07 | End: 2018-05-07

## 2018-05-07 RX ADMIN — GADOBUTROL 5.5 ML: 604.72 INJECTION INTRAVENOUS at 03:05

## 2018-05-08 ENCOUNTER — HOSPITAL ENCOUNTER (OUTPATIENT)
Dept: PULMONOLOGY | Facility: HOSPITAL | Age: 83
Discharge: HOME | End: 2018-05-08
Attending: PSYCHIATRY & NEUROLOGY
Payer: MEDICARE

## 2018-05-08 DIAGNOSIS — R56.9 NEW ONSET SEIZURE: ICD-10-CM

## 2018-05-08 DIAGNOSIS — I77.6 VASCULITIS, CNS: ICD-10-CM

## 2018-05-08 PROCEDURE — 95816 EEG AWAKE AND DROWSY: CPT | Mod: 26,,, | Performed by: PSYCHIATRY & NEUROLOGY

## 2018-05-08 PROCEDURE — 95816 EEG AWAKE AND DROWSY: CPT

## 2018-05-09 NOTE — PROCEDURES
Referring physician: Dr. Rosa     Technician : Fly Bradley     Recording time:               20 minutes    Artifacts:   Eye movements , muscle .    Age:      83                                            Sex: F        History:   Memory loss    Technique : Electrodes are placed according to International 10-20 system . Bipolar and referential montages are used. Hyperventilation was  Not done   . Photic was  done.      Findings:  This is a multichannel digital EEG recording using the international 10-20 placement     system. The resting record is fairly well organized and symmetric. A dominant posterior     rhythm is seen. It consists of a 9-10 hertz 20-70 microvolt alpha rhythm. This attenuates     with eye opening. During drowsiness, there is mild attenuation and slowing of the     background rhythm. Stage II sleep was not achieved. Hyperventilation was not     performed. Photic stimulation was  done .There is no change in the back ground .There is no spikes or spike waves activities in this EEG. No seizure or epileptiform discharge are appreciated.    There is no focal attenuation or side to side variation. No clinical seizure activity was noted by the EEG technician.     IMPRESSION:  This is a normal awake and drowsy EEG study. Clinical correlation appreciated.

## 2018-05-13 DIAGNOSIS — I10 ESSENTIAL HYPERTENSION: ICD-10-CM

## 2018-05-13 DIAGNOSIS — L40.50 PSORIASIS WITH ARTHROPATHY: ICD-10-CM

## 2018-05-13 RX ORDER — FOLIC ACID 1 MG/1
TABLET ORAL
Qty: 90 TABLET | Refills: 0 | Status: SHIPPED | OUTPATIENT
Start: 2018-05-13 | End: 2018-08-05 | Stop reason: SDUPTHER

## 2018-05-13 RX ORDER — VERAPAMIL HYDROCHLORIDE 240 MG/1
CAPSULE, EXTENDED RELEASE ORAL
Qty: 90 CAPSULE | Refills: 0 | Status: SHIPPED | OUTPATIENT
Start: 2018-05-13 | End: 2018-09-18

## 2018-05-16 ENCOUNTER — PATIENT MESSAGE (OUTPATIENT)
Dept: NEUROLOGY | Facility: CLINIC | Age: 83
End: 2018-05-16

## 2018-06-13 ENCOUNTER — PATIENT MESSAGE (OUTPATIENT)
Dept: INTERNAL MEDICINE | Facility: CLINIC | Age: 83
End: 2018-06-13

## 2018-06-13 DIAGNOSIS — K21.9 GASTROESOPHAGEAL REFLUX DISEASE, ESOPHAGITIS PRESENCE NOT SPECIFIED: ICD-10-CM

## 2018-06-13 DIAGNOSIS — F41.9 INSOMNIA SECONDARY TO ANXIETY: ICD-10-CM

## 2018-06-13 DIAGNOSIS — R41.89 IMPAIRED COGNITION: ICD-10-CM

## 2018-06-13 DIAGNOSIS — I67.3 BINSWANGER'S DEMENTIA: ICD-10-CM

## 2018-06-13 DIAGNOSIS — R41.3 MEMORY LOSS: ICD-10-CM

## 2018-06-13 DIAGNOSIS — F51.05 INSOMNIA SECONDARY TO ANXIETY: ICD-10-CM

## 2018-06-13 DIAGNOSIS — F01.50 DEMENTIA, VASCULAR, MIXED, WITHOUT BEHAVIORAL DISTURBANCE: ICD-10-CM

## 2018-06-13 DIAGNOSIS — M62.81 HAND MUSCLE WEAKNESS: ICD-10-CM

## 2018-06-13 DIAGNOSIS — J30.1 SEASONAL ALLERGIC RHINITIS DUE TO POLLEN: ICD-10-CM

## 2018-06-13 DIAGNOSIS — I10 ESSENTIAL HYPERTENSION: Chronic | ICD-10-CM

## 2018-06-13 RX ORDER — METOPROLOL SUCCINATE 25 MG/1
25 TABLET, EXTENDED RELEASE ORAL DAILY
Qty: 30 TABLET | Refills: 2 | Status: SHIPPED | OUTPATIENT
Start: 2018-06-13 | End: 2018-12-05

## 2018-06-13 RX ORDER — METOPROLOL SUCCINATE 25 MG/1
TABLET, EXTENDED RELEASE ORAL
Qty: 30 TABLET | Refills: 2 | Status: SHIPPED | OUTPATIENT
Start: 2018-06-13 | End: 2018-06-13 | Stop reason: SDUPTHER

## 2018-06-13 RX ORDER — DONEPEZIL HYDROCHLORIDE 10 MG/1
10 TABLET, FILM COATED ORAL NIGHTLY
Qty: 30 TABLET | Refills: 10 | Status: SHIPPED | OUTPATIENT
Start: 2018-06-13 | End: 2018-06-14 | Stop reason: ALTCHOICE

## 2018-06-13 RX ORDER — LISINOPRIL 20 MG/1
20 TABLET ORAL DAILY
Qty: 30 TABLET | Refills: 2 | Status: SHIPPED | OUTPATIENT
Start: 2018-06-13 | End: 2018-06-19 | Stop reason: SDUPTHER

## 2018-06-13 RX ORDER — ROSUVASTATIN CALCIUM 10 MG/1
10 TABLET, COATED ORAL NIGHTLY
Qty: 30 TABLET | Refills: 2 | Status: SHIPPED | OUTPATIENT
Start: 2018-06-13 | End: 2018-06-14

## 2018-06-13 RX ORDER — TRAZODONE HYDROCHLORIDE 100 MG/1
100 TABLET ORAL NIGHTLY
Qty: 30 TABLET | Refills: 2 | Status: SHIPPED | OUTPATIENT
Start: 2018-06-13 | End: 2018-09-16 | Stop reason: SDUPTHER

## 2018-06-13 RX ORDER — SERTRALINE HYDROCHLORIDE 50 MG/1
50 TABLET, FILM COATED ORAL DAILY
Qty: 30 TABLET | Refills: 2 | Status: SHIPPED | OUTPATIENT
Start: 2018-06-13 | End: 2018-12-05 | Stop reason: SDUPTHER

## 2018-06-13 RX ORDER — LEVOTHYROXINE SODIUM 25 UG/1
TABLET ORAL
Qty: 30 TABLET | Refills: 2 | Status: SHIPPED | OUTPATIENT
Start: 2018-06-13 | End: 2018-09-23 | Stop reason: SDUPTHER

## 2018-06-13 RX ORDER — PANTOPRAZOLE SODIUM 40 MG/1
40 TABLET, DELAYED RELEASE ORAL DAILY
Qty: 30 TABLET | Refills: 2 | Status: SHIPPED | OUTPATIENT
Start: 2018-06-13 | End: 2018-08-30 | Stop reason: SDUPTHER

## 2018-06-13 RX ORDER — TRAMADOL HYDROCHLORIDE 50 MG/1
50 TABLET ORAL DAILY
Qty: 90 TABLET | Refills: 0 | Status: SHIPPED | OUTPATIENT
Start: 2018-06-13 | End: 2018-11-13

## 2018-06-13 RX ORDER — PANTOPRAZOLE SODIUM 40 MG/1
TABLET, DELAYED RELEASE ORAL
Qty: 30 TABLET | Refills: 2 | Status: SHIPPED | OUTPATIENT
Start: 2018-06-13 | End: 2018-06-13 | Stop reason: SDUPTHER

## 2018-06-13 RX ORDER — LISINOPRIL 10 MG/1
10 TABLET ORAL DAILY
Qty: 90 TABLET | Refills: 1 | OUTPATIENT
Start: 2018-06-13

## 2018-06-13 NOTE — TELEPHONE ENCOUNTER
Rosa from [facility] called ([phone]) he stated that Ms Rojas was discharged from [facility] on yesterday for TIA. She did a swallow study and it was recommended that she start to crush meds and puree foods. The discharge instructions stated that she should see Speech Therapy and PT. She would like to know if you would put those orders in for her since they didn't set her up with anyone upon discharge. Told her I will get the message to Dr Herring and call back.

## 2018-06-13 NOTE — TELEPHONE ENCOUNTER
Patient needs to come into the clinic for hospital discharge follow-up for further orders like speech, therapy and physical therapy.  Usually they will be ordered by physician discharging from the hospital  Lisinopril has been changed from 10 mg twice daily to 20 mg once daily

## 2018-06-14 ENCOUNTER — OFFICE VISIT (OUTPATIENT)
Dept: NEUROLOGY | Facility: CLINIC | Age: 83
End: 2018-06-14
Payer: MEDICARE

## 2018-06-14 ENCOUNTER — OFFICE VISIT (OUTPATIENT)
Dept: PULMONOLOGY | Facility: CLINIC | Age: 83
End: 2018-06-14
Payer: MEDICARE

## 2018-06-14 VITALS
RESPIRATION RATE: 16 BRPM | HEIGHT: 61 IN | WEIGHT: 129.88 LBS | HEART RATE: 71 BPM | DIASTOLIC BLOOD PRESSURE: 80 MMHG | OXYGEN SATURATION: 98 % | BODY MASS INDEX: 24.52 KG/M2 | SYSTOLIC BLOOD PRESSURE: 126 MMHG

## 2018-06-14 VITALS
SYSTOLIC BLOOD PRESSURE: 110 MMHG | RESPIRATION RATE: 20 BRPM | BODY MASS INDEX: 24.15 KG/M2 | WEIGHT: 127.88 LBS | HEIGHT: 61 IN | HEART RATE: 62 BPM | DIASTOLIC BLOOD PRESSURE: 68 MMHG

## 2018-06-14 DIAGNOSIS — I67.3 BINSWANGER'S DEMENTIA: Primary | ICD-10-CM

## 2018-06-14 DIAGNOSIS — R56.9 SEIZURE: ICD-10-CM

## 2018-06-14 DIAGNOSIS — T17.900D PULMONARY ASPIRATION, SUBSEQUENT ENCOUNTER: ICD-10-CM

## 2018-06-14 DIAGNOSIS — J45.31 MILD PERSISTENT ASTHMA WITH ACUTE EXACERBATION: Primary | ICD-10-CM

## 2018-06-14 DIAGNOSIS — R13.10 DYSPHAGIA, UNSPECIFIED TYPE: ICD-10-CM

## 2018-06-14 PROCEDURE — 3074F SYST BP LT 130 MM HG: CPT | Mod: CPTII,S$GLB,, | Performed by: PSYCHIATRY & NEUROLOGY

## 2018-06-14 PROCEDURE — 3078F DIAST BP <80 MM HG: CPT | Mod: CPTII,S$GLB,, | Performed by: PSYCHIATRY & NEUROLOGY

## 2018-06-14 PROCEDURE — 99999 PR PBB SHADOW E&M-EST. PATIENT-LVL IV: CPT | Mod: PBBFAC,,, | Performed by: PSYCHIATRY & NEUROLOGY

## 2018-06-14 PROCEDURE — 99215 OFFICE O/P EST HI 40 MIN: CPT | Mod: S$GLB,,, | Performed by: INTERNAL MEDICINE

## 2018-06-14 PROCEDURE — 99215 OFFICE O/P EST HI 40 MIN: CPT | Mod: S$GLB,,, | Performed by: PSYCHIATRY & NEUROLOGY

## 2018-06-14 PROCEDURE — 3079F DIAST BP 80-89 MM HG: CPT | Mod: CPTII,S$GLB,, | Performed by: INTERNAL MEDICINE

## 2018-06-14 PROCEDURE — 3074F SYST BP LT 130 MM HG: CPT | Mod: CPTII,S$GLB,, | Performed by: INTERNAL MEDICINE

## 2018-06-14 PROCEDURE — 99999 PR PBB SHADOW E&M-EST. PATIENT-LVL V: CPT | Mod: PBBFAC,,, | Performed by: INTERNAL MEDICINE

## 2018-06-14 RX ORDER — LEVETIRACETAM 500 MG/1
500 TABLET ORAL 2 TIMES DAILY
Qty: 60 TABLET | Refills: 11 | Status: SHIPPED | OUTPATIENT
Start: 2018-06-14 | End: 2019-06-11

## 2018-06-14 RX ORDER — ASPIRIN 81 MG/1
81 TABLET ORAL DAILY
COMMUNITY
End: 2019-08-20 | Stop reason: SDUPTHER

## 2018-06-14 RX ORDER — ALBUTEROL SULFATE 0.83 MG/ML
2.5 SOLUTION RESPIRATORY (INHALATION)
Qty: 300 ML | Refills: 11 | Status: SHIPPED | OUTPATIENT
Start: 2018-06-14 | End: 2018-11-12 | Stop reason: ALTCHOICE

## 2018-06-14 RX ORDER — ALBUTEROL SULFATE 90 UG/1
2 AEROSOL, METERED RESPIRATORY (INHALATION) EVERY 4 HOURS PRN
Qty: 18 G | Refills: 11 | Status: SHIPPED | OUTPATIENT
Start: 2018-06-14 | End: 2018-06-14 | Stop reason: SDUPTHER

## 2018-06-14 RX ORDER — PREDNISONE 20 MG/1
TABLET ORAL
Qty: 20 TABLET | Refills: 0 | Status: SHIPPED | OUTPATIENT
Start: 2018-06-14 | End: 2018-08-30

## 2018-06-14 RX ORDER — FLUTICASONE PROPIONATE 50 MCG
2 SPRAY, SUSPENSION (ML) NASAL DAILY
Qty: 1 BOTTLE | Refills: 11 | Status: SHIPPED | OUTPATIENT
Start: 2018-06-14 | End: 2018-11-13

## 2018-06-14 RX ORDER — ATORVASTATIN CALCIUM 20 MG/1
20 TABLET, FILM COATED ORAL DAILY
COMMUNITY
End: 2018-06-19 | Stop reason: ALTCHOICE

## 2018-06-14 NOTE — PROGRESS NOTES
Subjective:       Patient ID: Crystal Romero is a 83 y.o. female.    Chief Complaint: She       asthma with copd and Wheezing    HPI CC: Dysphagia  Noted to be aspirating  Wheezing noted   She   presents for evaluation and treatment of aspiration and dysphagia after being discharged from the hospital  3  days ago. Since discharge symptoms have unchanged course since that time. Patient denies fever or chills. Symptoms are aggravated by activity and eating. Symptoms improve with rest.  Respiratory: positive for cough and dyspnea on exertion; Cardiovascular: no chest pain or palpitations.  Patient currently is not on home oxygen therapy..    MEDICAL RECORDS FROM THE HOSPITAL REVIEWED:  Review of medical records from hospital. Medical records from hospital were reviewed during office visit - these included but were not limited to review of radiographic studies and /or radiologists reports, laboratory studies, discharge summaries, procedure notes, consultations and other transcribed notes.      Answers for HPI/ROS submitted by the patient on 6/13/2018   Asthma  In the past 4 weeks, how much of the time did your asthma keep you from getting as much done at work, school, or at home?: some of the time  During the past 4 weeks, how often have you had shortness of breath?: more than once a day  During the past 4 weeks, how often did your asthma symptoms (Wheezing, coughing, shortness of breath, chest tightness or pain) wake you up at night or earlier that usual in the morning?: 4 or more nights a week  During the past 4 weeks, how often have you used your rescue inhaler or nebulizer medication (such as albuterol)?: 2 or 3 times a week  How would you rate your asthma control during the past 4 weeks?: somewhat controlled   : 11    Past Medical History:   Diagnosis Date    Arthritis     Coronary artery disease     Depression     Encounter for blood transfusion     H/O: GI bleed     Hiatal hernia      Hyperlipidemia     Hypertension     Hypothyroid     Rheumatoid arthritis(714.0)     Stroke     Syncope and collapse      Past Surgical History:   Procedure Laterality Date    TONSILLECTOMY       Social History     Social History    Marital status:      Spouse name: N/A    Number of children: 2    Years of education: N/A     Occupational History    Not on file.     Social History Main Topics    Smoking status: Former Smoker     Packs/day: 2.00     Years: 16.00     Types: Cigarettes     Quit date: 1/1/1950    Smokeless tobacco: Never Used    Alcohol use 1.8 oz/week     3 Glasses of wine per week      Comment: daily 1/2 bottle wine    Drug use: No    Sexual activity: No     Other Topics Concern    Not on file     Social History Narrative    Lives with daughter     Review of Systems   Constitutional: Positive for fatigue. Negative for fever.   HENT: Positive for postnasal drip, rhinorrhea and congestion.    Eyes: Negative for redness and itching.   Respiratory: Positive for cough, sputum production, shortness of breath, dyspnea on extertion, use of rescue inhaler and Paroxysmal Nocturnal Dyspnea.    Cardiovascular: Negative for chest pain, palpitations and leg swelling.   Genitourinary: Negative for difficulty urinating and hematuria.   Endocrine: Negative for cold intolerance and heat intolerance.    Skin: Negative for rash.   Gastrointestinal: Negative for nausea and abdominal pain.   Neurological: Positive for dizziness. Negative for syncope, weakness and light-headedness.   Hematological: Negative for adenopathy. Does not bruise/bleed easily.   Psychiatric/Behavioral: Positive for confusion. Negative for sleep disturbance. The patient is nervous/anxious.        Objective:      Physical Exam   Constitutional: She appears well-developed and well-nourished.   HENT:   Head: Normocephalic and atraumatic.   Mouth/Throat: Oropharyngeal exudate present.   Eyes: Conjunctivae are normal. Pupils are  equal, round, and reactive to light.   Neck: Neck supple. No JVD present. No tracheal deviation present. No thyromegaly present.   Cardiovascular: Normal rate, regular rhythm and normal heart sounds.    Pulmonary/Chest: Effort normal. No respiratory distress. She has decreased breath sounds. She has wheezes in the right lower field and the left lower field. She has no rhonchi. She has no rales. She exhibits no tenderness.   Abdominal: Soft. Bowel sounds are normal.   Musculoskeletal: Normal range of motion. She exhibits no edema.   Lymphadenopathy:     She has no cervical adenopathy.   Neurological: She is alert. She displays abnormal reflex. She exhibits abnormal muscle tone.   Skin: Skin is warm and dry.   Nursing note and vitals reviewed.    Personal Diagnostic Review  none pertinent  Office Spirometry Results:     No flowsheet data found.  Pulmonary Studies Review 6/14/2018   SpO2 98   Height 61.000   Weight 2077.62   BMI (Calculated) 24.6   Predicted Distance 240.61   Predicted Distance Meters (Calculated) 379.22         Assessment:       1. Mild persistent asthma with acute exacerbation    2. Pulmonary aspiration, subsequent encounter    3. Seizure    4. Dysphagia, unspecified type        Outpatient Encounter Prescriptions as of 6/14/2018   Medication Sig Dispense Refill    acetaminophen (TYLENOL) 500 MG tablet Take 500 mg by mouth every 6 (six) hours as needed for Pain.      albuterol (VENTOLIN HFA) 90 mcg/actuation inhaler Inhale 2 puffs into the lungs every 4 (four) hours as needed for Wheezing or Shortness of Breath. Rescue 18 g 10    aspirin (ECOTRIN) 81 MG EC tablet Take 81 mg by mouth once daily.      atorvastatin (LIPITOR) 20 MG tablet Take 20 mg by mouth once daily.      cetirizine (ZYRTEC) 10 MG tablet Take 10 mg by mouth once daily.      fluticasone (FLONASE) 50 mcg/actuation nasal spray 2 sprays (100 mcg total) by Each Nare route once daily. 1 Bottle 11    folic acid (FOLVITE) 1 MG tablet  TAKE ONE TABLET BY MOUTH ONE TIME DAILY 90 tablet 0    INHALER, ASSIST DEVICES (AEROCHAMBER PLUS FLOW-VU MISC) by Misc.(Non-Drug; Combo Route) route.      levETIRAcetam (KEPPRA) 500 MG Tab Take 1 tablet (500 mg total) by mouth 2 (two) times daily. 60 tablet 11    levothyroxine (SYNTHROID) 25 MCG tablet TAKE ONE TABLET BY MOUTH ONE TIME DAILY BEFORE BREAKFAST 30 tablet 2    lisinopril (PRINIVIL,ZESTRIL) 20 MG tablet Take 1 tablet (20 mg total) by mouth once daily. 30 tablet 2    lisinopril 10 MG tablet TAKE ONE TABLET BY MOUTH ONE TIME DAILY (Patient taking differently: TAKE ONE TABLET BY MOUTH TWICE DAILY) 90 tablet 1    metoprolol succinate (TOPROL-XL) 25 MG 24 hr tablet Take 1 tablet (25 mg total) by mouth once daily. 30 tablet 2    pantoprazole (PROTONIX) 40 MG tablet Take 1 tablet (40 mg total) by mouth once daily. 30 tablet 2    potassium chloride (KLOR-CON) 10 MEQ TbSR Take 1 tablet (10 mEq total) by mouth 2 (two) times daily. 60 tablet 3    sertraline (ZOLOFT) 50 MG tablet Take 1 tablet (50 mg total) by mouth once daily. 30 tablet 2    traMADol (ULTRAM) 50 mg tablet Take 1 tablet (50 mg total) by mouth once daily. 90 tablet 0    traZODone (DESYREL) 100 MG tablet Take 1 tablet (100 mg total) by mouth every evening. 30 tablet 2    verapamil (VERELAN) 240 MG C24P TAKE ONE CAPSULE BY MOUTH ONE TIME DAILY 90 capsule 0    VITAMIN B COMPLEX (SUPER B COMPLEX-B-12 ORAL) Take by mouth.      [DISCONTINUED] donepezil (ARICEPT) 10 MG tablet TAKE 1 TABLET (10 MG TOTAL) BY MOUTH EVERY EVENING. 30 tablet 10    [DISCONTINUED] methotrexate 2.5 MG Tab Take 2 tablets (5 mg total) by mouth every 7 days. 24 tablet 1    albuterol (PROVENTIL) 2.5 mg /3 mL (0.083 %) nebulizer solution Take 3 mLs (2.5 mg total) by nebulization every 6 (six) hours while awake. 300 mL 11    predniSONE (DELTASONE) 20 MG tablet Prednisone 60 mg/ day for 3 days, 40 mg/day for 3 days,20 mg/ day for 3 days, (1/2 tablet )10 mg a day for 3  days. 20 tablet 0    [DISCONTINUED] albuterol (VENTOLIN HFA) 90 mcg/actuation inhaler Inhale 2 puffs into the lungs every 4 (four) hours as needed for Wheezing or Shortness of Breath. Rescue 18 g 11    [DISCONTINUED] rosuvastatin (CRESTOR) 10 MG tablet Take 1 tablet (10 mg total) by mouth every evening. 30 tablet 2     No facility-administered encounter medications on file as of 6/14/2018.      Orders Placed This Encounter   Procedures    X-Ray Chest PA And Lateral     Standing Status:   Future     Standing Expiration Date:   6/14/2019     Order Specific Question:   May the Radiologist modify the order per protocol to meet the clinical needs of the patient?     Answer:   Yes    SUBSEQUENT HOME HEALTH ORDERS     Subsequent Home Health Orders    Current Medications:  Current Outpatient Prescriptions:  acetaminophen (TYLENOL) 500 MG tablet, Take 500 mg by mouth every 6 (six) hours as needed for Pain., Disp: , Rfl:   albuterol (VENTOLIN HFA) 90 mcg/actuation inhaler, Inhale 2 puffs into the lungs every 4 (four) hours as needed for Wheezing or Shortness of Breath. Rescue, Disp: 18 g, Rfl: 10  aspirin (ECOTRIN) 81 MG EC tablet, Take 81 mg by mouth once daily., Disp: , Rfl:   atorvastatin (LIPITOR) 20 MG tablet, Take 20 mg by mouth once daily., Disp: , Rfl:   cetirizine (ZYRTEC) 10 MG tablet, Take 10 mg by mouth once daily., Disp: , Rfl:   fluticasone (FLONASE) 50 mcg/actuation nasal spray, 2 sprays (100 mcg total) by Each Nare route once daily., Disp: 1 Bottle, Rfl: 11  folic acid (FOLVITE) 1 MG tablet, TAKE ONE TABLET BY MOUTH ONE TIME DAILY, Disp: 90 tablet, Rfl: 0  INHALER, ASSIST DEVICES (AEROCHAMBER PLUS FLOW-VU MISC), by Misc.(Non-Drug; Combo Route) route., Disp: , Rfl:   levETIRAcetam (KEPPRA) 500 MG Tab, Take 1 tablet (500 mg total) by mouth 2 (two) times daily., Disp: 60 tablet, Rfl: 11  levothyroxine (SYNTHROID) 25 MCG tablet, TAKE ONE TABLET BY MOUTH ONE TIME DAILY BEFORE BREAKFAST, Disp: 30 tablet, Rfl:  2  lisinopril (PRINIVIL,ZESTRIL) 20 MG tablet, Take 1 tablet (20 mg total) by mouth once daily., Disp: 30 tablet, Rfl: 2  lisinopril 10 MG tablet, TAKE ONE TABLET BY MOUTH ONE TIME DAILY (Patient taking differently: TAKE ONE TABLET BY MOUTH TWICE DAILY), Disp: 90 tablet, Rfl: 1  metoprolol succinate (TOPROL-XL) 25 MG 24 hr tablet, Take 1 tablet (25 mg total) by mouth once daily., Disp: 30 tablet, Rfl: 2  pantoprazole (PROTONIX) 40 MG tablet, Take 1 tablet (40 mg total) by mouth once daily., Disp: 30 tablet, Rfl: 2  potassium chloride (KLOR-CON) 10 MEQ TbSR, Take 1 tablet (10 mEq total) by mouth 2 (two) times daily., Disp: 60 tablet, Rfl: 3  sertraline (ZOLOFT) 50 MG tablet, Take 1 tablet (50 mg total) by mouth once daily., Disp: 30 tablet, Rfl: 2  traMADol (ULTRAM) 50 mg tablet, Take 1 tablet (50 mg total) by mouth once daily., Disp: 90 tablet, Rfl: 0  traZODone (DESYREL) 100 MG tablet, Take 1 tablet (100 mg total) by mouth every evening., Disp: 30 tablet, Rfl: 2  verapamil (VERELAN) 240 MG C24P, TAKE ONE CAPSULE BY MOUTH ONE TIME DAILY, Disp: 90 capsule, Rfl: 0  VITAMIN B COMPLEX (SUPER B COMPLEX-B-12 ORAL), Take by mouth., Disp: , Rfl:   albuterol (PROVENTIL) 2.5 mg /3 mL (0.083 %) nebulizer solution, Take 3 mLs (2.5 mg total) by nebulization every 6 (six) hours while awake., Disp: 300 mL, Rfl: 11  predniSONE (DELTASONE) 20 MG tablet, Prednisone 60 mg/ day for 3 days, 40 mg/day for 3 days,20 mg/ day for 3 days, (1/2 tablet )10 mg a day for 3 days., Disp: 20 tablet, Rfl: 0    No current facility-administered medications for this visit.       Nursing:   COPD: Please ensure patient has a functioning nebulizer and provide education on its usage.    Diet:   pureed diet  nectar thick    Activities:   ambulate in house     Labs:  Report Lab results to PCP.    Referrals/ Consults  Speech Therapy  to evaluate and treat for  Swallowing.    Home Health Aide:  Nursing Weekly     Order Specific Question:   What Home Health  Agency is the patient currently using?     Answer:   [facility]     Plan:       Requested Prescriptions     Signed Prescriptions Disp Refills    albuterol (PROVENTIL) 2.5 mg /3 mL (0.083 %) nebulizer solution 300 mL 11     Sig: Take 3 mLs (2.5 mg total) by nebulization every 6 (six) hours while awake.    predniSONE (DELTASONE) 20 MG tablet 20 tablet 0     Sig: Prednisone 60 mg/ day for 3 days, 40 mg/day for 3 days,20 mg/ day for 3 days, (1/2 tablet )10 mg a day for 3 days.     Mild persistent asthma with acute exacerbation  -     albuterol (PROVENTIL) 2.5 mg /3 mL (0.083 %) nebulizer solution; Take 3 mLs (2.5 mg total) by nebulization every 6 (six) hours while awake.  Dispense: 300 mL; Refill: 11  -     predniSONE (DELTASONE) 20 MG tablet; Prednisone 60 mg/ day for 3 days, 40 mg/day for 3 days,20 mg/ day for 3 days, (1/2 tablet )10 mg a day for 3 days.  Dispense: 20 tablet; Refill: 0  -     X-Ray Chest PA And Lateral; Future; Expected date: 06/14/2018    Pulmonary aspiration, subsequent encounter  -     albuterol (PROVENTIL) 2.5 mg /3 mL (0.083 %) nebulizer solution; Take 3 mLs (2.5 mg total) by nebulization every 6 (six) hours while awake.  Dispense: 300 mL; Refill: 11  -     predniSONE (DELTASONE) 20 MG tablet; Prednisone 60 mg/ day for 3 days, 40 mg/day for 3 days,20 mg/ day for 3 days, (1/2 tablet )10 mg a day for 3 days.  Dispense: 20 tablet; Refill: 0  -     X-Ray Chest PA And Lateral; Future; Expected date: 06/14/2018  -     SUBSEQUENT HOME HEALTH ORDERS    Seizure  -     SUBSEQUENT HOME HEALTH ORDERS    Dysphagia, unspecified type  -     SUBSEQUENT HOME HEALTH ORDERS           Follow-up in about 4 weeks (around 7/12/2018) for Review CXR.    MEDICAL DECISION MAKING: Moderate to high complexity.  Overall, the multiple problems listed are of moderate to high severity that may impact quality of life and activities of daily living. Side effects of medications, treatment plan as well as options and  alternatives reviewed and discussed with patient. There was counseling of patient concerning these issues.    Total time spent in face to face counseling and coordination of care - 40  minutes over 50% of time was used in discussion of prognosis, risks, benefits of treatment, instructions and compliance with regimen . Discussion with other physicians or health care providers (DME, NP, pharmacy, respiratory therapy) occurred.

## 2018-06-14 NOTE — PATIENT INSTRUCTIONS
Understanding Aspiration from Dysphagia  Aspiration is when something enters your airway or lungs by accident. It may be food, liquid, or some other material. This can cause serious health problems, such as pneumonia. Aspiration can happen when you have trouble swallowing normally. This is known as dysphagia.  What happens when you swallow?  When you swallow food, it passes from your mouth down into your throat (pharynx). From there, the food moves down through a long tube (esophagus) and into your stomach. This journey is made possible by a series of actions from the muscles in these areas.  The pharynx is also part of the system that brings air into your lungs. When you breathe, air enters your mouth and moves into the pharynx. The air then goes down into your main airway (trachea) and into your lungs. A flap of tissue called the epiglottis sits over the top of the trachea. This flap blocks food and drink from going down into the trachea when you swallow.  What causes aspiration?  Aspiration from dysphagia is caused when the muscles in your throat dont work normally. This lets food or drink enter the trachea when you swallow. This can happen as food goes down when you swallow. Or it can happen if food comes back up from your stomach.  When a person has dysphagia, aspiration is always a risk. You may be at risk for aspiration from dysphagia if you have any of these medical conditions:  · Stroke  · Severe dental problems  · Conditions that lead to less saliva, such as Sjogrens syndrome  · Mouth sores  · Parkinson disease or other neurologic conditions  · Muscular dystrophies  · Blockage in the esophagus, such as a growth from cancer  · History of radiation or surgery to treat throat cancer  Symptoms of aspiration from dysphagia  Some people who aspirate do not have any signs or symptoms. This is called silent aspiration. But aspiration can cause symptoms such as:  · Sense of food sticking in your throat or  coming back into your mouth  · Pain when swallowing  · Trouble starting a swallow  · Coughing or wheezing after eating  · Chest discomfort or heartburn  · Fever 30 minutes to an hour after eating  · Too much saliva  · Feeling congested after eating or drinking  · Having a wet-sounding voice during or after eating or drinking  · Shortness of breath or tiredness while eating  · Vomiting blood  · Pneumonia that comes back again and again  Symptoms can happen right after eating. Or they may happen over time. You may not have all of these symptoms. They may depend on how often and how much food or drink you aspirate.  Diagnosing aspiration from dysphagia  You will need to be checked for aspiration from dysphagia if you have symptoms. You may also need to be checked if you have had a stroke or other health problem that can cause trouble swallowing. If your healthcare provider thinks you may be aspirating, you may be told to not eat or drink until you are tested.  Your healthcare provider will ask about your medical history and symptoms. This may be done by a speech-language pathologist (SLP). The SLP will try to find out if you have problems with the lower or upper part of your swallowing muscles. The SLP may ask about what foods or drink cause problems, and when your symptoms occur.  You may have a physical exam. This may include an exam of your teeth, lips, jaws, tongue, and cheeks. You may be asked to move these areas in certain ways and make certain sounds. Your SLP may also test how you swallow different types of liquids and solids.  You may also need 1 or more tests. These can help to find the cause of your dysphagia. Tests are often very helpful in showing cases of silent aspiration. The test may include:  · Modified barium swallow test (MBS). This is done to show if material is going into your lungs.  · Fiberoptic endoscopic evaluation of swallowing (FEES). This test can also show if material is going into your  lungs  · Pharyngeal manometry. This test checks the pressure inside your esophagus  Date Last Reviewed: 3/1/2017  © 4307-4633 [employer]. [address] [place] [address], [place] [address]. All rights reserved. This information is not intended as a substitute for professional medical care. Always follow your healthcare professional's instructions.        Albuterol inhalation solution  What is this medicine?  ALBUTEROL (al BYOO ter ole) is a bronchodilator. It helps to open up the airways in your lungs to make it easier to breathe. This medicine is used to treat and to prevent bronchospasm.  How should I use this medicine?  This medicine is used in a nebulizer. Nebulizers make a liquid into an aerosol that you breathe in through your mouth or your mouth and nose into your lungs. You will be taught how to use your nebulizer. Follow the directions on your prescription label. Take your medicine at regular intervals. Do not use more often than directed.  Talk to your pediatrician regarding the use of this medicine in children. Special care may be needed.  What side effects may I notice from receiving this medicine?  Side effects that you should report to your doctor or health care professional as soon as possible:  · allergic reactions like skin rash, itching or hives, swelling of the face, lips, or tongue  · breathing problems  · chest pain  · feeling faint or lightheaded, falls  · high blood pressure  · irregular heartbeat  · fever  · muscle cramps or weakness  · pain, tingling, numbness in the hands or feet  · vomiting  Side effects that usually do not require medical attention (report to your doctor or health care professional if they continue or are bothersome):  · cough  · difficulty sleeping  · headache  · nervousness, trembling  · stomach upset  · stuffy or runny nose  · throat irritation  · unusual taste  What may interact with this medicine?  · anti-infectives like chloroquine and  pentamidine  · caffeine  · cisapride  · diuretics  · medicines for colds  · medicines for depression or emotional or psychotic conditions  · medicines for weight loss including some herbal products  · methadone  · some antibiotics like clarithromycin, erythromycin, levofloxacin, and linezolid  · some heart medicines  · steroid hormones like dexamethasone, cortisone, hydrocortisone  · theophylline  · thyroid hormones  What if I miss a dose?  If you miss a dose, use it as soon as you can. If it is almost time for your next dose, use only that dose. Do not use double or extra doses.  Where should I keep my medicine?  Keep out of the reach of children.  Store between 2 and 25 degrees C (36 and 77 degrees F). Do not freeze. Protect from light. Throw away any unused medicine after the expiration date. Most products are kept in the foil package until time of use. Some products can be used up to 1 week after they are removed from the foil pouch. Check the instructions that come with your medicine.  What should I tell my health care provider before I take this medicine?  They need to know if you have any of the following conditions:  · diabetes  · heart disease or irregular heartbeat  · high blood pressure  · pheochromocytoma  · seizures  · thyroid disease  · an unusual or allergic reaction to albuterol, levalbuterol, sulfites, other medicines, foods, dyes, or preservatives  · pregnant or trying to get pregnant  · breast-feeding  What should I watch for while using this medicine?  Tell your doctor or health care professional if your symptoms do not improve. Do not use extra albuterol. Call your doctor right away if your asthma or bronchitis gets worse while you are using this medicine.  If your mouth gets dry try chewing sugarless gum or sucking hard candy. Drink water as directed.  NOTE:This sheet is a summary. It may not cover all possible information. If you have questions about this medicine, talk to your doctor,  pharmacist, or health care provider. Copyright© 2017 [employer]        Prednisone tablets  What is this medicine?  PREDNISONE (PRED ni sone) is a corticosteroid. It is commonly used to treat inflammation of the skin, joints, lungs, and other organs. Common conditions treated include asthma, allergies, and arthritis. It is also used for other conditions, such as blood disorders and diseases of the adrenal glands.  How should I use this medicine?  Take this medicine by mouth with a glass of water. Follow the directions on the prescription label. Take this medicine with food. If you are taking this medicine once a day, take it in the morning. Do not take more medicine than you are told to take. Do not suddenly stop taking your medicine because you may develop a severe reaction. Your doctor will tell you how much medicine to take. If your doctor wants you to stop the medicine, the dose may be slowly lowered over time to avoid any side effects.  Talk to your pediatrician regarding the use of this medicine in children. Special care may be needed.  What side effects may I notice from receiving this medicine?  Side effects that you should report to your doctor or health care professional as soon as possible:  · allergic reactions like skin rash, itching or hives, swelling of the face, lips, or tongue  · changes in emotions or moods  · changes in vision  · depressed mood  · eye pain  · fever or chills, cough, sore throat, pain or difficulty passing urine  · increased thirst  · swelling of ankles, feet  Side effects that usually do not require medical attention (report to your doctor or health care professional if they continue or are bothersome):  · confusion, excitement, restlessness  · headache  · nausea, vomiting  · skin problems, acne, thin and shiny skin  · trouble sleeping  · weight gain  What may interact with this medicine?  Do not take this medicine with any of the following  medications:  · metyrapone  · mifepristone  This medicine may also interact with the following medications:  · aminoglutethimide  · amphotericin B  · aspirin and aspirin-like medicines  · barbiturates  · certain medicines for diabetes, like glipizide or glyburide  · cholestyramine  · cholinesterase inhibitors  · cyclosporine  · digoxin  · diuretics  · ephedrine  · female hormones, like estrogens and birth control pills  · isoniazid  · ketoconazole  · NSAIDS, medicines for pain and inflammation, like ibuprofen or naproxen  · phenytoin  · rifampin  · toxoids  · vaccines  · warfarin  What if I miss a dose?  If you miss a dose, take it as soon as you can. If it is almost time for your next dose, talk to your doctor or health care professional. You may need to miss a dose or take an extra dose. Do not take double or extra doses without advice.  Where should I keep my medicine?  Keep out of the reach of children.  Store at room temperature between 15 and 30 degrees C (59 and 86 degrees F). Protect from light. Keep container tightly closed. Throw away any unused medicine after the expiration date.  What should I tell my health care provider before I take this medicine?  They need to know if you have any of these conditions:  · Cushing's syndrome  · diabetes  · glaucoma  · heart disease  · high blood pressure  · infection (especially a virus infection such as chickenpox, cold sores, or herpes)  · kidney disease  · liver disease  · mental illness  · myasthenia gravis  · osteoporosis  · seizures  · stomach or intestine problems  · thyroid disease  · an unusual or allergic reaction to lactose, prednisone, other medicines, foods, dyes, or preservatives  · pregnant or trying to get pregnant  · breast-feeding  What should I watch for while using this medicine?  Visit your doctor or health care professional for regular checks on your progress. If you are taking this medicine over a prolonged period, carry an identification card  with your name and address, the type and dose of your medicine, and your doctor's name and address.  This medicine may increase your risk of getting an infection. Tell your doctor or health care professional if you are around anyone with measles or chickenpox, or if you develop sores or blisters that do not heal properly.  If you are going to have surgery, tell your doctor or health care professional that you have taken this medicine within the last twelve months.  Ask your doctor or health care professional about your diet. You may need to lower the amount of salt you eat.  This medicine may affect blood sugar levels. If you have diabetes, check with your doctor or health care professional before you change your diet or the dose of your diabetic medicine.  NOTE:This sheet is a summary. It may not cover all possible information. If you have questions about this medicine, talk to your doctor, pharmacist, or health care provider. Copyright© 2017 [employer]

## 2018-06-19 ENCOUNTER — OFFICE VISIT (OUTPATIENT)
Dept: CARDIOLOGY | Facility: CLINIC | Age: 83
End: 2018-06-19
Payer: MEDICARE

## 2018-06-19 ENCOUNTER — LAB VISIT (OUTPATIENT)
Dept: LAB | Facility: HOSPITAL | Age: 83
End: 2018-06-19
Attending: FAMILY MEDICINE
Payer: MEDICARE

## 2018-06-19 ENCOUNTER — OFFICE VISIT (OUTPATIENT)
Dept: INTERNAL MEDICINE | Facility: CLINIC | Age: 83
End: 2018-06-19
Payer: MEDICARE

## 2018-06-19 VITALS
OXYGEN SATURATION: 96 % | TEMPERATURE: 98 F | HEIGHT: 61 IN | WEIGHT: 124.56 LBS | SYSTOLIC BLOOD PRESSURE: 146 MMHG | BODY MASS INDEX: 23.52 KG/M2 | HEART RATE: 60 BPM | DIASTOLIC BLOOD PRESSURE: 84 MMHG

## 2018-06-19 VITALS
HEIGHT: 61 IN | BODY MASS INDEX: 23.41 KG/M2 | DIASTOLIC BLOOD PRESSURE: 72 MMHG | SYSTOLIC BLOOD PRESSURE: 138 MMHG | WEIGHT: 124 LBS | HEART RATE: 58 BPM

## 2018-06-19 DIAGNOSIS — E78.2 MIXED HYPERLIPIDEMIA: Chronic | ICD-10-CM

## 2018-06-19 DIAGNOSIS — I67.3 BINSWANGER'S DEMENTIA: ICD-10-CM

## 2018-06-19 DIAGNOSIS — G45.9 TRANSIENT CEREBRAL ISCHEMIA, UNSPECIFIED TYPE: Primary | ICD-10-CM

## 2018-06-19 DIAGNOSIS — R41.89 IMPAIRED COGNITION: ICD-10-CM

## 2018-06-19 DIAGNOSIS — F33.0 MAJOR DEPRESSIVE DISORDER, RECURRENT EPISODE, MILD: ICD-10-CM

## 2018-06-19 DIAGNOSIS — F41.8 INSOMNIA SECONDARY TO DEPRESSION WITH ANXIETY: ICD-10-CM

## 2018-06-19 DIAGNOSIS — Z09 HOSPITAL DISCHARGE FOLLOW-UP: ICD-10-CM

## 2018-06-19 DIAGNOSIS — K21.9 GASTROESOPHAGEAL REFLUX DISEASE WITHOUT ESOPHAGITIS: ICD-10-CM

## 2018-06-19 DIAGNOSIS — J44.89 ASTHMA WITH COPD: Chronic | ICD-10-CM

## 2018-06-19 DIAGNOSIS — I35.1 NONRHEUMATIC AORTIC VALVE INSUFFICIENCY: ICD-10-CM

## 2018-06-19 DIAGNOSIS — R53.81 PHYSICAL DEBILITY: ICD-10-CM

## 2018-06-19 DIAGNOSIS — I10 ESSENTIAL HYPERTENSION: Chronic | ICD-10-CM

## 2018-06-19 DIAGNOSIS — I35.0 NONRHEUMATIC AORTIC VALVE STENOSIS: ICD-10-CM

## 2018-06-19 DIAGNOSIS — I10 ESSENTIAL HYPERTENSION: Primary | Chronic | ICD-10-CM

## 2018-06-19 DIAGNOSIS — F51.05 INSOMNIA SECONDARY TO DEPRESSION WITH ANXIETY: ICD-10-CM

## 2018-06-19 DIAGNOSIS — R56.9 SEIZURE: ICD-10-CM

## 2018-06-19 DIAGNOSIS — E03.9 HYPOTHYROIDISM, UNSPECIFIED TYPE: Chronic | ICD-10-CM

## 2018-06-19 DIAGNOSIS — R47.02 DYSPHASIA: ICD-10-CM

## 2018-06-19 PROBLEM — G47.9 SLEEP DISTURBANCE: Status: RESOLVED | Noted: 2017-02-07 | Resolved: 2018-06-19

## 2018-06-19 LAB
ANION GAP SERPL CALC-SCNC: 9 MMOL/L
BASOPHILS # BLD AUTO: 0.03 K/UL
BASOPHILS NFR BLD: 0.4 %
BUN SERPL-MCNC: 14 MG/DL
CALCIUM SERPL-MCNC: 9.2 MG/DL
CHLORIDE SERPL-SCNC: 99 MMOL/L
CO2 SERPL-SCNC: 27 MMOL/L
CREAT SERPL-MCNC: 0.8 MG/DL
DIFFERENTIAL METHOD: ABNORMAL
EOSINOPHIL # BLD AUTO: 0 K/UL
EOSINOPHIL NFR BLD: 0 %
ERYTHROCYTE [DISTWIDTH] IN BLOOD BY AUTOMATED COUNT: 15.9 %
EST. GFR  (AFRICAN AMERICAN): >60 ML/MIN/1.73 M^2
EST. GFR  (NON AFRICAN AMERICAN): >60 ML/MIN/1.73 M^2
GLUCOSE SERPL-MCNC: 102 MG/DL
HCT VFR BLD AUTO: 41.2 %
HGB BLD-MCNC: 12.8 G/DL
IMM GRANULOCYTES # BLD AUTO: 0.02 K/UL
IMM GRANULOCYTES NFR BLD AUTO: 0.3 %
LYMPHOCYTES # BLD AUTO: 2.4 K/UL
LYMPHOCYTES NFR BLD: 34.6 %
MCH RBC QN AUTO: 26.9 PG
MCHC RBC AUTO-ENTMCNC: 31.1 G/DL
MCV RBC AUTO: 87 FL
MONOCYTES # BLD AUTO: 0.6 K/UL
MONOCYTES NFR BLD: 8.8 %
NEUTROPHILS # BLD AUTO: 3.8 K/UL
NEUTROPHILS NFR BLD: 55.9 %
NRBC BLD-RTO: 0 /100 WBC
PLATELET # BLD AUTO: 255 K/UL
PMV BLD AUTO: 11.7 FL
POTASSIUM SERPL-SCNC: 3.4 MMOL/L
RBC # BLD AUTO: 4.75 M/UL
SODIUM SERPL-SCNC: 135 MMOL/L
WBC # BLD AUTO: 6.85 K/UL

## 2018-06-19 PROCEDURE — 36415 COLL VENOUS BLD VENIPUNCTURE: CPT | Mod: PO

## 2018-06-19 PROCEDURE — 99999 PR PBB SHADOW E&M-EST. PATIENT-LVL III: CPT | Mod: PBBFAC,,, | Performed by: INTERNAL MEDICINE

## 2018-06-19 PROCEDURE — 99214 OFFICE O/P EST MOD 30 MIN: CPT | Mod: S$GLB,,, | Performed by: FAMILY MEDICINE

## 2018-06-19 PROCEDURE — 3075F SYST BP GE 130 - 139MM HG: CPT | Mod: CPTII,S$GLB,, | Performed by: INTERNAL MEDICINE

## 2018-06-19 PROCEDURE — 99214 OFFICE O/P EST MOD 30 MIN: CPT | Mod: S$GLB,,, | Performed by: INTERNAL MEDICINE

## 2018-06-19 PROCEDURE — 85025 COMPLETE CBC W/AUTO DIFF WBC: CPT

## 2018-06-19 PROCEDURE — 3078F DIAST BP <80 MM HG: CPT | Mod: CPTII,S$GLB,, | Performed by: INTERNAL MEDICINE

## 2018-06-19 PROCEDURE — 3079F DIAST BP 80-89 MM HG: CPT | Mod: CPTII,S$GLB,, | Performed by: FAMILY MEDICINE

## 2018-06-19 PROCEDURE — 80048 BASIC METABOLIC PNL TOTAL CA: CPT

## 2018-06-19 PROCEDURE — 99999 PR PBB SHADOW E&M-EST. PATIENT-LVL III: CPT | Mod: PBBFAC,,, | Performed by: FAMILY MEDICINE

## 2018-06-19 PROCEDURE — 3077F SYST BP >= 140 MM HG: CPT | Mod: CPTII,S$GLB,, | Performed by: FAMILY MEDICINE

## 2018-06-19 NOTE — PROGRESS NOTES
Progress note  Neurology      Neurology follow up:  For: New onset seizure    SUBJECTIVE:      HPI:       She is here to go over the Test results. She is stable.     Past Medical History:   Diagnosis Date    Arthritis     Coronary artery disease     Depression     Encounter for blood transfusion     H/O: GI bleed     Hiatal hernia     Hyperlipidemia     Hypertension     Hypothyroid     Rheumatoid arthritis(714.0)     Stroke     Syncope and collapse      Past Surgical History:   Procedure Laterality Date    TONSILLECTOMY       Family History   Problem Relation Age of Onset    Cancer Mother      breast, uterine     Social History   Substance Use Topics    Smoking status: Former Smoker     Packs/day: 2.00     Years: 16.00     Types: Cigarettes     Quit date: 1/1/1950    Smokeless tobacco: Never Used    Alcohol use 1.8 oz/week     3 Glasses of wine per week      Comment: daily 1/2 bottle wine       Review of patient's allergies indicates:   Allergen Reactions    Sulfa (sulfonamide antibiotics) Shortness Of Breath      Patient Active Problem List   Diagnosis    Rheumatoid arthritis    Hiatal hernia    Asthma with COPD    Hyperlipidemia    Hypertension    Diastolic dysfunction    Osteoarthritis    Rotator cuff arthropathy    H/O: GI bleed    Dysplastic colon polyp    Hypothyroid    Nonrheumatic aortic valve stenosis    Nonrheumatic aortic valve insufficiency    Impaired cognition    Gastroesophageal reflux disease without esophagitis    Major depressive disorder, recurrent episode, mild    Psoriasis with arthropathy    Sleep disturbance    Bilateral adhesive capsulitis of shoulders    Pulmonary emphysema    Insomnia secondary to depression with anxiety    Binswanger's dementia    Seizure         Review of Systems   Constitutional: Positive for malaise/fatigue. Negative for fever and weight loss.   HENT: Negative for hearing loss.    Eyes: Negative for blurred vision, double  vision, photophobia and pain.   Respiratory: Negative for cough.    Cardiovascular: Negative for chest pain.   Gastrointestinal: Negative for abdominal pain, nausea and vomiting.   Genitourinary: Negative for dysuria, frequency and urgency.   Musculoskeletal: Positive for joint pain, myalgias and neck pain. Negative for back pain and falls.   Skin: Negative for itching and rash.   Neurological: Positive for sensory change and seizures. Negative for tingling and headaches.   Psychiatric/Behavioral: Positive for memory loss. Negative for depression.         OBJECTIVE:       Physical Exam   Constitutional: She is oriented to person, place, and time. She appears well-developed and well-nourished.   HENT:   Head: Normocephalic and atraumatic.   Eyes: Conjunctivae and EOM are normal. Pupils are equal, round, and reactive to light.   Neck: Normal range of motion. Neck supple. No JVD present. No tracheal deviation present. No thyromegaly present.   Cardiovascular: Normal rate, regular rhythm and normal heart sounds.    Pulmonary/Chest: Effort normal and breath sounds normal.   Abdominal: She exhibits no distension. There is no tenderness.   Musculoskeletal: She exhibits no edema.        Right shoulder: She exhibits decreased range of motion, bony tenderness, pain and spasm.        Left shoulder: She exhibits decreased range of motion, tenderness, bony tenderness, pain and spasm.   Bilateral shoulder frozen.   Neurological: She is alert and oriented to person, place, and time. She has normal strength and normal reflexes. She displays normal reflexes. No cranial nerve deficit or sensory deficit. She exhibits normal muscle tone. She displays a negative Romberg sign. Coordination and gait normal.   Reflex Scores:       Tricep reflexes are 2+ on the right side and 2+ on the left side.       Bicep reflexes are 2+ on the right side and 2+ on the left side.       Brachioradialis reflexes are 2+ on the right side and 2+ on the left  side.       Patellar reflexes are 2+ on the right side and 2+ on the left side.       Achilles reflexes are 2+ on the right side and 2+ on the left side.  Skin: Skin is warm and dry. No rash noted.   Psychiatric: She has a normal mood and affect. Her speech is normal and behavior is normal. Judgment and thought content normal. Cognition and memory are impaired. She does not express inappropriate judgment. She exhibits abnormal recent memory.       Strength  Deltoids Triceps Biceps Wrist Extension Wrist Flexion Hand    Upper: R 5/5 5/5 5/5 5/5 5/5 5/5    L 5/5 5/5 5/5 5/5 5/5 5/     Iliopsoas Quadriceps Knee  Flexion Tibialis  anterior Gastro- cnemius EHL   Lower: R 5/5 5/5 5/5 5/5 5/5 5/5    L 5/ 5/ 5/5 5/5 5/5 5/         Laboratory:  Lab Results   Component Value Date    WBC 4.70 2018    HGB 12.1 2018    HCT 40.9 2018     2018    CHOL 211 (H) 2018    TRIG 82 2018    HDL 72 2018    ALT 8 (L) 2018    AST 16 2018     2018    K 3.2 (L) 2018     2018    CREATININE 0.9 2018    BUN 12 2018    CO2 24 2018    TSH 1.552 2018    INR 0.9 03/10/2017     MRI of the brain: 2018   Impression       No acute intracranial abnormality.  Cerebral and cerebellar atrophy, extensive areas of chronic white matter small vessel ischemic change, right mastoid effusion, ethmoid and frontal sinusitis, and other findings as above       [id number]  IMPRESSION:  This is a normal awake and drowsy EEG study.   Clinical correlation appreciated.        ASSESSMENT/PLAN:     Assessment:   1.New onset seizure   2. Dementia , possibly AD type.    Patient Active Problem List   Diagnosis    Rheumatoid arthritis    Hiatal hernia    Asthma with COPD    Hyperlipidemia    Hypertension    Diastolic dysfunction    Osteoarthritis    Rotator cuff arthropathy    H/O: GI bleed    Dysplastic colon polyp    Hypothyroid     Nonrheumatic aortic valve stenosis    Nonrheumatic aortic valve insufficiency    Impaired cognition    Gastroesophageal reflux disease without esophagitis    Major depressive disorder, recurrent episode, mild    Psoriasis with arthropathy    Sleep disturbance    Bilateral adhesive capsulitis of shoulders    Pulmonary emphysema    Insomnia secondary to depression with anxiety    Binswanger's dementia    Seizure         Plan: Cont. Current medication and therapy.  Time spent: 30 minutes in face to face discussion concerning diagnosis, prognosis, review of lab and test results, benefits of treatment as well as management of disease, counseling of patient and coordination of care between various health care providers . Greater than half the time spent was used for coordination of care and counseling of patient. This note may have some spelling or wording mistakes which might have been overlooked during proof reading.

## 2018-06-19 NOTE — PROGRESS NOTES
Subjective:   Patient ID:  Crystal Romero is a 83 y.o. female who presents for follow up of Hospital Follow Up      82, yo female, came in for routine f/u. Severe dementia and discussed with the her Shannan Lockwood.  PMH moderate AI, and moderate AS, dementia 3 yrs, HTN, HLD, RA, TIA x3 in 4 months  Had TIA/seizure episode last week and admitted to [facility]. Had impaired swallow and dysphasia. F/u with  neurologist.  C/o fatigue, decreased appetite related to dementia.   Denied chest pain, dyspnea  Confused at night.    Repeat ECHO in , showed normal EF, DD, moderate AI and moderate AS.   Refused surgery for valve per POA.  Decreased activity, appetite, sleeps 18 hours a day.  No leg swelling, chest pain,and syncope  Dyspnea during the dressing. Easily tired,           Past Medical History:   Diagnosis Date    Arthritis     Coronary artery disease     Depression     Encounter for blood transfusion     H/O: GI bleed     Hiatal hernia     Hyperlipidemia     Hypertension     Hypothyroid     Rheumatoid arthritis(714.0)     Stroke     Syncope and collapse        Past Surgical History:   Procedure Laterality Date    TONSILLECTOMY         Social History   Substance Use Topics    Smoking status: Former Smoker     Packs/day: 2.00     Years: 16.00     Types: Cigarettes     Quit date: 1/1/1950    Smokeless tobacco: Never Used    Alcohol use 1.8 oz/week     3 Glasses of wine per week      Comment: daily 1/2 bottle wine       Family History   Problem Relation Age of Onset    Cancer Mother         breast, uterine         Review of Systems   Constitution: Positive for decreased appetite and fever.   HENT: Negative.    Eyes: Negative.    Cardiovascular: Positive for dyspnea on exertion. Negative for chest pain.   Respiratory: Negative.    Endocrine: Negative.    Hematologic/Lymphatic: Negative.    Skin: Negative.    Musculoskeletal: Positive for back pain and joint pain.   Gastrointestinal:  Negative.    Genitourinary: Negative.    Psychiatric/Behavioral: Positive for hallucinations and memory loss.   Allergic/Immunologic: Negative.        Objective:   Physical Exam   Constitutional: She is oriented to person, place, and time. She appears well-nourished.   HENT:   Head: Normocephalic.   Eyes: Pupils are equal, round, and reactive to light.   Neck: Normal carotid pulses and no JVD present. Carotid bruit is not present. No thyromegaly present.   Cardiovascular: Normal rate, regular rhythm and normal pulses.   No extrasystoles are present. PMI is not displaced.  Exam reveals no gallop and no S3.    Murmur heard.  Pulses:       Radial pulses are 2+ on the right side, and 2+ on the left side.   3/6 ESM on RUSB, S2 not audible.   Pulmonary/Chest: Breath sounds normal. No stridor. No respiratory distress.   Abdominal: Soft. Bowel sounds are normal. There is no tenderness. There is no rebound.   Musculoskeletal: Normal range of motion.   Neurological: She is alert and oriented to person, place, and time.   Skin: Skin is intact. No rash noted.   Psychiatric: Her behavior is normal.       Lab Results   Component Value Date    CHOL 211 (H) 04/04/2018    CHOL 169 07/11/2017    CHOL 180 06/17/2016     Lab Results   Component Value Date    HDL 72 04/04/2018    HDL 58 07/11/2017    HDL 87 (H) 06/17/2016     Lab Results   Component Value Date    LDLCALC 122.6 04/04/2018    LDLCALC 95.2 07/11/2017    LDLCALC 78.6 06/17/2016     Lab Results   Component Value Date    TRIG 82 04/04/2018    TRIG 79 07/11/2017    TRIG 72 06/17/2016     Lab Results   Component Value Date    CHOLHDL 34.1 04/04/2018    CHOLHDL 34.3 07/11/2017    CHOLHDL 48.3 06/17/2016       Chemistry        Component Value Date/Time     04/04/2018 1554    K 3.2 (L) 04/04/2018 1554     04/04/2018 1554    CO2 24 04/04/2018 1554    BUN 12 04/04/2018 1554    CREATININE 0.8 05/02/2018 1505    GLU 87 04/04/2018 1554        Component Value Date/Time     CALCIUM 9.4 04/04/2018 1554    ALKPHOS 52 (L) 04/04/2018 1554    AST 16 04/04/2018 1554    ALT 8 (L) 04/04/2018 1554    BILITOT 0.4 04/04/2018 1554    ESTGFRAFRICA >60.0 05/02/2018 1505    EGFRNONAA >60.0 05/02/2018 1505          Lab Results   Component Value Date    TSH 1.552 04/04/2018     Lab Results   Component Value Date    INR 0.9 03/10/2017    INR 1.0 09/23/2014     Lab Results   Component Value Date    WBC 4.70 04/04/2018    HGB 12.1 04/04/2018    HCT 40.9 04/04/2018    MCV 89 04/04/2018     04/04/2018     BMP  Sodium   Date Value Ref Range Status   04/04/2018 141 136 - 145 mmol/L Final     Potassium   Date Value Ref Range Status   04/04/2018 3.2 (L) 3.5 - 5.1 mmol/L Final     Chloride   Date Value Ref Range Status   04/04/2018 103 95 - 110 mmol/L Final     CO2   Date Value Ref Range Status   04/04/2018 24 23 - 29 mmol/L Final     BUN, Bld   Date Value Ref Range Status   04/04/2018 12 8 - 23 mg/dL Final     Creatinine   Date Value Ref Range Status   05/02/2018 0.8 0.5 - 1.4 mg/dL Final     Calcium   Date Value Ref Range Status   04/04/2018 9.4 8.7 - 10.5 mg/dL Final     Anion Gap   Date Value Ref Range Status   04/04/2018 14 8 - 16 mmol/L Final     eGFR if    Date Value Ref Range Status   05/02/2018 >60.0 >60 mL/min/1.73 m^2 Final     eGFR if non    Date Value Ref Range Status   05/02/2018 >60.0 >60 mL/min/1.73 m^2 Final     Comment:     Calculation used to obtain the estimated glomerular filtration  rate (eGFR) is the CKD-EPI equation.        CrCl cannot be calculated (Patient's most recent lab result is older than the maximum 7 days allowed.).     Assessment:      1. Essential hypertension    2. Mixed hyperlipidemia    3. Nonrheumatic aortic valve stenosis    4. Nonrheumatic aortic valve insufficiency    5. Impaired cognition    6. Seizure    7. Dysphasia      Had recent admission for TIA/seizure.  Dx of dysphasia and on pureed diet.  SBP goal ok to keep at 140 to 150  mmHg given her advanced age and co morbidities.    Plan:   D/c Statin due to dysphasia and severe dementia  Continue ASA, Verapamil, Lipinopril and BB.  Fall precaution  Nutrition support  RTC in 3 months           8

## 2018-06-19 NOTE — PROGRESS NOTES
Subjective:       Patient ID: Crystal Romero is a 83 y.o. female.    Chief Complaint: Hospital Follow Up    83-year-old female patient accompanied by her daughter with Patient Active Problem List:     Rheumatoid arthritis     Hiatal hernia     Asthma with COPD     Hyperlipidemia     Hypertension     Diastolic dysfunction     Osteoarthritis     Rotator cuff arthropathy     H/O: GI bleed     Dysplastic colon polyp     Hypothyroid     Nonrheumatic aortic valve stenosis     Nonrheumatic aortic valve insufficiency     Impaired cognition     Gastroesophageal reflux disease without esophagitis     Major depressive disorder, recurrent episode, mild     Psoriasis with arthropathy     Bilateral adhesive capsulitis of shoulders     Pulmonary emphysema     Insomnia secondary to depression with anxiety     Binswanger's dementia     Seizure     Vasculitis, CNS  Here for hospital discharge follow-up from [facility] 6/10-6/12/18.   Patient went to the hospital secondary to aphasia and confusion, unable to follow commands secondary to dementia.  Daughter reported that when she went to feet hurt the dinner patient had a brief episode of unresponsiveness and slurring of speech, with some tremors and jerking movements to her upper extremities, and she fell back on the bed with her eyes rolling upwards  No bowel or bladder incontinence noted  When she came to ED should was back to her baseline  CT did not show any acute changes    As patient had similar episode in the past she has already been evaluated outpatient by neurologist here and had MRI and EEG which did not reveal  any etiology .  Patient secondary to severe aortic stenosis has been followed by Cardiology and did not want to choose to have any intervention    Patient's symptoms improved while she was in the hospital, did not have any seizure-like activity  Had echocardiogram and carotid Doppler which was unrevealing  Patient was placed on aspirin and  "statins  Patient was advised to discontinue Crestor 10 mg and start taking Lipitor 20 mg, patient has not started yet and would like to discuss with cardiologist today    Had hyponatremia and hypokalemia which was replaced    Patient was not Aricept but has been discontinued by Neurology here and has been placed on Keppra, to see whether seizure-like activity existed, and is currently being treated as seizures.   Patient is closely followed by pulmonology and Neurology at this time  Patient is getting home health services for speech therapy advised to eat pureed diet as she had minimal aspiration.     Patient reports that she has decreased appetite and weakness secondary to recent hospitalization.         Review of Systems   Constitutional: Positive for activity change, appetite change and fatigue.   Eyes: Negative for visual disturbance.   Respiratory: Negative for shortness of breath.    Cardiovascular: Negative for chest pain and leg swelling.   Gastrointestinal: Negative for abdominal pain, nausea and vomiting.   Musculoskeletal: Negative for myalgias.   Skin: Negative for rash.   Neurological: Negative for seizures, syncope, speech difficulty, weakness, light-headedness and headaches.   Psychiatric/Behavioral: Positive for decreased concentration and dysphoric mood. Negative for sleep disturbance.         BP (!) 146/84 (BP Location: Right arm, Patient Position: Sitting)   Pulse 60   Temp 97.7 °F (36.5 °C) (Tympanic)   Ht 5' 1" (1.549 m)   Wt 56.5 kg (124 lb 9 oz)   SpO2 96%   BMI 23.54 kg/m²   Objective:      Physical Exam   Constitutional: She is oriented to person, place, and time. She appears well-developed and well-nourished.   HENT:   Head: Normocephalic and atraumatic.   Mouth/Throat: Oropharynx is clear and moist.   Cardiovascular: Normal rate and regular rhythm.    Murmur heard.  Pulmonary/Chest: Effort normal and breath sounds normal. She has no wheezes.   Abdominal: Soft. Bowel sounds are " normal. There is no tenderness.   Musculoskeletal: She exhibits no edema.   Neurological: She is alert and oriented to person, place, and time.   In wheelchair today   Skin: Skin is warm and dry. No rash noted.   Psychiatric: She has a normal mood and affect.         Assessment:       1. Transient cerebral ischemia, unspecified type    2. Seizure    3. Binswanger's dementia    4. Hospital discharge follow-up    5. 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 Impaired cognition    7. Essential hypertension    8. Mixed hyperlipidemia    9. Asthma with COPD    10. Major depressive disorder, recurrent episode, mild    11. Gastroesophageal reflux disease without esophagitis    12. Hypothyroidism, unspecified type    13. Nonrheumatic aortic valve stenosis    14. Nonrheumatic aortic valve insufficiency    15. Physical debility        Plan:   Transient cerebral ischemia, unspecified type  -     SUBSEQUENT HOME HEALTH ORDERS  Seizure  -     SUBSEQUENT HOME HEALTH ORDERS  Binswanger's dementia  -     CBC auto differential; Future; Expected date: 06/19/2018  -     SUBSEQUENT HOME HEALTH ORDERS  Hospital discharge follow-up  -     CBC auto differential; Future; Expected date: 06/19/2018  -     Basic metabolic panel; Future; Expected date: 06/19/2018  -     SUBSEQUENT HOME HEALTH ORDERS  Insomnia secondary to depression with anxiety-on trazodone 100 mg daily  Impaired cognition    Reviewed hospital records from [facility] and reviewed pulmonology and Neurology  Patient was advised to discuss further with Cardiology today as scheduled as blood pressure is still mildly elevated and to see, whether patient should still continue lisinopril 10 mg twice daily or should changed to 20 mg daily and continue other blood pressure medication  Start aspirin 81 mg daily  Discuss further with Cardiology regarding changing Crestor to Lipitor as recommended by hospitalist at the time of discharge  Continue close follow-up with  Neurology Cardiology and pulmonology at this time  Secondary to weakness will start physical therapy  Encouraged to eat protein rich diet secondary to decreased appetite to combat fatigue    Essential hypertension-blood pressure mildly elevated currently taking lisinopril 10 mg twice daily, metoprolol 25 mg daily and verapamil 240 mg daily    Mixed hyperlipidemia-currently on Crestor 10 mg daily discuss further with Cardiology regarding changing to Lipitor 20 mg as prescribed recently    Asthma with COPD  -     CBC auto differential; Future; Expected date: 06/19/2018  Stable followed by pulmonology continue current inhalers    Major depressive disorder, recurrent episode, mild-taking Zoloft 50 mg daily    Gastroesophageal reflux disease without esophagitis-stable on pantoprazole 40 mg daily     Hypothyroidism, unspecified type- currently on levothyroxine 25 mcg p.o. daily    Nonrheumatic aortic valve stenosis  Nonrheumatic aortic valve insufficiency  Patient does not want to get any further intervention done secondary to moderate to severe aortic valve stenosis and valve insufficiency in the mitral valve  To discuss further with Cardiology    Physical debility  -     SUBSEQUENT HOME HEALTH ORDERS  Will start physical therapy for secondary generalized weakness

## 2018-06-20 ENCOUNTER — TELEPHONE (OUTPATIENT)
Dept: INTERNAL MEDICINE | Facility: CLINIC | Age: 83
End: 2018-06-20

## 2018-06-20 DIAGNOSIS — E87.6 HYPOKALEMIA: ICD-10-CM

## 2018-06-20 RX ORDER — POTASSIUM CHLORIDE 750 MG/1
10 TABLET, EXTENDED RELEASE ORAL 3 TIMES DAILY
Qty: 90 TABLET | Refills: 3 | Status: SHIPPED | OUTPATIENT
Start: 2018-06-20 | End: 2018-08-30 | Stop reason: DRUGHIGH

## 2018-06-20 NOTE — TELEPHONE ENCOUNTER
Spoke with pt daughter (Shannan), advised that potassium levels were low and that Dr. Herring wants her to increase her potassium supplements from twice a day to three times a day. Advised that all other labs were stable, pt verbalized understanding. TITI

## 2018-07-04 ENCOUNTER — TELEPHONE (OUTPATIENT)
Dept: ADMINISTRATIVE | Facility: CLINIC | Age: 83
End: 2018-07-04

## 2018-07-09 ENCOUNTER — TELEPHONE (OUTPATIENT)
Dept: INTERNAL MEDICINE | Facility: CLINIC | Age: 83
End: 2018-07-09

## 2018-07-09 NOTE — TELEPHONE ENCOUNTER
Spoke with pt's daughter, stated that she wanted to know if Dr. Herring received fax from home health. Advised that I will double check with Dr. Herring and call her back. TITI

## 2018-07-10 ENCOUNTER — TELEPHONE (OUTPATIENT)
Dept: INTERNAL MEDICINE | Facility: CLINIC | Age: 83
End: 2018-07-10

## 2018-07-10 NOTE — TELEPHONE ENCOUNTER
Returned call to pt's daughter (Shannan) with a response from Dr. Herring regarding the issue with pt not eating and having some dysphagia. No answer and unable to leave a message as phone line just continued to ring. Will call back at later time. TNJ            Please advise patient to see Gastroenterology if she has not been eating and experiencing some dysphagia for further evaluation, currently taking acid reflux medication pantoprazole 40 mg daily

## 2018-07-12 ENCOUNTER — OFFICE VISIT (OUTPATIENT)
Dept: PULMONOLOGY | Facility: CLINIC | Age: 83
End: 2018-07-12
Payer: MEDICARE

## 2018-07-12 ENCOUNTER — HOSPITAL ENCOUNTER (OUTPATIENT)
Dept: RADIOLOGY | Facility: HOSPITAL | Age: 83
Discharge: HOME | End: 2018-07-12
Attending: INTERNAL MEDICINE
Payer: MEDICARE

## 2018-07-12 VITALS
HEART RATE: 67 BPM | OXYGEN SATURATION: 95 % | HEIGHT: 61 IN | SYSTOLIC BLOOD PRESSURE: 122 MMHG | DIASTOLIC BLOOD PRESSURE: 70 MMHG | BODY MASS INDEX: 23.47 KG/M2 | WEIGHT: 124.31 LBS | RESPIRATION RATE: 16 BRPM

## 2018-07-12 DIAGNOSIS — R13.12 OROPHARYNGEAL DYSPHAGIA: Primary | ICD-10-CM

## 2018-07-12 DIAGNOSIS — J45.31 MILD PERSISTENT ASTHMA WITH ACUTE EXACERBATION: ICD-10-CM

## 2018-07-12 DIAGNOSIS — T17.900D PULMONARY ASPIRATION, SUBSEQUENT ENCOUNTER: ICD-10-CM

## 2018-07-12 DIAGNOSIS — R63.4 WEIGHT LOSS, NON-INTENTIONAL: ICD-10-CM

## 2018-07-12 PROCEDURE — 99999 PR PBB SHADOW E&M-EST. PATIENT-LVL V: CPT | Mod: PBBFAC,,, | Performed by: INTERNAL MEDICINE

## 2018-07-12 PROCEDURE — 71046 X-RAY EXAM CHEST 2 VIEWS: CPT | Mod: 26,,, | Performed by: RADIOLOGY

## 2018-07-12 PROCEDURE — 99215 OFFICE O/P EST HI 40 MIN: CPT | Mod: S$GLB,,, | Performed by: INTERNAL MEDICINE

## 2018-07-12 PROCEDURE — 71046 X-RAY EXAM CHEST 2 VIEWS: CPT | Mod: TC,FY,PO

## 2018-07-12 PROCEDURE — 3074F SYST BP LT 130 MM HG: CPT | Mod: CPTII,S$GLB,, | Performed by: INTERNAL MEDICINE

## 2018-07-12 PROCEDURE — 3078F DIAST BP <80 MM HG: CPT | Mod: CPTII,S$GLB,, | Performed by: INTERNAL MEDICINE

## 2018-07-12 NOTE — PROGRESS NOTES
Subjective:       Patient ID: Crystal Romero is a 83 y.o. female.    Chief Complaint: She       Asthma    HPI   Dysphagia  Patient complains of difficulty swallowing. The dysphagia occurs with both solids and liquids Symptoms have been present for approximately 3 months. The symptoms are gradually worsening. The dysphagia has been persistent and has not been progressive.  She fullness in esophagus, mid chest.  She denies melena, hematochezia, hematemesis, and coffee ground emesis.  This has been associated with chest pain, choking on food, cough, difficulty swallowing, dysphagia, early satiety, midespigastric pain and unexpected weight loss.  She denies hematemesis.    Dysphagia  Asthma improved   Weight loss of 5 lbs  Patient remains pleasantly demented and most of history is obtained from daughter      Past Medical History:   Diagnosis Date    Arthritis     Coronary artery disease     Depression     Encounter for blood transfusion     H/O: GI bleed     Hiatal hernia     Hyperlipidemia     Hypertension     Hypothyroid     Rheumatoid arthritis(714.0)     Stroke     Syncope and collapse      Past Surgical History:   Procedure Laterality Date    TONSILLECTOMY       Social History     Social History    Marital status:      Spouse name: N/A    Number of children: 2    Years of education: N/A     Occupational History    Not on file.     Social History Main Topics    Smoking status: Former Smoker     Packs/day: 2.00     Years: 16.00     Types: Cigarettes     Quit date: 1/1/1950    Smokeless tobacco: Never Used    Alcohol use 1.8 oz/week     3 Glasses of wine per week      Comment: daily 1/2 bottle wine    Drug use: No    Sexual activity: No     Other Topics Concern    Not on file     Social History Narrative    Lives with daughter     Review of Systems   Constitutional: Positive for weight loss, fatigue and weakness.   HENT: Positive for trouble swallowing.    Respiratory: Positive  for cough, choking and shortness of breath.    Cardiovascular: Negative.    Genitourinary: Negative.    Musculoskeletal: Positive for arthralgias.   Gastrointestinal: Negative.    Neurological: Positive for dizziness, weakness and light-headedness.   Psychiatric/Behavioral: Positive for confusion.       Objective:      Physical Exam   Constitutional: She is oriented to person, place, and time. She appears well-developed and well-nourished.   HENT:   Head: Normocephalic and atraumatic.   Eyes: Conjunctivae are normal. Pupils are equal, round, and reactive to light.   Neck: Neck supple. No JVD present. No tracheal deviation present. No thyromegaly present.   Cardiovascular: Normal rate, regular rhythm and normal heart sounds.    Pulmonary/Chest: Effort normal. No respiratory distress. She has decreased breath sounds in the right lower field and the left lower field. She has no wheezes. She has no rales. She exhibits no tenderness.   Abdominal: Soft. Bowel sounds are normal.   Musculoskeletal: Normal range of motion. She exhibits no edema.   Lymphadenopathy:     She has no cervical adenopathy.   Neurological: She is alert and oriented to person, place, and time.   Skin: Skin is warm and dry.   Nursing note and vitals reviewed.    Personal Diagnostic Review  Chest x-ray: slight elevateion of the right diaphragm  Office Spirometry Results:     No flowsheet data found.  Pulmonary Studies Review 7/12/2018   SpO2 95   Height 61.000   Weight 1989.43   BMI (Calculated) 23.5   Predicted Distance 247.47   Predicted Distance Meters (Calculated) 384.94         Assessment:       1. Oropharyngeal dysphagia    2. Weight loss, non-intentional        Outpatient Encounter Prescriptions as of 7/12/2018   Medication Sig Dispense Refill    acetaminophen (TYLENOL) 500 MG tablet Take 500 mg by mouth every 6 (six) hours as needed for Pain.      albuterol (PROVENTIL) 2.5 mg /3 mL (0.083 %) nebulizer solution Take 3 mLs (2.5 mg total) by  nebulization every 6 (six) hours while awake. 300 mL 11    albuterol (VENTOLIN HFA) 90 mcg/actuation inhaler Inhale 2 puffs into the lungs every 4 (four) hours as needed for Wheezing or Shortness of Breath. Rescue 18 g 10    aspirin (ECOTRIN) 81 MG EC tablet Take 81 mg by mouth once daily.      cetirizine (ZYRTEC) 10 MG tablet Take 10 mg by mouth once daily.      fluticasone (FLONASE) 50 mcg/actuation nasal spray 2 sprays (100 mcg total) by Each Nare route once daily. 1 Bottle 11    folic acid (FOLVITE) 1 MG tablet TAKE ONE TABLET BY MOUTH ONE TIME DAILY 90 tablet 0    INHALER, ASSIST DEVICES (AEROCHAMBER PLUS FLOW-VU MISC) by Misc.(Non-Drug; Combo Route) route.      levETIRAcetam (KEPPRA) 500 MG Tab Take 1 tablet (500 mg total) by mouth 2 (two) times daily. 60 tablet 11    levothyroxine (SYNTHROID) 25 MCG tablet TAKE ONE TABLET BY MOUTH ONE TIME DAILY BEFORE BREAKFAST 30 tablet 2    lisinopril 10 MG tablet TAKE ONE TABLET BY MOUTH ONE TIME DAILY (Patient taking differently: TAKE ONE TABLET BY MOUTH TWICE DAILY) 90 tablet 1    metoprolol succinate (TOPROL-XL) 25 MG 24 hr tablet Take 1 tablet (25 mg total) by mouth once daily. 30 tablet 2    pantoprazole (PROTONIX) 40 MG tablet Take 1 tablet (40 mg total) by mouth once daily. 30 tablet 2    potassium chloride (KLOR-CON) 10 MEQ TbSR Take 1 tablet (10 mEq total) by mouth 3 (three) times daily. 90 tablet 3    sertraline (ZOLOFT) 50 MG tablet Take 1 tablet (50 mg total) by mouth once daily. 30 tablet 2    traMADol (ULTRAM) 50 mg tablet Take 1 tablet (50 mg total) by mouth once daily. (Patient taking differently: Take 50 mg by mouth once daily. ) 90 tablet 0    traZODone (DESYREL) 100 MG tablet Take 1 tablet (100 mg total) by mouth every evening. 30 tablet 2    verapamil (VERELAN) 240 MG C24P TAKE ONE CAPSULE BY MOUTH ONE TIME DAILY 90 capsule 0    VITAMIN B COMPLEX (SUPER B COMPLEX-B-12 ORAL) Take by mouth.      predniSONE (DELTASONE) 20 MG tablet  Prednisone 60 mg/ day for 3 days, 40 mg/day for 3 days,20 mg/ day for 3 days, (1/2 tablet )10 mg a day for 3 days. 20 tablet 0     No facility-administered encounter medications on file as of 7/12/2018.      Orders Placed This Encounter   Procedures    Ambulatory consult to Gastroenterology     Referral Priority:   Routine     Referral Type:   Consultation     Referral Reason:   Specialty Services Required     Requested Specialty:   Gastroenterology     Number of Visits Requested:   1     Plan:       Requested Prescriptions      No prescriptions requested or ordered in this encounter     Oropharyngeal dysphagia  -     Ambulatory consult to Gastroenterology    Weight loss, non-intentional  -     Ambulatory consult to Gastroenterology           Follow-up in about 2 months (around 9/12/2018).    MEDICAL DECISION MAKING: Moderate to high complexity.  Overall, the multiple problems listed are of moderate to high severity that may impact quality of life and activities of daily living. Side effects of medications, treatment plan as well as options and alternatives reviewed and discussed with patient. There was counseling of patient concerning these issues.    Total time spent in face to face counseling and coordination of care - 40  minutes over 50% of time was used in discussion of prognosis, risks, benefits of treatment, instructions and compliance with regimen . Discussion with other physicians or health care providers (DME, NP, pharmacy, respiratory therapy) occurred.

## 2018-07-19 ENCOUNTER — TELEPHONE (OUTPATIENT)
Dept: NEUROLOGY | Facility: CLINIC | Age: 83
End: 2018-07-19

## 2018-07-25 ENCOUNTER — TELEPHONE (OUTPATIENT)
Dept: ADMINISTRATIVE | Facility: CLINIC | Age: 83
End: 2018-07-25

## 2018-07-25 NOTE — TELEPHONE ENCOUNTER
Home Health Recerrt 07/02/2018 to 08/30/2018 [facility] SN, PT, ST services.

## 2018-07-30 ENCOUNTER — TELEPHONE (OUTPATIENT)
Dept: INTERNAL MEDICINE | Facility: CLINIC | Age: 83
End: 2018-07-30

## 2018-07-30 NOTE — TELEPHONE ENCOUNTER
----- Message from Skyler Quintero sent at 7/30/2018  2:49 PM CDT -----  Contact: Miranda---Physical Theraphy   Miranda need a order to continue PT for 2 times a a week for the next 5 weeks [phone]

## 2018-07-31 ENCOUNTER — LAB VISIT (OUTPATIENT)
Dept: LAB | Facility: HOSPITAL | Age: 83
End: 2018-07-31
Attending: PSYCHIATRY & NEUROLOGY
Payer: MEDICARE

## 2018-07-31 DIAGNOSIS — Z51.81 ENCOUNTER FOR THERAPEUTIC DRUG MONITORING: Primary | ICD-10-CM

## 2018-07-31 PROCEDURE — 80177 DRUG SCRN QUAN LEVETIRACETAM: CPT

## 2018-08-02 ENCOUNTER — TELEPHONE (OUTPATIENT)
Dept: NEUROLOGY | Facility: CLINIC | Age: 83
End: 2018-08-02

## 2018-08-02 LAB — LEVETIRACETAM SERPL-MCNC: 5.7 UG/ML (ref 3–60)

## 2018-08-05 DIAGNOSIS — L40.50 PSORIASIS WITH ARTHROPATHY: ICD-10-CM

## 2018-08-06 DIAGNOSIS — I10 ESSENTIAL HYPERTENSION: Chronic | ICD-10-CM

## 2018-08-06 RX ORDER — FOLIC ACID 1 MG/1
TABLET ORAL
Qty: 90 TABLET | Refills: 0 | Status: SHIPPED | OUTPATIENT
Start: 2018-08-06 | End: 2018-12-03 | Stop reason: SDUPTHER

## 2018-08-06 RX ORDER — LISINOPRIL 10 MG/1
TABLET ORAL
Qty: 90 TABLET | Refills: 0 | Status: SHIPPED | OUTPATIENT
Start: 2018-08-06 | End: 2018-08-30

## 2018-08-29 ENCOUNTER — PATIENT MESSAGE (OUTPATIENT)
Dept: CARDIOLOGY | Facility: CLINIC | Age: 83
End: 2018-08-29

## 2018-08-29 ENCOUNTER — TELEPHONE (OUTPATIENT)
Dept: NEUROLOGY | Facility: CLINIC | Age: 83
End: 2018-08-29

## 2018-08-29 ENCOUNTER — PATIENT MESSAGE (OUTPATIENT)
Dept: NEUROLOGY | Facility: CLINIC | Age: 83
End: 2018-08-29

## 2018-08-29 ENCOUNTER — PATIENT MESSAGE (OUTPATIENT)
Dept: INTERNAL MEDICINE | Facility: CLINIC | Age: 83
End: 2018-08-29

## 2018-08-29 NOTE — TELEPHONE ENCOUNTER
----- Message from Sarai Valdes sent at 8/29/2018  2:46 PM CDT -----  Contact: Shannan  Call Shannan @[phone] pt had a mini TIA and EMS was called Shannan just wanted to call and alert  and would like to know if she needs to schedule follow up.

## 2018-08-29 NOTE — TELEPHONE ENCOUNTER
Spoke with andrea carlin, Ms. Romero's daughter, about the incident set up appt with  for 8/30/18 at 11:20 am

## 2018-08-30 ENCOUNTER — TELEPHONE (OUTPATIENT)
Dept: NEUROLOGY | Facility: CLINIC | Age: 83
End: 2018-08-30

## 2018-08-30 ENCOUNTER — OFFICE VISIT (OUTPATIENT)
Dept: INTERNAL MEDICINE | Facility: CLINIC | Age: 83
End: 2018-08-30
Payer: MEDICARE

## 2018-08-30 VITALS
DIASTOLIC BLOOD PRESSURE: 78 MMHG | OXYGEN SATURATION: 99 % | SYSTOLIC BLOOD PRESSURE: 110 MMHG | HEART RATE: 67 BPM | TEMPERATURE: 98 F | WEIGHT: 132.69 LBS | HEIGHT: 61 IN | BODY MASS INDEX: 25.05 KG/M2

## 2018-08-30 DIAGNOSIS — E03.9 HYPOTHYROIDISM, UNSPECIFIED TYPE: Chronic | ICD-10-CM

## 2018-08-30 DIAGNOSIS — Z86.73 HISTORY OF RECURRENT TIAS: Primary | ICD-10-CM

## 2018-08-30 DIAGNOSIS — R56.9 SEIZURE: ICD-10-CM

## 2018-08-30 DIAGNOSIS — K21.9 GASTROESOPHAGEAL REFLUX DISEASE, ESOPHAGITIS PRESENCE NOT SPECIFIED: ICD-10-CM

## 2018-08-30 DIAGNOSIS — E87.6 HYPOKALEMIA: ICD-10-CM

## 2018-08-30 DIAGNOSIS — I10 ESSENTIAL HYPERTENSION: Chronic | ICD-10-CM

## 2018-08-30 PROCEDURE — 99213 OFFICE O/P EST LOW 20 MIN: CPT | Mod: S$GLB,,, | Performed by: FAMILY MEDICINE

## 2018-08-30 PROCEDURE — 99999 PR PBB SHADOW E&M-EST. PATIENT-LVL III: CPT | Mod: PBBFAC,,, | Performed by: FAMILY MEDICINE

## 2018-08-30 PROCEDURE — 3078F DIAST BP <80 MM HG: CPT | Mod: CPTII,S$GLB,, | Performed by: FAMILY MEDICINE

## 2018-08-30 PROCEDURE — 3074F SYST BP LT 130 MM HG: CPT | Mod: CPTII,S$GLB,, | Performed by: FAMILY MEDICINE

## 2018-08-30 RX ORDER — POTASSIUM CHLORIDE 1.5 G/1.58G
20 POWDER, FOR SOLUTION ORAL DAILY
Qty: 30 PACKET | Refills: 3 | Status: SHIPPED | OUTPATIENT
Start: 2018-08-30 | End: 2018-12-09 | Stop reason: SDUPTHER

## 2018-08-30 RX ORDER — LISINOPRIL 20 MG/1
20 TABLET ORAL DAILY
COMMUNITY
End: 2018-09-16 | Stop reason: SDUPTHER

## 2018-08-30 RX ORDER — PANTOPRAZOLE SODIUM 40 MG/1
40 TABLET, DELAYED RELEASE ORAL DAILY
Qty: 90 TABLET | Refills: 3 | Status: SHIPPED | OUTPATIENT
Start: 2018-08-30 | End: 2018-12-05

## 2018-08-30 NOTE — TELEPHONE ENCOUNTER
----- Message from Edwin Ambrosio sent at 8/30/2018  9:23 AM CDT -----  Contact: [facility] nursse   Caller Is requesting a call back from the nurse in regards to a suspected TIA event yesterday.  [phone]

## 2018-08-30 NOTE — PROGRESS NOTES
Subjective:       Patient ID: Crystal Romero is a 83 y.o. female.    Chief Complaint: follow up tia incident    83-year-old female patient accompanied by her daughter with Patient Active Problem List:     Rheumatoid arthritis     Hiatal hernia     Asthma with COPD     Hyperlipidemia     Hypertension     Diastolic dysfunction     Osteoarthritis     Rotator cuff arthropathy     H/O: GI bleed     Dysplastic colon polyp     Hypothyroid     Nonrheumatic aortic valve stenosis     Nonrheumatic aortic valve insufficiency     Impaired cognition     Gastroesophageal reflux disease without esophagitis     Major depressive disorder, recurrent episode, mild     Psoriasis with arthropathy     Bilateral adhesive capsulitis of shoulders     Pulmonary emphysema     Insomnia secondary to depression with anxiety     Binswanger's dementia     Seizure     Vasculitis, CNS     Dysphasia  Here for follow-up on TIA incident yesterday.  Patient daughter reported that she was at home and her blood pressures went up to 165/110, home health nurse was there at the time of incident, patient got short of breath and was leaning more towards the right side, was having a stiff gait and feeling clammy, EMS was called and had EKG which was normal.   Within an hour all her symptoms subsided and blood pressure has stabilized  Patient's daughter reported that this is her 4th incident of TIA, patient and family decided not to go to ER for complete evaluation as they had similar episodes in the past  Patient's daughter has called Cardiology and Neurology, and was informed by cardiologist to follow up with Neurology recommendations and neurologist mentioned that, she need not be seen if she does not have any symptoms at this time  Patient has been feeling better  Home health physical therapy has been extended  Patient was able to eat scrambled eggs this morning well without having any difficulty with swallowing, has been eating soft diet  Denies  "any seizures lately  Requesting refill on Protonix for 90 day supply  Patient not able to take potassium supplements 10 mEq 3 times daily and requesting oral pack which can be mixed in liquid  Patient has been taking verapamil but would like to know whether it can be crushed as it is a big pill  Denies any chest pain or shortness of breath or dizziness      Review of Systems   Constitutional: Negative for fatigue.   Eyes: Negative for visual disturbance.   Respiratory: Negative for shortness of breath.    Cardiovascular: Negative for chest pain, palpitations and leg swelling.   Gastrointestinal: Negative for abdominal pain, nausea and vomiting.   Musculoskeletal: Positive for back pain and myalgias.   Skin: Negative for rash.   Neurological: Negative for seizures, weakness, light-headedness, numbness and headaches.   Psychiatric/Behavioral: Negative for sleep disturbance.         /78 (BP Location: Left arm, Patient Position: Sitting)   Pulse 67   Temp 97.9 °F (36.6 °C) (Oral)   Ht 5' 1" (1.549 m)   Wt 60.2 kg (132 lb 11.5 oz)   SpO2 99%   BMI 25.08 kg/m²   Objective:      Physical Exam   Constitutional: She is oriented to person, place, and time. She appears well-developed and well-nourished.   HENT:   Head: Normocephalic and atraumatic.   Mouth/Throat: Oropharynx is clear and moist.   Cardiovascular: Normal rate and regular rhythm.   Murmur heard.  Pulmonary/Chest: Effort normal and breath sounds normal. She has no wheezes.   Abdominal: Soft. Bowel sounds are normal. There is no tenderness.   Musculoskeletal: She exhibits no edema or tenderness.   Neurological: She is alert and oriented to person, place, and time. No cranial nerve deficit.   No neurological deficit noted on exam today  Strength 5 x 5 in bilateral upper and lower extremities  No facial droop noted on exam today  Gait has been stable but cautious   Skin: Skin is warm and dry. No rash noted.   Psychiatric: She has a normal mood and affect. "         Assessment:       1. History of recurrent TIAs    2. Essential hypertension    3. Seizure    4. Hypothyroidism, unspecified type    5. Gastroesophageal reflux disease, esophagitis presence not specified    6. Hypokalemia        Plan:   History of recurrent TIAs  Patient clinically doing well.  Blood pressure is stable today and no symptoms suggestive of stroke at this time  If having any symptoms please advise patient to go to ER or follow up with Nephrology    Essential hypertension-blood pressure is stable today currently on lisinopril 20 mg metoprolol 25 mg and verapamil 240 mg    Seizure-taking Keppra followed by Neurology    Hypothyroidism, unspecified type-stable on levothyroxine 25 mcg p.o. daily    Gastroesophageal reflux disease, esophagitis presence not specified  -     pantoprazole (PROTONIX) 40 MG tablet; Take 1 tablet (40 mg total) by mouth once daily.  Dispense: 90 tablet; Refill: 3  Stable on pantoprazole 40 mg refill given today    Hypokalemia  -     potassium chloride (KLOR-CON) 20 mEq Pack; Take 20 mEq by mouth once daily.  Dispense: 30 packet; Refill: 3  Will change potassium supplements from 10 mg 3 times daily to 20 mEq once daily  Will recheck labs during next visit    Currently not taking tramadol  Continue physical therapy for chronic medical conditions

## 2018-08-30 NOTE — TELEPHONE ENCOUNTER
nurse called to say she had some kind of episode yesterday. And ems did come evaluate. But family chose not to go to ER.i let her know that I sent dr berman a message about it and will call her back by end of day.8/30/1830/18/1023/sf

## 2018-08-30 NOTE — TELEPHONE ENCOUNTER
----- Message from Mendez Rosa MD sent at 8/30/2018 12:27 PM CDT -----  Next time , it happens, she needs to go to ER.  ----- Message -----  From: Dalila Bradley LPN  Sent: 8/30/2018  10:25 AM  To: Mendez Rosa MD    [facility] home health nurse says she was with pt yesterday when she had an episode of profuse diaphoresis,unsteady agit,b/p up 160/100 (before meds). And  were equal and able to raise both arms equally. Called ems and rythym was ok no heart attack evident. So family refused to transfer to ER. Nurse says she hasnt been sleeping x 2 days and wants to knowe if you want to see her .

## 2018-08-30 NOTE — TELEPHONE ENCOUNTER
Informed  nurse kathe of dr pena response to send pt to ER if another episode occurs.she sais ok. 8/30/1830/18/1411/sf

## 2018-09-11 ENCOUNTER — TELEPHONE (OUTPATIENT)
Dept: ADMINISTRATIVE | Facility: CLINIC | Age: 83
End: 2018-09-11

## 2018-09-11 NOTE — TELEPHONE ENCOUNTER
Home Health Recert 08/31/2018 - 10/29/2018 with [facility] ([facility]) - Dr. Maria Guadalupe Herring. Patient received   services.

## 2018-09-16 RX ORDER — LISINOPRIL 20 MG/1
TABLET ORAL
Qty: 30 TABLET | Refills: 1 | Status: SHIPPED | OUTPATIENT
Start: 2018-09-16 | End: 2018-09-17 | Stop reason: SDUPTHER

## 2018-09-16 RX ORDER — TRAZODONE HYDROCHLORIDE 100 MG/1
TABLET ORAL
Qty: 30 TABLET | Refills: 3 | Status: SHIPPED | OUTPATIENT
Start: 2018-09-16 | End: 2018-09-17 | Stop reason: SDUPTHER

## 2018-09-17 RX ORDER — TRAZODONE HYDROCHLORIDE 100 MG/1
100 TABLET ORAL NIGHTLY
Qty: 90 TABLET | Refills: 1 | Status: SHIPPED | OUTPATIENT
Start: 2018-09-17 | End: 2019-06-11

## 2018-09-17 RX ORDER — LISINOPRIL 20 MG/1
20 TABLET ORAL DAILY
Qty: 90 TABLET | Refills: 1 | Status: SHIPPED | OUTPATIENT
Start: 2018-09-17 | End: 2018-11-14 | Stop reason: SDUPTHER

## 2018-09-18 ENCOUNTER — OFFICE VISIT (OUTPATIENT)
Dept: CARDIOLOGY | Facility: CLINIC | Age: 83
End: 2018-09-18
Payer: MEDICARE

## 2018-09-18 VITALS
HEIGHT: 61 IN | BODY MASS INDEX: 24.55 KG/M2 | DIASTOLIC BLOOD PRESSURE: 80 MMHG | SYSTOLIC BLOOD PRESSURE: 130 MMHG | HEART RATE: 76 BPM | WEIGHT: 130 LBS

## 2018-09-18 DIAGNOSIS — I10 HTN (HYPERTENSION): ICD-10-CM

## 2018-09-18 DIAGNOSIS — I10 ESSENTIAL HYPERTENSION: Chronic | ICD-10-CM

## 2018-09-18 DIAGNOSIS — I35.0 NONRHEUMATIC AORTIC VALVE STENOSIS: Primary | ICD-10-CM

## 2018-09-18 DIAGNOSIS — J43.8 OTHER EMPHYSEMA: ICD-10-CM

## 2018-09-18 DIAGNOSIS — E78.2 MIXED HYPERLIPIDEMIA: Chronic | ICD-10-CM

## 2018-09-18 PROCEDURE — 93005 ELECTROCARDIOGRAM TRACING: CPT | Mod: PBBFAC,PO | Performed by: INTERNAL MEDICINE

## 2018-09-18 PROCEDURE — 99214 OFFICE O/P EST MOD 30 MIN: CPT | Mod: S$PBB,,, | Performed by: INTERNAL MEDICINE

## 2018-09-18 PROCEDURE — 3079F DIAST BP 80-89 MM HG: CPT | Mod: CPTII,,, | Performed by: INTERNAL MEDICINE

## 2018-09-18 PROCEDURE — 1101F PT FALLS ASSESS-DOCD LE1/YR: CPT | Mod: CPTII,,, | Performed by: INTERNAL MEDICINE

## 2018-09-18 PROCEDURE — 93010 ELECTROCARDIOGRAM REPORT: CPT | Mod: S$PBB,,, | Performed by: INTERNAL MEDICINE

## 2018-09-18 PROCEDURE — 99999 PR PBB SHADOW E&M-EST. PATIENT-LVL III: CPT | Mod: PBBFAC,,, | Performed by: INTERNAL MEDICINE

## 2018-09-18 PROCEDURE — 3075F SYST BP GE 130 - 139MM HG: CPT | Mod: CPTII,,, | Performed by: INTERNAL MEDICINE

## 2018-09-18 PROCEDURE — 99213 OFFICE O/P EST LOW 20 MIN: CPT | Mod: PBBFAC,PO,25 | Performed by: INTERNAL MEDICINE

## 2018-09-18 NOTE — PROGRESS NOTES
Subjective:   Patient ID:  Crystal Romero is a 83 y.o. female who presents for follow up of Follow-up      82, yo female, came in for routine f/u. Severe dementia and discussed with the her Shannan Lockwood.  PMH moderate AI, and moderate AS, dementia 3 yrs, HTN, HLD, RA, TIA x3 in 4 months  Had TIA/seizure episode last week and admitted to [facility]. Had impaired swallow and dysphasia. F/u with  neurologist.  C/o dyspnea when having anxiety.  C/o fatigue, decreased appetite related to dementia.   Denied chest pain, dyspnea  Confused at night.  Decreased activity, appetite, sleeps 18 hours a day.  Repeat ECHO in , showed normal EF, DD, moderate AI and moderate AS.   Refused surgery for valve per POA.              Past Medical History:   Diagnosis Date    Arthritis     Coronary artery disease     Dementia     Depression     Encounter for blood transfusion     H/O: GI bleed     Hiatal hernia     Hyperlipidemia     Hypertension     Hypothyroid     Rheumatoid arthritis(714.0)     Seizures     Stroke     Syncope and collapse        Past Surgical History:   Procedure Laterality Date    COLONOSCOPY N/A 2015    Performed [facility] at [facility] END[facility]    COLONOSCOPY N/A 2014    Performed by [facility] at [facility] END[facility]    ESOPHAGOGASTRODUODENOSCOPY (EGD) N/A 2014    Performed by [facility]o [facility] III[facility] at [facility] ENDO    MANDIBLE SURGERY      TONSILLECTOMY         Social History     Tobacco Use    Smoking status: Former Smoker     Packs/day: 2.00     Years: 16.00     Pack years: 32.00     Types: Cigarettes     Last attempt to quit: 1950     Years since quittin.7    Smokeless tobacco: Never Used   Substance Use Topics    Alcohol use: Yes     Alcohol/week: 1.8 oz     Types: 3 Glasses of wine per week     Comment: daily 1/2 bottle wine    Drug use: No       Family History   Problem Relation Age of Onset    Cancer Mother         breast,  uterine         Review of Systems   Constitution: Positive for decreased appetite and fever.   HENT: Negative.    Eyes: Negative.    Cardiovascular: Positive for dyspnea on exertion. Negative for chest pain.   Respiratory: Negative.    Endocrine: Negative.    Hematologic/Lymphatic: Negative.    Skin: Negative.    Musculoskeletal: Positive for back pain and joint pain.   Gastrointestinal: Negative.    Genitourinary: Negative.    Psychiatric/Behavioral: Positive for hallucinations and memory loss.   Allergic/Immunologic: Negative.        Objective:   Physical Exam   Constitutional: She is oriented to person, place, and time. She appears well-nourished.   HENT:   Head: Normocephalic.   Eyes: Pupils are equal, round, and reactive to light.   Neck: Normal carotid pulses and no JVD present. Carotid bruit is not present. No thyromegaly present.   Cardiovascular: Normal rate, regular rhythm and normal pulses.  No extrasystoles are present. PMI is not displaced. Exam reveals no gallop and no S3.   Murmur heard.  Pulses:       Radial pulses are 2+ on the right side, and 2+ on the left side.   3/6 ESM on RUSB, S2 not audible.   Pulmonary/Chest: Breath sounds normal. No stridor. No respiratory distress.   Abdominal: Soft. Bowel sounds are normal. There is no tenderness. There is no rebound.   Musculoskeletal: Normal range of motion.   Neurological: She is alert and oriented to person, place, and time.   Skin: Skin is intact. No rash noted.   Psychiatric: Her behavior is normal.       Lab Results   Component Value Date    CHOL 211 (H) 04/04/2018    CHOL 169 07/11/2017    CHOL 180 06/17/2016     Lab Results   Component Value Date    HDL 72 04/04/2018    HDL 58 07/11/2017    HDL 87 (H) 06/17/2016     Lab Results   Component Value Date    LDLCALC 122.6 04/04/2018    LDLCALC 95.2 07/11/2017    LDLCALC 78.6 06/17/2016     Lab Results   Component Value Date    TRIG 82 04/04/2018    TRIG 79 07/11/2017    TRIG 72 06/17/2016     Lab  Results   Component Value Date    CHOLHDL 34.1 04/04/2018    CHOLHDL 34.3 07/11/2017    CHOLHDL 48.3 06/17/2016       Chemistry        Component Value Date/Time     (L) 06/19/2018 1040    K 3.4 (L) 06/19/2018 1040    CL 99 06/19/2018 1040    CO2 27 06/19/2018 1040    BUN 14 06/19/2018 1040    CREATININE 0.8 06/19/2018 1040     06/19/2018 1040        Component Value Date/Time    CALCIUM 9.2 06/19/2018 1040    ALKPHOS 52 (L) 04/04/2018 1554    AST 16 04/04/2018 1554    ALT 8 (L) 04/04/2018 1554    BILITOT 0.4 04/04/2018 1554    ESTGFRAFRICA >60.0 06/19/2018 1040    EGFRNONAA >60.0 06/19/2018 1040          No results found for: LABA1C, HGBA1C  Lab Results   Component Value Date    TSH 1.552 04/04/2018     Lab Results   Component Value Date    INR 0.9 03/10/2017    INR 1.0 09/23/2014     Lab Results   Component Value Date    WBC 6.85 06/19/2018    HGB 12.8 06/19/2018    HCT 41.2 06/19/2018    MCV 87 06/19/2018     06/19/2018     BMP  Sodium   Date Value Ref Range Status   06/19/2018 135 (L) 136 - 145 mmol/L Final     Potassium   Date Value Ref Range Status   06/19/2018 3.4 (L) 3.5 - 5.1 mmol/L Final     Chloride   Date Value Ref Range Status   06/19/2018 99 95 - 110 mmol/L Final     CO2   Date Value Ref Range Status   06/19/2018 27 23 - 29 mmol/L Final     BUN, Bld   Date Value Ref Range Status   06/19/2018 14 8 - 23 mg/dL Final     Creatinine   Date Value Ref Range Status   06/19/2018 0.8 0.5 - 1.4 mg/dL Final     Calcium   Date Value Ref Range Status   06/19/2018 9.2 8.7 - 10.5 mg/dL Final     Anion Gap   Date Value Ref Range Status   06/19/2018 9 8 - 16 mmol/L Final     eGFR if    Date Value Ref Range Status   06/19/2018 >60.0 >60 mL/min/1.73 m^2 Final     eGFR if non    Date Value Ref Range Status   06/19/2018 >60.0 >60 mL/min/1.73 m^2 Final     Comment:     Calculation used to obtain the estimated glomerular filtration  rate (eGFR) is the CKD-EPI equation.         BNP  @LABRCNTIP(BNP,BNPTRIAGEBLO)@  @LABRCNTIP(troponini)@  CrCl cannot be calculated (Patient's most recent lab result is older than the maximum 7 days allowed.).  No results found in the last 24 hours.  No results found in the last 24 hours.  No results found in the last 24 hours.    Assessment:      1. Nonrheumatic aortic valve stenosis    2. Essential hypertension    3. Mixed hyperlipidemia    4. Other emphysema      Dyspnea while anxiety    Plan:   Continue asa  Ok to hold statin due to dementia and weakness  Continue metoprolol and lisnopril  advsie to f/u woithpulm to get home O2 PRN  RTC in 4 months

## 2018-09-19 ENCOUNTER — TELEPHONE (OUTPATIENT)
Dept: INTERNAL MEDICINE | Facility: CLINIC | Age: 83
End: 2018-09-19

## 2018-09-20 ENCOUNTER — TELEPHONE (OUTPATIENT)
Dept: INTERNAL MEDICINE | Facility: CLINIC | Age: 83
End: 2018-09-20

## 2018-09-23 RX ORDER — LEVOTHYROXINE SODIUM 25 UG/1
TABLET ORAL
Qty: 90 TABLET | Refills: 0 | Status: SHIPPED | OUTPATIENT
Start: 2018-09-23 | End: 2018-12-08 | Stop reason: SDUPTHER

## 2018-09-25 ENCOUNTER — OFFICE VISIT (OUTPATIENT)
Dept: NEUROLOGY | Facility: CLINIC | Age: 83
End: 2018-09-25
Payer: MEDICARE

## 2018-09-25 VITALS
WEIGHT: 134.06 LBS | BODY MASS INDEX: 25.31 KG/M2 | HEIGHT: 61 IN | DIASTOLIC BLOOD PRESSURE: 80 MMHG | HEART RATE: 74 BPM | SYSTOLIC BLOOD PRESSURE: 162 MMHG | RESPIRATION RATE: 20 BRPM

## 2018-09-25 DIAGNOSIS — F51.05 INSOMNIA SECONDARY TO DEPRESSION WITH ANXIETY: ICD-10-CM

## 2018-09-25 DIAGNOSIS — R56.9 SEIZURE: ICD-10-CM

## 2018-09-25 DIAGNOSIS — R41.89 IMPAIRED COGNITION: ICD-10-CM

## 2018-09-25 DIAGNOSIS — I67.3 BINSWANGER'S DEMENTIA: Primary | ICD-10-CM

## 2018-09-25 DIAGNOSIS — F41.8 INSOMNIA SECONDARY TO DEPRESSION WITH ANXIETY: ICD-10-CM

## 2018-09-25 PROCEDURE — 3077F SYST BP >= 140 MM HG: CPT | Mod: CPTII,,, | Performed by: PSYCHIATRY & NEUROLOGY

## 2018-09-25 PROCEDURE — 99214 OFFICE O/P EST MOD 30 MIN: CPT | Mod: S$PBB,,, | Performed by: PSYCHIATRY & NEUROLOGY

## 2018-09-25 PROCEDURE — 99999 PR PBB SHADOW E&M-EST. PATIENT-LVL IV: CPT | Mod: PBBFAC,,, | Performed by: PSYCHIATRY & NEUROLOGY

## 2018-09-25 PROCEDURE — 3079F DIAST BP 80-89 MM HG: CPT | Mod: CPTII,,, | Performed by: PSYCHIATRY & NEUROLOGY

## 2018-09-25 PROCEDURE — 1101F PT FALLS ASSESS-DOCD LE1/YR: CPT | Mod: CPTII,,, | Performed by: PSYCHIATRY & NEUROLOGY

## 2018-09-25 PROCEDURE — 99214 OFFICE O/P EST MOD 30 MIN: CPT | Mod: PBBFAC,PO | Performed by: PSYCHIATRY & NEUROLOGY

## 2018-09-25 RX ORDER — OLANZAPINE 5 MG/1
5 TABLET ORAL 2 TIMES DAILY PRN
Qty: 60 TABLET | Refills: 0 | Status: SHIPPED | OUTPATIENT
Start: 2018-09-25 | End: 2018-11-13

## 2018-09-25 NOTE — PROGRESS NOTES
Progress note  Neurology      Neurology follow up:  For: New onset seizure    SUBJECTIVE:      HPI:   This is an 83-year-old lady seen by me in the past, presented today in the   clinic with her daughter with the complaint of declining memory loss, also   unusual behavior problems at night.  She is not sleeping well, but she is   walking around at night without sleep and trying to go out of home.  She becomes   aggressive sometimes and threaten to call police.  She is acting totally out of   her own norm.  Family now under stress significantly to control her behavior   problems, which is new.  She also not eating well and not drinking water as   needed.  Family is very frustrated with these change of her behavior and brought   to the Neurology Clinic for further management.      AK/SANTO  dd: 09/25/2018 16:38:47 (CDT)  td: 09/26/2018 07:01:22 (CDT)  Doc ID   #[id number]  Job ID #[id number]    CC:           Past Medical History:   Diagnosis Date    Arthritis     Coronary artery disease     Depression     Encounter for blood transfusion     H/O: GI bleed     Hiatal hernia     Hyperlipidemia     Hypertension     Hypothyroid     Rheumatoid arthritis(714.0)     Stroke     Syncope and collapse      Past Surgical History:   Procedure Laterality Date    TONSILLECTOMY       Family History   Problem Relation Age of Onset    Cancer Mother      breast, uterine     Social History   Substance Use Topics    Smoking status: Former Smoker     Packs/day: 2.00     Years: 16.00     Types: Cigarettes     Quit date: 1/1/1950    Smokeless tobacco: Never Used    Alcohol use 1.8 oz/week     3 Glasses of wine per week      Comment: daily 1/2 bottle wine       Review of patient's allergies indicates:   Allergen Reactions    Sulfa (sulfonamide antibiotics) Shortness Of Breath      Patient Active Problem List   Diagnosis    Rheumatoid arthritis    Hiatal hernia    Asthma with COPD    Hyperlipidemia    Hypertension    Diastolic  dysfunction    Osteoarthritis    Rotator cuff arthropathy    H/O: GI bleed    Dysplastic colon polyp    Hypothyroid    Nonrheumatic aortic valve stenosis    Nonrheumatic aortic valve insufficiency    Impaired cognition    Gastroesophageal reflux disease without esophagitis    Major depressive disorder, recurrent episode, mild    Psoriasis with arthropathy    Sleep disturbance    Bilateral adhesive capsulitis of shoulders    Pulmonary emphysema    Insomnia secondary to depression with anxiety    Binswanger's dementia    Seizure         Review of Systems   Constitutional: Positive for malaise/fatigue. Negative for fever and weight loss.   HENT: Negative for hearing loss.    Eyes: Negative for blurred vision, double vision, photophobia and pain.   Respiratory: Negative for cough.    Cardiovascular: Negative for chest pain.   Gastrointestinal: Negative for abdominal pain, nausea and vomiting.   Genitourinary: Negative for dysuria, frequency and urgency.   Musculoskeletal: Positive for joint pain, myalgias and neck pain. Negative for back pain and falls.   Skin: Negative for itching and rash.   Neurological: Positive for sensory change and seizures. Negative for tingling and headaches.   Psychiatric/Behavioral: Positive for memory loss. Negative for depression.         OBJECTIVE:       Physical Exam   Constitutional: She is oriented to person, place, and time. She appears well-developed and well-nourished.   HENT:   Head: Normocephalic and atraumatic.   Eyes: Conjunctivae and EOM are normal. Pupils are equal, round, and reactive to light.   Neck: Normal range of motion. Neck supple. No JVD present. No tracheal deviation present. No thyromegaly present.   Cardiovascular: Normal rate, regular rhythm and normal heart sounds.    Pulmonary/Chest: Effort normal and breath sounds normal.   Abdominal: She exhibits no distension. There is no tenderness.   Musculoskeletal: She exhibits no edema.        Right  shoulder: She exhibits decreased range of motion, bony tenderness, pain and spasm.        Left shoulder: She exhibits decreased range of motion, tenderness, bony tenderness, pain and spasm.   Bilateral shoulder frozen.   Neurological: She is alert and oriented to person, place, and time. She has normal strength and normal reflexes. She displays normal reflexes. No cranial nerve deficit or sensory deficit. She exhibits normal muscle tone. She displays a negative Romberg sign. Coordination and gait normal.   Reflex Scores:       Tricep reflexes are 2+ on the right side and 2+ on the left side.       Bicep reflexes are 2+ on the right side and 2+ on the left side.       Brachioradialis reflexes are 2+ on the right side and 2+ on the left side.       Patellar reflexes are 2+ on the right side and 2+ on the left side.       Achilles reflexes are 2+ on the right side and 2+ on the left side.  Skin: Skin is warm and dry. No rash noted.   Psychiatric: She has a normal mood and affect. Her speech is normal and behavior is normal. Judgment and thought content normal. Cognition and memory are impaired. She does not express inappropriate judgment. She exhibits abnormal recent memory.       Strength  Deltoids Triceps Biceps Wrist Extension Wrist Flexion Hand    Upper: R 5/5 5/5 5/5 5/5 5/5 5/5    L 5/5 5/5 5/5 5/5 5/5 5/5     Iliopsoas Quadriceps Knee  Flexion Tibialis  anterior Gastro- cnemius EHL   Lower: R 5/5 5/5 5/5 5/5 5/5 5/5    L 5/5 5/5 5/5 5/5 5/5 5/5         Laboratory:  Lab Results   Component Value Date    WBC 4.70 04/04/2018    HGB 12.1 04/04/2018    HCT 40.9 04/04/2018     04/04/2018    CHOL 211 (H) 04/04/2018    TRIG 82 04/04/2018    HDL 72 04/04/2018    ALT 8 (L) 04/04/2018    AST 16 04/04/2018     04/04/2018    K 3.2 (L) 04/04/2018     04/04/2018    CREATININE 0.9 04/04/2018    BUN 12 04/04/2018    CO2 24 04/04/2018    TSH 1.552 04/04/2018    INR 0.9 03/10/2017              ASSESSMENT/PLAN:     Assessment:   Dementia : she is showing increasing problem with dementia and behavioral problem , may be due to lack of sleep, poor eating and drinking water. Her neurological condition is worsening with some neuropsychiatric features. She might need hospitalization and Gu[facility]upe=psych assessment. Family has been advised in that way. Zyprexa has been prescribed , 5 mg bid prn but if her condition does not improve , she needs to ER .    Patient Active Problem List   Diagnosis    Rheumatoid arthritis    Hiatal hernia    Asthma with COPD    Hyperlipidemia    Hypertension    Diastolic dysfunction    Osteoarthritis    Rotator cuff arthropathy    H/O: GI bleed    Dysplastic colon polyp    Hypothyroid    Nonrheumatic aortic valve stenosis    Nonrheumatic aortic valve insufficiency    Impaired cognition    Gastroesophageal reflux disease without esophagitis    Major depressive disorder, recurrent episode, mild    Psoriasis with arthropathy    Sleep disturbance    Bilateral adhesive capsulitis of shoulders    Pulmonary emphysema    Insomnia secondary to depression with anxiety    Binswanger's dementia    Seizure         Plan:  Will see PRN.  Time spent: 30 minutes in face to face discussion concerning diagnosis, prognosis, review of lab and test results, benefits of treatment as well as management of disease, counseling of patient and coordination of care between various health care providers . Greater than half the time spent was used for coordination of care and counseling of patient. This note may have some spelling or wording mistakes which might have been overlooked during proof reading.

## 2018-10-03 ENCOUNTER — IMMUNIZATION (OUTPATIENT)
Dept: INTERNAL MEDICINE | Facility: CLINIC | Age: 83
End: 2018-10-03
Payer: MEDICARE

## 2018-10-03 PROCEDURE — 90662 IIV NO PRSV INCREASED AG IM: CPT | Mod: PBBFAC,PO

## 2018-10-12 ENCOUNTER — HOSPITAL ENCOUNTER (OUTPATIENT)
Dept: RADIOLOGY | Facility: HOSPITAL | Age: 83
Discharge: HOME | End: 2018-10-12
Attending: NURSE PRACTITIONER
Payer: MEDICARE

## 2018-10-12 ENCOUNTER — OFFICE VISIT (OUTPATIENT)
Dept: PULMONOLOGY | Facility: CLINIC | Age: 83
End: 2018-10-12
Payer: MEDICARE

## 2018-10-12 VITALS
BODY MASS INDEX: 26.27 KG/M2 | HEIGHT: 61 IN | WEIGHT: 139.13 LBS | SYSTOLIC BLOOD PRESSURE: 124 MMHG | DIASTOLIC BLOOD PRESSURE: 74 MMHG | RESPIRATION RATE: 16 BRPM | OXYGEN SATURATION: 96 % | TEMPERATURE: 99 F | HEART RATE: 78 BPM

## 2018-10-12 DIAGNOSIS — J45.901 ASTHMA WITH COPD WITH EXACERBATION: Primary | ICD-10-CM

## 2018-10-12 DIAGNOSIS — J44.1 ASTHMA WITH COPD WITH EXACERBATION: ICD-10-CM

## 2018-10-12 DIAGNOSIS — J45.901 ASTHMA WITH COPD WITH EXACERBATION: ICD-10-CM

## 2018-10-12 DIAGNOSIS — J44.1 ASTHMA WITH COPD WITH EXACERBATION: Primary | ICD-10-CM

## 2018-10-12 DIAGNOSIS — J44.89 ASTHMA WITH COPD: ICD-10-CM

## 2018-10-12 PROCEDURE — 1101F PT FALLS ASSESS-DOCD LE1/YR: CPT | Mod: CPTII,,, | Performed by: NURSE PRACTITIONER

## 2018-10-12 PROCEDURE — 99999 PR PBB SHADOW E&M-EST. PATIENT-LVL IV: CPT | Mod: PBBFAC,,, | Performed by: NURSE PRACTITIONER

## 2018-10-12 PROCEDURE — 3074F SYST BP LT 130 MM HG: CPT | Mod: CPTII,,, | Performed by: NURSE PRACTITIONER

## 2018-10-12 PROCEDURE — 3078F DIAST BP <80 MM HG: CPT | Mod: CPTII,,, | Performed by: NURSE PRACTITIONER

## 2018-10-12 PROCEDURE — 99214 OFFICE O/P EST MOD 30 MIN: CPT | Mod: 25,S$PBB,, | Performed by: NURSE PRACTITIONER

## 2018-10-12 PROCEDURE — 71046 X-RAY EXAM CHEST 2 VIEWS: CPT | Mod: TC,FY,PO

## 2018-10-12 PROCEDURE — 71046 X-RAY EXAM CHEST 2 VIEWS: CPT | Mod: 26,,, | Performed by: RADIOLOGY

## 2018-10-12 PROCEDURE — 94640 AIRWAY INHALATION TREATMENT: CPT | Mod: PBBFAC,PO

## 2018-10-12 PROCEDURE — 99214 OFFICE O/P EST MOD 30 MIN: CPT | Mod: PBBFAC,25,PO | Performed by: NURSE PRACTITIONER

## 2018-10-12 PROCEDURE — 96372 THER/PROPH/DIAG INJ SC/IM: CPT | Mod: PBBFAC,59,PO

## 2018-10-12 RX ORDER — TRIAMCINOLONE ACETONIDE 40 MG/ML
40 INJECTION, SUSPENSION INTRA-ARTICULAR; INTRAMUSCULAR
Status: COMPLETED | OUTPATIENT
Start: 2018-10-12 | End: 2018-10-12

## 2018-10-12 RX ORDER — IPRATROPIUM BROMIDE AND ALBUTEROL SULFATE 2.5; .5 MG/3ML; MG/3ML
3 SOLUTION RESPIRATORY (INHALATION)
Status: COMPLETED | OUTPATIENT
Start: 2018-10-12 | End: 2018-10-12

## 2018-10-12 RX ORDER — IPRATROPIUM BROMIDE AND ALBUTEROL SULFATE 2.5; .5 MG/3ML; MG/3ML
3 SOLUTION RESPIRATORY (INHALATION) EVERY 6 HOURS PRN
Qty: 360 VIAL | Refills: 5 | Status: SHIPPED | OUTPATIENT
Start: 2018-10-12 | End: 2018-12-11 | Stop reason: SDUPTHER

## 2018-10-12 RX ORDER — AZITHROMYCIN 250 MG/1
TABLET, FILM COATED ORAL
Qty: 6 TABLET | Refills: 0 | Status: SHIPPED | OUTPATIENT
Start: 2018-10-12 | End: 2018-10-29

## 2018-10-12 RX ORDER — PREDNISONE 20 MG/1
TABLET ORAL
Qty: 20 TABLET | Refills: 0 | Status: SHIPPED | OUTPATIENT
Start: 2018-10-12 | End: 2018-11-09 | Stop reason: SDUPTHER

## 2018-10-12 RX ADMIN — IPRATROPIUM BROMIDE AND ALBUTEROL SULFATE 3 ML: .5; 2.5 SOLUTION RESPIRATORY (INHALATION) at 10:10

## 2018-10-12 RX ADMIN — TRIAMCINOLONE ACETONIDE 40 MG: 40 INJECTION, SUSPENSION INTRA-ARTICULAR; INTRAMUSCULAR at 10:10

## 2018-10-12 NOTE — PROGRESS NOTES
Subjective:      Patient ID: Crystal Romero is a 83 y.o. female.    Patient Active Problem List   Diagnosis    Rheumatoid arthritis    Hiatal hernia    Asthma with COPD    Hyperlipidemia    Hypertension    Diastolic dysfunction    Osteoarthritis    Rotator cuff arthropathy    H/O: GI bleed    Dysplastic colon polyp    Hypothyroid    Nonrheumatic aortic valve stenosis    Nonrheumatic aortic valve insufficiency    Impaired cognition    Gastroesophageal reflux disease without esophagitis    Major depressive disorder, recurrent episode, mild    Psoriasis with arthropathy    Bilateral adhesive capsulitis of shoulders    Pulmonary emphysema    Insomnia secondary to depression with anxiety    Binswanger's dementia    Seizure    Vasculitis, CNS    Dysphasia       she has been referred by No ref. provider found for evaluation and management for   Chief Complaint   Patient presents with    Wheezing    Asthma    COPD     Chief Complaint: Wheezing; Asthma; and COPD    HPI:  Crystal Romero is a 83 y.o. female presents to pulmonary clinic initial evaluation related to acute asthma with COPD flare with increased wheezing over past 48 hours. Has increased from 1 albuterol neb treatment a day after her afternoon walk, as a routine there is no wheezing when treatment done. She has increased albuterol nebs to 3 times and awakening at night with wheezing over past 2 days. No fever no chills, cough is non productive. No change in appetite or activity level.   Patient presents with her daughter Augustina who patient resides with. Stefanie is also present, patients care giver while daughter is at work.     Previous Report Reviewed: lab reports, office notes and radiology reports     Past Medical History: The following portions of the patient's history were reviewed and updated as appropriate:   She  has a past surgical history that includes Tonsillectomy; Mandible surgery; COLONOSCOPY (N/A,  4/7/2015); ESOPHAGOGASTRODUODENOSCOPY (EGD) (N/A, 9/25/2014); and COLONOSCOPY (N/A, 9/25/2014).  Her family history includes Cancer in her mother.  She  reports that she quit smoking about 68 years ago. Her smoking use included cigarettes. She has a 32.00 pack-year smoking history. she has never used smokeless tobacco. She reports that she drinks about 1.8 oz of alcohol per week. She reports that she does not use drugs.  She has a current medication list which includes the following prescription(s): acetaminophen, albuterol, aspirin, cetirizine, fluticasone, folic acid, inhaler, assist devices, levetiracetam, levothyroxine, lisinopril, metoprolol succinate, olanzapine, pantoprazole, potassium chloride, sertraline, trazodone, vitamin b complex, albuterol-ipratropium, azithromycin, prednisone, and tramadol.  She is allergic to sulfa (sulfonamide antibiotics).    Review of Systems   Constitutional: Negative for fever, chills, weight loss, weight gain, activity change, appetite change, fatigue and night sweats.   HENT: Negative for postnasal drip, rhinorrhea, sinus pressure, voice change and congestion.    Eyes: Negative for redness and itching.   Respiratory: Positive for cough, shortness of breath, wheezing, asthma nighttime symptoms and dyspnea on extertion. Negative for snoring, sputum production, chest tightness, orthopnea, use of rescue inhaler and somnolence (intermittent non productive).    Cardiovascular: Negative.  Negative for chest pain, palpitations and leg swelling.   Genitourinary: Negative for difficulty urinating and hematuria.   Endocrine: Negative for cold intolerance and heat intolerance.    Musculoskeletal: Negative for arthralgias, gait problem, joint swelling and myalgias.   Skin: Negative.    Gastrointestinal: Negative for nausea, vomiting, abdominal pain and acid reflux.   Neurological: Negative for dizziness, weakness, light-headedness and headaches.   Hematological: Negative for adenopathy. No  "excessive bruising.   All other systems reviewed and are negative.     Objective:   /74   Pulse 78   Temp 98.6 °F (37 °C) (Temporal)   Resp 16   Ht 5' 1" (1.549 m)   Wt 63.1 kg (139 lb 1.8 oz)   SpO2 96%   BMI 26.28 kg/m²   Physical Exam   Constitutional: She is oriented to person, place, and time. She appears well-developed and well-nourished. She is active and cooperative.  Non-toxic appearance. She does not appear ill. No distress.   HENT:   Head: Normocephalic.   Right Ear: External ear normal.   Left Ear: External ear normal.   Nose: Nose normal.   Mouth/Throat: Oropharynx is clear and moist. No oropharyngeal exudate.   Eyes: Conjunctivae are normal.   Neck: Normal range of motion. Neck supple.   Cardiovascular: Normal rate, regular rhythm, normal heart sounds and intact distal pulses.   Pulmonary/Chest: Effort normal. No stridor. She has wheezes (diffuse to ascultation and audible. complete resolution of wheezing post duo neb in clinic).   Abdominal: Soft. Bowel sounds are normal.   Musculoskeletal: Normal range of motion.   Lymphadenopathy:     She has no cervical adenopathy.   Neurological: She is alert and oriented to person, place, and time.   Skin: Skin is warm and dry.   Psychiatric: She has a normal mood and affect. Her behavior is normal. Judgment and thought content normal.   Vitals reviewed.    Personal Diagnostic Review  Chest xray 10/12/2018  EXAMINATION:  XR CHEST PA AND LATERAL  CLINICAL HISTORY:  Chronic obstructive pulmonary disease with (acute) exacerbation  TECHNIQUE:  PA and lateral views of the chest were performed.  COMPARISON:  07/12/2018  FINDINGS:  Cardiac silhouette is normal in size.  Hiatal hernia again noted.    Lungs   remain hyperinflated with flattening of the diaphragm, suggesting COPD.    No parenchymal consolidations or pleural effusions demonstrated.    Degenerative changes noted in the visualized spine and bilateral   shoulders.  Mild lower thoracic compression " deformity is unchanged.     IMPRESSION: Unchanged appearance of the chest as above.  No acute findings     Electronically signed by:     Varinder Rudd MD  Date:                                    10/12/2018    Assessment:     1. Asthma with COPD with exacerbation    2. Asthma with COPD      Orders Placed This Encounter   Procedures    X-Ray Chest PA And Lateral     Standing Status:   Future     Number of Occurrences:   1     Standing Expiration Date:   10/12/2019     Order Specific Question:   May the Radiologist modify the order per protocol to meet the clinical needs of the patient?     Answer:   Yes     Plan:   Discussed diagnosis, its evaluation, treatment and usual course. All questions answered.  Problem List Items Addressed This Visit     Asthma with COPD (Chronic)    Relevant Medications    albuterol-ipratropium (DUO-NEB) 2.5 mg-0.5 mg/3 mL nebulizer solution      Other Visit Diagnoses     Asthma with COPD with exacerbation    -  Primary    Relevant Medications    albuterol-ipratropium 2.5 mg-0.5 mg/3 mL nebulizer solution 3 mL (Completed)    predniSONE (DELTASONE) 20 MG tablet    triamcinolone acetonide injection 40 mg (Completed)    azithromycin (Z-JAMIE) 250 MG tablet    Other Relevant Orders    X-Ray Chest PA And Lateral (Completed)        Follow-up if symptoms worsen or fail to improve. daughter reports she discussed with Dr. Slater in past and they want option to follow up on as needed basis in pulmonary.

## 2018-10-28 ENCOUNTER — PATIENT MESSAGE (OUTPATIENT)
Dept: NEUROLOGY | Facility: CLINIC | Age: 83
End: 2018-10-28

## 2018-10-29 ENCOUNTER — PATIENT MESSAGE (OUTPATIENT)
Dept: PULMONOLOGY | Facility: CLINIC | Age: 83
End: 2018-10-29

## 2018-10-29 ENCOUNTER — PATIENT MESSAGE (OUTPATIENT)
Dept: NEUROLOGY | Facility: CLINIC | Age: 83
End: 2018-10-29

## 2018-10-29 ENCOUNTER — OFFICE VISIT (OUTPATIENT)
Dept: PULMONOLOGY | Facility: CLINIC | Age: 83
End: 2018-10-29
Payer: MEDICARE

## 2018-10-29 VITALS
HEART RATE: 82 BPM | WEIGHT: 144.31 LBS | BODY MASS INDEX: 27.25 KG/M2 | HEIGHT: 61 IN | RESPIRATION RATE: 18 BRPM | OXYGEN SATURATION: 91 % | SYSTOLIC BLOOD PRESSURE: 120 MMHG | DIASTOLIC BLOOD PRESSURE: 80 MMHG

## 2018-10-29 DIAGNOSIS — J43.8 OTHER EMPHYSEMA: ICD-10-CM

## 2018-10-29 DIAGNOSIS — J44.89 ASTHMA WITH COPD: Primary | Chronic | ICD-10-CM

## 2018-10-29 DIAGNOSIS — K44.9 HIATAL HERNIA: ICD-10-CM

## 2018-10-29 DIAGNOSIS — K21.9 GASTROESOPHAGEAL REFLUX DISEASE WITHOUT ESOPHAGITIS: ICD-10-CM

## 2018-10-29 PROCEDURE — 3074F SYST BP LT 130 MM HG: CPT | Mod: CPTII,,, | Performed by: NURSE PRACTITIONER

## 2018-10-29 PROCEDURE — 99999 PR PBB SHADOW E&M-EST. PATIENT-LVL IV: CPT | Mod: PBBFAC,,, | Performed by: NURSE PRACTITIONER

## 2018-10-29 PROCEDURE — 99214 OFFICE O/P EST MOD 30 MIN: CPT | Mod: S$PBB,,, | Performed by: NURSE PRACTITIONER

## 2018-10-29 PROCEDURE — 1101F PT FALLS ASSESS-DOCD LE1/YR: CPT | Mod: CPTII,,, | Performed by: NURSE PRACTITIONER

## 2018-10-29 PROCEDURE — 3079F DIAST BP 80-89 MM HG: CPT | Mod: CPTII,,, | Performed by: NURSE PRACTITIONER

## 2018-10-29 PROCEDURE — 99214 OFFICE O/P EST MOD 30 MIN: CPT | Mod: PBBFAC | Performed by: NURSE PRACTITIONER

## 2018-10-29 RX ORDER — BUDESONIDE AND FORMOTEROL FUMARATE DIHYDRATE 160; 4.5 UG/1; UG/1
2 AEROSOL RESPIRATORY (INHALATION) EVERY 12 HOURS
Qty: 10.2 G | Refills: 11 | Status: SHIPPED | OUTPATIENT
Start: 2018-10-29 | End: 2018-11-12 | Stop reason: ALTCHOICE

## 2018-10-29 NOTE — PROGRESS NOTES
Subjective:      Patient ID: Crystal Romero is a 83 y.o. female.    Patient Active Problem List   Diagnosis    Rheumatoid arthritis    Hiatal hernia    Asthma with COPD    Hyperlipidemia    Hypertension    Diastolic dysfunction    Osteoarthritis    Rotator cuff arthropathy    H/O: GI bleed    Dysplastic colon polyp    Hypothyroid    Nonrheumatic aortic valve stenosis    Nonrheumatic aortic valve insufficiency    Impaired cognition    Gastroesophageal reflux disease without esophagitis    Major depressive disorder, recurrent episode, mild    Psoriasis with arthropathy    Bilateral adhesive capsulitis of shoulders    Pulmonary emphysema    Insomnia secondary to depression with anxiety    Binswanger's dementia    Seizure    Vasculitis, CNS    Dysphasia       she has been referred by No ref. provider found for evaluation and management for   Chief Complaint   Patient presents with    Asthma    COPD     Chief Complaint: Asthma and COPD    HPI:  Crystal Romero is a 83 y.o. female presents to pulmonary clinic evaluation related to diagnosis of asthma with COPD.  This is follow-up visit patient was seen on 10/12/2018 for acute care visit.   Daughter reports she improved with treatment which was prednisone taper, Z-Jonnathan. Kenalog 40 mg IM in the office on 10/12. Duo nebs were also added. Chest x-ray at that time was clear.    Daughter reports patient began with wheezing again on 10/25/2018 mainly at night, but also in evening sporadically. Current medication regime duo nebs 4 times a day with relief.  No fever no chills, cough rare about twice a day, non productive. No change in appetite or activity level. No edema.   Patient presents with her daughter Augustina who patient lives with and Stefanie patients care giver while daughter is at work.     Previous Report Reviewed: lab reports, office notes and radiology reports     Past Medical History: The following portions of the  patient's history were reviewed and updated as appropriate:   She  has a past surgical history that includes Tonsillectomy; Mandible surgery; COLONOSCOPY (N/A, 4/7/2015); ESOPHAGOGASTRODUODENOSCOPY (EGD) (N/A, 9/25/2014); and COLONOSCOPY (N/A, 9/25/2014).  Her family history includes Cancer in her mother.  She  reports that she quit smoking about 68 years ago. Her smoking use included cigarettes. She has a 32.00 pack-year smoking history. she has never used smokeless tobacco. She reports that she drinks about 1.8 oz of alcohol per week. She reports that she does not use drugs.  She has a current medication list which includes the following prescription(s): acetaminophen, albuterol-ipratropium, aspirin, cetirizine, fluticasone, folic acid, inhaler, assist devices, levetiracetam, levothyroxine, lisinopril, metoprolol succinate, olanzapine, pantoprazole, potassium chloride, sertraline, trazodone, vitamin b complex, albuterol, budesonide-formoterol 160-4.5 mcg, prednisone, and tramadol.  She is allergic to sulfa (sulfonamide antibiotics).    Review of Systems   Constitutional: Negative for fever, chills, weight loss, weight gain, activity change, appetite change, fatigue and night sweats.   HENT: Negative for postnasal drip, rhinorrhea, sinus pressure, voice change and congestion.    Eyes: Negative for redness and itching.   Respiratory: Positive for cough, shortness of breath, wheezing and dyspnea on extertion. Negative for snoring, sputum production, chest tightness, orthopnea and use of rescue inhaler.    Cardiovascular: Negative.  Negative for chest pain, palpitations and leg swelling.   Genitourinary: Negative for difficulty urinating and hematuria.   Endocrine: Negative for cold intolerance and heat intolerance.    Musculoskeletal: Negative for arthralgias, gait problem, joint swelling and myalgias.   Skin: Negative.    Gastrointestinal: Negative for nausea, vomiting, abdominal pain and acid reflux.  "  Neurological: Negative for dizziness, weakness, light-headedness and headaches.   Hematological: Negative for adenopathy. No excessive bruising.   All other systems reviewed and are negative.     Objective:   /80   Pulse 82   Resp 18   Ht 5' 1" (1.549 m)   Wt 65.5 kg (144 lb 4.7 oz)   SpO2 (!) 91%   BMI 27.26 kg/m²   Physical Exam   Constitutional: She is oriented to person, place, and time. She appears well-developed and well-nourished. She is active and cooperative.  Non-toxic appearance. She does not appear ill. No distress.   HENT:   Head: Normocephalic.   Right Ear: External ear normal.   Left Ear: External ear normal.   Nose: Nose normal.   Mouth/Throat: Oropharynx is clear and moist. No oropharyngeal exudate.   Eyes: Conjunctivae are normal.   Neck: Normal range of motion. Neck supple.   Cardiovascular: Normal rate, regular rhythm, normal heart sounds and intact distal pulses.   Pulmonary/Chest: Effort normal. No stridor. She has no wheezes. She has no rales.   Abdominal: Soft. Bowel sounds are normal.   Musculoskeletal: Normal range of motion.   Lymphadenopathy:     She has no cervical adenopathy.   Neurological: She is alert and oriented to person, place, and time.   Skin: Skin is warm and dry.   Psychiatric: She has a normal mood and affect. Her behavior is normal. Judgment and thought content normal.   Vitals reviewed.    Personal Diagnostic Review  Chest xray 10/12/2018  EXAMINATION:  XR CHEST PA AND LATERAL  CLINICAL HISTORY:  Chronic obstructive pulmonary disease with (acute) exacerbation  TECHNIQUE:  PA and lateral views of the chest were performed.  COMPARISON:  07/12/2018  FINDINGS:  Cardiac silhouette is normal in size.  Hiatal hernia again noted.    Lungs   remain hyperinflated with flattening of the diaphragm, suggesting COPD.    No parenchymal consolidations or pleural effusions demonstrated.    Degenerative changes noted in the visualized spine and bilateral   shoulders.  Mild " lower thoracic compression deformity is unchanged.     IMPRESSION: Unchanged appearance of the chest as above.  No acute findings     Electronically signed by:     Varinder Rudd MD  Date:                                    10/12/2018    Assessment:     1. Asthma with COPD    2. Gastroesophageal reflux disease without esophagitis    3. Hiatal hernia    4. Other emphysema      No orders of the defined types were placed in this encounter.    Plan:   Discussed diagnosis, its evaluation, treatment and usual course. All questions answered.  Problem List Items Addressed This Visit     Asthma with COPD - Primary (Chronic)     Not well controlled with wheezing at night.   Add on: controller Symbicort 160 mcg 2 puffs twice a day  Continue duo neb 2-4 times a day if needed.   Not on home oxygen          Relevant Medications    budesonide-formoterol 160-4.5 mcg (SYMBICORT) 160-4.5 mcg/actuation HFAA    Gastroesophageal reflux disease without esophagitis     Controlled on protonix          Hiatal hernia     On protonix         Pulmonary emphysema     Not well controlled with wheezing at night begin controlled Symbicort 160 mcg with chamber. Patient has chamber to begin.              No Follow-up on file. daughter continues to want option to follow up on as needed basis in pulmonary.

## 2018-10-29 NOTE — ASSESSMENT & PLAN NOTE
Not well controlled with wheezing at night begin controlled Symbicort 160 mcg with chamber. Patient has chamber to begin.

## 2018-10-29 NOTE — ASSESSMENT & PLAN NOTE
Not well controlled with wheezing at night.   Add on: controller Symbicort 160 mcg 2 puffs twice a day  Continue duo neb 2-4 times a day if needed.   Not on home oxygen

## 2018-10-30 NOTE — TELEPHONE ENCOUNTER
Talked to daughter that slowly make the use of zyprexa  5 mg at night PRN instead of regular dose. Daughter understood that. Side effects were discussed.

## 2018-11-06 ENCOUNTER — TELEPHONE (OUTPATIENT)
Dept: ADMINISTRATIVE | Facility: CLINIC | Age: 83
End: 2018-11-06

## 2018-11-06 NOTE — TELEPHONE ENCOUNTER
Home Health Recert 10/30/2018 to 12/28/2018 with  [facility], Dr Maria Guadalupe Herring.  service.

## 2018-11-09 ENCOUNTER — TELEPHONE (OUTPATIENT)
Dept: PULMONOLOGY | Facility: CLINIC | Age: 83
End: 2018-11-09

## 2018-11-09 DIAGNOSIS — R60.9 EDEMA, UNSPECIFIED TYPE: Primary | ICD-10-CM

## 2018-11-09 DIAGNOSIS — J44.89 ASTHMA WITH COPD: ICD-10-CM

## 2018-11-09 DIAGNOSIS — J45.901 ASTHMA WITH COPD WITH EXACERBATION: ICD-10-CM

## 2018-11-09 DIAGNOSIS — J44.1 ASTHMA WITH COPD WITH EXACERBATION: ICD-10-CM

## 2018-11-09 DIAGNOSIS — R06.02 SOB (SHORTNESS OF BREATH): Primary | ICD-10-CM

## 2018-11-09 RX ORDER — PREDNISONE 20 MG/1
TABLET ORAL
Qty: 20 TABLET | Refills: 0 | Status: SHIPPED | OUTPATIENT
Start: 2018-11-09 | End: 2018-12-06 | Stop reason: ALTCHOICE

## 2018-11-09 RX ORDER — FUROSEMIDE 20 MG/1
20 TABLET ORAL DAILY
Qty: 30 TABLET | Refills: 11 | Status: SHIPPED | OUTPATIENT
Start: 2018-11-09 | End: 2019-06-13 | Stop reason: SDUPTHER

## 2018-11-09 NOTE — TELEPHONE ENCOUNTER
Pt accepted [facility] appointment on 11/12/2018 at 1500 with Dr Slater.   Pt provided times for all testing appointments.  Pt verbalized understanding.

## 2018-11-09 NOTE — TELEPHONE ENCOUNTER
----- Message from Laura Hung sent at 11/9/2018 10:33 AM CST -----  [facility]  States the pt was discharged from the hospital on yesterday and has question concerning the pt medication, can be reached at [phone]///[id number]

## 2018-11-09 NOTE — TELEPHONE ENCOUNTER
Recently discharged from [facility]. Crackles to [facility]. MOLINA w/ wheezing. Previously on IV Lasix. Unknown dose. Requesting prednisone taper so as not to d/c prednisone abruptly. Will contact [facility] for hospital records. Will contact pt to schedule sooner follow up with CX[facility].

## 2018-11-12 ENCOUNTER — HOSPITAL ENCOUNTER (OUTPATIENT)
Dept: RADIOLOGY | Facility: HOSPITAL | Age: 83
Discharge: HOME | End: 2018-11-12
Attending: INTERNAL MEDICINE
Payer: MEDICARE

## 2018-11-12 ENCOUNTER — OFFICE VISIT (OUTPATIENT)
Dept: PULMONOLOGY | Facility: CLINIC | Age: 83
End: 2018-11-12
Payer: MEDICARE

## 2018-11-12 VITALS
HEIGHT: 61 IN | OXYGEN SATURATION: 97 % | BODY MASS INDEX: 25.45 KG/M2 | WEIGHT: 134.81 LBS | RESPIRATION RATE: 16 BRPM | HEART RATE: 88 BPM | SYSTOLIC BLOOD PRESSURE: 114 MMHG | DIASTOLIC BLOOD PRESSURE: 72 MMHG

## 2018-11-12 DIAGNOSIS — Z51.81 MEDICATION MONITORING ENCOUNTER: ICD-10-CM

## 2018-11-12 DIAGNOSIS — I50.42 CHF (CONGESTIVE HEART FAILURE), NYHA CLASS III, CHRONIC, COMBINED: Primary | ICD-10-CM

## 2018-11-12 DIAGNOSIS — J44.89 ASTHMA WITH COPD: ICD-10-CM

## 2018-11-12 DIAGNOSIS — R56.9 SEIZURE: ICD-10-CM

## 2018-11-12 DIAGNOSIS — R06.02 SOB (SHORTNESS OF BREATH): ICD-10-CM

## 2018-11-12 DIAGNOSIS — J96.11 CHRONIC RESPIRATORY FAILURE WITH HYPOXIA: ICD-10-CM

## 2018-11-12 PROCEDURE — 3078F DIAST BP <80 MM HG: CPT | Mod: CPTII,HCNC,S$GLB, | Performed by: INTERNAL MEDICINE

## 2018-11-12 PROCEDURE — 3074F SYST BP LT 130 MM HG: CPT | Mod: CPTII,HCNC,S$GLB, | Performed by: INTERNAL MEDICINE

## 2018-11-12 PROCEDURE — 1101F PT FALLS ASSESS-DOCD LE1/YR: CPT | Mod: CPTII,HCNC,S$GLB, | Performed by: INTERNAL MEDICINE

## 2018-11-12 PROCEDURE — 99999 PR PBB SHADOW E&M-EST. PATIENT-LVL V: CPT | Mod: PBBFAC,HCNC,, | Performed by: INTERNAL MEDICINE

## 2018-11-12 PROCEDURE — 71046 X-RAY EXAM CHEST 2 VIEWS: CPT | Mod: 26,HCNC,, | Performed by: RADIOLOGY

## 2018-11-12 PROCEDURE — 71046 X-RAY EXAM CHEST 2 VIEWS: CPT | Mod: TC,HCNC

## 2018-11-12 PROCEDURE — 99215 OFFICE O/P EST HI 40 MIN: CPT | Mod: HCNC,S$GLB,, | Performed by: INTERNAL MEDICINE

## 2018-11-12 NOTE — PROGRESS NOTES
Subjective:       Patient ID: Crystal Romero is a 83 y.o. female.    Chief Complaint:   She   presents for evaluation and treatment of asthma , valvular heart disease and Transient Ischemic Attack , seizures and [facility] after being [facility] from the hospital  1  week ago. Since discharge symptoms have gradually improving course since that time. Patient denies fever or chills. Symptoms are aggravated by actifvity. Symptoms improve with rest and weight loss (10 lbs).  Respiratory: positive for dyspnea on exertion; Cardiovascular: no chest pain or palpitations.  Patient currently is not on home oxygen therapy..    Treated with steroids , symbicort and added atrovent jet nebs    Answers for HPI/ROS submitted by the patient on 11/9/2018   Asthma  In the past 4 weeks, how much of the time did your asthma keep you from getting as much done at work, school, or at home?: most of the time  During the past 4 weeks, how often have you had shortness of breath?: more than once a day  During the past 4 weeks, how often did your asthma symptoms (Wheezing, coughing, shortness of breath, chest tightness or pain) wake you up at night or earlier that usual in the morning?: 4 or more nights a week  During the past 4 weeks, how often have you used your rescue inhaler or nebulizer medication (such as albuterol)?: 3 or more times per day  How would you rate your asthma control during the past 4 weeks?: not controlled at all   : 6      MEDICAL RECORDS FROM THE HOSPITAL REVIEWED:    Asthma with COPD    HPI  Past Medical History:   Diagnosis Date    Arthritis     Coronary artery disease     Dementia     Depression     Encounter for blood transfusion     H/O: GI bleed     Hiatal hernia     Hyperlipidemia     Hypertension     Hypothyroid     Rheumatoid arthritis(714.0)     Seizures     Stroke     Syncope and collapse      Past Surgical History:   Procedure Laterality Date     COLONOSCOPY N/A 2015    Performed by [facility]    COLONOSCOPY N/A 2014    Performed by [facility] at [facility] END[facility]    ESOPHAGOGASTRODUODENOSCOPY (EGD) N/A 2014    Performed by [facility] at [facility] END[facility]    MANDIBLE SURGERY      TONSILLECTOMY       Social History     Socioeconomic History    Marital status:      Spouse name: Not on file    Number of children: 2    Years of education: Not on file    Highest education level: Not on file   Social Needs    Financial resource strain: Not on file    Food insecurity - worry: Not on file    Food insecurity - inability: Not on file    Transportation needs - medical: Not on file    Transportation needs - non-medical: Not on file   Occupational History    Not on file   Tobacco Use    Smoking status: Former Smoker     Packs/day: 2.00     Years: 16.00     Pack years: 32.00     Types: Cigarettes     Last attempt to quit: 1950     Years since quittin.9    Smokeless tobacco: Never Used   Substance and Sexual Activity    Alcohol use: Yes     Alcohol/week: 1.8 oz     Types: 3 Glasses of wine per week     Comment: daily 1/2 bottle wine    Drug use: No    Sexual activity: No   Other Topics Concern    Not on file   Social History Narrative    Lives with daughter     Review of Systems   Constitutional: Positive for fatigue and weakness.   Respiratory: Positive for shortness of breath, dyspnea on extertion and Paroxysmal Nocturnal Dyspnea.    Cardiovascular: Positive for leg swelling.       Objective:      Physical Exam   Constitutional: She appears well-developed and well-nourished.   HENT:   Head: Normocephalic.   Neck: JVD present.   Cardiovascular: Normal rate. An irregular rhythm present. PMI is displaced.   Murmur heard.   Systolic murmur is present with a grade of 3/6.  Pulmonary/Chest: She has decreased breath sounds in the right lower field and the left lower field. She has rales  in the right lower field and the left lower field.   Nursing note and vitals reviewed.    Personal Diagnostic Review  Chest x-ray: clearing of pul edema  .[email or web address]  No flowsheet data found.        Assessment:       1. CHF (congestive heart failure), NYHA class III, chronic, combined    2. Seizure    3. Medication monitoring encounter    4. Asthma with COPD    5. Chronic respiratory failure with hypoxia        Outpatient Encounter Medications as of 11/12/2018   Medication Sig Dispense Refill    acetaminophen (TYLENOL) 500 MG tablet Take 500 mg by mouth every 6 (six) hours as needed for Pain.      albuterol-ipratropium (DUO-NEB) 2.5 mg-0.5 mg/3 mL nebulizer solution Take 3 mLs by nebulization every 6 (six) hours as needed for Wheezing. Rescue 360 vial 5    aspirin (ECOTRIN) 81 MG EC tablet Take 81 mg by mouth once daily.      cetirizine (ZYRTEC) 10 MG tablet Take 10 mg by mouth once daily.      fluticasone (FLONASE) 50 mcg/actuation nasal spray 2 sprays (100 mcg total) by Each Nare route once daily. 1 Bottle 11    folic acid (FOLVITE) 1 MG tablet TAKE ONE TABLET BY MOUTH ONE TIME DAILY 90 tablet 0    furosemide (LASIX) 20 MG tablet Take 1 tablet (20 mg total) by mouth once daily. 30 tablet 11    inhalation spacing device (AEROCHAMBER PLUS FLOW-VU) Use with Symbicort and Albuterol inhalers 1 each 0    levETIRAcetam (KEPPRA) 500 MG Tab Take 1 tablet (500 mg total) by mouth 2 (two) times daily. 60 tablet 11    levothyroxine (SYNTHROID) 25 MCG tablet TAKE ONE TABLET BY MOUTH ONE TIME DAILY BEFORE BREAKFAST 90 tablet 0    lisinopril (PRINIVIL,ZESTRIL) 20 MG tablet Take 1 tablet (20 mg total) by mouth once daily. 90 tablet 1    metoprolol succinate (TOPROL-XL) 25 MG 24 hr tablet Take 1 tablet (25 mg total) by mouth once daily. (Patient taking differently: Take 25 mg by mouth once daily. ) 30 tablet 2    pantoprazole (PROTONIX) 40 MG tablet Take 1 tablet (40 mg total) by mouth once daily. (Patient taking  "differently: Take 40 mg by mouth once daily. ) 90 tablet 3    potassium chloride (KLOR-CON) 20 mEq Pack Take 20 mEq by mouth once daily. 30 packet 3    predniSONE (DELTASONE) 20 MG tablet 3 daily x 3 days, 2 daily x 3 days, 1 daily x 3 days, 1/2 daily x 4 days. 20 tablet 0    sertraline (ZOLOFT) 50 MG tablet Take 1 tablet (50 mg total) by mouth once daily. 30 tablet 2    traZODone (DESYREL) 100 MG tablet Take 1 tablet (100 mg total) by mouth every evening. 90 tablet 1    VITAMIN B COMPLEX (SUPER B COMPLEX-B-12 ORAL) Take by mouth.      [DISCONTINUED] albuterol (PROVENTIL) 2.5 mg /3 mL (0.083 %) nebulizer solution Take 3 mLs (2.5 mg total) by nebulization every 6 (six) hours while awake. 300 mL 11    [DISCONTINUED] budesonide-formoterol 160-4.5 mcg (SYMBICORT) 160-4.5 mcg/actuation HFAA Inhale 2 puffs into the lungs every 12 (twelve) hours. Controller 10.2 g 11    OLANZapine (ZYPREXA) 5 MG tablet Take 1 tablet (5 mg total) by mouth 2 (two) times daily as needed. 60 tablet 0    traMADol (ULTRAM) 50 mg tablet Take 1 tablet (50 mg total) by mouth once daily. (Patient taking differently: Take 50 mg by mouth once daily. ) 90 tablet 0     No facility-administered encounter medications on file as of 11/12/2018.      Orders Placed This Encounter   Procedures    HOSPITAL BED FOR HOME USE     Order Specific Question:   Type:     Answer:   Semi-electric     Order Specific Question:   Length of need (1-99 months):     Answer:   99     Order Specific Question:   Does patient have medical equipment at home?     Answer:   nebulizer     Order Specific Question:   Height:     Answer:   5' 1" (1.549 m)     Order Specific Question:   Weight:     Answer:   61.1 kg (134 lb 13 oz)     Order Specific Question:   Please check all that apply:     Answer:   Patient requires the head of bed to be elevated more than 30 degrees most of the time due to congestive heart failure, chronic pulmonary disease, or aspiration.  Pillows and " wedges have been considered and ruled out.     Comments:   CHF and COPD     Order Specific Question:   Please check all that apply:     Answer:   Patient requires positioning of the body in ways not feasible in an ordinary bed due to a medical condition which is expected to last at least one month.    Comprehensive metabolic panel     Standing Status:   Future     Number of Occurrences:   1     Standing Expiration Date:   1/11/2020    Levetiracetam level     Standing Status:   Future     Number of Occurrences:   1     Standing Expiration Date:   1/11/2020     Plan:       Requested Prescriptions      No prescriptions requested or ordered in this encounter     CHF (congestive heart failure), NYHA class III, chronic, combined  -     HOSPITAL BED FOR HOME USE    Seizure  -     Levetiracetam level; Future; Expected date: 11/12/2018    Medication monitoring encounter  -     Comprehensive metabolic panel; Future; Expected date: 11/12/2018  -     Levetiracetam level; Future; Expected date: 11/12/2018    Asthma with COPD  -     HOSPITAL BED FOR HOME USE    Chronic respiratory failure with hypoxia  -     HOSPITAL BED FOR HOME USE           Follow-up in about 6 months (around 5/12/2019).    Review of medical records from hospital. Medical records from hospital were reviewed during office visit - these included but were not limited to review of radiographic studies and /or radiologists reports, laboratory studies, discharge summaries, procedure notes, consultations and other transcribed notes.    MEDICAL DECISION MAKING: Moderate to high complexity.  Overall, the multiple problems listed are of moderate to high severity that may impact quality of life and activities of daily living. Side effects of medications, treatment plan as well as options and alternatives reviewed and discussed with patient. There was counseling of patient concerning these issues.    Total time spent in face to face counseling and coordination of care  - 40 minutes over 50% of time was used in discussion of prognosis, risks, benefits of treatment, instructions and compliance with regimen . Discussion with other physicians or health care providers (homehealth, durable medical equipment providers).

## 2018-11-12 NOTE — PATIENT INSTRUCTIONS
Albuterol; Ipratropium solution for inhalation  What is this medicine?  ALBUTEROL; IPRATROPIUM (al BYOO ter ole; i caleb cota um) has two bronchodilators. It helps open up the airways in your lungs to make it easier to breathe. This medicine is used to treat chronic obstructive pulmonary disease (COPD).  How should I use this medicine?  This medicine is used in a nebulizer. Nebulizers make a liquid into an aerosol that you breathe in through your mouth or your mouth and nose into your lungs. You will be taught how to use your nebulizer. Follow the directions on your prescription label. Take your medicine at regular intervals. Do not use more often than directed.  Talk to your pediatrician regarding the use of this medicine in children. Special care may be needed.  What side effects may I notice from receiving this medicine?  Side effects that you should report to your doctor or health care professional as soon as possible:  · allergic reactions like skin rash, itching or hives, swelling of the face, lips, or tongue  · breathing problems  · feeling faint or lightheaded, falls  · fever  · high blood pressure  · irregular heartbeat or chest pain  · muscle cramps or weakness  · pain, tingling, numbness in the hands or feet  · vomiting  Side effects that usually do not require medical attention (report to your doctor or health care professional if they continue or are bothersome):  · blurred vision  · cough  · difficulty passing urine  · difficulty sleeping  · headache  · nervousness or trembling  · stuffy or runny nose  · unusual taste  · upset stomach  What may interact with this medicine?  Do not take this medicine with any of the following medications:  · MAOIs like Carbex, Eldepryl, Marplan, Nardil, and Parnate  This medicine may also interact with the following medications:  · diuretics  · medicines for depression, anxiety, or psychotic disturbances  · medicines for irregular heartbeat  · medicines for weight  loss including some herbal products  · methadone  · pimozide  · some medicines for blood pressure or the heart  · sertindole  What if I miss a dose?  If you miss a dose, use it as soon as you can. If it is almost time for your next dose, use only that dose. Do not use double or extra doses.  Where should I keep my medicine?  Keep out of the reach of children.  Store at a room temperature 2 and 30 degees C (36 to 86 degrees F). Protect from light. Store this medicine in the protective pouch until ready to use. Throw away any unused medicine after the expiration date.  What should I tell my health care provider before I take this medicine?  They need to know if you have any of the following conditions:  · heart disease  · high blood pressure  · irregular heartbeat  · an unusual or allergic reaction to albuterol, ipratropium, atropine, soya protein, soybeans or peanuts, other medicines, foods, dyes, or preservatives  · pregnant or trying to get pregnant  · breast-feeding  What should I watch for while using this medicine?  Tell your doctor or healthcare professional if your symptoms do not start to get better or if they get worse. If your breathing gets worse while you are using this medicine, call your doctor right away. Do not stop using your medicine unless your doctor tells you to.  Your mouth may get dry. Chewing sugarless gum or sucking hard candy, and drinking plenty of water may help. Contact your doctor if the problem does not go away or is severe.  You may get dizzy or have blurred vision. Do not drive, use machinery, or do anything that needs mental alertness until you know how this medicine affects you. Do not stand or sit up quickly, especially if you are an older patient. This reduces the risk of dizzy or fainting spells.  NOTE:This sheet is a summary. It may not cover all possible information. If you have questions about this medicine, talk to your doctor, pharmacist, or health care provider. Copyright©  2017 [employer]

## 2018-11-13 ENCOUNTER — OFFICE VISIT (OUTPATIENT)
Dept: INTERNAL MEDICINE | Facility: CLINIC | Age: 83
End: 2018-11-13
Payer: MEDICARE

## 2018-11-13 VITALS
TEMPERATURE: 99 F | OXYGEN SATURATION: 97 % | DIASTOLIC BLOOD PRESSURE: 76 MMHG | SYSTOLIC BLOOD PRESSURE: 118 MMHG | BODY MASS INDEX: 25.68 KG/M2 | HEIGHT: 61 IN | HEART RATE: 70 BPM | WEIGHT: 136 LBS

## 2018-11-13 DIAGNOSIS — I67.3 BINSWANGER'S DEMENTIA: ICD-10-CM

## 2018-11-13 DIAGNOSIS — K21.9 GASTROESOPHAGEAL REFLUX DISEASE WITHOUT ESOPHAGITIS: ICD-10-CM

## 2018-11-13 DIAGNOSIS — Z86.73 HISTORY OF TRANSIENT ISCHEMIC ATTACK (TIA): ICD-10-CM

## 2018-11-13 DIAGNOSIS — F33.0 MAJOR DEPRESSIVE DISORDER, RECURRENT EPISODE, MILD: ICD-10-CM

## 2018-11-13 DIAGNOSIS — Z09 HOSPITAL DISCHARGE FOLLOW-UP: ICD-10-CM

## 2018-11-13 DIAGNOSIS — K92.0 HEMATEMESIS WITHOUT NAUSEA: ICD-10-CM

## 2018-11-13 DIAGNOSIS — E03.9 HYPOTHYROIDISM, UNSPECIFIED TYPE: Chronic | ICD-10-CM

## 2018-11-13 DIAGNOSIS — I35.1 NONRHEUMATIC AORTIC VALVE INSUFFICIENCY: ICD-10-CM

## 2018-11-13 DIAGNOSIS — J44.89 ASTHMA WITH COPD: Chronic | ICD-10-CM

## 2018-11-13 DIAGNOSIS — E87.1 HYPONATREMIA: ICD-10-CM

## 2018-11-13 DIAGNOSIS — E87.6 HYPOKALEMIA: ICD-10-CM

## 2018-11-13 DIAGNOSIS — R56.9 SEIZURE: ICD-10-CM

## 2018-11-13 DIAGNOSIS — I50.9 CONGESTIVE HEART FAILURE, NYHA CLASS 3, UNSPECIFIED CONGESTIVE HEART FAILURE TYPE: Primary | ICD-10-CM

## 2018-11-13 DIAGNOSIS — Z87.19 H/O: GI BLEED: ICD-10-CM

## 2018-11-13 DIAGNOSIS — I35.0 NONRHEUMATIC AORTIC VALVE STENOSIS: ICD-10-CM

## 2018-11-13 PROCEDURE — 1101F PT FALLS ASSESS-DOCD LE1/YR: CPT | Mod: CPTII,HCNC,S$GLB, | Performed by: FAMILY MEDICINE

## 2018-11-13 PROCEDURE — 3078F DIAST BP <80 MM HG: CPT | Mod: CPTII,HCNC,S$GLB, | Performed by: FAMILY MEDICINE

## 2018-11-13 PROCEDURE — 3074F SYST BP LT 130 MM HG: CPT | Mod: CPTII,HCNC,S$GLB, | Performed by: FAMILY MEDICINE

## 2018-11-13 PROCEDURE — 99999 PR PBB SHADOW E&M-EST. PATIENT-LVL III: CPT | Mod: PBBFAC,HCNC,, | Performed by: FAMILY MEDICINE

## 2018-11-13 PROCEDURE — 99214 OFFICE O/P EST MOD 30 MIN: CPT | Mod: HCNC,S$GLB,, | Performed by: FAMILY MEDICINE

## 2018-11-13 RX ORDER — ATORVASTATIN CALCIUM 20 MG/1
20 TABLET, FILM COATED ORAL DAILY
COMMUNITY
End: 2018-12-03 | Stop reason: SDUPTHER

## 2018-11-13 NOTE — PROGRESS NOTES
The Subjective:       Patient ID: Crystal Romero is a 83 y.o. female.    Chief Complaint: Hospital Follow Up    83-year-old female patient accompanied by daughter and caretaker here with Patient Active Problem List:     Rheumatoid arthritis     Hiatal hernia     Asthma with COPD     Hyperlipidemia     Hypertension     Diastolic dysfunction     Osteoarthritis     Rotator cuff arthropathy     H/O: GI bleed     Dysplastic colon polyp     Hypothyroid     Nonrheumatic aortic valve stenosis     Nonrheumatic aortic valve insufficiency     Impaired cognition     Gastroesophageal reflux disease without esophagitis     Major depressive disorder, recurrent episode, mild     Psoriasis with arthropathy     Bilateral adhesive capsulitis of shoulders     Pulmonary emphysema     Insomnia secondary to depression with anxiety     Binswanger's dementia     Seizure     Vasculitis, CNS     Dysphasia     History of transient ischemic attack (TIA)  For hospital discharge follow-up from [facility] last week- 11/04/2018 to 11/08/2018.   Reported that patient has been hospitalized for congestive heart failure, asthma exacerbation, had seizure, and right-sided weakness with TIA which has resolved, bloody vomiting twice, with previous history of GI bleed  Patient was in the hospital for 4 days and was given Lasix secondary to difficulty with breathing, when laying flat.   Patient was sent home on prednisone for 3 days but has been evaluated by pulmonologist after discharge secondary to ongoing shortness of breath.  And was given Lasix 40 mg twice daily  Patient was also increased on metoprolol to 25 mg twice daily at the time of discharge  Patient has appointment with Cardiology tomorrow  Has been discharged with PT OT through [facility]  Patient reports that she has been feeling better and taking her medications regularly  She was advised to increase pantoprazole to 40 mg twice daily for a month secondary to recent  "hematemesis  Denies any bloody vomiting or nausea or seizures since discharge and has been taking her medications regularly  Was advised to hold off potassium at the time of discharge from the hospital, was previously taking potassium supplements 20 mg 3 times daily  Currently on Lasix twice daily, would like to know whether she needs to start back taking  Potassium  Patient had extremely low sodium levels at the time of hospitalization.   Reports that she has lost 10 lb since recent hospitalization but has been eating well lately      Review of Systems   Constitutional: Positive for unexpected weight change. Negative for appetite change, fatigue and fever.   Eyes: Negative for visual disturbance.   Respiratory: Negative for cough, shortness of breath and wheezing.    Cardiovascular: Negative for chest pain and leg swelling.   Gastrointestinal: Negative for abdominal pain, blood in stool, constipation, diarrhea, nausea and vomiting.   Genitourinary: Negative for dysuria.   Musculoskeletal: Negative for myalgias.   Skin: Negative for rash.   Neurological: Positive for seizures. Negative for syncope, speech difficulty, weakness, light-headedness and headaches.   Psychiatric/Behavioral: Positive for decreased concentration. Negative for sleep disturbance.         /76 (BP Location: Left arm, Patient Position: Sitting)   Pulse 70   Temp 98.7 °F (37.1 °C) (Tympanic)   Ht 5' 1" (1.549 m)   Wt 61.7 kg (136 lb 0.4 oz)   SpO2 97%   BMI 25.70 kg/m²   Objective:      Physical Exam   Constitutional: She is oriented to person, place, and time. She appears well-developed and well-nourished.   HENT:   Head: Normocephalic and atraumatic.   Mouth/Throat: Oropharynx is clear and moist.   Cardiovascular: Normal rate and regular rhythm.   Murmur heard.  Pulmonary/Chest: Effort normal and breath sounds normal. No respiratory distress. She has no wheezes. She has no rales.   Abdominal: Soft. Bowel sounds are normal. There is " no tenderness.   Musculoskeletal: She exhibits no edema.   Neurological: She is alert and oriented to person, place, and time.   Skin: Skin is warm and dry. No rash noted.   Psychiatric: She has a normal mood and affect.         Assessment:       1. Congestive heart failure, NYHA class 3, unspecified congestive heart failure type    2. Hematemesis without nausea    3. H/O: GI bleed    4. Seizure    5. History of transient ischemic attack (TIA)    6. Binswanger's dementia    7. Asthma with COPD    8. Nonrheumatic aortic valve stenosis    9. Nonrheumatic aortic valve insufficiency    10. Gastroesophageal reflux disease without esophagitis    11. Major depressive disorder, recurrent episode, mild    12. Hypothyroidism, unspecified type    13. Hyponatremia    14. Hypokalemia    15. Hospital discharge follow-up        Plan:   Congestive heart failure, NYHA class 3, unspecified congestive heart failure type  Hematemesis without nausea  -     CBC auto differential; Future; Expected date: 11/13/2018  H/O: GI bleed  -     CBC auto differential; Future; Expected date: 11/13/2018  Seizure  History of transient ischemic attack (TIA)  Binswanger's dementia  Asthma with COPD  Nonrheumatic aortic valve stenosis  Nonrheumatic aortic valve insufficiency  Gastroesophageal reflux disease without esophagitis  Major depressive disorder, recurrent episode, mild  Hypothyroidism, unspecified type  Hyponatremia  -     Comprehensive metabolic panel; Future; Expected date: 11/13/2018  Hypokalemia  Hospital discharge follow-up    Reviewed recent blood work done by pulmonologist showing normal sodium and potassium levels  Patient was informed that for sugars might be elevated secondary to steroid taper  Advised to start back taking potassium supplements 20 mg once daily as she is currently on Lasix 40 mg twice a day  Patient clinically doing well  Advised to follow up with Cardiology as scheduled tomorrow  Secondary to moderate to severe aortic  stenosis and aortic insufficiency patient does not want to choose surgical option and wants to follow up with Cardiology  Currently taking metoprolol 25 mg twice daily, advised to discuss further with Cardiology    Will recheck labs in 1 month  Patient to continue physical therapy and OT with home health  Has been given hospital bed secondary to orthopnea with congestive heart failure by pulmonology  Depression has been stable  Advised to continue taking Lipitor 20 mg and discuss further regarding continuation of statin with Cardiology  Patient does not have any weakness secondary to TIA.

## 2018-11-14 ENCOUNTER — OFFICE VISIT (OUTPATIENT)
Dept: CARDIOLOGY | Facility: CLINIC | Age: 83
End: 2018-11-14
Payer: MEDICARE

## 2018-11-14 VITALS
DIASTOLIC BLOOD PRESSURE: 58 MMHG | SYSTOLIC BLOOD PRESSURE: 108 MMHG | HEIGHT: 61 IN | BODY MASS INDEX: 25.94 KG/M2 | HEART RATE: 80 BPM | WEIGHT: 137.38 LBS

## 2018-11-14 DIAGNOSIS — J44.89 ASTHMA WITH COPD: Chronic | ICD-10-CM

## 2018-11-14 DIAGNOSIS — E78.2 MIXED HYPERLIPIDEMIA: Chronic | ICD-10-CM

## 2018-11-14 DIAGNOSIS — I10 ESSENTIAL HYPERTENSION: Chronic | ICD-10-CM

## 2018-11-14 DIAGNOSIS — I35.0 NONRHEUMATIC AORTIC VALVE STENOSIS: ICD-10-CM

## 2018-11-14 DIAGNOSIS — Z86.73 HISTORY OF TRANSIENT ISCHEMIC ATTACK (TIA): ICD-10-CM

## 2018-11-14 DIAGNOSIS — I50.32 CHRONIC DIASTOLIC CONGESTIVE HEART FAILURE: Primary | ICD-10-CM

## 2018-11-14 PROCEDURE — 3078F DIAST BP <80 MM HG: CPT | Mod: CPTII,HCNC,S$GLB, | Performed by: INTERNAL MEDICINE

## 2018-11-14 PROCEDURE — 1101F PT FALLS ASSESS-DOCD LE1/YR: CPT | Mod: CPTII,HCNC,S$GLB, | Performed by: INTERNAL MEDICINE

## 2018-11-14 PROCEDURE — 99999 PR PBB SHADOW E&M-EST. PATIENT-LVL III: CPT | Mod: PBBFAC,HCNC,, | Performed by: INTERNAL MEDICINE

## 2018-11-14 PROCEDURE — 3074F SYST BP LT 130 MM HG: CPT | Mod: CPTII,HCNC,S$GLB, | Performed by: INTERNAL MEDICINE

## 2018-11-14 PROCEDURE — 99214 OFFICE O/P EST MOD 30 MIN: CPT | Mod: HCNC,S$GLB,, | Performed by: INTERNAL MEDICINE

## 2018-11-14 RX ORDER — LISINOPRIL 5 MG/1
5 TABLET ORAL DAILY
Qty: 30 TABLET | Refills: 5 | Status: SHIPPED | OUTPATIENT
Start: 2018-11-14 | End: 2019-01-30

## 2018-11-14 NOTE — PROGRESS NOTES
Subjective:   Patient ID:  Crystal Romero is a 83 y.o. female who presents for follow up of Hospital Follow Up and Hypertension      82, yo female, came in for routine f/u. Severe dementia and discussed with the her Shannan Lockwood.  PMH moderate AI, and moderate to severe AS, dementia 3 yrs, HTN, HLD, asthma, GI bleeding due to prolong Rx of steroid for asthma, RA, TIA x3 in 4 months  H/o TIA/seizure episode. Had impaired swallow and dysphasia. F/u with  neurologist.  Repeat ECHO in , showed normal EF, DD, moderate AI and moderate AS.   Refused surgery for valve per POA.    Admitted to ER [facility] one week ago. For TIA, with left weakness and aphasia. She had vomiting with blood. Positive orthopnea. CHF and added Lasix  C/o dyspnea when having anxiety.  C/o fatigue, decreased appetite related to dementia.   Denied chest pain, dyspnea  Confused at night.  Decreased activity, appetite, sleeps 18 hours a day.      Addendum on 11/16  [facility] discharge nDx:  Acute metabolic encephalopathy  Severe low Na  Seizure  Coffee-ground emesis  Anemia  Piulm. Edema          Past Medical History:   Diagnosis Date    Arthritis     Coronary artery disease     Dementia     Depression     Encounter for blood transfusion     H/O: GI bleed     Hiatal hernia     Hyperlipidemia     Hypertension     Hypothyroid     Rheumatoid arthritis(714.0)     Seizures     Stroke     Syncope and collapse        Past Surgical History:   Procedure Laterality Date    COLONOSCOPY N/A 4/7/2015    [facility]    COLONOSCOPY N/A 9/25/2014    [facility]    ESOPHAGOGASTRODUODENOSCOPY (EGD) N/A 9/25/2014    [facility]    MANDIBLE SURGERY      TONSILLECTOMY         Social History     Tobacco Use    Smoking status: Former Smoker     Packs/day: 2.00     Years: 16.00     Pack years: 32.00     Types: Cigarettes      Last attempt to quit: 1950     Years since quittin.9    Smokeless tobacco: Never Used   Substance Use Topics    Alcohol use: Yes     Alcohol/week: 1.8 oz     Types: 3 Glasses of wine per week     Comment: daily 1/2 bottle wine    Drug use: No       Family History   Problem Relation Age of Onset    Cancer Mother         breast, uterine         Review of Systems   Constitution: Positive for decreased appetite and fever.   HENT: Negative.    Eyes: Negative.    Cardiovascular: Positive for dyspnea on exertion. Negative for chest pain.   Respiratory: Positive for cough and wheezing.    Endocrine: Negative.    Hematologic/Lymphatic: Negative.    Skin: Negative.    Musculoskeletal: Positive for back pain and joint pain.   Gastrointestinal: Negative.    Genitourinary: Negative.    Psychiatric/Behavioral: Positive for hallucinations and memory loss.   Allergic/Immunologic: Negative.        Objective:   Physical Exam   Constitutional: She is oriented to person, place, and time. She appears well-nourished.   HENT:   Head: Normocephalic.   Eyes: Pupils are equal, round, and reactive to light.   Neck: Normal carotid pulses and no JVD present. Carotid bruit is not present. No thyromegaly present.   Cardiovascular: Normal rate, regular rhythm and normal pulses.  No extrasystoles are present. PMI is not displaced. Exam reveals no gallop and no S3.   Murmur heard.  Pulses:       Radial pulses are 2+ on the right side, and 2+ on the left side.   3/6 ESM on RUSB, S2 not audible.   Pulmonary/Chest: No stridor. No respiratory distress. She has decreased breath sounds.   Abdominal: Soft. Bowel sounds are normal. There is no tenderness. There is no rebound.   Musculoskeletal: Normal range of motion.   Neurological: She is alert and oriented to person, place, and time.   Skin: Skin is intact. No rash noted.   Psychiatric: Her behavior is normal.       Lab Results   Component Value Date    CHOL 211 (H) 2018    CHOL  169 07/11/2017    CHOL 180 06/17/2016     Lab Results   Component Value Date    HDL 72 04/04/2018    HDL 58 07/11/2017    HDL 87 (H) 06/17/2016     Lab Results   Component Value Date    LDLCALC 122.6 04/04/2018    LDLCALC 95.2 07/11/2017    LDLCALC 78.6 06/17/2016     Lab Results   Component Value Date    TRIG 82 04/04/2018    TRIG 79 07/11/2017    TRIG 72 06/17/2016     Lab Results   Component Value Date    CHOLHDL 34.1 04/04/2018    CHOLHDL 34.3 07/11/2017    CHOLHDL 48.3 06/17/2016       Chemistry        Component Value Date/Time     11/12/2018 1603    K 3.7 11/12/2018 1603    CL 95 11/12/2018 1603    CO2 25 11/12/2018 1603    BUN 25 (H) 11/12/2018 1603    CREATININE 1.0 11/12/2018 1603     (H) 11/12/2018 1603        Component Value Date/Time    CALCIUM 9.5 11/12/2018 1603    ALKPHOS 53 (L) 11/12/2018 1603    AST 15 11/12/2018 1603    ALT 15 11/12/2018 1603    BILITOT 0.5 11/12/2018 1603    ESTGFRAFRICA >60.0 11/12/2018 1603    EGFRNONAA 52.2 (A) 11/12/2018 1603          No results found for: LABA1C, HGBA1C  Lab Results   Component Value Date    TSH 1.552 04/04/2018     Lab Results   Component Value Date    INR 0.9 03/10/2017    INR 1.0 09/23/2014     Lab Results   Component Value Date    WBC 6.85 06/19/2018    HGB 12.8 06/19/2018    HCT 41.2 06/19/2018    MCV 87 06/19/2018     06/19/2018     BMP  Sodium   Date Value Ref Range Status   11/12/2018 136 136 - 145 mmol/L Final     Potassium   Date Value Ref Range Status   11/12/2018 3.7 3.5 - 5.1 mmol/L Final     Chloride   Date Value Ref Range Status   11/12/2018 95 95 - 110 mmol/L Final     CO2   Date Value Ref Range Status   11/12/2018 25 23 - 29 mmol/L Final     BUN, Bld   Date Value Ref Range Status   11/12/2018 25 (H) 8 - 23 mg/dL Final     Creatinine   Date Value Ref Range Status   11/12/2018 1.0 0.5 - 1.4 mg/dL Final     Calcium   Date Value Ref Range Status   11/12/2018 9.5 8.7 - 10.5 mg/dL Final     Anion Gap   Date Value Ref Range  Status   11/12/2018 16 8 - 16 mmol/L Final     eGFR if    Date Value Ref Range Status   11/12/2018 >60.0 >60 mL/min/1.73 m^2 Final     eGFR if non    Date Value Ref Range Status   11/12/2018 52.2 (A) >60 mL/min/1.73 m^2 Final     Comment:     Calculation used to obtain the estimated glomerular filtration  rate (eGFR) is the CKD-EPI equation.        BNP  @LABRCNTIP(BNP,BNPTRIAGEBLO)@  @LABRCNTIP(troponini)@  Estimated Creatinine Clearance: 36.1 mL/min (based on SCr of 1 mg/dL).  No results found in the last 24 hours.  No results found in the last 24 hours.  No results found in the last 24 hours.    Assessment:      1. Mixed hyperlipidemia    2. Chronic diastolic congestive heart failure    3. Essential hypertension    4. Nonrheumatic aortic valve stenosis    5. History of transient ischemic attack (TIA)    6. Asthma with COPD      Developed CHfpEF  GI bleeding due to steroid Rx for asthma  Recurrent TIA and epilepsy  Moderate to severe AS and moderate AI. No invasive procedure    Plan:   Lasix 40 mg daily x 5 days and back to 20 mg daily  BNP and BMP in 5 days  Check weight and BP daily. Report in one week  Continue ToprolXL 25 mg daily and statin  Keep K 20 meq daily  Daily weight. Please call the office if gain 3 pounds in 1 day or 5 pounds in 1 week.  Fluid restriction 1.5 liters a day  Na< 2 gm  RTC in 4 weeks

## 2018-11-19 ENCOUNTER — LAB VISIT (OUTPATIENT)
Dept: LAB | Facility: HOSPITAL | Age: 83
End: 2018-11-19
Attending: INTERNAL MEDICINE
Payer: MEDICARE

## 2018-11-19 DIAGNOSIS — I50.32 CHRONIC DIASTOLIC CONGESTIVE HEART FAILURE: ICD-10-CM

## 2018-11-19 LAB
ANION GAP SERPL CALC-SCNC: 10 MMOL/L
BNP SERPL-MCNC: 258 PG/ML
BUN SERPL-MCNC: 25 MG/DL
CALCIUM SERPL-MCNC: 9.6 MG/DL
CHLORIDE SERPL-SCNC: 98 MMOL/L
CO2 SERPL-SCNC: 28 MMOL/L
CREAT SERPL-MCNC: 0.9 MG/DL
EST. GFR  (AFRICAN AMERICAN): >60 ML/MIN/1.73 M^2
EST. GFR  (NON AFRICAN AMERICAN): 59.3 ML/MIN/1.73 M^2
GLUCOSE SERPL-MCNC: 108 MG/DL
POTASSIUM SERPL-SCNC: 3.8 MMOL/L
SODIUM SERPL-SCNC: 136 MMOL/L

## 2018-11-19 PROCEDURE — 83880 ASSAY OF NATRIURETIC PEPTIDE: CPT | Mod: HCNC

## 2018-11-19 PROCEDURE — 80048 BASIC METABOLIC PNL TOTAL CA: CPT | Mod: HCNC

## 2018-11-19 PROCEDURE — 36415 COLL VENOUS BLD VENIPUNCTURE: CPT | Mod: HCNC,PO

## 2018-11-20 ENCOUNTER — TELEPHONE (OUTPATIENT)
Dept: CARDIOLOGY | Facility: CLINIC | Age: 83
End: 2018-11-20

## 2018-12-01 ENCOUNTER — PATIENT MESSAGE (OUTPATIENT)
Dept: CARDIOLOGY | Facility: CLINIC | Age: 83
End: 2018-12-01

## 2018-12-01 ENCOUNTER — PATIENT MESSAGE (OUTPATIENT)
Dept: INTERNAL MEDICINE | Facility: CLINIC | Age: 83
End: 2018-12-01

## 2018-12-01 DIAGNOSIS — L40.50 PSORIASIS WITH ARTHROPATHY: ICD-10-CM

## 2018-12-03 RX ORDER — ATORVASTATIN CALCIUM 20 MG/1
20 TABLET, FILM COATED ORAL DAILY
Qty: 30 TABLET | Refills: 11 | Status: SHIPPED | OUTPATIENT
Start: 2018-12-03 | End: 2019-06-14 | Stop reason: DRUGHIGH

## 2018-12-03 RX ORDER — FOLIC ACID 1 MG/1
TABLET ORAL
Qty: 90 TABLET | Refills: 0 | Status: SHIPPED | OUTPATIENT
Start: 2018-12-03 | End: 2019-03-22 | Stop reason: SDUPTHER

## 2018-12-05 DIAGNOSIS — I10 ESSENTIAL HYPERTENSION: Primary | Chronic | ICD-10-CM

## 2018-12-05 DIAGNOSIS — K21.9 GASTROESOPHAGEAL REFLUX DISEASE, ESOPHAGITIS PRESENCE NOT SPECIFIED: ICD-10-CM

## 2018-12-05 DIAGNOSIS — F33.0 MAJOR DEPRESSIVE DISORDER, RECURRENT EPISODE, MILD: ICD-10-CM

## 2018-12-05 RX ORDER — PANTOPRAZOLE SODIUM 40 MG/1
40 TABLET, DELAYED RELEASE ORAL DAILY
Qty: 90 TABLET | Refills: 3 | Status: SHIPPED | OUTPATIENT
Start: 2018-12-05 | End: 2019-12-01 | Stop reason: SDUPTHER

## 2018-12-05 RX ORDER — SERTRALINE HYDROCHLORIDE 50 MG/1
50 TABLET, FILM COATED ORAL DAILY
Qty: 30 TABLET | Refills: 2 | Status: SHIPPED | OUTPATIENT
Start: 2018-12-05 | End: 2019-03-22 | Stop reason: SDUPTHER

## 2018-12-05 RX ORDER — METOPROLOL SUCCINATE 25 MG/1
25 TABLET, EXTENDED RELEASE ORAL 2 TIMES DAILY
Qty: 180 TABLET | Refills: 1 | Status: SHIPPED | OUTPATIENT
Start: 2018-12-05 | End: 2019-01-27 | Stop reason: SDUPTHER

## 2018-12-06 ENCOUNTER — OFFICE VISIT (OUTPATIENT)
Dept: INTERNAL MEDICINE | Facility: CLINIC | Age: 83
End: 2018-12-06
Payer: MEDICARE

## 2018-12-06 VITALS
TEMPERATURE: 100 F | SYSTOLIC BLOOD PRESSURE: 110 MMHG | HEART RATE: 93 BPM | DIASTOLIC BLOOD PRESSURE: 70 MMHG | OXYGEN SATURATION: 97 % | WEIGHT: 132.25 LBS | HEIGHT: 62 IN | BODY MASS INDEX: 24.34 KG/M2

## 2018-12-06 DIAGNOSIS — J22 LOWER RESP. TRACT INFECTION: Primary | ICD-10-CM

## 2018-12-06 PROCEDURE — 3074F SYST BP LT 130 MM HG: CPT | Mod: CPTII,HCNC,S$GLB, | Performed by: PHYSICIAN ASSISTANT

## 2018-12-06 PROCEDURE — 99213 OFFICE O/P EST LOW 20 MIN: CPT | Mod: 25,HCNC,S$GLB, | Performed by: PHYSICIAN ASSISTANT

## 2018-12-06 PROCEDURE — 96372 THER/PROPH/DIAG INJ SC/IM: CPT | Mod: HCNC,S$GLB,, | Performed by: PHYSICIAN ASSISTANT

## 2018-12-06 PROCEDURE — 1101F PT FALLS ASSESS-DOCD LE1/YR: CPT | Mod: CPTII,HCNC,S$GLB, | Performed by: PHYSICIAN ASSISTANT

## 2018-12-06 PROCEDURE — 3078F DIAST BP <80 MM HG: CPT | Mod: CPTII,HCNC,S$GLB, | Performed by: PHYSICIAN ASSISTANT

## 2018-12-06 PROCEDURE — 99999 PR PBB SHADOW E&M-EST. PATIENT-LVL IV: CPT | Mod: PBBFAC,HCNC,, | Performed by: PHYSICIAN ASSISTANT

## 2018-12-06 RX ORDER — METHYLPREDNISOLONE ACETATE 80 MG/ML
40 INJECTION, SUSPENSION INTRA-ARTICULAR; INTRALESIONAL; INTRAMUSCULAR; SOFT TISSUE ONCE
Status: COMPLETED | OUTPATIENT
Start: 2018-12-06 | End: 2018-12-06

## 2018-12-06 RX ORDER — AZITHROMYCIN 250 MG/1
TABLET, FILM COATED ORAL
Qty: 6 TABLET | Refills: 0 | Status: SHIPPED | OUTPATIENT
Start: 2018-12-06 | End: 2018-12-11

## 2018-12-06 RX ADMIN — METHYLPREDNISOLONE ACETATE 40 MG: 80 INJECTION, SUSPENSION INTRA-ARTICULAR; INTRALESIONAL; INTRAMUSCULAR; SOFT TISSUE at 10:12

## 2018-12-06 NOTE — PROGRESS NOTES
Subjective:       Patient ID: Crystal Romero is a 84 y.o.W/ female.    Chief Complaint: Cough and Nasal Congestion    HPI         She comes in today accompanied by her daughter and has the above problem for the past 2-3 days.  She also has a caregiver sitter that watches after her daily.  Her daughter had these same symptoms about a week prior to her coming down with this.  I saw her in the clinic for that problem.  She is now better.        She does not have any ear or nose or throat problems at this point.  She awake in the last 2 days with coughing and rattling in her chest.  It is rare for her to bring mucus up out of her chest and spit it out.  Her cough is mostly a dry 1.  She is running a low-grade fever here in the office today.        She did get her flu shot earlier this year.    Review of Systems    Otherwise negative concerning the ENT, RESPIRATORY, PULMONARY, and GI system review.    Objective:      Physical Exam    ENT:  All within normal limits.  Her voice is normal without nasal tone or hoarseness.  I do not see any redness present in her pharynx, soft palate, or nares.  She does not have any tender glands or adenopathy present.  CHEST:  Clear BS anterior to posterior with no wheeze, rhonchi, or rales.  Her breathing is not labored and she is comfortable.  Her skin does not feel hot or clammy.    Assessment:       1. Lower resp. tract infection        Plan:     1.  Recommended a few OTC meds to give her for her cough to loosen it.  2.  Recommended her taking a Depo-Medrol 40 mg IM injection and she accepted that.  3.  Will start her on Z-Jonnathan 250 mg for the next 5 days.  Take with food.  4.  Recheck if cough increases or persists more than 7 days.

## 2018-12-09 RX ORDER — LEVOTHYROXINE SODIUM 25 UG/1
TABLET ORAL
Qty: 90 TABLET | Refills: 0 | Status: SHIPPED | OUTPATIENT
Start: 2018-12-09 | End: 2019-03-22 | Stop reason: SDUPTHER

## 2018-12-09 RX ORDER — POTASSIUM CHLORIDE 750 MG/1
TABLET, EXTENDED RELEASE ORAL
Qty: 90 TABLET | Refills: 2 | Status: SHIPPED | OUTPATIENT
Start: 2018-12-09 | End: 2019-04-27 | Stop reason: SDUPTHER

## 2018-12-10 ENCOUNTER — PATIENT MESSAGE (OUTPATIENT)
Dept: PULMONOLOGY | Facility: CLINIC | Age: 83
End: 2018-12-10

## 2018-12-10 ENCOUNTER — TELEPHONE (OUTPATIENT)
Dept: PULMONOLOGY | Facility: CLINIC | Age: 83
End: 2018-12-10

## 2018-12-10 NOTE — TELEPHONE ENCOUNTER
----- Message from Denise Givens sent at 12/10/2018  4:12 PM CST -----  Contact: Rosa bradley/[facility]  Caller request a call back lower Respiratory tract infection  call back number:[phone]

## 2018-12-11 ENCOUNTER — HOSPITAL ENCOUNTER (OUTPATIENT)
Dept: RADIOLOGY | Facility: HOSPITAL | Age: 83
Discharge: HOME | End: 2018-12-11
Attending: INTERNAL MEDICINE
Payer: MEDICARE

## 2018-12-11 ENCOUNTER — OFFICE VISIT (OUTPATIENT)
Dept: PULMONOLOGY | Facility: CLINIC | Age: 83
End: 2018-12-11
Payer: MEDICARE

## 2018-12-11 VITALS
WEIGHT: 133.19 LBS | RESPIRATION RATE: 18 BRPM | DIASTOLIC BLOOD PRESSURE: 64 MMHG | OXYGEN SATURATION: 95 % | HEART RATE: 84 BPM | BODY MASS INDEX: 24.51 KG/M2 | HEIGHT: 62 IN | SYSTOLIC BLOOD PRESSURE: 110 MMHG

## 2018-12-11 DIAGNOSIS — J44.1 COPD EXACERBATION: Primary | ICD-10-CM

## 2018-12-11 DIAGNOSIS — J44.89 ASTHMA WITH COPD: ICD-10-CM

## 2018-12-11 DIAGNOSIS — R06.02 SOB (SHORTNESS OF BREATH): ICD-10-CM

## 2018-12-11 PROCEDURE — 71046 X-RAY EXAM CHEST 2 VIEWS: CPT | Mod: TC,HCNC

## 2018-12-11 PROCEDURE — 1101F PT FALLS ASSESS-DOCD LE1/YR: CPT | Mod: CPTII,HCNC,S$GLB, | Performed by: INTERNAL MEDICINE

## 2018-12-11 PROCEDURE — 3078F DIAST BP <80 MM HG: CPT | Mod: CPTII,HCNC,S$GLB, | Performed by: INTERNAL MEDICINE

## 2018-12-11 PROCEDURE — 71046 X-RAY EXAM CHEST 2 VIEWS: CPT | Mod: 26,HCNC,, | Performed by: RADIOLOGY

## 2018-12-11 PROCEDURE — 99214 OFFICE O/P EST MOD 30 MIN: CPT | Mod: HCNC,S$GLB,, | Performed by: INTERNAL MEDICINE

## 2018-12-11 PROCEDURE — 3074F SYST BP LT 130 MM HG: CPT | Mod: CPTII,HCNC,S$GLB, | Performed by: INTERNAL MEDICINE

## 2018-12-11 PROCEDURE — 99999 PR PBB SHADOW E&M-EST. PATIENT-LVL IV: CPT | Mod: PBBFAC,HCNC,, | Performed by: INTERNAL MEDICINE

## 2018-12-11 RX ORDER — METHYLPREDNISOLONE 4 MG/1
TABLET ORAL
Qty: 1 PACKAGE | Refills: 1 | Status: SHIPPED | OUTPATIENT
Start: 2018-12-11 | End: 2018-12-17 | Stop reason: ALTCHOICE

## 2018-12-11 RX ORDER — IPRATROPIUM BROMIDE AND ALBUTEROL SULFATE 2.5; .5 MG/3ML; MG/3ML
3 SOLUTION RESPIRATORY (INHALATION) EVERY 6 HOURS PRN
Qty: 360 VIAL | Refills: 5 | Status: SHIPPED | OUTPATIENT
Start: 2018-12-11 | End: 2019-07-16 | Stop reason: SDUPTHER

## 2018-12-11 RX ORDER — GUAIFENESIN 600 MG/1
1200 TABLET, EXTENDED RELEASE ORAL 2 TIMES DAILY
Qty: 60 TABLET | Refills: 11 | Status: SHIPPED | OUTPATIENT
Start: 2018-12-11 | End: 2019-01-10

## 2018-12-11 NOTE — TELEPHONE ENCOUNTER
----- Message from Chasidy Teague sent at 12/11/2018 11:47 AM CST -----  Pt daughter(Shannan) at [phone]//states the Home Health nurse received a message back from your office regarding scheduling an x-ray and appt with Dr///The Nurse is concerned about pt's right lung//please call asap//thanks//cristy

## 2018-12-11 NOTE — TELEPHONE ENCOUNTER
Pt accepted [facility] appointment on 12/11/2018 at 1620 with Dr Slater.  Pt provided times for all testing appointments.  Pt verbalized understanding.

## 2018-12-11 NOTE — PROGRESS NOTES
Subjective:       Patient ID: Crystal Romero is a 84 y.o. female.    Chief Complaint: Asthma with copd and Cough    HPI COPD  She presents for evaluation and treatment of COPD. The patient is currently having symptoms / an exacerbation. Current symptoms include acute dyspnea, cough productive of white sputum in small amounts and wheezing. Symptoms have been present since a week ago and have been gradually worsening. She denies chills and fever. Associated symptoms include poor exercise tolerance and shortness of breath.  This episode appears to have been triggered by upper respiratory infection. Treatments tried for the current exacerbation: albuterol nebulizer and chronic oral steroids. The patient has been having similar episodes for approximately 10 years. She uses 1 pillows at night. Patient currently is not on home oxygen therapy.. The patient is having no constitutional symptoms, denying fever, chills, anorexia, or weight loss. The patient has been hospitalized for this condition before. She quit smoking approximately many years ago. The patient is experiencing exercise intolerance (difficulty walking 1 blocks on flat ground).  Cough - z pac, steroids   Low grade fever      Answers for HPI/ROS submitted by the patient on 12/11/2018   Asthma  In the past 4 weeks, how much of the time did your asthma keep you from getting as much done at work, school, or at home?: all of the time  During the past 4 weeks, how often have you had shortness of breath?: 3 to 6 times a week  During the past 4 weeks, how often did your asthma symptoms (Wheezing, coughing, shortness of breath, chest tightness or pain) wake you up at night or earlier that usual in the morning?: 4 or more nights a week  During the past 4 weeks, how often have you used your rescue inhaler or nebulizer medication (such as albuterol)?: 3 or more times per day  How would you rate your asthma control during the past 4 weeks?: poorly controlled   :  8      Past Medical History:   Diagnosis Date    Arthritis     Cataract     Coronary artery disease     Dementia     Depression     Encounter for blood transfusion     H/O: GI bleed     Hiatal hernia     Hyperlipidemia     Hypertension     Hypothyroid     Rheumatoid arthritis(714.0)     Seizures     Stroke     Syncope and collapse      Past Surgical History:   Procedure Laterality Date    COLONOSCOPY N/A 2015    [facility]    COLONOSCOPY N/A 2014    [facility]    ESOPHAGOGASTRODUODENOSCOPY (EGD) N/A 2014    [facility]    MANDIBLE SURGERY      TONSILLECTOMY       Social History     Socioeconomic History    Marital status:      Spouse name: Not on file    Number of children: 2    Years of education: Not on file    Highest education level: Not on file   Social Needs    Financial resource strain: Not on file    Food insecurity - worry: Not on file    Food insecurity - inability: Not on file    Transportation needs - medical: Not on file    Transportation needs - non-medical: Not on file   Occupational History    Not on file   Tobacco Use    Smoking status: Former Smoker     Packs/day: 2.00     Years: 16.00     Pack years: 32.00     Types: Cigarettes     Last attempt to quit: 1950     Years since quittin.9    Smokeless tobacco: Never Used   Substance and Sexual Activity    Alcohol use: Yes     Alcohol/week: 1.8 oz     Types: 3 Glasses of wine per week     Comment: daily 1/2 bottle wine    Drug use: No    Sexual activity: No   Other Topics Concern    Not on file   Social History Narrative    Lives with daughter     Review of Systems   Constitutional: Positive for fatigue. Negative for fever.   HENT: Positive for postnasal drip, rhinorrhea and congestion.    Eyes: Negative for redness and itching.   Respiratory: Positive for cough, sputum  production, shortness of breath, dyspnea on extertion, use of rescue inhaler and Paroxysmal Nocturnal Dyspnea.    Cardiovascular: Negative for chest pain, palpitations and leg swelling.   Genitourinary: Negative for difficulty urinating and hematuria.   Endocrine: Negative for cold intolerance and heat intolerance.    Skin: Negative for rash.   Gastrointestinal: Negative for nausea and abdominal pain.   Neurological: Negative for dizziness, syncope, weakness and light-headedness.   Hematological: Negative for adenopathy. Does not bruise/bleed easily.   Psychiatric/Behavioral: Negative for sleep disturbance. The patient is not nervous/anxious.        Objective:      Physical Exam   Constitutional: She is oriented to person, place, and time. She appears well-developed and well-nourished.   HENT:   Head: Normocephalic and atraumatic.   Mouth/Throat: Oropharyngeal exudate present.   Eyes: Conjunctivae are normal. Pupils are equal, round, and reactive to light.   Neck: Neck supple. No JVD present. No tracheal deviation present. No thyromegaly present.   Cardiovascular: Normal rate, regular rhythm and normal heart sounds.   Pulmonary/Chest: Effort normal. No respiratory distress. She has decreased breath sounds. She has wheezes in the right lower field and the left lower field. She has no rhonchi. She has no rales. She exhibits no tenderness.   Abdominal: Soft. Bowel sounds are normal.   Musculoskeletal: Normal range of motion. She exhibits no edema.   Lymphadenopathy:     She has no cervical adenopathy.   Neurological: She is alert and oriented to person, place, and time.   Skin: Skin is warm and dry.   Nursing note and vitals reviewed.    Personal Diagnostic Review  Chest X-Ray: I personally reviewed the films and findings are:, air trapping/emphysema  Pulmonary function tests:  No recent    Pulmonary Studies Review 12/11/2018 12/6/2018 11/14/2018 11/13/2018 11/12/2018 10/29/2018 10/12/2018   SpO2 95 97 - 97 97 91 96    Height 62.000 62.000 61.000 61.000 61.000 61.000 61.000   Weight 2130.53 2116.42 2197.55 2176.38 2156.98 2308.66 2225.76   BMI (Calculated) 24.4 24.2 26 25.8 25.5 27.3 26.3   Predicted Distance 236.02 237.27 231.87 233.12 234.99 223.76 230   Predicted Distance Meters (Calculated) 375.49 376.41 371.43 372.81 374.07 364.22 369.6     No flowsheet data found.      Assessment:       1. COPD exacerbation    2. Asthma with COPD        Outpatient Encounter Medications as of 12/11/2018   Medication Sig Dispense Refill    acetaminophen (TYLENOL) 500 MG tablet Take 500 mg by mouth every 6 (six) hours as needed for Pain.      albuterol-ipratropium (DUO-NEB) 2.5 mg-0.5 mg/3 mL nebulizer solution Take 3 mLs by nebulization every 6 (six) hours as needed for Wheezing. Rescue 360 vial 5    aspirin (ECOTRIN) 81 MG EC tablet Take 81 mg by mouth once daily.      atorvastatin (LIPITOR) 20 MG tablet Take 1 tablet (20 mg total) by mouth once daily. 30 tablet 11    folic acid (FOLVITE) 1 MG tablet TAKE ONE TABLET BY MOUTH ONE TIME DAILY 90 tablet 0    furosemide (LASIX) 20 MG tablet Take 1 tablet (20 mg total) by mouth once daily. 30 tablet 11    inhalation spacing device (AEROCHAMBER PLUS FLOW-VU) Use with Symbicort and Albuterol inhalers 1 each 0    levETIRAcetam (KEPPRA) 500 MG Tab Take 1 tablet (500 mg total) by mouth 2 (two) times daily. 60 tablet 11    levothyroxine (SYNTHROID) 25 MCG tablet TAKE ONE TABLET BY MOUTH ONE TIME DAILY BEFORE BREAKFAST 90 tablet 0    lisinopril (PRINIVIL,ZESTRIL) 5 MG tablet Take 1 tablet (5 mg total) by mouth once daily. 30 tablet 5    metoprolol succinate (TOPROL-XL) 25 MG 24 hr tablet Take 1 tablet (25 mg total) by mouth 2 (two) times daily. (Patient taking differently: Take 25 mg by mouth once daily. ) 180 tablet 1    pantoprazole (PROTONIX) 40 MG tablet Take 1 tablet (40 mg total) by mouth once daily. 90 tablet 3    potassium chloride (KLOR-CON) 10 MEQ TbSR TAKE ONE TABLET BY MOUTH  THREE TIMES DAILY 90 tablet 2    sertraline (ZOLOFT) 50 MG tablet Take 1 tablet (50 mg total) by mouth once daily. 30 tablet 2    traZODone (DESYREL) 100 MG tablet Take 1 tablet (100 mg total) by mouth every evening. (Patient taking differently: Take 100 mg by mouth every evening. ) 90 tablet 1    VITAMIN B COMPLEX (SUPER B COMPLEX-B-12 ORAL) Take by mouth.      [DISCONTINUED] albuterol-ipratropium (DUO-NEB) 2.5 mg-0.5 mg/3 mL nebulizer solution Take 3 mLs by nebulization every 6 (six) hours as needed for Wheezing. Rescue 360 vial 5    azithromycin (Z-JAMIE) 250 MG tablet Take 2 tablets by mouth on day 1; Take 1 tablet by mouth on days 2-5 with food. 6 tablet 0    guaiFENesin (MUCINEX) 600 mg 12 hr tablet Take 2 tablets (1,200 mg total) by mouth 2 (two) times daily. 60 tablet 11    methylPREDNISolone (MEDROL DOSEPACK) 4 mg tablet use as directed 1 Package 1     No facility-administered encounter medications on file as of 12/11/2018.      No orders of the defined types were placed in this encounter.    Plan:       Requested Prescriptions     Signed Prescriptions Disp Refills    guaiFENesin (MUCINEX) 600 mg 12 hr tablet 60 tablet 11     Sig: Take 2 tablets (1,200 mg total) by mouth 2 (two) times daily.    methylPREDNISolone (MEDROL DOSEPACK) 4 mg tablet 1 Package 1     Sig: use as directed    albuterol-ipratropium (DUO-NEB) 2.5 mg-0.5 mg/3 mL nebulizer solution 360 vial 5     Sig: Take 3 mLs by nebulization every 6 (six) hours as needed for Wheezing. Rescue     COPD exacerbation  -     guaiFENesin (MUCINEX) 600 mg 12 hr tablet; Take 2 tablets (1,200 mg total) by mouth 2 (two) times daily.  Dispense: 60 tablet; Refill: 11  -     methylPREDNISolone (MEDROL DOSEPACK) 4 mg tablet; use as directed  Dispense: 1 Package; Refill: 1    Asthma with COPD  -     albuterol-ipratropium (DUO-NEB) 2.5 mg-0.5 mg/3 mL nebulizer solution; Take 3 mLs by nebulization every 6 (six) hours as needed for Wheezing. Rescue  Dispense:  360 vial; Refill: 5      Follow-up in about 5 months (around 5/13/2019).    MEDICAL DECISION MAKING: Moderate  complexity.  Overall, the multiple problems listed are of moderate severity that may impact quality of life and activities of daily living. Side effects of medications, treatment plan as well as options and alternatives reviewed and discussed with patient. There was counseling of patient concerning these issues.    Total time spent in face to face counseling and coordination of care - 25 minutes over 50% of time was used in discussion of prognosis, risks, benefits of treatment, instructions and compliance with regimen . Discussion with other physicians and/or health care providers ( home health or for use of durable medical equipment (oxygen, nebulizers, CPAP, BiPAP) occurred.

## 2018-12-13 ENCOUNTER — OFFICE VISIT (OUTPATIENT)
Dept: CARDIOLOGY | Facility: CLINIC | Age: 83
End: 2018-12-13
Payer: MEDICARE

## 2018-12-13 ENCOUNTER — LAB VISIT (OUTPATIENT)
Dept: LAB | Facility: HOSPITAL | Age: 83
End: 2018-12-13
Attending: FAMILY MEDICINE
Payer: MEDICARE

## 2018-12-13 VITALS
HEIGHT: 62 IN | BODY MASS INDEX: 24.01 KG/M2 | HEART RATE: 89 BPM | SYSTOLIC BLOOD PRESSURE: 114 MMHG | DIASTOLIC BLOOD PRESSURE: 68 MMHG | WEIGHT: 130.5 LBS

## 2018-12-13 DIAGNOSIS — I50.32 CHRONIC DIASTOLIC CONGESTIVE HEART FAILURE: ICD-10-CM

## 2018-12-13 DIAGNOSIS — E87.1 HYPONATREMIA: ICD-10-CM

## 2018-12-13 DIAGNOSIS — Z87.19 H/O: GI BLEED: ICD-10-CM

## 2018-12-13 DIAGNOSIS — E78.2 MIXED HYPERLIPIDEMIA: Chronic | ICD-10-CM

## 2018-12-13 DIAGNOSIS — J44.89 ASTHMA WITH COPD: Chronic | ICD-10-CM

## 2018-12-13 DIAGNOSIS — I10 ESSENTIAL HYPERTENSION: Chronic | ICD-10-CM

## 2018-12-13 DIAGNOSIS — K92.0 HEMATEMESIS WITHOUT NAUSEA: ICD-10-CM

## 2018-12-13 DIAGNOSIS — I35.0 NONRHEUMATIC AORTIC VALVE STENOSIS: Primary | ICD-10-CM

## 2018-12-13 LAB
ALBUMIN SERPL BCP-MCNC: 3.8 G/DL
ALP SERPL-CCNC: 61 U/L
ALT SERPL W/O P-5'-P-CCNC: 8 U/L
ANION GAP SERPL CALC-SCNC: 11 MMOL/L
AST SERPL-CCNC: 13 U/L
BASOPHILS # BLD AUTO: 0.02 K/UL
BASOPHILS NFR BLD: 0.4 %
BILIRUB SERPL-MCNC: 0.3 MG/DL
BUN SERPL-MCNC: 21 MG/DL
CALCIUM SERPL-MCNC: 9.6 MG/DL
CHLORIDE SERPL-SCNC: 103 MMOL/L
CO2 SERPL-SCNC: 22 MMOL/L
CREAT SERPL-MCNC: 0.9 MG/DL
DIFFERENTIAL METHOD: ABNORMAL
EOSINOPHIL # BLD AUTO: 0 K/UL
EOSINOPHIL NFR BLD: 0.5 %
ERYTHROCYTE [DISTWIDTH] IN BLOOD BY AUTOMATED COUNT: 16.5 %
EST. GFR  (AFRICAN AMERICAN): >60 ML/MIN/1.73 M^2
EST. GFR  (NON AFRICAN AMERICAN): 58.9 ML/MIN/1.73 M^2
GLUCOSE SERPL-MCNC: 92 MG/DL
HCT VFR BLD AUTO: 34.9 %
HGB BLD-MCNC: 10.1 G/DL
IMM GRANULOCYTES # BLD AUTO: 0.02 K/UL
IMM GRANULOCYTES NFR BLD AUTO: 0.4 %
LYMPHOCYTES # BLD AUTO: 2 K/UL
LYMPHOCYTES NFR BLD: 36.9 %
MCH RBC QN AUTO: 24.2 PG
MCHC RBC AUTO-ENTMCNC: 28.9 G/DL
MCV RBC AUTO: 84 FL
MONOCYTES # BLD AUTO: 0.5 K/UL
MONOCYTES NFR BLD: 8.8 %
NEUTROPHILS # BLD AUTO: 2.9 K/UL
NEUTROPHILS NFR BLD: 53 %
NRBC BLD-RTO: 0 /100 WBC
PLATELET # BLD AUTO: 276 K/UL
PMV BLD AUTO: 12.3 FL
POTASSIUM SERPL-SCNC: 4.2 MMOL/L
PROT SERPL-MCNC: 7.2 G/DL
RBC # BLD AUTO: 4.17 M/UL
SODIUM SERPL-SCNC: 136 MMOL/L
WBC # BLD AUTO: 5.47 K/UL

## 2018-12-13 PROCEDURE — 80053 COMPREHEN METABOLIC PANEL: CPT | Mod: HCNC

## 2018-12-13 PROCEDURE — 3078F DIAST BP <80 MM HG: CPT | Mod: CPTII,HCNC,S$GLB, | Performed by: INTERNAL MEDICINE

## 2018-12-13 PROCEDURE — 99214 OFFICE O/P EST MOD 30 MIN: CPT | Mod: HCNC,S$GLB,, | Performed by: INTERNAL MEDICINE

## 2018-12-13 PROCEDURE — 3074F SYST BP LT 130 MM HG: CPT | Mod: CPTII,HCNC,S$GLB, | Performed by: INTERNAL MEDICINE

## 2018-12-13 PROCEDURE — 99999 PR PBB SHADOW E&M-EST. PATIENT-LVL III: CPT | Mod: PBBFAC,HCNC,, | Performed by: INTERNAL MEDICINE

## 2018-12-13 PROCEDURE — 85025 COMPLETE CBC W/AUTO DIFF WBC: CPT | Mod: HCNC

## 2018-12-13 PROCEDURE — 1101F PT FALLS ASSESS-DOCD LE1/YR: CPT | Mod: CPTII,HCNC,S$GLB, | Performed by: INTERNAL MEDICINE

## 2018-12-13 PROCEDURE — 36415 COLL VENOUS BLD VENIPUNCTURE: CPT | Mod: HCNC,PO

## 2018-12-13 NOTE — PROGRESS NOTES
Subjective:   Patient ID:  Crystal Romero is a 84 y.o. female who presents for follow up of Follow-up (follow up for hospital)      82, yo female, came in for routine f/u. Severe dementia and discussed with the her Shannan Lockwood.  PMH moderate AI, and moderate to severe AS, dementia 3 yrs, HTN, HLD, asthma, COPD, GI bleeding due to prolong Rx of steroid for asthma, RA, TIA x3 in 4 months  H/o TIA/seizure episode. Had impaired swallow and dysphasia. F/u with  neurologist.  Repeat ECHO in , showed normal EF, DD, moderate AI and moderate AS.   Refused surgery for valve per POA.    Recent rx of COPD exacerbation. Has SOB, cough and sputum.  No recurrent seizure.  No faint, syncope and chest pain.            Past Medical History:   Diagnosis Date    Arthritis     Cataract     Coronary artery disease     Dementia     Depression     Encounter for blood transfusion     H/O: GI bleed     Hiatal hernia     Hyperlipidemia     Hypertension     Hypothyroid     Rheumatoid arthritis(714.0)     Seizures     Stroke     Syncope and collapse        Past Surgical History:   Procedure Laterality Date    COLONOSCOPY N/A 2015    Performed by [facility] III, [facility]    COLONOSCOPY N/A 2014    Performed by [facility] at [facility]    ESOPHAGOGASTRODUODENOSCOPY (EGD) N/A 2014    Performed by [facility] at [facility]    MANDIBLE SURGERY      TONSILLECTOMY         Social History     Tobacco Use    Smoking status: Former Smoker     Packs/day: 2.00     Years: 16.00     Pack years: 32.00     Types: Cigarettes     Last attempt to quit: 1950     Years since quittin.9    Smokeless tobacco: Never Used   Substance Use Topics    Alcohol use: No     Frequency: Never    Drug use: No       Family History   Problem Relation Age of Onset    Cancer Mother         breast, uterine         Review of Systems   Constitution: Positive for  decreased appetite and fever.   HENT: Negative.    Eyes: Negative.    Cardiovascular: Positive for dyspnea on exertion. Negative for chest pain.   Respiratory: Positive for cough and wheezing.    Endocrine: Negative.    Hematologic/Lymphatic: Negative.    Skin: Negative.    Musculoskeletal: Positive for back pain and joint pain.   Gastrointestinal: Negative.    Genitourinary: Negative.    Psychiatric/Behavioral: Positive for hallucinations and memory loss.   Allergic/Immunologic: Negative.        Objective:   Physical Exam   Constitutional: She is oriented to person, place, and time. She appears well-nourished.   HENT:   Head: Normocephalic.   Eyes: Pupils are equal, round, and reactive to light.   Neck: Normal carotid pulses and no JVD present. Carotid bruit is not present. No thyromegaly present.   Cardiovascular: Normal rate, regular rhythm and normal pulses.  No extrasystoles are present. PMI is not displaced. Exam reveals no gallop and no S3.   Murmur heard.  Pulses:       Radial pulses are 2+ on the right side, and 2+ on the left side.   3/6 ESM on RUSB, S2 not audible.   Pulmonary/Chest: No stridor. No respiratory distress. She has decreased breath sounds.   Abdominal: Soft. Bowel sounds are normal. There is no tenderness. There is no rebound.   Musculoskeletal: Normal range of motion.   Neurological: She is alert and oriented to person, place, and time.   Skin: Skin is intact. No rash noted.   Psychiatric: Her behavior is normal.       Lab Results   Component Value Date    CHOL 211 (H) 04/04/2018    CHOL 169 07/11/2017    CHOL 180 06/17/2016     Lab Results   Component Value Date    HDL 72 04/04/2018    HDL 58 07/11/2017    HDL 87 (H) 06/17/2016     Lab Results   Component Value Date    LDLCALC 122.6 04/04/2018    LDLCALC 95.2 07/11/2017    LDLCALC 78.6 06/17/2016     Lab Results   Component Value Date    TRIG 82 04/04/2018    TRIG 79 07/11/2017    TRIG 72 06/17/2016     Lab Results   Component Value Date     CHOLHDL 34.1 04/04/2018    CHOLHDL 34.3 07/11/2017    CHOLHDL 48.3 06/17/2016       Chemistry        Component Value Date/Time     11/19/2018 1222    K 3.8 11/19/2018 1222    CL 98 11/19/2018 1222    CO2 28 11/19/2018 1222    BUN 25 (H) 11/19/2018 1222    CREATININE 0.9 11/19/2018 1222     11/19/2018 1222        Component Value Date/Time    CALCIUM 9.6 11/19/2018 1222    ALKPHOS 53 (L) 11/12/2018 1603    AST 15 11/12/2018 1603    ALT 15 11/12/2018 1603    BILITOT 0.5 11/12/2018 1603    ESTGFRAFRICA >60.0 11/19/2018 1222    EGFRNONAA 59.3 (A) 11/19/2018 1222          No results found for: LABA1C, HGBA1C  Lab Results   Component Value Date    TSH 1.552 04/04/2018     Lab Results   Component Value Date    INR 0.9 03/10/2017    INR 1.0 09/23/2014     Lab Results   Component Value Date    WBC 6.85 06/19/2018    HGB 12.8 06/19/2018    HCT 41.2 06/19/2018    MCV 87 06/19/2018     06/19/2018     BMP  Sodium   Date Value Ref Range Status   11/19/2018 136 136 - 145 mmol/L Final     Potassium   Date Value Ref Range Status   11/19/2018 3.8 3.5 - 5.1 mmol/L Final     Chloride   Date Value Ref Range Status   11/19/2018 98 95 - 110 mmol/L Final     CO2   Date Value Ref Range Status   11/19/2018 28 23 - 29 mmol/L Final     BUN, Bld   Date Value Ref Range Status   11/19/2018 25 (H) 8 - 23 mg/dL Final     Creatinine   Date Value Ref Range Status   11/19/2018 0.9 0.5 - 1.4 mg/dL Final     Calcium   Date Value Ref Range Status   11/19/2018 9.6 8.7 - 10.5 mg/dL Final     Anion Gap   Date Value Ref Range Status   11/19/2018 10 8 - 16 mmol/L Final     eGFR if    Date Value Ref Range Status   11/19/2018 >60.0 >60 mL/min/1.73 m^2 Final     eGFR if non    Date Value Ref Range Status   11/19/2018 59.3 (A) >60 mL/min/1.73 m^2 Final     Comment:     Calculation used to obtain the estimated glomerular filtration  rate (eGFR) is the CKD-EPI equation.         BNP  @LABRCNTIP(BNP,BNPTRIAGEBLO)@  @LABRCNTIP(troponini)@  CrCl cannot be calculated (Patient's most recent lab result is older than the maximum 7 days allowed.).  No results found in the last 24 hours.  No results found in the last 24 hours.  No results found in the last 24 hours.    Assessment:      1. Nonrheumatic aortic valve stenosis    2. Essential hypertension    3. Mixed hyperlipidemia    4. Chronic diastolic congestive heart failure    5. Asthma with COPD      Bp stable  CHF pEF compensated  COPD exacerbation with rx.  Plan:   Continue Lisinopril, Metoprolol, ASA, Lipitor and Lasix 20 mg daily  Fall precaution  Dash  Fluid restriction 1.2 liters a day.  RTc in 3 months

## 2018-12-14 DIAGNOSIS — D50.0 IRON DEFICIENCY ANEMIA DUE TO CHRONIC BLOOD LOSS: ICD-10-CM

## 2018-12-14 DIAGNOSIS — Z87.19 HISTORY OF GI BLEED: Primary | ICD-10-CM

## 2018-12-17 ENCOUNTER — LAB VISIT (OUTPATIENT)
Dept: LAB | Facility: HOSPITAL | Age: 83
End: 2018-12-17
Attending: FAMILY MEDICINE
Payer: MEDICARE

## 2018-12-17 ENCOUNTER — OFFICE VISIT (OUTPATIENT)
Dept: INTERNAL MEDICINE | Facility: CLINIC | Age: 83
End: 2018-12-17
Payer: MEDICARE

## 2018-12-17 VITALS
SYSTOLIC BLOOD PRESSURE: 110 MMHG | OXYGEN SATURATION: 97 % | HEIGHT: 62 IN | DIASTOLIC BLOOD PRESSURE: 70 MMHG | WEIGHT: 132.06 LBS | HEART RATE: 78 BPM | BODY MASS INDEX: 24.3 KG/M2 | TEMPERATURE: 98 F

## 2018-12-17 DIAGNOSIS — Z87.19 H/O: GI BLEED: ICD-10-CM

## 2018-12-17 DIAGNOSIS — D64.9 NORMOCYTIC ANEMIA: ICD-10-CM

## 2018-12-17 DIAGNOSIS — I50.32 CHRONIC DIASTOLIC CONGESTIVE HEART FAILURE: ICD-10-CM

## 2018-12-17 DIAGNOSIS — I10 ESSENTIAL HYPERTENSION: Chronic | ICD-10-CM

## 2018-12-17 DIAGNOSIS — R05.8 COUGH PRODUCTIVE OF CLEAR SPUTUM: Primary | ICD-10-CM

## 2018-12-17 DIAGNOSIS — R05.8 COUGH PRODUCTIVE OF CLEAR SPUTUM: ICD-10-CM

## 2018-12-17 LAB
ALBUMIN SERPL BCP-MCNC: 4.1 G/DL
ALP SERPL-CCNC: 57 U/L
ALT SERPL W/O P-5'-P-CCNC: 10 U/L
ANION GAP SERPL CALC-SCNC: 11 MMOL/L
AST SERPL-CCNC: 14 U/L
BASOPHILS # BLD AUTO: 0.03 K/UL
BASOPHILS NFR BLD: 0.3 %
BILIRUB SERPL-MCNC: 0.2 MG/DL
BUN SERPL-MCNC: 36 MG/DL
CALCIUM SERPL-MCNC: 10 MG/DL
CHLORIDE SERPL-SCNC: 104 MMOL/L
CO2 SERPL-SCNC: 22 MMOL/L
CREAT SERPL-MCNC: 1.6 MG/DL
DIFFERENTIAL METHOD: ABNORMAL
EOSINOPHIL # BLD AUTO: 0.1 K/UL
EOSINOPHIL NFR BLD: 0.6 %
ERYTHROCYTE [DISTWIDTH] IN BLOOD BY AUTOMATED COUNT: 16.4 %
EST. GFR  (AFRICAN AMERICAN): 34 ML/MIN/1.73 M^2
EST. GFR  (NON AFRICAN AMERICAN): 29 ML/MIN/1.73 M^2
GLUCOSE SERPL-MCNC: 100 MG/DL
HCT VFR BLD AUTO: 36.4 %
HGB BLD-MCNC: 11 G/DL
LYMPHOCYTES # BLD AUTO: 2.1 K/UL
LYMPHOCYTES NFR BLD: 20.6 %
MCH RBC QN AUTO: 24.7 PG
MCHC RBC AUTO-ENTMCNC: 30.2 G/DL
MCV RBC AUTO: 82 FL
MONOCYTES # BLD AUTO: 1 K/UL
MONOCYTES NFR BLD: 9.4 %
NEUTROPHILS # BLD AUTO: 7 K/UL
NEUTROPHILS NFR BLD: 69.1 %
PLATELET # BLD AUTO: 293 K/UL
PMV BLD AUTO: 11.1 FL
POTASSIUM SERPL-SCNC: 4.7 MMOL/L
PROT SERPL-MCNC: 7.2 G/DL
RBC # BLD AUTO: 4.46 M/UL
SODIUM SERPL-SCNC: 137 MMOL/L
WBC # BLD AUTO: 10.12 K/UL

## 2018-12-17 PROCEDURE — 99999 PR PBB SHADOW E&M-EST. PATIENT-LVL III: CPT | Mod: PBBFAC,HCNC,, | Performed by: FAMILY MEDICINE

## 2018-12-17 PROCEDURE — 85025 COMPLETE CBC W/AUTO DIFF WBC: CPT | Mod: HCNC,PO

## 2018-12-17 PROCEDURE — 80053 COMPREHEN METABOLIC PANEL: CPT | Mod: HCNC,PO

## 2018-12-17 PROCEDURE — 99214 OFFICE O/P EST MOD 30 MIN: CPT | Mod: HCNC,S$GLB,, | Performed by: FAMILY MEDICINE

## 2018-12-17 PROCEDURE — 3078F DIAST BP <80 MM HG: CPT | Mod: CPTII,HCNC,S$GLB, | Performed by: FAMILY MEDICINE

## 2018-12-17 PROCEDURE — 3074F SYST BP LT 130 MM HG: CPT | Mod: CPTII,HCNC,S$GLB, | Performed by: FAMILY MEDICINE

## 2018-12-17 PROCEDURE — 82728 ASSAY OF FERRITIN: CPT | Mod: HCNC

## 2018-12-17 PROCEDURE — 84145 PROCALCITONIN (PCT): CPT | Mod: HCNC

## 2018-12-17 PROCEDURE — 1101F PT FALLS ASSESS-DOCD LE1/YR: CPT | Mod: CPTII,HCNC,S$GLB, | Performed by: FAMILY MEDICINE

## 2018-12-17 PROCEDURE — 36415 COLL VENOUS BLD VENIPUNCTURE: CPT | Mod: HCNC,PO

## 2018-12-17 PROCEDURE — 83540 ASSAY OF IRON: CPT | Mod: HCNC

## 2018-12-17 RX ORDER — PROMETHAZINE HYDROCHLORIDE AND DEXTROMETHORPHAN HYDROBROMIDE 6.25; 15 MG/5ML; MG/5ML
5 SYRUP ORAL NIGHTLY PRN
Qty: 118 ML | Refills: 0 | Status: SHIPPED | OUTPATIENT
Start: 2018-12-17 | End: 2019-08-13 | Stop reason: SDUPTHER

## 2018-12-17 NOTE — PROGRESS NOTES
Subjective:       Patient ID: Crystal Romero is a 84 y.o. female.    Chief Complaint: Follow-up    84-year-old female patient with Patient Active Problem List:     Rheumatoid arthritis     Hiatal hernia     Asthma with COPD     Hyperlipidemia     Hypertension     Diastolic dysfunction     Osteoarthritis     Rotator cuff arthropathy     H/O: GI bleed     Dysplastic colon polyp     Hypothyroid     Nonrheumatic aortic valve stenosis     Nonrheumatic aortic valve insufficiency     Impaired cognition     Gastroesophageal reflux disease without esophagitis     Major depressive disorder, recurrent episode, mild     Psoriasis with arthropathy     Bilateral adhesive capsulitis of shoulders     Pulmonary emphysema     Insomnia secondary to depression with anxiety     Binswanger's dementia     Seizure     Vasculitis, CNS     Dysphasia     History of transient ischemic attack (TIA)     Chronic diastolic congestive heart failure  Here reports that she continues to have productive cough, in spite of finishing Zithromax, steroid injection and Medrol Dosepak.   Patient denies any fever with chills but has been complaining of right upper quadrant abdominal pain/discomfort not sure whether it was secondary to coughing  But with previous history of GI bleed and recent drop in hemoglobin, patient 's daughter would like to get checked  Denies any wheezing, chest pain or difficulty breathing   Has been taking her medications regularly      Review of Systems   Constitutional: Negative for chills, fatigue and fever.   HENT: Positive for postnasal drip. Negative for congestion.    Eyes: Negative for visual disturbance.   Respiratory: Positive for cough. Negative for shortness of breath and wheezing.    Cardiovascular: Negative for chest pain and leg swelling.   Gastrointestinal: Positive for abdominal pain. Negative for blood in stool, constipation, diarrhea, nausea and vomiting.   Musculoskeletal: Negative for myalgias.   Skin:  "Negative for rash.   Neurological: Negative for light-headedness and headaches.   Psychiatric/Behavioral: Negative for sleep disturbance.         /70 (BP Location: Right arm, Patient Position: Sitting)   Pulse 78   Temp 98.3 °F (36.8 °C) (Oral)   Ht 5' 2" (1.575 m)   Wt 59.9 kg (132 lb 0.9 oz)   SpO2 97%   BMI 24.15 kg/m²   Objective:      Physical Exam   Constitutional: She is oriented to person, place, and time. She appears well-developed and well-nourished.   HENT:   Head: Normocephalic and atraumatic.   Mouth/Throat: Oropharynx is clear and moist. No oropharyngeal exudate.   Postnasal drip noted on exam today   Neck: Neck supple.   Cardiovascular: Normal rate, regular rhythm and normal heart sounds.   No murmur heard.  Pulmonary/Chest: Effort normal and breath sounds normal. She has no wheezes.   Abdominal: Soft. Bowel sounds are normal. There is tenderness.   Minimal tenderness noted in the right ribcage wall, could be musculoskeletal   Musculoskeletal: She exhibits no edema.   Lymphadenopathy:     She has no cervical adenopathy.   Neurological: She is alert and oriented to person, place, and time.   Skin: Skin is warm and dry. No rash noted.   Psychiatric: She has a normal mood and affect.         Assessment:       1. Cough productive of clear sputum    2. Chronic diastolic congestive heart failure    3. H/O: GI bleed    4. Normocytic anemia    5. Essential hypertension        Plan:   Cough productive of clear sputum  -     Procalcitonin; Future; Expected date: 12/17/2018  -     promethazine-dextromethorphan (PROMETHAZINE-DM) 6.25-15 mg/5 mL Syrp; Take 5 mLs by mouth nightly as needed.  Dispense: 118 mL; Refill: 0  Chronic diastolic congestive heart failure  H/O: GI bleed  -     CBC auto differential; Future; Expected date: 12/17/2018  -     Comprehensive metabolic panel; Future; Expected date: 12/17/2018  Normocytic anemia  -     CBC auto differential; Future; Expected date: 12/17/2018  -     Iron " and TIBC; Future; Expected date: 12/17/2018  -     Ferritin; Future; Expected date: 12/17/2018    Will plan to repeat labs to recheck CBC on H&H  Currently taking pantoprazole 40 mg twice daily  Advised to eat small frequent meals  Follow up with Gastroenterology as scheduled  Promethazine DM cough syrup prescribed today for symptomatic relief  Advised to take over-the-counter Claritin and saltwater gargles recommended   If all  workup comes back normal and cough continues to persist discuss further with pulmonology    Essential hypertension  Blood pressure is stable today currently on  Lisinopril 5 mg daily

## 2018-12-18 LAB
FERRITIN SERPL-MCNC: 23 NG/ML
IRON SERPL-MCNC: 27 UG/DL
PROCALCITONIN SERPL IA-MCNC: 0.04 NG/ML
SATURATED IRON: 5 %
TOTAL IRON BINDING CAPACITY: 491 UG/DL
TRANSFERRIN SERPL-MCNC: 332 MG/DL

## 2019-01-09 ENCOUNTER — TELEPHONE (OUTPATIENT)
Dept: ADMINISTRATIVE | Facility: CLINIC | Age: 84
End: 2019-01-09

## 2019-01-09 NOTE — PROGRESS NOTES
HH re-certification with [facility], Dr. Maria Guadalupe Herring.  Patient received SN services.

## 2019-01-15 ENCOUNTER — LAB VISIT (OUTPATIENT)
Dept: LAB | Facility: HOSPITAL | Age: 84
End: 2019-01-15
Attending: INTERNAL MEDICINE
Payer: MEDICARE

## 2019-01-15 ENCOUNTER — OFFICE VISIT (OUTPATIENT)
Dept: GASTROENTEROLOGY | Facility: CLINIC | Age: 84
End: 2019-01-15
Payer: MEDICARE

## 2019-01-15 VITALS
WEIGHT: 130.94 LBS | DIASTOLIC BLOOD PRESSURE: 78 MMHG | BODY MASS INDEX: 24.09 KG/M2 | HEIGHT: 62 IN | SYSTOLIC BLOOD PRESSURE: 120 MMHG

## 2019-01-15 DIAGNOSIS — K92.0 HEMATEMESIS, PRESENCE OF NAUSEA NOT SPECIFIED: Primary | ICD-10-CM

## 2019-01-15 DIAGNOSIS — K92.0 HEMATEMESIS, PRESENCE OF NAUSEA NOT SPECIFIED: ICD-10-CM

## 2019-01-15 DIAGNOSIS — J43.9 EMPHYSEMATOUS BLEB: ICD-10-CM

## 2019-01-15 DIAGNOSIS — K21.00 GASTROESOPHAGEAL REFLUX DISEASE WITH ESOPHAGITIS: ICD-10-CM

## 2019-01-15 DIAGNOSIS — D50.0 IRON DEFICIENCY ANEMIA DUE TO CHRONIC BLOOD LOSS: ICD-10-CM

## 2019-01-15 LAB
BASOPHILS # BLD AUTO: 0.05 K/UL
BASOPHILS NFR BLD: 0.8 %
DIFFERENTIAL METHOD: ABNORMAL
EOSINOPHIL # BLD AUTO: 0.1 K/UL
EOSINOPHIL NFR BLD: 1.8 %
ERYTHROCYTE [DISTWIDTH] IN BLOOD BY AUTOMATED COUNT: 17.2 %
FERRITIN SERPL-MCNC: 30 NG/ML
HCT VFR BLD AUTO: 34.9 %
HGB BLD-MCNC: 10.6 G/DL
IMM GRANULOCYTES # BLD AUTO: 0.02 K/UL
IMM GRANULOCYTES NFR BLD AUTO: 0.3 %
IRON SERPL-MCNC: 21 UG/DL
LYMPHOCYTES # BLD AUTO: 1.4 K/UL
LYMPHOCYTES NFR BLD: 21.1 %
MCH RBC QN AUTO: 24.9 PG
MCHC RBC AUTO-ENTMCNC: 30.4 G/DL
MCV RBC AUTO: 82 FL
MONOCYTES # BLD AUTO: 0.8 K/UL
MONOCYTES NFR BLD: 12.7 %
NEUTROPHILS # BLD AUTO: 4.2 K/UL
NEUTROPHILS NFR BLD: 63.3 %
NRBC BLD-RTO: 0 /100 WBC
PLATELET # BLD AUTO: 207 K/UL
PMV BLD AUTO: 12.2 FL
RBC # BLD AUTO: 4.26 M/UL
SATURATED IRON: 4 %
TOTAL IRON BINDING CAPACITY: 474 UG/DL
TRANSFERRIN SERPL-MCNC: 320 MG/DL
WBC # BLD AUTO: 6.64 K/UL

## 2019-01-15 PROCEDURE — 99203 OFFICE O/P NEW LOW 30 MIN: CPT | Mod: HCNC,S$GLB,, | Performed by: INTERNAL MEDICINE

## 2019-01-15 PROCEDURE — 99999 PR PBB SHADOW E&M-EST. PATIENT-LVL II: ICD-10-PCS | Mod: PBBFAC,HCNC,, | Performed by: INTERNAL MEDICINE

## 2019-01-15 PROCEDURE — 99999 PR PBB SHADOW E&M-EST. PATIENT-LVL II: CPT | Mod: PBBFAC,HCNC,, | Performed by: INTERNAL MEDICINE

## 2019-01-15 PROCEDURE — 3078F PR MOST RECENT DIASTOLIC BLOOD PRESSURE < 80 MM HG: ICD-10-PCS | Mod: HCNC,CPTII,S$GLB, | Performed by: INTERNAL MEDICINE

## 2019-01-15 PROCEDURE — 1101F PR PT FALLS ASSESS DOC 0-1 FALLS W/OUT INJ PAST YR: ICD-10-PCS | Mod: HCNC,CPTII,S$GLB, | Performed by: INTERNAL MEDICINE

## 2019-01-15 PROCEDURE — 3074F PR MOST RECENT SYSTOLIC BLOOD PRESSURE < 130 MM HG: ICD-10-PCS | Mod: HCNC,CPTII,S$GLB, | Performed by: INTERNAL MEDICINE

## 2019-01-15 PROCEDURE — 82728 ASSAY OF FERRITIN: CPT | Mod: HCNC

## 2019-01-15 PROCEDURE — 83540 ASSAY OF IRON: CPT | Mod: HCNC

## 2019-01-15 PROCEDURE — 3074F SYST BP LT 130 MM HG: CPT | Mod: HCNC,CPTII,S$GLB, | Performed by: INTERNAL MEDICINE

## 2019-01-15 PROCEDURE — 3078F DIAST BP <80 MM HG: CPT | Mod: HCNC,CPTII,S$GLB, | Performed by: INTERNAL MEDICINE

## 2019-01-15 PROCEDURE — 36415 COLL VENOUS BLD VENIPUNCTURE: CPT | Mod: HCNC

## 2019-01-15 PROCEDURE — 1101F PT FALLS ASSESS-DOCD LE1/YR: CPT | Mod: HCNC,CPTII,S$GLB, | Performed by: INTERNAL MEDICINE

## 2019-01-15 PROCEDURE — 99203 PR OFFICE/OUTPT VISIT, NEW, LEVL III, 30-44 MIN: ICD-10-PCS | Mod: HCNC,S$GLB,, | Performed by: INTERNAL MEDICINE

## 2019-01-15 PROCEDURE — 85025 COMPLETE CBC W/AUTO DIFF WBC: CPT | Mod: HCNC

## 2019-01-15 NOTE — LETTER
January 27, 2019      Maria Guadalupe Herring MD  [address] [facility] [address]           [facility]  [address] [place] [address]  [place] [address]  Phone: [phone]  Fax: [phone]          Patient: Crystal Romero   MR Number: [id number]   YOB: 1934   Date of Visit: 1/15/2019       Dear Dr. Maria Guadalupe Herring:    Thank you for referring Crystal Romero to me for evaluation. Attached you will find relevant portions of my assessment and plan of care.    If you have questions, please do not hesitate to call me. I look forward to following Crystal Romero along with you.    Sincerely,    Memo Allen III, MD    Enclosure  CC:  No Recipients    If you would like to receive this communication electronically, please contact [email or web address] or [phone] to request more information on "[employer]" Link access.    For providers and/or their staff who would like to refer a patient to [facility], please contact us through our one-stop-shop provider referral line, [facility], at [phone].    If you feel you have received this communication in error or would no longer like to receive these types of communications, please e-mail [email or web address]

## 2019-01-15 NOTE — PROGRESS NOTES
Subjective:       Patient ID: Crystal Romero is a 84 y.o. female.    Chief Complaint: Anemia    The patient who is known to our service from previous encounters has a history of colon polyps and GERD with esophagitis. When hospitalized recently in early November she presented with seizures and a mini-stroke. During that presentation she had a bout of hematemesis that was treated with PPI empirically. Since then she has seen her PCP with findings that she is mildly anemic and recommendations made to see GI. In the meantime she has developed a RLQ pain. Her bowel are working well and generally she has a BM daily. This has been less so recently as her intake has decreased. She has also been placed on iron.       Review of Systems   Constitutional: Negative for activity change, appetite change, chills, diaphoresis, fatigue, fever and unexpected weight change.   HENT: Negative for congestion, ear discharge, ear pain, hearing loss, nosebleeds, postnasal drip and tinnitus.    Eyes: Negative for photophobia and visual disturbance.   Respiratory: Positive for cough, shortness of breath and wheezing. Negative for apnea, choking and chest tightness.    Cardiovascular: Negative for chest pain, palpitations and leg swelling.   Gastrointestinal: Positive for abdominal pain. Negative for abdominal distention, anal bleeding, blood in stool, constipation, diarrhea, nausea, rectal pain and vomiting.        Mild RLQ abdominal pain   Genitourinary: Negative for difficulty urinating, dyspareunia, dysuria, flank pain, frequency, hematuria, menstrual problem, pelvic pain, urgency, vaginal bleeding and vaginal discharge.   Musculoskeletal: Positive for arthralgias and gait problem. Negative for back pain, joint swelling, myalgias and neck stiffness.   Skin: Negative for pallor and rash.   Neurological: Negative for dizziness, tremors, seizures, syncope, speech difficulty, weakness, numbness and headaches.   Hematological:  Negative for adenopathy.   Psychiatric/Behavioral: Negative for agitation, confusion, hallucinations, sleep disturbance and suicidal ideas.       Objective:      Physical Exam   Constitutional: She is oriented to person, place, and time. She appears well-developed and well-nourished.   HENT:   Head: Normocephalic and atraumatic.   Bilateral turbinate congestion   Eyes: Conjunctivae and EOM are normal. Pupils are equal, round, and reactive to light. Right eye exhibits no discharge. Left eye exhibits no discharge. No scleral icterus.   Neck: Normal range of motion. Neck supple. No JVD present. No thyromegaly present.   Cardiovascular: Normal rate, regular rhythm, normal heart sounds and intact distal pulses. Exam reveals no gallop and no friction rub.   No murmur heard.  Pulmonary/Chest: Effort normal. No respiratory distress. She has wheezes. She has no rales. She exhibits no tenderness.   Prolonged expiratory phase   Abdominal: Soft. Bowel sounds are normal. She exhibits no distension and no mass. There is no tenderness. There is no rebound and no guarding.   Musculoskeletal: Normal range of motion. She exhibits no edema.   Lymphadenopathy:     She has no cervical adenopathy.   Neurological: She is alert and oriented to person, place, and time. She has normal reflexes. She exhibits normal muscle tone. Coordination normal.   Skin: Skin is warm and dry. No rash noted. No erythema. No pallor.   Psychiatric: She has a normal mood and affect. Her behavior is normal. Judgment and thought content normal.   Vitals reviewed.      Assessment:     Iron deficiency anemia    Hx of Colon Polyps    Hx of GERD with esophagitis    COPD  No diagnosis found.    Plan:   Will check CBC and iron studies to look for continued improvement. As long as she is stable or improving would not risk EGD.

## 2019-01-23 ENCOUNTER — TELEPHONE (OUTPATIENT)
Dept: INTERNAL MEDICINE | Facility: CLINIC | Age: 84
End: 2019-01-23

## 2019-01-23 DIAGNOSIS — R41.89 IMPAIRED COGNITION: ICD-10-CM

## 2019-01-23 DIAGNOSIS — R47.02 DYSPHASIA: ICD-10-CM

## 2019-01-23 DIAGNOSIS — K21.9 GASTROESOPHAGEAL REFLUX DISEASE WITHOUT ESOPHAGITIS: ICD-10-CM

## 2019-01-23 DIAGNOSIS — I67.3 BINSWANGER'S DEMENTIA: Primary | ICD-10-CM

## 2019-01-23 DIAGNOSIS — Z86.73 HISTORY OF TRANSIENT ISCHEMIC ATTACK (TIA): ICD-10-CM

## 2019-01-23 NOTE — TELEPHONE ENCOUNTER
Speech therapy orders has been placed as requested by home health nurse  If any further information needed patient may have to come into the clinic for further evaluation

## 2019-01-23 NOTE — TELEPHONE ENCOUNTER
----- Message from Linda Ferro sent at 1/23/2019 11:46 AM CST -----  Contact: Rikita [facility]  She is calling in regards to getting a speech therapy order placed for the pt,please advise [phone]

## 2019-01-24 ENCOUNTER — PATIENT MESSAGE (OUTPATIENT)
Dept: INTERNAL MEDICINE | Facility: CLINIC | Age: 84
End: 2019-01-24

## 2019-01-24 ENCOUNTER — PATIENT MESSAGE (OUTPATIENT)
Dept: PULMONOLOGY | Facility: CLINIC | Age: 84
End: 2019-01-24

## 2019-01-24 ENCOUNTER — TELEPHONE (OUTPATIENT)
Dept: INTERNAL MEDICINE | Facility: CLINIC | Age: 84
End: 2019-01-24

## 2019-01-24 ENCOUNTER — TELEPHONE (OUTPATIENT)
Dept: PULMONOLOGY | Facility: CLINIC | Age: 84
End: 2019-01-24

## 2019-01-24 DIAGNOSIS — R06.02 SOB (SHORTNESS OF BREATH): Primary | ICD-10-CM

## 2019-01-24 NOTE — TELEPHONE ENCOUNTER
Returned call to pt's daughter regarding this issue of rubbing sound in pt's lung and to schedule an appt for further evaluation, daughter stated that she scheduled appt for pt for tomorrow at 10am.

## 2019-01-24 NOTE — TELEPHONE ENCOUNTER
----- Message from Elizabeth Menchaca sent at 1/24/2019 12:29 PM CST -----  Contact: ms mendez-daughter  states that home health reports a rubbing found at top & bottom right lung, may indicate pleurisy. pls adv..[phone]

## 2019-01-24 NOTE — TELEPHONE ENCOUNTER
----- Message from Temitope Luna sent at 1/24/2019 12:32 PM CST -----  Contact: self/[phone]  Would like to consult with nurse regarding a rubbing sound in at the top and bottom of her right lung of the patient. Patient's daughter states that she and the nurse call and 01-23-19 since left message and no respond. Please call back at [phone]. Thanks/ar

## 2019-01-24 NOTE — TELEPHONE ENCOUNTER
Pt accepted [facility] appointment on 1/25/2019 at 1420 with Dr Slater.  Pt provided times for all testing appointments.  Pt verbalized understanding.

## 2019-01-25 ENCOUNTER — OFFICE VISIT (OUTPATIENT)
Dept: PULMONOLOGY | Facility: CLINIC | Age: 84
End: 2019-01-25
Payer: MEDICARE

## 2019-01-25 ENCOUNTER — HOSPITAL ENCOUNTER (OUTPATIENT)
Dept: RADIOLOGY | Facility: HOSPITAL | Age: 84
Discharge: HOME | End: 2019-01-25
Payer: MEDICARE

## 2019-01-25 VITALS
OXYGEN SATURATION: 99 % | DIASTOLIC BLOOD PRESSURE: 68 MMHG | BODY MASS INDEX: 22.74 KG/M2 | SYSTOLIC BLOOD PRESSURE: 114 MMHG | HEIGHT: 62 IN | WEIGHT: 123.56 LBS | HEART RATE: 62 BPM | RESPIRATION RATE: 16 BRPM

## 2019-01-25 DIAGNOSIS — R06.02 SOB (SHORTNESS OF BREATH): ICD-10-CM

## 2019-01-25 DIAGNOSIS — J45.21 MILD INTERMITTENT ASTHMATIC BRONCHITIS WITH ACUTE EXACERBATION: Primary | ICD-10-CM

## 2019-01-25 PROCEDURE — 1101F PR PT FALLS ASSESS DOC 0-1 FALLS W/OUT INJ PAST YR: ICD-10-PCS | Mod: HCNC,CPTII,S$GLB, | Performed by: INTERNAL MEDICINE

## 2019-01-25 PROCEDURE — 71046 X-RAY EXAM CHEST 2 VIEWS: CPT | Mod: 26,HCNC,, | Performed by: RADIOLOGY

## 2019-01-25 PROCEDURE — 1101F PT FALLS ASSESS-DOCD LE1/YR: CPT | Mod: HCNC,CPTII,S$GLB, | Performed by: INTERNAL MEDICINE

## 2019-01-25 PROCEDURE — 71046 XR CHEST PA AND LATERAL: ICD-10-PCS | Mod: 26,HCNC,, | Performed by: RADIOLOGY

## 2019-01-25 PROCEDURE — 3074F SYST BP LT 130 MM HG: CPT | Mod: HCNC,CPTII,S$GLB, | Performed by: INTERNAL MEDICINE

## 2019-01-25 PROCEDURE — 99999 PR PBB SHADOW E&M-EST. PATIENT-LVL IV: CPT | Mod: PBBFAC,HCNC,, | Performed by: INTERNAL MEDICINE

## 2019-01-25 PROCEDURE — 99999 PR PBB SHADOW E&M-EST. PATIENT-LVL IV: ICD-10-PCS | Mod: PBBFAC,HCNC,, | Performed by: INTERNAL MEDICINE

## 2019-01-25 PROCEDURE — 3078F PR MOST RECENT DIASTOLIC BLOOD PRESSURE < 80 MM HG: ICD-10-PCS | Mod: HCNC,CPTII,S$GLB, | Performed by: INTERNAL MEDICINE

## 2019-01-25 PROCEDURE — 3078F DIAST BP <80 MM HG: CPT | Mod: HCNC,CPTII,S$GLB, | Performed by: INTERNAL MEDICINE

## 2019-01-25 PROCEDURE — 99215 PR OFFICE/OUTPT VISIT, EST, LEVL V, 40-54 MIN: ICD-10-PCS | Mod: HCNC,S$GLB,, | Performed by: INTERNAL MEDICINE

## 2019-01-25 PROCEDURE — 99215 OFFICE O/P EST HI 40 MIN: CPT | Mod: HCNC,S$GLB,, | Performed by: INTERNAL MEDICINE

## 2019-01-25 PROCEDURE — 71046 X-RAY EXAM CHEST 2 VIEWS: CPT | Mod: TC,HCNC

## 2019-01-25 PROCEDURE — 3074F PR MOST RECENT SYSTOLIC BLOOD PRESSURE < 130 MM HG: ICD-10-PCS | Mod: HCNC,CPTII,S$GLB, | Performed by: INTERNAL MEDICINE

## 2019-01-25 RX ORDER — BUDESONIDE 0.5 MG/2ML
0.5 INHALANT ORAL 2 TIMES DAILY
Qty: 120 ML | Refills: 11 | Status: SHIPPED | OUTPATIENT
Start: 2019-01-25 | End: 2019-07-16 | Stop reason: SDUPTHER

## 2019-01-25 RX ORDER — PREDNISONE 20 MG/1
TABLET ORAL
Qty: 20 TABLET | Refills: 1 | Status: SHIPPED | OUTPATIENT
Start: 2019-01-25 | End: 2019-05-03 | Stop reason: SDUPTHER

## 2019-01-25 RX ORDER — LEVOFLOXACIN 500 MG/1
500 TABLET, FILM COATED ORAL DAILY
Qty: 7 TABLET | Refills: 1 | Status: SHIPPED | OUTPATIENT
Start: 2019-01-25 | End: 2019-06-11

## 2019-01-25 NOTE — PATIENT INSTRUCTIONS
Use Budesonide jet nebs twice a day - no more or no less.  Use Albuterol / ipratroprium prior to budesonide at least twice a day. May use albuterol / ipratroprium every 4 - 6 hours if needed for shortness of breath, cough or chest congestion    Levofloxacin tablets  What is this medicine?  LEVOFLOXACIN (koki aleyda FLOX a sin) is a quinolone antibiotic. It is used to treat certain kinds of bacterial infections. It will not work for colds, flu, or other viral infections.  How should I use this medicine?  Take this medicine by mouth with a full glass of water. Follow the directions on the prescription label. This medicine can be taken with or without food. Take your medicine at regular intervals. Do not take your medicine more often than directed. Do not skip doses or stop your medicine early even if you feel better. Do not stop taking except on your doctor's advice.  A special MedGuide will be given to you by the pharmacist with each prescription and refill. Be sure to read this information carefully each time.  Talk to your pediatrician regarding the use of this medicine in children. While this drug may be prescribed for children as young as 6 months for selected conditions, precautions do apply.  What side effects may I notice from receiving this medicine?  Side effects that you should report to your doctor or health care professional as soon as possible:  · allergic reactions like skin rash or hives, swelling of the face, lips, or tongue  · anxious  · confusion  · depressed mood  · diarrhea  · fast, irregular heartbeat  · hallucination, loss of contact with reality  · joint, muscle, or tendon pain or swelling  · pain, tingling, numbness in the hands or feet  · suicidal thoughts or other mood changes  · sunburn  · unusually weak or tired  Side effects that usually do not require medical attention (report to your doctor or health care professional if they continue or are bothersome):  · dry  mouth  · headache  · nausea  · trouble sleeping  What may interact with this medicine?  Do not take this medicine with any of the following medications:  · arsenic trioxide  · chloroquine  · droperidol  · medicines for irregular heart rhythm like amiodarone, disopyramide, dofetilide, flecainide, quinidine, procainamide, sotalol  · some medicines for depression or mental problems like phenothiazines, pimozide, and ziprasidone  This medicine may also interact with the following medications:  · amoxapine  · antacids  · birth control pills  · cisapride  · dairy products  · didanosine (ddI) buffered tablets or powder  · haloperidol  · multivitamins  · NSAIDS, medicines for pain and inflammation, like ibuprofen or naproxen  · retinoid products like tretinoin or isotretinoin  · risperidone  · some other antibiotics like clarithromycin or erythromycin  · sucralfate  · theophylline  · warfarin  What if I miss a dose?  If you miss a dose, take it as soon as you remember. If it is almost time for your next dose, take only that dose. Do not take double or extra doses.  Where should I keep my medicine?  Keep out of the reach of children.  Store at room temperature between 15 and 30 degrees C (59 and 86 degrees F). Keep in a tightly closed container. Throw away any unused medicine after the expiration date.  What should I tell my health care provider before I take this medicine?  They need to know if you have any of these conditions:  · bone problems  · cerebral disease  · history of low levels of potassium in the blood  · irregular heartbeat  · joint problems  · kidney disease  · myasthenia gravis  · seizures  · tendon problems  · tingling of the fingers or toes, or other nerve disorder  · an unusual or allergic reaction to levofloxacin, other quinolone antibiotics, foods, dyes, or preservatives  · pregnant or trying to get pregnant  · breast-feeding  What should I watch for while using this medicine?  Tell your doctor or health  care professional if your symptoms do not improve or if they get worse. Drink several glasses of water a day and cut down on drinks that contain caffeine. You must not get dehydrated while taking this medicine.  You may get drowsy or dizzy. Do not drive, use machinery, or do anything that needs mental alertness until you know how this medicine affects you. Do not sit or stand up quickly, especially if you are an older patient. This reduces the risk of dizzy or fainting spells.  This medicine can make you more sensitive to the sun. Keep out of the sun. If you cannot avoid being in the sun, wear protective clothing and use a sunscreen. Do not use sun lamps or tanning beds/booths. Contact your doctor if you get a sunburn.  If you are a diabetic monitor your blood glucose carefully. If you get an unusual reading stop taking this medicine and call your doctor right away.  Do not treat diarrhea with over-the-counter products. Contact your doctor if you have diarrhea that lasts more than 2 days or if the diarrhea is severe and watery.  Avoid antacids, calcium, iron, and zinc products for 2 hours before and 2 hours after taking a dose of this medicine.  NOTE:This sheet is a summary. It may not cover all possible information. If you have questions about this medicine, talk to your doctor, pharmacist, or health care provider. Copyright© 2017 [employer]

## 2019-01-25 NOTE — PROGRESS NOTES
Subjective:       Patient ID: Crystal Romero is a 84 y.o. female.    Chief Complaint: COPD    HPI COPD  She presents for evaluation and treatment of COPD. The patient is currently having symptoms / an exacerbation. Current symptoms include chronic dyspnea and cough productive of yellow sputum in small amounts. Symptoms have been present since a week ago and have been gradually worsening. She denies increased sputum. Associated symptoms include poor exercise tolerance.  This episode appears to have been triggered by no identifiable factor. Treatments tried for the current exacerbation: albuterol nebulizer and ipratropium nebulizer. The patient has been having similar episodes for approximately 5 years. She uses 1 pillows at night. Patient currently is not on home oxygen therapy.. The patient is having no constitutional symptoms, denying fever, chills, anorexia, or weight loss. The patient has been hospitalized for this condition before. She quit smoking approximately mnay years ago. The patient is experiencing exercise intolerance (difficulty walking 10 feet on flat ground).        Past Medical History:   Diagnosis Date    Arthritis     Cataract     Coronary artery disease     Dementia     Depression     Encounter for blood transfusion     H/O: GI bleed     Hiatal hernia     Hyperlipidemia     Hypertension     Hypothyroid     Rheumatoid arthritis(714.0)     Seizures     Stroke     Syncope and collapse      Past Surgical History:   Procedure Laterality Date    COLONOSCOPY N/A 4/7/2015    [facility]    COLONOSCOPY N/A 9/25/2014    [facility] by [facility] at [facility]    ESOPHAGOGASTRODUODENOSCOPY (EGD) N/A 9/25/2014    [facility] by [facility] END[facility]    MANDIBLE SURGERY      TONSILLECTOMY       Social History     Socioeconomic History    Marital status:      Spouse name: Not on file    Number of children: 2     Years of education: Not on file    Highest education level: Not on file   Social Needs    Financial resource strain: Not on file    Food insecurity - worry: Not on file    Food insecurity - inability: Not on file    Transportation needs - medical: Not on file    Transportation needs - non-medical: Not on file   Occupational History    Not on file   Tobacco Use    Smoking status: Former Smoker     Packs/day: 2.00     Years: 16.00     Pack years: 32.00     Types: Cigarettes     Last attempt to quit: 1950     Years since quittin.1    Smokeless tobacco: Never Used   Substance and Sexual Activity    Alcohol use: No     Frequency: Never    Drug use: No    Sexual activity: No   Other Topics Concern    Not on file   Social History Narrative    Lives with daughter     Review of Systems   Constitutional: Positive for fatigue. Negative for fever.   HENT: Positive for postnasal drip, rhinorrhea and congestion.    Eyes: Negative for redness and itching.   Respiratory: Positive for cough, sputum production, shortness of breath, dyspnea on extertion, use of rescue inhaler and Paroxysmal Nocturnal Dyspnea.    Cardiovascular: Negative for chest pain, palpitations and leg swelling.   Genitourinary: Negative for difficulty urinating and hematuria.   Endocrine: Negative for cold intolerance and heat intolerance.    Skin: Negative for rash.   Gastrointestinal: Negative for nausea and abdominal pain.   Neurological: Negative for dizziness, syncope, weakness and light-headedness.   Hematological: Negative for adenopathy. Does not bruise/bleed easily.   Psychiatric/Behavioral: Negative for sleep disturbance. The patient is not nervous/anxious.        Objective:      Physical Exam   Constitutional: She is oriented to person, place, and time. She appears well-developed and well-nourished.   HENT:   Head: Normocephalic and atraumatic.   Mouth/Throat: Oropharyngeal exudate present.   Eyes: Conjunctivae are normal.  Pupils are equal, round, and reactive to light.   Neck: Neck supple. No JVD present. No tracheal deviation present. No thyromegaly present.   Cardiovascular: Normal rate, regular rhythm and normal heart sounds.   Pulmonary/Chest: Effort normal. No respiratory distress. She has decreased breath sounds. She has wheezes in the right lower field and the left lower field. She has no rhonchi. She has no rales. She exhibits no tenderness.   Abdominal: Soft. Bowel sounds are normal.   Musculoskeletal: Normal range of motion. She exhibits no edema.   Lymphadenopathy:     She has no cervical adenopathy.   Neurological: She is alert and oriented to person, place, and time.   Skin: Skin is warm and dry.   Nursing note and vitals reviewed.    Personal Diagnostic Review  Chest X-Ray: I personally reviewed the films and findings are:, air trapping/emphysema, hiatal hernia  Pulmonary function tests:  No recent    Pulmonary Studies Review 1/25/2019 1/15/2019 12/17/2018 12/13/2018 12/11/2018 12/6/2018 11/14/2018   SpO2 99 - 97 - 95 97 -   Height 62.000 62.000 62.000 62.000 62.000 62.000 61.000   Weight 1977.09 2095.25 2112.89 2088.2 2130.53 2116.42 2197.55   BMI (Calculated) 22.6 24 24.2 23.9 24.4 24.2 26   Predicted Distance 247.26 238.52 237.27 239.14 236.02 237.27 231.87   Predicted Distance Meters (Calculated) 385.45 377.78 376.63 378.24 375.49 376.41 371.43     No flowsheet data found.      Assessment:       1. Mild intermittent asthmatic bronchitis with acute exacerbation        Outpatient Encounter Medications as of 1/25/2019   Medication Sig Dispense Refill    acetaminophen (TYLENOL) 500 MG tablet Take 500 mg by mouth every 6 (six) hours as needed for Pain.      albuterol-ipratropium (DUO-NEB) 2.5 mg-0.5 mg/3 mL nebulizer solution Take 3 mLs by nebulization every 6 (six) hours as needed for Wheezing. Rescue 360 vial 5    aspirin (ECOTRIN) 81 MG EC tablet Take 81 mg by mouth once daily.      atorvastatin (LIPITOR) 20 MG  tablet Take 1 tablet (20 mg total) by mouth once daily. 30 tablet 11    folic acid (FOLVITE) 1 MG tablet TAKE ONE TABLET BY MOUTH ONE TIME DAILY 90 tablet 0    furosemide (LASIX) 20 MG tablet Take 1 tablet (20 mg total) by mouth once daily. 30 tablet 11    inhalation spacing device (AEROCHAMBER PLUS FLOW-VU) Use with Symbicort and Albuterol inhalers 1 each 0    levETIRAcetam (KEPPRA) 500 MG Tab Take 1 tablet (500 mg total) by mouth 2 (two) times daily. 60 tablet 11    levothyroxine (SYNTHROID) 25 MCG tablet TAKE ONE TABLET BY MOUTH ONE TIME DAILY BEFORE BREAKFAST 90 tablet 0    lisinopril (PRINIVIL,ZESTRIL) 5 MG tablet Take 1 tablet (5 mg total) by mouth once daily. 30 tablet 5    metoprolol succinate (TOPROL-XL) 25 MG 24 hr tablet Take 1 tablet (25 mg total) by mouth 2 (two) times daily. (Patient taking differently: Take 25 mg by mouth once daily. ) 180 tablet 1    pantoprazole (PROTONIX) 40 MG tablet Take 1 tablet (40 mg total) by mouth once daily. 90 tablet 3    potassium chloride (KLOR-CON) 10 MEQ TbSR TAKE ONE TABLET BY MOUTH THREE TIMES DAILY (Patient taking differently: TAKE ONE TABLET BY MOUTH TWO TIMES DAILY) 90 tablet 2    sertraline (ZOLOFT) 50 MG tablet Take 1 tablet (50 mg total) by mouth once daily. 30 tablet 2    traZODone (DESYREL) 100 MG tablet Take 1 tablet (100 mg total) by mouth every evening. (Patient taking differently: Take 50 mg by mouth every evening. ) 90 tablet 1    VITAMIN B COMPLEX (SUPER B COMPLEX-B-12 ORAL) Take by mouth.      budesonide (PULMICORT) 0.5 mg/2 mL nebulizer solution Take 2 mLs (0.5 mg total) by nebulization 2 (two) times daily. Wash out mouth after using. 120 mL 11    levoFLOXacin (LEVAQUIN) 500 MG tablet Take 1 tablet (500 mg total) by mouth once daily. 7 tablet 1    predniSONE (DELTASONE) 20 MG tablet Prednisone 60 mg/ day for 3 days, 40 mg/day for 3 days,20 mg/ day for 3 days, (1/2 tablet )10 mg a day for 3 days. 20 tablet 1     No facility-administered  encounter medications on file as of 1/25/2019.      No orders of the defined types were placed in this encounter.    Plan:       Requested Prescriptions     Signed Prescriptions Disp Refills    budesonide (PULMICORT) 0.5 mg/2 mL nebulizer solution 120 mL 11     Sig: Take 2 mLs (0.5 mg total) by nebulization 2 (two) times daily. Wash out mouth after using.    predniSONE (DELTASONE) 20 MG tablet 20 tablet 1     Sig: Prednisone 60 mg/ day for 3 days, 40 mg/day for 3 days,20 mg/ day for 3 days, (1/2 tablet )10 mg a day for 3 days.    levoFLOXacin (LEVAQUIN) 500 MG tablet 7 tablet 1     Sig: Take 1 tablet (500 mg total) by mouth once daily.     Mild intermittent asthmatic bronchitis with acute exacerbation  -     budesonide (PULMICORT) 0.5 mg/2 mL nebulizer solution; Take 2 mLs (0.5 mg total) by nebulization 2 (two) times daily. Wash out mouth after using.  Dispense: 120 mL; Refill: 11  -     predniSONE (DELTASONE) 20 MG tablet; Prednisone 60 mg/ day for 3 days, 40 mg/day for 3 days,20 mg/ day for 3 days, (1/2 tablet )10 mg a day for 3 days.  Dispense: 20 tablet; Refill: 1  -     levoFLOXacin (LEVAQUIN) 500 MG tablet; Take 1 tablet (500 mg total) by mouth once daily.  Dispense: 7 tablet; Refill: 1      Follow-up in about 6 months (around 7/25/2019).    MEDICAL DECISION MAKING: Moderate to high complexity.  Overall, the multiple problems listed are of moderate to high severity that may impact quality of life and activities of daily living. Side effects of medications, treatment plan as well as options and alternatives reviewed and discussed with patient. There was counseling of patient concerning these issues.    Total time spent in face to face counseling and coordination of care - 40 minutes over 50% of time was used in discussion of prognosis, risks, benefits of treatment, instructions and compliance with regimen . Discussion with other physicians or health care providers (homehealth, durable medical equipment  providers).

## 2019-01-27 DIAGNOSIS — I10 ESSENTIAL HYPERTENSION: Chronic | ICD-10-CM

## 2019-01-27 RX ORDER — TRAMADOL HYDROCHLORIDE 50 MG/1
TABLET ORAL
Qty: 90 TABLET | Refills: 0 | Status: SHIPPED | OUTPATIENT
Start: 2019-01-27 | End: 2019-06-11

## 2019-01-28 RX ORDER — PROMETHAZINE HYDROCHLORIDE AND DEXTROMETHORPHAN HYDROBROMIDE 6.25; 15 MG/5ML; MG/5ML
SYRUP ORAL
Qty: 120 ML | Refills: 0 | OUTPATIENT
Start: 2019-01-28

## 2019-01-28 RX ORDER — METOPROLOL SUCCINATE 25 MG/1
TABLET, EXTENDED RELEASE ORAL
Qty: 30 TABLET | Refills: 1 | Status: SHIPPED | OUTPATIENT
Start: 2019-01-28 | End: 2019-01-30

## 2019-01-30 ENCOUNTER — TELEPHONE (OUTPATIENT)
Dept: CARDIOLOGY | Facility: CLINIC | Age: 84
End: 2019-01-30

## 2019-01-30 NOTE — TELEPHONE ENCOUNTER
Returned call and gave the following orders to Peyton with [facility];    Orlando Hassan MD          1/30/19 1:40 PM   Note      D/c lisinopril and metoprolol  Continue to monitor vital.  Keep SBp < 140 mmHg          Orders repeated back correctly.

## 2019-01-30 NOTE — TELEPHONE ENCOUNTER
Home Health nurse called and stated must report the following due to blood pressure and parameters.    Blood pressure today 90/60 - asymptomatic , taking lasix 20mg daily and lisinopril 5mg daily.    Weight is 124.4 pounds ( parameters set for 125-135 lbs)    Please respond

## 2019-02-05 ENCOUNTER — PATIENT MESSAGE (OUTPATIENT)
Dept: NEUROLOGY | Facility: CLINIC | Age: 84
End: 2019-02-05

## 2019-02-05 ENCOUNTER — TELEPHONE (OUTPATIENT)
Dept: INTERNAL MEDICINE | Facility: CLINIC | Age: 84
End: 2019-02-05

## 2019-02-05 NOTE — TELEPHONE ENCOUNTER
----- Message from Angelita Whitfield sent at 2/5/2019  9:38 AM CST -----  Delma Gramajo states that she was calling to offer a peer to peer for home health service. Caller can be reached at [phone] and would need to hear from nurse by the close of business on Thursday Feb. 7

## 2019-02-05 NOTE — TELEPHONE ENCOUNTER
Spoke with Delma. I informed Delma Dahl does not do peer to peers. Delma said she would note it. Call ended well.

## 2019-02-20 ENCOUNTER — TELEPHONE (OUTPATIENT)
Dept: CARDIOLOGY | Facility: CLINIC | Age: 84
End: 2019-02-20

## 2019-02-20 NOTE — TELEPHONE ENCOUNTER
Peyton with [facility] called. Reporting pt had 4lb wt gain over night. Pt ate Popeyes. bp was elevated as well, it has now returned to normal at 122/70. No other worsening sob or edema noted. Pt denies any PND or orthopnea, only wt gain. Please advise. Peyton explained with pt in detail including medications, dietary restriction and fluid restrictions.

## 2019-03-13 ENCOUNTER — LAB VISIT (OUTPATIENT)
Dept: LAB | Facility: HOSPITAL | Age: 84
End: 2019-03-13
Attending: PHYSICIAN ASSISTANT
Payer: MEDICARE

## 2019-03-13 ENCOUNTER — OFFICE VISIT (OUTPATIENT)
Dept: CARDIOLOGY | Facility: CLINIC | Age: 84
End: 2019-03-13
Payer: MEDICARE

## 2019-03-13 VITALS
WEIGHT: 126.75 LBS | DIASTOLIC BLOOD PRESSURE: 80 MMHG | BODY MASS INDEX: 23.32 KG/M2 | SYSTOLIC BLOOD PRESSURE: 136 MMHG | HEART RATE: 88 BPM | HEIGHT: 62 IN

## 2019-03-13 DIAGNOSIS — I50.32 CHRONIC DIASTOLIC CONGESTIVE HEART FAILURE: Primary | ICD-10-CM

## 2019-03-13 DIAGNOSIS — I35.1 NONRHEUMATIC AORTIC VALVE INSUFFICIENCY: ICD-10-CM

## 2019-03-13 DIAGNOSIS — I35.0 NONRHEUMATIC AORTIC VALVE STENOSIS: ICD-10-CM

## 2019-03-13 DIAGNOSIS — I50.32 CHRONIC DIASTOLIC CONGESTIVE HEART FAILURE: ICD-10-CM

## 2019-03-13 DIAGNOSIS — Z86.73 HISTORY OF TRANSIENT ISCHEMIC ATTACK (TIA): ICD-10-CM

## 2019-03-13 DIAGNOSIS — J43.8 OTHER EMPHYSEMA: ICD-10-CM

## 2019-03-13 DIAGNOSIS — E78.2 MIXED HYPERLIPIDEMIA: Chronic | ICD-10-CM

## 2019-03-13 DIAGNOSIS — I10 ESSENTIAL HYPERTENSION: Chronic | ICD-10-CM

## 2019-03-13 LAB
ANION GAP SERPL CALC-SCNC: 15 MMOL/L
BUN SERPL-MCNC: 23 MG/DL
CALCIUM SERPL-MCNC: 10.7 MG/DL
CHLORIDE SERPL-SCNC: 107 MMOL/L
CO2 SERPL-SCNC: 24 MMOL/L
CREAT SERPL-MCNC: 1.1 MG/DL
EST. GFR  (AFRICAN AMERICAN): 53 ML/MIN/1.73 M^2
EST. GFR  (NON AFRICAN AMERICAN): 46 ML/MIN/1.73 M^2
GLUCOSE SERPL-MCNC: 101 MG/DL
POTASSIUM SERPL-SCNC: 4 MMOL/L
SODIUM SERPL-SCNC: 146 MMOL/L

## 2019-03-13 PROCEDURE — 99214 PR OFFICE/OUTPT VISIT, EST, LEVL IV, 30-39 MIN: ICD-10-PCS | Mod: HCNC,S$GLB,, | Performed by: PHYSICIAN ASSISTANT

## 2019-03-13 PROCEDURE — 99999 PR PBB SHADOW E&M-EST. PATIENT-LVL IV: ICD-10-PCS | Mod: PBBFAC,HCNC,, | Performed by: PHYSICIAN ASSISTANT

## 2019-03-13 PROCEDURE — 83880 ASSAY OF NATRIURETIC PEPTIDE: CPT | Mod: HCNC

## 2019-03-13 PROCEDURE — 99999 PR PBB SHADOW E&M-EST. PATIENT-LVL IV: CPT | Mod: PBBFAC,HCNC,, | Performed by: PHYSICIAN ASSISTANT

## 2019-03-13 PROCEDURE — 1101F PT FALLS ASSESS-DOCD LE1/YR: CPT | Mod: HCNC,CPTII,S$GLB, | Performed by: PHYSICIAN ASSISTANT

## 2019-03-13 PROCEDURE — 3075F SYST BP GE 130 - 139MM HG: CPT | Mod: HCNC,CPTII,S$GLB, | Performed by: PHYSICIAN ASSISTANT

## 2019-03-13 PROCEDURE — 99214 OFFICE O/P EST MOD 30 MIN: CPT | Mod: HCNC,S$GLB,, | Performed by: PHYSICIAN ASSISTANT

## 2019-03-13 PROCEDURE — 3075F PR MOST RECENT SYSTOLIC BLOOD PRESS GE 130-139MM HG: ICD-10-PCS | Mod: HCNC,CPTII,S$GLB, | Performed by: PHYSICIAN ASSISTANT

## 2019-03-13 PROCEDURE — 1101F PR PT FALLS ASSESS DOC 0-1 FALLS W/OUT INJ PAST YR: ICD-10-PCS | Mod: HCNC,CPTII,S$GLB, | Performed by: PHYSICIAN ASSISTANT

## 2019-03-13 PROCEDURE — 80048 BASIC METABOLIC PNL TOTAL CA: CPT | Mod: HCNC

## 2019-03-13 PROCEDURE — 36415 COLL VENOUS BLD VENIPUNCTURE: CPT | Mod: HCNC

## 2019-03-13 PROCEDURE — 3079F DIAST BP 80-89 MM HG: CPT | Mod: HCNC,CPTII,S$GLB, | Performed by: PHYSICIAN ASSISTANT

## 2019-03-13 PROCEDURE — 3079F PR MOST RECENT DIASTOLIC BLOOD PRESSURE 80-89 MM HG: ICD-10-PCS | Mod: HCNC,CPTII,S$GLB, | Performed by: PHYSICIAN ASSISTANT

## 2019-03-13 RX ORDER — DIVALPROEX SODIUM 500 MG/1
500 TABLET, FILM COATED, EXTENDED RELEASE ORAL 3 TIMES DAILY
COMMUNITY
End: 2019-06-11

## 2019-03-13 RX ORDER — GLUCOSAMINE HCL 500 MG
TABLET ORAL
COMMUNITY
End: 2020-06-18

## 2019-03-13 NOTE — PROGRESS NOTES
Subjective:    Patient ID:  Crystal Romero is a 84 y.o. female who presents for follow-up of Follow-up and Congestive Heart Failure      HPI   Ms. Romero is an 84 year old female patient whose current medical conditions include TIA, dementia, chronic diastolic CHF, AS, and hyperlipidemia who presents today for her three month follow-up. She returns today, accompanied by her daughter, Shannan. States she is feeling well. No complaints. Denies any chest pain or anginal equivalent. Admits to stable SOB/MOLINA, improved with inhalers. No clinical signs of volume overload. Denies any PND, orthopnea, increased lower extremity edema, or excessive weight gain. No lightheadedness, dizziness, palpitations, near syncope, or syncope. BP stable today in clinic however daughter reports it will often spike at night when the patient is agitated. Generally comes back down once she relaxes. Patient is compliant with her medications, previously taken off of Metoprolol and Lisinopril due to issues with hypotension. Labs pending.    Of note, patient previously declined TAVR workup.     Review of Systems   Constitution: Negative for chills, decreased appetite, fever, weakness and malaise/fatigue.   HENT: Negative for congestion, hoarse voice and sore throat.    Eyes: Negative for blurred vision and discharge.   Cardiovascular: Negative for chest pain, claudication, cyanosis, dyspnea on exertion, irregular heartbeat, leg swelling, near-syncope, orthopnea, palpitations and paroxysmal nocturnal dyspnea.   Respiratory: Negative for cough, hemoptysis, shortness of breath, snoring, sputum production and wheezing.    Endocrine: Negative for cold intolerance and heat intolerance.   Hematologic/Lymphatic: Negative for bleeding problem. Does not bruise/bleed easily.   Skin: Negative for rash.   Musculoskeletal: Positive for joint pain. Negative for arthritis, back pain, joint swelling, muscle cramps, muscle weakness and myalgias.  "  Gastrointestinal: Negative for abdominal pain, constipation, diarrhea, heartburn, melena and nausea.   Genitourinary: Negative for hematuria.   Neurological: Negative for dizziness, focal weakness, headaches, light-headedness, loss of balance, numbness, paresthesias and seizures.   Psychiatric/Behavioral: Positive for memory loss. The patient does not have insomnia.    Allergic/Immunologic: Negative for hives.       /80 (BP Location: Right arm, Patient Position: Sitting, BP Method: Medium (Manual))   Pulse 88   Ht 5' 2" (1.575 m)   Wt 57.5 kg (126 lb 12.2 oz)   BMI 23.19 kg/m²     Objective:    Physical Exam   Constitutional: She is oriented to person, place, and time. She appears well-developed and well-nourished. No distress.   HENT:   Head: Normocephalic and atraumatic.   Eyes: Pupils are equal, round, and reactive to light. Right eye exhibits no discharge. Left eye exhibits no discharge.   Neck: Neck supple. No JVD present.   Cardiovascular: Normal rate, regular rhythm, S1 normal, S2 normal and intact distal pulses.   Murmur heard.   Harsh midsystolic murmur is present at the upper right sternal border radiating to the neck.  Pulmonary/Chest: Effort normal. No respiratory distress. She has no wheezes. She has no rales.   Diminished BS    Abdominal: Soft. She exhibits no distension. There is no tenderness.   Musculoskeletal: She exhibits edema (BLE).   Neurological: She is alert and oriented to person, place, and time.   Skin: Skin is warm and dry. She is not diaphoretic. No erythema.   Psychiatric: She has a normal mood and affect. Her behavior is normal. Thought content normal.   Nursing note and vitals reviewed.      2D Echo CONCLUSIONS     1 - Normal left ventricular systolic function (EF 65-70%).     2 - Left ventricular diastolic dysfunction.     3 - Normal right ventricular systolic function .     4 - Moderate to severe aortic stenosis, AMANDA = 1.1 cm2, mean gradient = 39.6 mmHg.     5 - Moderate " aortic regurgitation.   Assessment:       1. Chronic diastolic congestive heart failure    2. History of transient ischemic attack (TIA)    3. Other emphysema    4. Mixed hyperlipidemia    5. Essential hypertension    6. Nonrheumatic aortic valve stenosis    7. Nonrheumatic aortic valve insufficiency       Patient presents for f/u. Doing clinically well. No cardiac complaints. CHF clinically compensated. Continue same meds/ mgmt. Advised if BP stays elevated in evenings, can take Toprol XL if needed. Patient's daughter knows to call our office if this becomes an issue.   Plan:   -BMP, BNP today with phone review  -Continue same meds  -Cardiac low salt diet  -Previously declined proceeding with TAVR workup  -RTC 3 months with Dr. Hassan      Chart reviewed. Dr. Hassan agrees with plan as outlined above.

## 2019-03-14 ENCOUNTER — TELEPHONE (OUTPATIENT)
Dept: CARDIOLOGY | Facility: CLINIC | Age: 84
End: 2019-03-14

## 2019-03-14 LAB — BNP SERPL-MCNC: 297 PG/ML

## 2019-03-14 NOTE — TELEPHONE ENCOUNTER
Please phone patient's daughter, Shannan. BMP reviewed. Creatinine improved to 1.1 and is stable.    Continue same meds/ mgmt, call our office if any issues.    Thanks

## 2019-03-15 ENCOUNTER — TELEPHONE (OUTPATIENT)
Dept: CARDIOLOGY | Facility: CLINIC | Age: 84
End: 2019-03-15

## 2019-03-22 DIAGNOSIS — F33.0 MAJOR DEPRESSIVE DISORDER, RECURRENT EPISODE, MILD: ICD-10-CM

## 2019-03-22 DIAGNOSIS — L40.50 PSORIASIS WITH ARTHROPATHY: ICD-10-CM

## 2019-03-22 RX ORDER — FOLIC ACID 1 MG/1
TABLET ORAL
Qty: 90 TABLET | Refills: 0 | Status: SHIPPED | OUTPATIENT
Start: 2019-03-22 | End: 2019-06-09 | Stop reason: SDUPTHER

## 2019-03-22 RX ORDER — LEVOTHYROXINE SODIUM 25 UG/1
TABLET ORAL
Qty: 90 TABLET | Refills: 0 | Status: SHIPPED | OUTPATIENT
Start: 2019-03-22 | End: 2019-06-08 | Stop reason: SDUPTHER

## 2019-03-22 RX ORDER — SERTRALINE HYDROCHLORIDE 50 MG/1
TABLET, FILM COATED ORAL
Qty: 90 TABLET | Refills: 1 | Status: SHIPPED | OUTPATIENT
Start: 2019-03-22 | End: 2019-08-17 | Stop reason: SDUPTHER

## 2019-04-01 ENCOUNTER — TELEPHONE (OUTPATIENT)
Dept: URGENT CARE | Facility: CLINIC | Age: 84
End: 2019-04-01

## 2019-04-01 NOTE — TELEPHONE ENCOUNTER
----- Message from Angelita Whitfield sent at 4/1/2019  3:38 PM CDT -----  Contact: Shannan/pt daughter   Caller states that pt has gain about 6 pounds over the last 2 week. Caller states that they gave her 2 Lasix. Caller want to know if she need to give pt another Lasix to get weight from  131 to 128. Caller also want to know if the doctor want to resume home health cause pt is not getting any home health care.    [phone]

## 2019-04-02 ENCOUNTER — TELEPHONE (OUTPATIENT)
Dept: CARDIOLOGY | Facility: CLINIC | Age: 84
End: 2019-04-02

## 2019-04-02 NOTE — TELEPHONE ENCOUNTER
Spoke with Shannan and let her know to give pt lasix three times daily for three days then back to once daily.  Advised her to call if this didn't help or pt gains weight back again.  Shannan state instructions back.

## 2019-04-28 RX ORDER — POTASSIUM CHLORIDE 750 MG/1
TABLET, EXTENDED RELEASE ORAL
Qty: 90 TABLET | Refills: 1 | Status: SHIPPED | OUTPATIENT
Start: 2019-04-28 | End: 2019-07-02 | Stop reason: SDUPTHER

## 2019-05-03 ENCOUNTER — TELEPHONE (OUTPATIENT)
Dept: PULMONOLOGY | Facility: CLINIC | Age: 84
End: 2019-05-03

## 2019-05-03 ENCOUNTER — OFFICE VISIT (OUTPATIENT)
Dept: PULMONOLOGY | Facility: CLINIC | Age: 84
End: 2019-05-03
Payer: MEDICARE

## 2019-05-03 ENCOUNTER — HOSPITAL ENCOUNTER (OUTPATIENT)
Dept: RADIOLOGY | Facility: HOSPITAL | Age: 84
Discharge: HOME | End: 2019-05-03
Attending: INTERNAL MEDICINE
Payer: MEDICARE

## 2019-05-03 VITALS
OXYGEN SATURATION: 90 % | DIASTOLIC BLOOD PRESSURE: 80 MMHG | SYSTOLIC BLOOD PRESSURE: 130 MMHG | WEIGHT: 131.63 LBS | HEIGHT: 62 IN | BODY MASS INDEX: 24.22 KG/M2 | RESPIRATION RATE: 16 BRPM | HEART RATE: 73 BPM

## 2019-05-03 DIAGNOSIS — J43.9 PULMONARY EMPHYSEMA, UNSPECIFIED EMPHYSEMA TYPE: ICD-10-CM

## 2019-05-03 DIAGNOSIS — I50.32 CHRONIC DIASTOLIC CONGESTIVE HEART FAILURE: ICD-10-CM

## 2019-05-03 DIAGNOSIS — J45.41 MODERATE PERSISTENT ASTHMA WITH ACUTE EXACERBATION: Primary | ICD-10-CM

## 2019-05-03 DIAGNOSIS — J45.21 MILD INTERMITTENT ASTHMATIC BRONCHITIS WITH ACUTE EXACERBATION: ICD-10-CM

## 2019-05-03 DIAGNOSIS — E78.00 PURE HYPERCHOLESTEROLEMIA: Chronic | ICD-10-CM

## 2019-05-03 DIAGNOSIS — J44.89 ASTHMA WITH COPD: Primary | Chronic | ICD-10-CM

## 2019-05-03 DIAGNOSIS — I10 ESSENTIAL HYPERTENSION: Chronic | ICD-10-CM

## 2019-05-03 DIAGNOSIS — J44.89 ASTHMA WITH COPD: Chronic | ICD-10-CM

## 2019-05-03 PROCEDURE — 99215 PR OFFICE/OUTPT VISIT, EST, LEVL V, 40-54 MIN: ICD-10-PCS | Mod: 25,HCNC,S$GLB, | Performed by: INTERNAL MEDICINE

## 2019-05-03 PROCEDURE — 3079F PR MOST RECENT DIASTOLIC BLOOD PRESSURE 80-89 MM HG: ICD-10-PCS | Mod: HCNC,CPTII,S$GLB, | Performed by: INTERNAL MEDICINE

## 2019-05-03 PROCEDURE — 99999 PR PBB SHADOW E&M-EST. PATIENT-LVL III: ICD-10-PCS | Mod: PBBFAC,HCNC,, | Performed by: INTERNAL MEDICINE

## 2019-05-03 PROCEDURE — 3075F SYST BP GE 130 - 139MM HG: CPT | Mod: HCNC,CPTII,S$GLB, | Performed by: INTERNAL MEDICINE

## 2019-05-03 PROCEDURE — 3079F DIAST BP 80-89 MM HG: CPT | Mod: HCNC,CPTII,S$GLB, | Performed by: INTERNAL MEDICINE

## 2019-05-03 PROCEDURE — 1101F PT FALLS ASSESS-DOCD LE1/YR: CPT | Mod: HCNC,CPTII,S$GLB, | Performed by: INTERNAL MEDICINE

## 2019-05-03 PROCEDURE — 1101F PR PT FALLS ASSESS DOC 0-1 FALLS W/OUT INJ PAST YR: ICD-10-PCS | Mod: HCNC,CPTII,S$GLB, | Performed by: INTERNAL MEDICINE

## 2019-05-03 PROCEDURE — 99215 OFFICE O/P EST HI 40 MIN: CPT | Mod: 25,HCNC,S$GLB, | Performed by: INTERNAL MEDICINE

## 2019-05-03 PROCEDURE — 96372 THER/PROPH/DIAG INJ SC/IM: CPT | Mod: HCNC,S$GLB,, | Performed by: INTERNAL MEDICINE

## 2019-05-03 PROCEDURE — 71046 XR CHEST PA AND LATERAL: ICD-10-PCS | Mod: 26,HCNC,, | Performed by: RADIOLOGY

## 2019-05-03 PROCEDURE — 99999 PR PBB SHADOW E&M-EST. PATIENT-LVL III: CPT | Mod: PBBFAC,HCNC,, | Performed by: INTERNAL MEDICINE

## 2019-05-03 PROCEDURE — 3075F PR MOST RECENT SYSTOLIC BLOOD PRESS GE 130-139MM HG: ICD-10-PCS | Mod: HCNC,CPTII,S$GLB, | Performed by: INTERNAL MEDICINE

## 2019-05-03 PROCEDURE — 96372 PR INJECTION,THERAP/PROPH/DIAG2ST, IM OR SUBCUT: ICD-10-PCS | Mod: HCNC,S$GLB,, | Performed by: INTERNAL MEDICINE

## 2019-05-03 PROCEDURE — 71046 X-RAY EXAM CHEST 2 VIEWS: CPT | Mod: TC

## 2019-05-03 PROCEDURE — 71046 X-RAY EXAM CHEST 2 VIEWS: CPT | Mod: 26,HCNC,, | Performed by: RADIOLOGY

## 2019-05-03 RX ORDER — MIRTAZAPINE 45 MG/1
45 TABLET, FILM COATED ORAL NIGHTLY
COMMUNITY

## 2019-05-03 RX ORDER — PREDNISONE 20 MG/1
TABLET ORAL
Qty: 20 TABLET | Refills: 1 | Status: SHIPPED | OUTPATIENT
Start: 2019-05-03 | End: 2019-05-22

## 2019-05-03 RX ORDER — TRIAMCINOLONE ACETONIDE 40 MG/ML
40 INJECTION, SUSPENSION INTRA-ARTICULAR; INTRAMUSCULAR
Status: COMPLETED | OUTPATIENT
Start: 2019-05-03 | End: 2019-05-03

## 2019-05-03 RX ORDER — AZITHROMYCIN 250 MG/1
TABLET, FILM COATED ORAL
Qty: 6 TABLET | Refills: 0 | Status: SHIPPED | OUTPATIENT
Start: 2019-05-03 | End: 2019-06-11

## 2019-05-03 RX ORDER — NEOMYCIN/POLYMYXIN B/PRAMOXINE 3.5-10K-1
CREAM (GRAM) TOPICAL
COMMUNITY
End: 2019-07-16

## 2019-05-03 RX ADMIN — TRIAMCINOLONE ACETONIDE 40 MG: 40 INJECTION, SUSPENSION INTRA-ARTICULAR; INTRAMUSCULAR at 03:05

## 2019-05-03 NOTE — PROGRESS NOTES
Subjective:       Patient ID: Crystal Romero is a 84 y.o. female.    Chief Complaint: Shortness of Breath and Wheezing    Ms Nick garcía is 84 yars old  Seen Dr Slater  ASTHMA COPD OVERLAP: FEV1 0.78L( 47%)  ACT score 14, COPD score 23  Sudden SOB, Cough, wheezing worse since  AM  Used nebs: Budesonide, DUONEB. Poor exercise tolerance  Immunizations  Had Home health [facility]: Last hospitalization 2018  Also seen with cardiology and has chr swallowing  Issues.  Has hospital bed  No oxygen   On fluid restriction 1500mls           Past Medical History:   Diagnosis Date    Arthritis     Cataract     Coronary artery disease     Dementia     Depression     Encounter for blood transfusion     H/O: GI bleed     Hiatal hernia     Hyperlipidemia     Hypertension     Hypothyroid     Rheumatoid arthritis(714.0)     Seizures     Stroke     Syncope and collapse      Past Surgical History:   Procedure Laterality Date    COLONOSCOPY N/A 2015    [facility]    COLONOSCOPY N/A 2014    Per[facility] by [facility]    ESOPHAGOGASTRODUODENOSCOPY (EGD) N/A 2014    Per[facility] by [facility]    MANDIBLE SURGERY      TONSILLECTOMY       Social History     Socioeconomic History    Marital status:      Spouse name: Not on file    Number of children: 2    Years of education: Not on file    Highest education level: Not on file   Occupational History    Not on file   Social Needs    Financial resource strain: Not on file    Food insecurity:     Worry: Not on file     Inability: Not on file    Transportation needs:     Medical: Not on file     Non-medical: Not on file   Tobacco Use    Smoking status: Former Smoker     Packs/day: 2.00     Years: 16.00     Pack years: 32.00     Types: Cigarettes     Last attempt to quit: 1950     Years since quittin.3    Smokeless tobacco:  "Never Used   Substance and Sexual Activity    Alcohol use: No     Frequency: Never    Drug use: No    Sexual activity: Never   Lifestyle    Physical activity:     Days per week: Not on file     Minutes per session: Not on file    Stress: Not on file   Relationships    Social connections:     Talks on phone: Not on file     Gets together: Not on file     Attends [employer] service: Not on file     Active member of club or organization: Not on file     Attends meetings of clubs or organizations: Not on file     Relationship status: Not on file   Other Topics Concern    Not on file   Social History Narrative    Lives with daughter     Review of Systems   Constitutional: Positive for fatigue. Negative for fever.   HENT: Positive for congestion. Negative for postnasal drip and rhinorrhea.    Eyes: Negative for redness and itching.   Respiratory: Positive for sputum production, shortness of breath, dyspnea on extertion and use of rescue inhaler. Negative for cough and Paroxysmal Nocturnal Dyspnea.    Cardiovascular: Negative for chest pain, palpitations and leg swelling.   Genitourinary: Negative for difficulty urinating and hematuria.   Endocrine: Negative for cold intolerance and heat intolerance.    Skin: Negative for rash.   Gastrointestinal: Negative for nausea and abdominal pain.   Neurological: Negative for dizziness, syncope, weakness and light-headedness.   Hematological: Negative for adenopathy. Does not bruise/bleed easily.   Psychiatric/Behavioral: Negative for sleep disturbance. The patient is not nervous/anxious.        Objective:       Vitals:    05/03/19 1453   BP: 130/80   Pulse: 73   Resp: 16   SpO2: (!) 90%   Weight: 59.7 kg (131 lb 9.8 oz)   Height: 5' 2" (1.575 m)       Physical Exam   Constitutional: She is oriented to person, place, and time. She appears well-developed and well-nourished.   HENT:   Head: Normocephalic and atraumatic.   Mouth/Throat: No oropharyngeal exudate.   Eyes: Pupils are " equal, round, and reactive to light. Conjunctivae are normal.   Neck: Neck supple. No JVD present. No tracheal deviation present. No thyromegaly present.   Cardiovascular: Normal rate and regular rhythm.   Murmur heard.  Pulmonary/Chest: Effort normal. No respiratory distress. She has decreased breath sounds. She has wheezes in the right lower field and the left lower field. She has no rhonchi. She has no rales. She exhibits no tenderness.   Abdominal: Soft. Bowel sounds are normal.   Musculoskeletal: Normal range of motion. She exhibits no edema.   Lymphadenopathy:     She has no cervical adenopathy.   Neurological: She is alert and oriented to person, place, and time.   Skin: Skin is warm and dry.   Nursing note and vitals reviewed.    Personal Diagnostic Review  Chest X-Ray:   reviewed  Hyperinflation      Pulmonary function tests:    Spirometry 2017 reviewed  FEV1: 0.78L( 47%)  FEV1/FVC 42  No flowsheet data found.      Assessment:       Problem List Items Addressed This Visit     Asthma with COPD (Chronic)    Relevant Orders    Stress test, pulmonary    PULM - Arterial Blood Gases--in addition to PFT only    Spirometry without Bronchodilator    Hyperlipidemia (Chronic)    Current Assessment & Plan     Stable monitored by PCP         Hypertension (Chronic)    Current Assessment & Plan     Stable monitored by PCP         Pulmonary emphysema    Chronic diastolic congestive heart failure    Current Assessment & Plan     Optimize Preload and afterload and fliud and salt restriction            Other Visit Diagnoses     Moderate persistent asthma with acute exacerbation    -  Primary    Relevant Medications    triamcinolone acetonide injection 40 mg (Completed)    azithromycin (Z-JAMIE) 250 MG tablet    predniSONE (DELTASONE) 20 MG tablet    Mild intermittent asthmatic bronchitis with acute exacerbation        Relevant Medications    predniSONE (DELTASONE) 20 MG tablet            Plan:        Requested Prescriptions      Signed Prescriptions Disp Refills    azithromycin (Z-JAMIE) 250 MG tablet 6 tablet 0     Sig: Take 2 tablets by mouth on day 1; Take 1 tablet by mouth on days 2-5    predniSONE (DELTASONE) 20 MG tablet 20 tablet 1     Sig: Prednisone 60 mg/ day for 3 days, 40 mg/day for 3 days,20 mg/ day for 3 days, (1/2 tablet )10 mg a day for 3 days.         Follow up follwo with DR Slater, 6MWD, [place], [facility].    This note was prepared using voice recognition system and is likely to have sound alike errors that may have been overlooked even after proof reading.  Please call me with any questions    Discussed diagnosis, its evaluation, treatment and usual course. All questions answered.    Thank you for the courtesy of participating in the care of this patient    Abdon Lee MD

## 2019-05-22 ENCOUNTER — TELEPHONE (OUTPATIENT)
Dept: PULMONOLOGY | Facility: CLINIC | Age: 84
End: 2019-05-22

## 2019-05-22 ENCOUNTER — OFFICE VISIT (OUTPATIENT)
Dept: PULMONOLOGY | Facility: CLINIC | Age: 84
End: 2019-05-22
Payer: MEDICARE

## 2019-05-22 ENCOUNTER — HOSPITAL ENCOUNTER (OUTPATIENT)
Dept: RADIOLOGY | Facility: HOSPITAL | Age: 84
Discharge: HOME | End: 2019-05-22
Attending: NURSE PRACTITIONER
Payer: MEDICARE

## 2019-05-22 VITALS
DIASTOLIC BLOOD PRESSURE: 83 MMHG | HEIGHT: 62 IN | HEART RATE: 69 BPM | WEIGHT: 129 LBS | SYSTOLIC BLOOD PRESSURE: 120 MMHG | BODY MASS INDEX: 23.74 KG/M2 | RESPIRATION RATE: 16 BRPM | OXYGEN SATURATION: 94 %

## 2019-05-22 DIAGNOSIS — J44.89 ASTHMA WITH COPD: ICD-10-CM

## 2019-05-22 DIAGNOSIS — J44.89 ASTHMA WITH COPD: Primary | ICD-10-CM

## 2019-05-22 DIAGNOSIS — J44.1 COPD WITH ACUTE EXACERBATION: ICD-10-CM

## 2019-05-22 DIAGNOSIS — I25.10 CORONARY ARTERY CALCIFICATION: ICD-10-CM

## 2019-05-22 DIAGNOSIS — I25.84 CORONARY ARTERY CALCIFICATION: ICD-10-CM

## 2019-05-22 DIAGNOSIS — J43.9 PULMONARY EMPHYSEMA, UNSPECIFIED EMPHYSEMA TYPE: Primary | ICD-10-CM

## 2019-05-22 DIAGNOSIS — J44.89 ASTHMA WITH COPD: Chronic | ICD-10-CM

## 2019-05-22 DIAGNOSIS — R06.02 SOB (SHORTNESS OF BREATH) ON EXERTION: ICD-10-CM

## 2019-05-22 PROCEDURE — 94640 PR INHAL RX, AIRWAY OBST/DX SPUTUM INDUCT: ICD-10-PCS | Mod: HCNC,S$GLB,, | Performed by: NURSE PRACTITIONER

## 2019-05-22 PROCEDURE — 71046 XR CHEST PA AND LATERAL: ICD-10-PCS | Mod: 26,HCNC,, | Performed by: RADIOLOGY

## 2019-05-22 PROCEDURE — 3074F SYST BP LT 130 MM HG: CPT | Mod: HCNC,CPTII,S$GLB, | Performed by: NURSE PRACTITIONER

## 2019-05-22 PROCEDURE — 1101F PT FALLS ASSESS-DOCD LE1/YR: CPT | Mod: HCNC,CPTII,S$GLB, | Performed by: NURSE PRACTITIONER

## 2019-05-22 PROCEDURE — 1101F PR PT FALLS ASSESS DOC 0-1 FALLS W/OUT INJ PAST YR: ICD-10-PCS | Mod: HCNC,CPTII,S$GLB, | Performed by: NURSE PRACTITIONER

## 2019-05-22 PROCEDURE — 71046 X-RAY EXAM CHEST 2 VIEWS: CPT | Mod: 26,HCNC,, | Performed by: RADIOLOGY

## 2019-05-22 PROCEDURE — 99999 PR PBB SHADOW E&M-EST. PATIENT-LVL V: ICD-10-PCS | Mod: PBBFAC,HCNC,, | Performed by: NURSE PRACTITIONER

## 2019-05-22 PROCEDURE — 3074F PR MOST RECENT SYSTOLIC BLOOD PRESSURE < 130 MM HG: ICD-10-PCS | Mod: HCNC,CPTII,S$GLB, | Performed by: NURSE PRACTITIONER

## 2019-05-22 PROCEDURE — 3079F PR MOST RECENT DIASTOLIC BLOOD PRESSURE 80-89 MM HG: ICD-10-PCS | Mod: HCNC,CPTII,S$GLB, | Performed by: NURSE PRACTITIONER

## 2019-05-22 PROCEDURE — 99214 OFFICE O/P EST MOD 30 MIN: CPT | Mod: 25,HCNC,S$GLB, | Performed by: NURSE PRACTITIONER

## 2019-05-22 PROCEDURE — 99214 PR OFFICE/OUTPT VISIT, EST, LEVL IV, 30-39 MIN: ICD-10-PCS | Mod: 25,HCNC,S$GLB, | Performed by: NURSE PRACTITIONER

## 2019-05-22 PROCEDURE — 99999 PR PBB SHADOW E&M-EST. PATIENT-LVL V: CPT | Mod: PBBFAC,HCNC,, | Performed by: NURSE PRACTITIONER

## 2019-05-22 PROCEDURE — 94640 AIRWAY INHALATION TREATMENT: CPT | Mod: HCNC,S$GLB,, | Performed by: NURSE PRACTITIONER

## 2019-05-22 PROCEDURE — 3079F DIAST BP 80-89 MM HG: CPT | Mod: HCNC,CPTII,S$GLB, | Performed by: NURSE PRACTITIONER

## 2019-05-22 PROCEDURE — 71046 X-RAY EXAM CHEST 2 VIEWS: CPT | Mod: TC,HCNC

## 2019-05-22 RX ORDER — PREDNISONE 10 MG/1
10 TABLET ORAL DAILY
Qty: 30 TABLET | Refills: 0 | Status: SHIPPED | OUTPATIENT
Start: 2019-05-22 | End: 2019-06-01

## 2019-05-22 RX ORDER — VALPROIC ACID 250 MG/5ML
250 SOLUTION ORAL DAILY
COMMUNITY

## 2019-05-22 RX ORDER — IPRATROPIUM BROMIDE AND ALBUTEROL SULFATE 2.5; .5 MG/3ML; MG/3ML
3 SOLUTION RESPIRATORY (INHALATION) ONCE
Status: COMPLETED | OUTPATIENT
Start: 2019-05-22 | End: 2019-05-22

## 2019-05-22 RX ORDER — CLONAZEPAM 0.5 MG/1
0.5 TABLET ORAL NIGHTLY
COMMUNITY

## 2019-05-22 RX ADMIN — IPRATROPIUM BROMIDE AND ALBUTEROL SULFATE 3 ML: 2.5; .5 SOLUTION RESPIRATORY (INHALATION) at 12:05

## 2019-05-22 NOTE — PROGRESS NOTES
Subjective:      Patient ID: Crystal Romero is a 84 y.o. female.    Patient Active Problem List   Diagnosis    Rheumatoid arthritis    Hiatal hernia    Asthma with COPD    Hyperlipidemia    Hypertension    Diastolic dysfunction    Osteoarthritis    Rotator cuff arthropathy    H/O: GI bleed    Dysplastic colon polyp    Hypothyroid    Nonrheumatic aortic valve stenosis    Nonrheumatic aortic valve insufficiency    Impaired cognition    Gastroesophageal reflux disease without esophagitis    Major depressive disorder, recurrent episode, mild    Psoriasis with arthropathy    Bilateral adhesive capsulitis of shoulders    Pulmonary emphysema    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 provider found for evaluation and management for   Chief Complaint   Patient presents with    Asthma    COPD     Chief Complaint: Asthma and COPD    HPI:  Crystal Romero is a 84 y.o. female presents to pulmonary clinic acute care visit related to acute COPD flare.  Seen 5/2/2019 by Dr. Lee for acute flare with IM cortisone, zpack. Prednisone taper. She improved but once off prednisone she has had increased shortness of breath and wheezing needing additional duo neb in mid day.   No fever no chills.  No change in appetite or activity level. No edema.   Patient presents with her daughter Augustina who patient resides with and [facility] care giver while daughter is at work.     Previous Report Reviewed: lab reports, office notes and radiology reports     Past Medical History: The following portions of the patient's history were reviewed and updated as appropriate:   She  has a past surgical history that includes Tonsillectomy and Mandible surgery.  Her  family history includes Cancer in her mother.  She  reports that she quit smoking about 69 years ago. Her smoking use included cigarettes. She has a 32.00 pack-year smoking history. She has never used smokeless tobacco. She reports that she does not drink alcohol or use drugs.  She has a current medication list which includes the following prescription(s): acetaminophen, albuterol-ipratropium, aspirin, budesonide, cholecalciferol (vitamin d3), clonazepam, divalproex er, folic acid, furosemide, inhalation spacing device, levetiracetam, levothyroxine, melatonin, mirtazapine, pantoprazole, potassium chloride, sertraline, valproate, vitamin b complex, atorvastatin, azithromycin, levofloxacin, prednisone, tramadol, trazodone, and umeclidinium-vilanterol.  She is allergic to sulfa (sulfonamide antibiotics).    Review of Systems   Constitutional: Negative for fever, chills, weight loss, weight gain, activity change, appetite change, fatigue and night sweats.   HENT: Negative for postnasal drip, rhinorrhea, sinus pressure, voice change and congestion.    Eyes: Negative for redness and itching.   Respiratory: Positive for cough, shortness of breath, wheezing and dyspnea on extertion. Negative for snoring, sputum production, chest tightness, orthopnea and use of rescue inhaler.    Cardiovascular: Negative.  Negative for chest pain, palpitations and leg swelling.   Genitourinary: Negative for difficulty urinating and hematuria.   Endocrine: Negative for cold intolerance and heat intolerance.    Musculoskeletal: Negative for arthralgias, gait problem, joint swelling and myalgias.   Skin: Negative.    Gastrointestinal: Negative for nausea, vomiting, abdominal pain and acid reflux.   Neurological: Negative for dizziness, weakness, light-headedness and headaches.   Hematological: Negative for adenopathy. No excessive bruising.   All other systems reviewed and are negative.     Objective:   /83   Pulse 69   Resp 16   Ht 5'  "2" (1.575 m)   Wt 58.5 kg (128 lb 15.5 oz)   SpO2 (!) 94%   BMI 23.59 kg/m²   Physical Exam   Constitutional: She is oriented to person, place, and time. She appears well-developed and well-nourished. She is active and cooperative.  Non-toxic appearance. She does not appear ill. No distress.   HENT:   Head: Normocephalic.   Right Ear: External ear normal.   Left Ear: External ear normal.   Nose: Nose normal.   Mouth/Throat: Oropharynx is clear and moist. No oropharyngeal exudate.   Eyes: Conjunctivae are normal.   Neck: Normal range of motion. Neck supple.   Cardiovascular: Normal rate, regular rhythm, normal heart sounds and intact distal pulses.   Pulmonary/Chest: Effort normal. No stridor. She has wheezes ( diffuse expiratory. duo neb in clinic with resolution wheezing post neb evaluation). She has no rales.   Abdominal: Soft. Bowel sounds are normal.   Musculoskeletal: Normal range of motion.   Lymphadenopathy:     She has no cervical adenopathy.   Neurological: She is alert and oriented to person, place, and time.   Skin: Skin is warm and dry.   Psychiatric: She has a normal mood and affect. Her behavior is normal. Judgment and thought content normal.   Vitals reviewed.    X-Ray Chest PA And Lateral  Narrative: EXAMINATION:  XR CHEST PA AND LATERAL    CLINICAL HISTORY:  Chronic obstructive pulmonary disease, unspecified    TECHNIQUE:  PA and lateral views of the chest were performed.    COMPARISON:  May 3, 2019    FINDINGS:  Rotation and underlying scoliosis combine to mildly limits the study.  Trachea stable in position allowing for rotation.  Heart and pulmonary vasculature within normal limits in the aorta tortuous.  Rounded area of increased opacity projects through the midline lung base consistent with hiatal or paraesophageal hernia.  Few scattered left basilar areas of scarring and/or subsegmental atelectasis noted.  There is stable elevation right hemidiaphragm and minimal basilar scarring and/or " subsegmental atelectasis.  Minimal pleural thickening or scarring along the lateral margin of the minor fissure on the right.  Coronary artery calcification incidentally noted.    Prominent degenerative change in the shoulders and throughout the spine.  Stable wedge-shaped compression deformity at the T12 level without change back to 2013.  Impression: Stable exam without acute infiltrate.  See above.    Chronic findings as above.    Electronically signed by: Angel Mahan MD  Date:    05/22/2019  Time:    11:54    Assessment:     1. Pulmonary emphysema, unspecified emphysema type    2. COPD with acute exacerbation    3. Coronary artery calcification    4. SOB (shortness of breath) on exertion    5. Asthma with COPD      Orders Placed This Encounter   Procedures    Stress test, pulmonary     Standing Status:   Future     Standing Expiration Date:   5/22/2020     Plan:   Discussed diagnosis, its evaluation, treatment and usual course. All questions answered.  Problem List Items Addressed This Visit     Pulmonary emphysema - Primary    Relevant Medications    albuterol-ipratropium 2.5 mg-0.5 mg/3 mL nebulizer solution 3 mL (Completed)    umeclidinium-vilanterol (ANORO ELLIPTA) 62.5-25 mcg/actuation DsDv    Other Relevant Orders    Stress test, pulmonary    Coronary artery calcification     Seen on imaging          Asthma with COPD (Chronic)     Not well controlled  Continue pulmicort nebs every 12 hrs  Add on Anoro LABA/LAMA  Duo nebs twice daily if needed, consideration to change to albuterol nebs depending on response to Anoro controller. (free 30 day coupon provided).  Continue prednisone 10 mg x 10 days completed 13 day taper with decline once off prednisone.   O2 sats monitored at home, sats 93-98%.   Follow up 5 weeks for 6mwd, follow up sooner if not improved with treatment.            Other Visit Diagnoses     COPD with acute exacerbation        Relevant Medications    albuterol-ipratropium 2.5 mg-0.5 mg/3 mL  nebulizer solution 3 mL (Completed)    predniSONE (DELTASONE) 10 MG tablet    SOB (shortness of breath) on exertion        Relevant Orders    Stress test, pulmonary        Follow up in about 5 weeks (around 6/26/2019) for COPD, Shortness of Breath w/review 6mwd .

## 2019-05-22 NOTE — ASSESSMENT & PLAN NOTE
Not well controlled  Continue pulmicort nebs every 12 hrs  Add on Anoro LABA/LAMA  Duo nebs twice daily if needed, consideration to change to albuterol nebs depending on response to Anoro controller. (free 30 day coupon provided).  Continue prednisone 10 mg x 10 days completed 13 day taper with decline once off prednisone.   O2 sats monitored at home, sats 93-98%.   Follow up 5 weeks for 6mwd, follow up sooner if not improved with treatment.

## 2019-05-22 NOTE — TELEPHONE ENCOUNTER
Returned patient call, daughter stated she had schedule appointment with TIFFANY Hoang NP for today

## 2019-05-29 RX ORDER — ALBUTEROL SULFATE 90 UG/1
AEROSOL, METERED RESPIRATORY (INHALATION)
Qty: 18 G | Refills: 11 | Status: SHIPPED | OUTPATIENT
Start: 2019-05-29 | End: 2019-06-11

## 2019-06-08 DIAGNOSIS — L40.50 PSORIASIS WITH ARTHROPATHY: ICD-10-CM

## 2019-06-09 RX ORDER — LEVOTHYROXINE SODIUM 25 UG/1
TABLET ORAL
Qty: 90 TABLET | Refills: 0 | Status: SHIPPED | OUTPATIENT
Start: 2019-06-09 | End: 2019-08-17 | Stop reason: SDUPTHER

## 2019-06-09 RX ORDER — FOLIC ACID 1 MG/1
TABLET ORAL
Qty: 90 TABLET | Refills: 0 | Status: SHIPPED | OUTPATIENT
Start: 2019-06-09 | End: 2019-08-17 | Stop reason: SDUPTHER

## 2019-06-11 ENCOUNTER — OFFICE VISIT (OUTPATIENT)
Dept: INTERNAL MEDICINE | Facility: CLINIC | Age: 84
End: 2019-06-11
Payer: MEDICARE

## 2019-06-11 VITALS
WEIGHT: 132.5 LBS | TEMPERATURE: 99 F | SYSTOLIC BLOOD PRESSURE: 118 MMHG | OXYGEN SATURATION: 96 % | DIASTOLIC BLOOD PRESSURE: 86 MMHG | RESPIRATION RATE: 16 BRPM | BODY MASS INDEX: 24.38 KG/M2 | HEART RATE: 76 BPM | HEIGHT: 62 IN

## 2019-06-11 DIAGNOSIS — R56.9 SEIZURE: ICD-10-CM

## 2019-06-11 DIAGNOSIS — I25.10 CORONARY ARTERY CALCIFICATION: ICD-10-CM

## 2019-06-11 DIAGNOSIS — I35.1 NONRHEUMATIC AORTIC VALVE INSUFFICIENCY: ICD-10-CM

## 2019-06-11 DIAGNOSIS — R26.81 GAIT INSTABILITY: ICD-10-CM

## 2019-06-11 DIAGNOSIS — I25.84 CORONARY ARTERY CALCIFICATION: ICD-10-CM

## 2019-06-11 DIAGNOSIS — J43.9 PULMONARY EMPHYSEMA, UNSPECIFIED EMPHYSEMA TYPE: ICD-10-CM

## 2019-06-11 DIAGNOSIS — I35.0 NONRHEUMATIC AORTIC VALVE STENOSIS: ICD-10-CM

## 2019-06-11 DIAGNOSIS — I67.3 BINSWANGER'S DEMENTIA: ICD-10-CM

## 2019-06-11 DIAGNOSIS — Z86.73 HISTORY OF TRANSIENT ISCHEMIC ATTACK (TIA): ICD-10-CM

## 2019-06-11 DIAGNOSIS — J44.89 ASTHMA WITH COPD: Chronic | ICD-10-CM

## 2019-06-11 DIAGNOSIS — I70.0 ATHEROSCLEROSIS OF AORTA: ICD-10-CM

## 2019-06-11 DIAGNOSIS — I50.32 CHRONIC DIASTOLIC CONGESTIVE HEART FAILURE: ICD-10-CM

## 2019-06-11 DIAGNOSIS — Z87.19 H/O: GI BLEED: ICD-10-CM

## 2019-06-11 DIAGNOSIS — J96.11 CHRONIC RESPIRATORY FAILURE WITH HYPOXIA: ICD-10-CM

## 2019-06-11 DIAGNOSIS — E78.00 PURE HYPERCHOLESTEROLEMIA: Chronic | ICD-10-CM

## 2019-06-11 DIAGNOSIS — M06.9 RHEUMATOID ARTHRITIS OF HAND, UNSPECIFIED LATERALITY, UNSPECIFIED RHEUMATOID FACTOR PRESENCE: Chronic | ICD-10-CM

## 2019-06-11 DIAGNOSIS — I10 ESSENTIAL HYPERTENSION: Chronic | ICD-10-CM

## 2019-06-11 DIAGNOSIS — Z00.00 ROUTINE GENERAL MEDICAL EXAMINATION AT A HEALTH CARE FACILITY: Primary | ICD-10-CM

## 2019-06-11 DIAGNOSIS — K21.9 GASTROESOPHAGEAL REFLUX DISEASE WITHOUT ESOPHAGITIS: ICD-10-CM

## 2019-06-11 DIAGNOSIS — F33.0 MAJOR DEPRESSIVE DISORDER, RECURRENT EPISODE, MILD: ICD-10-CM

## 2019-06-11 DIAGNOSIS — L40.50 PSORIASIS WITH ARTHROPATHY: ICD-10-CM

## 2019-06-11 DIAGNOSIS — E03.9 HYPOTHYROIDISM, UNSPECIFIED TYPE: Chronic | ICD-10-CM

## 2019-06-11 DIAGNOSIS — F41.8 INSOMNIA SECONDARY TO DEPRESSION WITH ANXIETY: ICD-10-CM

## 2019-06-11 DIAGNOSIS — F51.05 INSOMNIA SECONDARY TO DEPRESSION WITH ANXIETY: ICD-10-CM

## 2019-06-11 PROCEDURE — 3074F PR MOST RECENT SYSTOLIC BLOOD PRESSURE < 130 MM HG: ICD-10-PCS | Mod: HCNC,CPTII,S$GLB, | Performed by: FAMILY MEDICINE

## 2019-06-11 PROCEDURE — 99999 PR PBB SHADOW E&M-EST. PATIENT-LVL III: CPT | Mod: PBBFAC,HCNC,, | Performed by: FAMILY MEDICINE

## 2019-06-11 PROCEDURE — 3079F PR MOST RECENT DIASTOLIC BLOOD PRESSURE 80-89 MM HG: ICD-10-PCS | Mod: HCNC,CPTII,S$GLB, | Performed by: FAMILY MEDICINE

## 2019-06-11 PROCEDURE — 3074F SYST BP LT 130 MM HG: CPT | Mod: HCNC,CPTII,S$GLB, | Performed by: FAMILY MEDICINE

## 2019-06-11 PROCEDURE — 99999 PR PBB SHADOW E&M-EST. PATIENT-LVL III: ICD-10-PCS | Mod: PBBFAC,HCNC,, | Performed by: FAMILY MEDICINE

## 2019-06-11 PROCEDURE — 99397 PR PREVENTIVE VISIT,EST,65 & OVER: ICD-10-PCS | Mod: HCNC,S$GLB,, | Performed by: FAMILY MEDICINE

## 2019-06-11 PROCEDURE — 99397 PER PM REEVAL EST PAT 65+ YR: CPT | Mod: HCNC,S$GLB,, | Performed by: FAMILY MEDICINE

## 2019-06-11 PROCEDURE — 3079F DIAST BP 80-89 MM HG: CPT | Mod: HCNC,CPTII,S$GLB, | Performed by: FAMILY MEDICINE

## 2019-06-11 RX ORDER — SODIUM, POTASSIUM,MAG SULFATES 17.5-3.13G
1 SOLUTION, RECONSTITUTED, ORAL ORAL DAILY
Qty: 1 KIT | Refills: 0 | Status: CANCELLED | OUTPATIENT
Start: 2019-06-11 | End: 2019-06-13

## 2019-06-11 RX ORDER — FERROUS SULFATE 325(65) MG
325 TABLET ORAL DAILY
COMMUNITY
End: 2019-07-16

## 2019-06-11 NOTE — PROGRESS NOTES
Subjective:       Patient ID: Crystal Romero is a 84 y.o. female.    Chief Complaint: Follow-up and Shortness of Breath    84-year-old female patient accompanied by her daughter with Patient Active Problem List:     Rheumatoid arthritis     Hiatal hernia     Asthma with COPD     Hyperlipidemia     Hypertension     Diastolic dysfunction     Osteoarthritis     Rotator cuff arthropathy     H/O: GI bleed     Dysplastic colon polyp     Hypothyroid     Nonrheumatic aortic valve stenosis     Nonrheumatic aortic valve insufficiency     Impaired cognition     Gastroesophageal reflux disease without esophagitis     Major depressive disorder, recurrent episode, mild     Psoriasis with arthropathy     Bilateral adhesive capsulitis of shoulders     Pulmonary emphysema     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   Patient continues to have shortness of breath with walking less than a block, denies any chest pain or palpitations and has appointment with Cardiology in 2 days.   Patient will get lung function studies end of this month  Patient needs further evaluation for chronic respiratory failure as her oxygen levels have been down to 90% occasionally, needs to get further evaluated to see whether patient needs home oxygen  Patient secondary to dementia, anxiety and depression has been followed by Dr. Siomara Huffman , has changes been made to medications and has been  "discontinued on Keppra and currently on Depakote, taking clonidine 1 tablet daily at bedtime but in spite of taking Remeron and melatonin patient has been up and occasionally agitated  Has not been drinking adequate fluids      Review of Systems   Constitutional: Negative for fatigue.   Eyes: Negative for visual disturbance.   Respiratory: Positive for shortness of breath and wheezing.    Cardiovascular: Negative for chest pain and leg swelling.   Gastrointestinal: Negative for abdominal pain, nausea and vomiting.   Musculoskeletal: Positive for back pain and gait problem. Negative for myalgias.   Skin: Negative for rash.   Neurological: Positive for dizziness. Negative for light-headedness and headaches.   Psychiatric/Behavioral: Positive for agitation, dysphoric mood and sleep disturbance. Negative for self-injury and suicidal ideas. The patient is nervous/anxious.          /86 (BP Location: Left arm, Patient Position: Sitting, BP Method: Medium (Manual))   Pulse 76   Temp 98.9 °F (37.2 °C) (Tympanic)   Resp 16   Ht 5' 2" (1.575 m)   Wt 60.1 kg (132 lb 7.9 oz)   SpO2 96%   BMI 24.23 kg/m²   Objective:      Physical Exam   Constitutional: She is oriented to person, place, and time. She appears well-developed and well-nourished.   HENT:   Head: Normocephalic and atraumatic.   Mouth/Throat: Oropharynx is clear and moist.   Cardiovascular: Normal rate and regular rhythm.   Murmur heard.  No carotid bruit heard bilaterally   Pulmonary/Chest: Effort normal. She has wheezes.   Scattered wheezes noted in bilateral lower lung fields   Abdominal: Soft. Bowel sounds are normal. There is no tenderness.   Musculoskeletal: She exhibits no edema.   Neurological: She is alert and oriented to person, place, and time.   Skin: Skin is warm and dry. No rash noted.   Psychiatric: She has a normal mood and affect.         Assessment/Plan:   1. Routine general medical examination at a health care facility  - [facility] auto " differential; Future  - Comprehensive metabolic panel; Future  - Lipid panel; Future  - TSH; Future  Will check fasting labs.  Vital signs stable today  Clinical exam stable  Encouraged to drink adequate fluids and eat protein rich diet    2. Essential hypertension  - Comprehensive metabolic panel; Future  - Lipid panel; Future  - TSH; Future  - Urinalysis; Future  Blood pressure is stable today , diet controlled    3. Pure hypercholesterolemia  - Lipid panel; Future  Currently taking Lipitor 20 mg daily    4. Hypothyroidism, unspecified type  - TSH; Future  Currently on levothyroxine 25 mcg p.o. daily    5. Rheumatoid arthritis of hand, unspecified laterality, unspecified rheumatoid factor presence    6. Asthma with COPD  As per pulmonology currently on DuoNeb, Pulmicort and Anoro.  Still having persistent wheezing off and on  Patient was encouraged to keep appointment with pulmonology for spirometry, and get further evaluated to see whether patient is a candidate for home oxygen secondary to chronic respiratory failure      7. Chronic diastolic congestive heart failure  Patient has appointment with Cardiology in 2 days  Advised to discuss further with Cardiology regarding shortness of breath with minimal exertion    8. Binswanger's dementia  As per Dr.Rebecca Huffman     9. Gastroesophageal reflux disease without esophagitis    10. History of transient ischemic attack (TIA)    11. H/O: GI bleed  - CBC auto differential; Future    12. 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 Major depressive disorder, recurrent episode, mild  Stable    14. Pulmonary emphysema, unspecified emphysema type    15. Psoriasis with arthropathy    16. Seizure  Followed by Neurology    17. Coronary artery calcification  18.  Atherosclerosis of aorta    19. Gait instability  - WALKER FOR HOME USE  Walker will be prescribed today to avoid falls  Handicap paperwork filled out and given to patient today    20. Nonrheumatic aortic valve stenosis  21. Nonrheumatic aortic valve insufficiency  22. Chronic respiratory failure with hypoxia    Discuss further with Cardiology and pulmonology

## 2019-06-13 ENCOUNTER — OFFICE VISIT (OUTPATIENT)
Dept: CARDIOLOGY | Facility: CLINIC | Age: 84
End: 2019-06-13
Payer: MEDICARE

## 2019-06-13 ENCOUNTER — LAB VISIT (OUTPATIENT)
Dept: LAB | Facility: HOSPITAL | Age: 84
End: 2019-06-13
Attending: FAMILY MEDICINE
Payer: MEDICARE

## 2019-06-13 VITALS
DIASTOLIC BLOOD PRESSURE: 82 MMHG | WEIGHT: 134.5 LBS | HEART RATE: 72 BPM | BODY MASS INDEX: 24.75 KG/M2 | SYSTOLIC BLOOD PRESSURE: 120 MMHG | HEIGHT: 62 IN

## 2019-06-13 DIAGNOSIS — E78.00 PURE HYPERCHOLESTEROLEMIA: Chronic | ICD-10-CM

## 2019-06-13 DIAGNOSIS — I67.3 BINSWANGER'S DEMENTIA: ICD-10-CM

## 2019-06-13 DIAGNOSIS — I35.0 NONRHEUMATIC AORTIC VALVE STENOSIS: Primary | ICD-10-CM

## 2019-06-13 DIAGNOSIS — R60.9 EDEMA, UNSPECIFIED TYPE: ICD-10-CM

## 2019-06-13 DIAGNOSIS — Z00.00 ROUTINE GENERAL MEDICAL EXAMINATION AT A HEALTH CARE FACILITY: ICD-10-CM

## 2019-06-13 DIAGNOSIS — Z87.19 H/O: GI BLEED: ICD-10-CM

## 2019-06-13 DIAGNOSIS — I10 ESSENTIAL HYPERTENSION: Chronic | ICD-10-CM

## 2019-06-13 DIAGNOSIS — E03.9 HYPOTHYROIDISM, UNSPECIFIED TYPE: Chronic | ICD-10-CM

## 2019-06-13 DIAGNOSIS — I50.32 CHRONIC DIASTOLIC CONGESTIVE HEART FAILURE: ICD-10-CM

## 2019-06-13 DIAGNOSIS — J96.11 CHRONIC RESPIRATORY FAILURE WITH HYPOXIA: ICD-10-CM

## 2019-06-13 LAB
ALBUMIN SERPL BCP-MCNC: 3.2 G/DL (ref 3.5–5.2)
ALP SERPL-CCNC: 45 U/L (ref 55–135)
ALT SERPL W/O P-5'-P-CCNC: 10 U/L (ref 10–44)
ANION GAP SERPL CALC-SCNC: 10 MMOL/L (ref 8–16)
AST SERPL-CCNC: 19 U/L (ref 10–40)
BASOPHILS # BLD AUTO: 0.05 K/UL (ref 0–0.2)
BASOPHILS NFR BLD: 0.8 % (ref 0–1.9)
BILIRUB SERPL-MCNC: 0.3 MG/DL (ref 0.1–1)
BUN SERPL-MCNC: 18 MG/DL (ref 8–23)
CALCIUM SERPL-MCNC: 9.2 MG/DL (ref 8.7–10.5)
CHLORIDE SERPL-SCNC: 103 MMOL/L (ref 95–110)
CHOLEST SERPL-MCNC: 281 MG/DL (ref 120–199)
CHOLEST/HDLC SERPL: 3.7 {RATIO} (ref 2–5)
CO2 SERPL-SCNC: 27 MMOL/L (ref 23–29)
CREAT SERPL-MCNC: 0.8 MG/DL (ref 0.5–1.4)
DIFFERENTIAL METHOD: ABNORMAL
EOSINOPHIL # BLD AUTO: 0.1 K/UL (ref 0–0.5)
EOSINOPHIL NFR BLD: 2.3 % (ref 0–8)
ERYTHROCYTE [DISTWIDTH] IN BLOOD BY AUTOMATED COUNT: 14.7 % (ref 11.5–14.5)
EST. GFR  (AFRICAN AMERICAN): >60 ML/MIN/1.73 M^2
EST. GFR  (NON AFRICAN AMERICAN): >60 ML/MIN/1.73 M^2
GLUCOSE SERPL-MCNC: 91 MG/DL (ref 70–110)
HCT VFR BLD AUTO: 41 % (ref 37–48.5)
HDLC SERPL-MCNC: 76 MG/DL (ref 40–75)
HDLC SERPL: 27 % (ref 20–50)
HGB BLD-MCNC: 12.9 G/DL (ref 12–16)
IMM GRANULOCYTES # BLD AUTO: 0.01 K/UL (ref 0–0.04)
IMM GRANULOCYTES NFR BLD AUTO: 0.2 % (ref 0–0.5)
LDLC SERPL CALC-MCNC: 186 MG/DL (ref 63–159)
LYMPHOCYTES # BLD AUTO: 1.8 K/UL (ref 1–4.8)
LYMPHOCYTES NFR BLD: 30.2 % (ref 18–48)
MCH RBC QN AUTO: 30.4 PG (ref 27–31)
MCHC RBC AUTO-ENTMCNC: 31.5 G/DL (ref 32–36)
MCV RBC AUTO: 97 FL (ref 82–98)
MONOCYTES # BLD AUTO: 0.6 K/UL (ref 0.3–1)
MONOCYTES NFR BLD: 10.2 % (ref 4–15)
NEUTROPHILS # BLD AUTO: 3.4 K/UL (ref 1.8–7.7)
NEUTROPHILS NFR BLD: 56.3 % (ref 38–73)
NONHDLC SERPL-MCNC: 205 MG/DL
NRBC BLD-RTO: 0 /100 WBC
PLATELET # BLD AUTO: 163 K/UL (ref 150–350)
PMV BLD AUTO: 12.6 FL (ref 9.2–12.9)
POTASSIUM SERPL-SCNC: 4.3 MMOL/L (ref 3.5–5.1)
PROT SERPL-MCNC: 6.2 G/DL (ref 6–8.4)
RBC # BLD AUTO: 4.25 M/UL (ref 4–5.4)
SODIUM SERPL-SCNC: 140 MMOL/L (ref 136–145)
TRIGL SERPL-MCNC: 95 MG/DL (ref 30–150)
TSH SERPL DL<=0.005 MIU/L-ACNC: 2.24 UIU/ML (ref 0.4–4)
WBC # BLD AUTO: 6.05 K/UL (ref 3.9–12.7)

## 2019-06-13 PROCEDURE — 1101F PR PT FALLS ASSESS DOC 0-1 FALLS W/OUT INJ PAST YR: ICD-10-PCS | Mod: HCNC,CPTII,S$GLB, | Performed by: INTERNAL MEDICINE

## 2019-06-13 PROCEDURE — 1101F PT FALLS ASSESS-DOCD LE1/YR: CPT | Mod: HCNC,CPTII,S$GLB, | Performed by: INTERNAL MEDICINE

## 2019-06-13 PROCEDURE — 84443 ASSAY THYROID STIM HORMONE: CPT | Mod: HCNC

## 2019-06-13 PROCEDURE — 3079F PR MOST RECENT DIASTOLIC BLOOD PRESSURE 80-89 MM HG: ICD-10-PCS | Mod: HCNC,CPTII,S$GLB, | Performed by: INTERNAL MEDICINE

## 2019-06-13 PROCEDURE — 3074F SYST BP LT 130 MM HG: CPT | Mod: HCNC,CPTII,S$GLB, | Performed by: INTERNAL MEDICINE

## 2019-06-13 PROCEDURE — 36415 COLL VENOUS BLD VENIPUNCTURE: CPT | Mod: HCNC

## 2019-06-13 PROCEDURE — 85025 COMPLETE CBC W/AUTO DIFF WBC: CPT | Mod: HCNC

## 2019-06-13 PROCEDURE — 99999 PR PBB SHADOW E&M-EST. PATIENT-LVL III: ICD-10-PCS | Mod: PBBFAC,HCNC,, | Performed by: INTERNAL MEDICINE

## 2019-06-13 PROCEDURE — 3074F PR MOST RECENT SYSTOLIC BLOOD PRESSURE < 130 MM HG: ICD-10-PCS | Mod: HCNC,CPTII,S$GLB, | Performed by: INTERNAL MEDICINE

## 2019-06-13 PROCEDURE — 3079F DIAST BP 80-89 MM HG: CPT | Mod: HCNC,CPTII,S$GLB, | Performed by: INTERNAL MEDICINE

## 2019-06-13 PROCEDURE — 80053 COMPREHEN METABOLIC PANEL: CPT | Mod: HCNC

## 2019-06-13 PROCEDURE — 99999 PR PBB SHADOW E&M-EST. PATIENT-LVL III: CPT | Mod: PBBFAC,HCNC,, | Performed by: INTERNAL MEDICINE

## 2019-06-13 PROCEDURE — 80061 LIPID PANEL: CPT | Mod: HCNC

## 2019-06-13 PROCEDURE — 99214 PR OFFICE/OUTPT VISIT, EST, LEVL IV, 30-39 MIN: ICD-10-PCS | Mod: HCNC,S$GLB,, | Performed by: INTERNAL MEDICINE

## 2019-06-13 PROCEDURE — 99214 OFFICE O/P EST MOD 30 MIN: CPT | Mod: HCNC,S$GLB,, | Performed by: INTERNAL MEDICINE

## 2019-06-13 RX ORDER — FUROSEMIDE 40 MG/1
40 TABLET ORAL DAILY
Qty: 30 TABLET | Refills: 11 | Status: SHIPPED | OUTPATIENT
Start: 2019-06-13 | End: 2019-07-02 | Stop reason: SDUPTHER

## 2019-06-13 NOTE — PROGRESS NOTES
Subjective:   Patient ID:  Crystal Romero is a 84 y.o. female who presents for follow up of Shortness of Breath (swelling in abd)      84, yo female, came in for routine f/u. Severe dementia and discussed with the her daughter Shannan HOWARD.  PMH moderate AI, and moderate to severe AS, dementia 3 yrs, HTN, HLD, asthma, COPD, GI bleeding due to prolong Rx of steroid for asthma, RA, TIA x3 in 4 months  H/o TIA/seizure episode. Had impaired swallow and dysphasia. F/u with  neurologist.  Repeat ECHO in , showed normal EF, DD, moderate AI and moderate AS.   Refused surgery for valve per POA.    Recent rx of COPD exacerbation.   Has SOB, cough, abd swelling,   Recent worse MOLINA. Question fell once recently. Unstable gait.  Decent appetite,   No recurrent seizure.  No faint, syncope and chest pain.          Past Medical History:   Diagnosis Date    Arthritis     Cataract     Coronary artery disease     Dementia     Depression     Encounter for blood transfusion     H/O: GI bleed     Hiatal hernia     Hyperlipidemia     Hypertension     Hypothyroid     Rheumatoid arthritis(714.0)     Seizures     Stroke     Syncope and collapse        Past Surgical History:   Procedure Laterality Date    COLONOSCOPY N/A 2015    Performed by Memo Allen III, MD at [facility] ENDO    COLONOSCOPY N/A 2014    Performed by Memo Allen III, MD at [facility]    ESOPHAGOGASTRODUODENOSCOPY (EGD) N/A 2014    Performed by Memo Allen III, MD at [facility] ENDO    MANDIBLE SURGERY      TONSILLECTOMY         Social History     Tobacco Use    Smoking status: Former Smoker     Packs/day: 2.00     Years: 16.00     Pack years: 32.00     Types: Cigarettes     Last attempt to quit: 1950     Years since quittin.4    Smokeless tobacco: Never Used   Substance Use Topics    Alcohol use: No     Frequency: Never    Drug use: No       Family History   Problem Relation Age of Onset    Cancer  Mother         breast, uterine         Review of Systems   Constitution: Positive for decreased appetite and fever.   HENT: Negative.    Eyes: Negative.    Cardiovascular: Positive for dyspnea on exertion. Negative for chest pain.   Respiratory: Positive for wheezing.    Endocrine: Negative.    Hematologic/Lymphatic: Negative.    Skin: Negative.    Musculoskeletal: Positive for back pain and joint pain.   Gastrointestinal: Negative.    Genitourinary: Negative.    Psychiatric/Behavioral: Positive for hallucinations and memory loss.   Allergic/Immunologic: Negative.        Objective:   Physical Exam   Constitutional: She is oriented to person, place, and time. She appears well-nourished.   HENT:   Head: Normocephalic.   Eyes: Pupils are equal, round, and reactive to light.   Neck: Normal carotid pulses and no JVD present. Carotid bruit is not present. No thyromegaly present.   Cardiovascular: Normal rate, regular rhythm and normal pulses.  No extrasystoles are present. PMI is not displaced. Exam reveals no gallop and no S3.   Murmur heard.  Pulses:       Radial pulses are 2+ on the right side, and 2+ on the left side.   3/6 ESM on RUSB, S2 not audible.   Pulmonary/Chest: No stridor. No respiratory distress. She has decreased breath sounds.   Abdominal: Soft. Bowel sounds are normal. There is no tenderness. There is no rebound.   Musculoskeletal: Normal range of motion.   Neurological: She is alert and oriented to person, place, and time.   Skin: Skin is intact. No rash noted.   Psychiatric: Her behavior is normal.       Lab Results   Component Value Date    CHOL 211 (H) 04/04/2018    CHOL 169 07/11/2017    CHOL 180 06/17/2016     Lab Results   Component Value Date    HDL 72 04/04/2018    HDL 58 07/11/2017    HDL 87 (H) 06/17/2016     Lab Results   Component Value Date    LDLCALC 122.6 04/04/2018    LDLCALC 95.2 07/11/2017    LDLCALC 78.6 06/17/2016     Lab Results   Component Value Date    TRIG 82 04/04/2018    TRIG  79 07/11/2017    TRIG 72 06/17/2016     Lab Results   Component Value Date    CHOLHDL 34.1 04/04/2018    CHOLHDL 34.3 07/11/2017    CHOLHDL 48.3 06/17/2016       Chemistry        Component Value Date/Time     (H) 03/13/2019 1634    K 4.0 03/13/2019 1634     03/13/2019 1634    CO2 24 03/13/2019 1634    BUN 23 03/13/2019 1634    CREATININE 1.1 03/13/2019 1634     03/13/2019 1634        Component Value Date/Time    CALCIUM 10.7 (H) 03/13/2019 1634    ALKPHOS 57 12/17/2018 1725    AST 14 12/17/2018 1725    ALT 10 12/17/2018 1725    BILITOT 0.2 12/17/2018 1725    ESTGFRAFRICA 53 (A) 03/13/2019 1634    EGFRNONAA 46 (A) 03/13/2019 1634          No results found for: LABA1C, HGBA1C  Lab Results   Component Value Date    TSH 1.552 04/04/2018     Lab Results   Component Value Date    INR 0.9 03/10/2017    INR 1.0 09/23/2014     Lab Results   Component Value Date    WBC 6.64 01/15/2019    HGB 10.6 (L) 01/15/2019    HCT 34.9 (L) 01/15/2019    MCV 82 01/15/2019     01/15/2019     BMP  Sodium   Date Value Ref Range Status   03/13/2019 146 (H) 136 - 145 mmol/L Final     Potassium   Date Value Ref Range Status   03/13/2019 4.0 3.5 - 5.1 mmol/L Final     Chloride   Date Value Ref Range Status   03/13/2019 107 95 - 110 mmol/L Final     CO2   Date Value Ref Range Status   03/13/2019 24 23 - 29 mmol/L Final     BUN, Bld   Date Value Ref Range Status   03/13/2019 23 8 - 23 mg/dL Final     Creatinine   Date Value Ref Range Status   03/13/2019 1.1 0.5 - 1.4 mg/dL Final     Calcium   Date Value Ref Range Status   03/13/2019 10.7 (H) 8.7 - 10.5 mg/dL Final     Anion Gap   Date Value Ref Range Status   03/13/2019 15 8 - 16 mmol/L Final     eGFR if    Date Value Ref Range Status   03/13/2019 53 (A) >60 mL/min/1.73 m^2 Final     eGFR if non    Date Value Ref Range Status   03/13/2019 46 (A) >60 mL/min/1.73 m^2 Final     Comment:     Calculation used to obtain the estimated glomerular  filtration  rate (eGFR) is the CKD-EPI equation.        BNP  @LABRCNTIP(BNP,BNPTRIAGEBLO)@  @LABRCNTIP(troponini)@  CrCl cannot be calculated (Patient's most recent lab result is older than the maximum 7 days allowed.).  No results found in the last 24 hours.  No results found in the last 24 hours.  No results found in the last 24 hours.    Assessment:      1. Nonrheumatic aortic valve stenosis    2. Edema, unspecified type    3. Chronic diastolic congestive heart failure    4. Essential hypertension    5. Pure hypercholesterolemia    6. Chronic respiratory failure with hypoxia    7. Binswanger's dementia      Recent progression of MOLINA, fall,, abd swelling and fatigue, sugegsting severe AS.  Repeat echo for AS severity prt family's request  POA refused invasive procedure  Will discuss with Dr. Lee for breathing Rx and home O2      Plan:   Home care referral  Repeat echo for AS  Continue ASA and Lipitor  Increase Lasix to 40 mg daily  Daily weight. Please call the office if gain 3 pounds in 1 day or 5 pounds in 1 week.  Fluid restriction 1.5 liters a day  Na< 2 gm  RTC in 2 months

## 2019-06-14 ENCOUNTER — TELEPHONE (OUTPATIENT)
Dept: INTERNAL MEDICINE | Facility: CLINIC | Age: 84
End: 2019-06-14

## 2019-06-14 DIAGNOSIS — E78.00 PURE HYPERCHOLESTEROLEMIA: Primary | Chronic | ICD-10-CM

## 2019-06-14 PROCEDURE — G0180 PR HOME HEALTH MD CERTIFICATION: ICD-10-PCS | Mod: ,,, | Performed by: INTERNAL MEDICINE

## 2019-06-14 PROCEDURE — G0180 MD CERTIFICATION HHA PATIENT: HCPCS | Mod: ,,, | Performed by: INTERNAL MEDICINE

## 2019-06-14 RX ORDER — ATORVASTATIN CALCIUM 40 MG/1
40 TABLET, FILM COATED ORAL DAILY
Qty: 90 TABLET | Refills: 3 | Status: SHIPPED | OUTPATIENT
Start: 2019-06-14 | End: 2020-07-19

## 2019-06-14 NOTE — TELEPHONE ENCOUNTER
----- Message from Maria Guadalupe Herring MD sent at 6/14/2019  1:09 PM CDT -----  Elevated cholesterol levels, will increase Lipitor from 20-40 mg daily  Otherwise stable labs

## 2019-06-24 ENCOUNTER — CLINICAL SUPPORT (OUTPATIENT)
Dept: CARDIOLOGY | Facility: CLINIC | Age: 84
End: 2019-06-24
Attending: INTERNAL MEDICINE
Payer: MEDICARE

## 2019-06-24 DIAGNOSIS — I35.0 NONRHEUMATIC AORTIC VALVE STENOSIS: ICD-10-CM

## 2019-06-24 LAB
AORTIC VALVE REGURGITATION: ABNORMAL
AORTIC VALVE STENOSIS: ABNORMAL
ESTIMATED PA SYSTOLIC PRESSURE: 28
RETIRED EF AND QEF - SEE NOTES: 65 (ref 55–65)

## 2019-06-24 PROCEDURE — 93306 TTE W/DOPPLER COMPLETE: CPT | Mod: HCNC,S$GLB,, | Performed by: NUCLEAR MEDICINE

## 2019-06-24 PROCEDURE — 93306 2D ECHO WITH COLOR FLOW DOPPLER: ICD-10-PCS | Mod: HCNC,S$GLB,, | Performed by: NUCLEAR MEDICINE

## 2019-06-26 ENCOUNTER — EXTERNAL HOME HEALTH (OUTPATIENT)
Dept: HOME HEALTH SERVICES | Facility: HOSPITAL | Age: 84
End: 2019-06-26
Payer: MEDICARE

## 2019-06-27 ENCOUNTER — LAB VISIT (OUTPATIENT)
Dept: LAB | Facility: HOSPITAL | Age: 84
End: 2019-06-27
Attending: INTERNAL MEDICINE
Payer: MEDICARE

## 2019-06-27 ENCOUNTER — CLINICAL SUPPORT (OUTPATIENT)
Dept: PULMONOLOGY | Facility: CLINIC | Age: 84
End: 2019-06-27
Payer: MEDICARE

## 2019-06-27 ENCOUNTER — OFFICE VISIT (OUTPATIENT)
Dept: PULMONOLOGY | Facility: CLINIC | Age: 84
End: 2019-06-27
Payer: MEDICARE

## 2019-06-27 VITALS
SYSTOLIC BLOOD PRESSURE: 111 MMHG | DIASTOLIC BLOOD PRESSURE: 69 MMHG | BODY MASS INDEX: 24.75 KG/M2 | HEART RATE: 82 BPM | HEIGHT: 62 IN | OXYGEN SATURATION: 99 % | HEIGHT: 62 IN | WEIGHT: 134.5 LBS | BODY MASS INDEX: 24.75 KG/M2 | RESPIRATION RATE: 18 BRPM | WEIGHT: 134.5 LBS

## 2019-06-27 DIAGNOSIS — J44.1 ASTHMA WITH COPD WITH EXACERBATION: Primary | ICD-10-CM

## 2019-06-27 DIAGNOSIS — J44.89 ASTHMA WITH COPD: ICD-10-CM

## 2019-06-27 DIAGNOSIS — K21.9 GASTROESOPHAGEAL REFLUX DISEASE WITHOUT ESOPHAGITIS: ICD-10-CM

## 2019-06-27 DIAGNOSIS — J96.11 CHRONIC RESPIRATORY FAILURE WITH HYPOXIA: ICD-10-CM

## 2019-06-27 DIAGNOSIS — J44.1 COPD, FREQUENT EXACERBATIONS: ICD-10-CM

## 2019-06-27 DIAGNOSIS — R09.02 EXERCISE HYPOXEMIA: ICD-10-CM

## 2019-06-27 DIAGNOSIS — J43.9 PULMONARY EMPHYSEMA, UNSPECIFIED EMPHYSEMA TYPE: ICD-10-CM

## 2019-06-27 DIAGNOSIS — J45.901 ASTHMA WITH COPD WITH EXACERBATION: Primary | ICD-10-CM

## 2019-06-27 DIAGNOSIS — R06.02 SOB (SHORTNESS OF BREATH) ON EXERTION: ICD-10-CM

## 2019-06-27 DIAGNOSIS — I35.0 NONRHEUMATIC AORTIC VALVE STENOSIS: ICD-10-CM

## 2019-06-27 DIAGNOSIS — I51.89 DIASTOLIC DYSFUNCTION: Chronic | ICD-10-CM

## 2019-06-27 LAB
ANION GAP SERPL CALC-SCNC: 10 MMOL/L (ref 8–16)
BUN SERPL-MCNC: 30 MG/DL (ref 8–23)
CALCIUM SERPL-MCNC: 9.2 MG/DL (ref 8.7–10.5)
CHLORIDE SERPL-SCNC: 101 MMOL/L (ref 95–110)
CO2 SERPL-SCNC: 32 MMOL/L (ref 23–29)
CREAT SERPL-MCNC: 0.8 MG/DL (ref 0.5–1.4)
EST. GFR  (AFRICAN AMERICAN): >60 ML/MIN/1.73 M^2
EST. GFR  (NON AFRICAN AMERICAN): >60 ML/MIN/1.73 M^2
GLUCOSE SERPL-MCNC: 92 MG/DL (ref 70–110)
POTASSIUM SERPL-SCNC: 4 MMOL/L (ref 3.5–5.1)
SODIUM SERPL-SCNC: 143 MMOL/L (ref 136–145)

## 2019-06-27 PROCEDURE — 3074F PR MOST RECENT SYSTOLIC BLOOD PRESSURE < 130 MM HG: ICD-10-PCS | Mod: HCNC,CPTII,S$GLB, | Performed by: NURSE PRACTITIONER

## 2019-06-27 PROCEDURE — 1101F PR PT FALLS ASSESS DOC 0-1 FALLS W/OUT INJ PAST YR: ICD-10-PCS | Mod: HCNC,CPTII,S$GLB, | Performed by: NURSE PRACTITIONER

## 2019-06-27 PROCEDURE — 96372 THER/PROPH/DIAG INJ SC/IM: CPT | Mod: 59,HCNC,S$GLB, | Performed by: NURSE PRACTITIONER

## 2019-06-27 PROCEDURE — 94618 PULMONARY STRESS TESTING: CPT | Mod: HCNC,S$GLB,, | Performed by: INTERNAL MEDICINE

## 2019-06-27 PROCEDURE — 94618 PULMONARY STRESS TESTING: ICD-10-PCS | Mod: HCNC,S$GLB,, | Performed by: INTERNAL MEDICINE

## 2019-06-27 PROCEDURE — 3074F SYST BP LT 130 MM HG: CPT | Mod: HCNC,CPTII,S$GLB, | Performed by: NURSE PRACTITIONER

## 2019-06-27 PROCEDURE — 99999 PR PBB SHADOW E&M-EST. PATIENT-LVL IV: CPT | Mod: PBBFAC,HCNC,, | Performed by: NURSE PRACTITIONER

## 2019-06-27 PROCEDURE — 3078F PR MOST RECENT DIASTOLIC BLOOD PRESSURE < 80 MM HG: ICD-10-PCS | Mod: HCNC,CPTII,S$GLB, | Performed by: NURSE PRACTITIONER

## 2019-06-27 PROCEDURE — 94640 AIRWAY INHALATION TREATMENT: CPT | Mod: HCNC,S$GLB,, | Performed by: NURSE PRACTITIONER

## 2019-06-27 PROCEDURE — 99214 PR OFFICE/OUTPT VISIT, EST, LEVL IV, 30-39 MIN: ICD-10-PCS | Mod: 25,HCNC,S$GLB, | Performed by: NURSE PRACTITIONER

## 2019-06-27 PROCEDURE — 96372 PR INJECTION,THERAP/PROPH/DIAG2ST, IM OR SUBCUT: ICD-10-PCS | Mod: 59,HCNC,S$GLB, | Performed by: NURSE PRACTITIONER

## 2019-06-27 PROCEDURE — 3078F DIAST BP <80 MM HG: CPT | Mod: HCNC,CPTII,S$GLB, | Performed by: NURSE PRACTITIONER

## 2019-06-27 PROCEDURE — 94640 PR INHAL RX, AIRWAY OBST/DX SPUTUM INDUCT: ICD-10-PCS | Mod: HCNC,S$GLB,, | Performed by: NURSE PRACTITIONER

## 2019-06-27 PROCEDURE — 80048 BASIC METABOLIC PNL TOTAL CA: CPT | Mod: HCNC

## 2019-06-27 PROCEDURE — 99999 PR PBB SHADOW E&M-EST. PATIENT-LVL IV: ICD-10-PCS | Mod: PBBFAC,HCNC,, | Performed by: NURSE PRACTITIONER

## 2019-06-27 PROCEDURE — 1101F PT FALLS ASSESS-DOCD LE1/YR: CPT | Mod: HCNC,CPTII,S$GLB, | Performed by: NURSE PRACTITIONER

## 2019-06-27 PROCEDURE — 99214 OFFICE O/P EST MOD 30 MIN: CPT | Mod: 25,HCNC,S$GLB, | Performed by: NURSE PRACTITIONER

## 2019-06-27 PROCEDURE — 36415 COLL VENOUS BLD VENIPUNCTURE: CPT | Mod: HCNC

## 2019-06-27 RX ORDER — CEFDINIR 300 MG/1
300 CAPSULE ORAL 2 TIMES DAILY
Qty: 20 CAPSULE | Refills: 0 | Status: SHIPPED | OUTPATIENT
Start: 2019-06-27 | End: 2019-07-07

## 2019-06-27 RX ORDER — IPRATROPIUM BROMIDE AND ALBUTEROL SULFATE 2.5; .5 MG/3ML; MG/3ML
3 SOLUTION RESPIRATORY (INHALATION)
Status: COMPLETED | OUTPATIENT
Start: 2019-06-27 | End: 2019-06-27

## 2019-06-27 RX ORDER — TRIAMCINOLONE ACETONIDE 40 MG/ML
40 INJECTION, SUSPENSION INTRA-ARTICULAR; INTRAMUSCULAR
Status: COMPLETED | OUTPATIENT
Start: 2019-06-27 | End: 2019-06-27

## 2019-06-27 RX ORDER — ROFLUMILAST 500 UG/1
500 TABLET ORAL DAILY
Qty: 30 TABLET | Refills: 11 | Status: SHIPPED | OUTPATIENT
Start: 2019-06-27 | End: 2019-07-16 | Stop reason: SINTOL

## 2019-06-27 RX ADMIN — TRIAMCINOLONE ACETONIDE 40 MG: 40 INJECTION, SUSPENSION INTRA-ARTICULAR; INTRAMUSCULAR at 10:06

## 2019-06-27 RX ADMIN — IPRATROPIUM BROMIDE AND ALBUTEROL SULFATE 3 ML: 2.5; .5 SOLUTION RESPIRATORY (INHALATION) at 10:06

## 2019-06-27 NOTE — ASSESSMENT & PLAN NOTE
Frequent exacerbations   Add on Daliresp 500 mcg daily  Begin Omnicef time 10 days, Kenalog 40 mg IM.  Continue Anoro that was added on in May 2019  Continue Pulmicort nebs and DuoNeb twice daily  If not improved with treatment plan consideration for prednisone daily.  Patient will see Dr. Slater in follow-up in 3 weeks.  6/27/2019 6 min walk distance qualification for nasal cannula 2 L with exertion.  O2 sat tara 70%.  Begin home oxygen nasal cannula 2 L exertion and with sleep.  If not improved with treatment plan consideration for prednisone daily and or once monthly zithromax/or other antibiotic of choice monthly  Patient will see Dr. Slater in follow-up in 3 weeks.

## 2019-06-27 NOTE — PROCEDURES
"[facility]  Six Minute Walk     SUMMARY     Ordering Provider: Yola Hoang NP   Interpreting Provider: Dr. Slater  Performing nurse/tech/RT: EFREN Ocasio  Diagnosis: Emphysema(SOBE)  Height: 5' 2" (157.5 cm)  Weight: 61 kg (134 lb 7.7 oz)  BMI (Calculated): 24.6   Patient Race:             Phase Oxygen Assessment Supplemental O2 Heart   Rate Blood Pressure Shy Dyspnea Scale Rating   Resting 97 % Room Air 79 bpm 132/69 0   Exercise        Minute        1 95 % Room Air 91 bpm     2 89 % Room Air 89 bpm     3 71 % Room Air 89 bpm     4 96 % 2 L/M 89 bpm     5 96 % 2 L/M 90 bpm     6  95 % 2 L/M 91 bpm 109/82 3   Recovery        Minute        1 99 % 2 L/M 84 bpm     2 99 % 2 L/M 82 bpm     3 99 % 2 L/M 85 bpm     4 99 % 2 L/M 82 bpm 111/69 1     Six Minute Walk Summary  6MWT Status: completed without stopping  Patient Reported: Dyspnea, Other (Comment)(Wheezing)     Interpretation:  Did the patient stop or pause?: No                       Total Time Walked (Calculated): 360 seconds  Final Partial Lap Distance (feet): 0 feet  Total Distance Meters (Calculated): 121.92 meters  Predicted Distance Meters (Calculated): 374.12 meters  Percentage of Predicted (Calculated): 32.59  Peak VO2 (Calculated): 7.64  Mets: 2.18  Has The Patient Had a Previous Six Minute Walk Test?: No  Comments: patient has dementia    Previous 6MWT Results  Has The Patient Had a Previous Six Minute Walk Test?: No      Interpretation:  Distance walked during 6 minutes was severely reduced when compared to predicted values. The 6 minute walk demonstrated severe functional impairment. There was significant oxygen desaturation during the test to 71% on room air. Clinical correlation suggested.    Chuck Slater MD    "

## 2019-06-27 NOTE — ASSESSMENT & PLAN NOTE
Frequent exacerbations   Add on Daliresp 500 mcg daily  Begin Omnicef time 10 days, Kenalog 40 mg IM.  Continue Anoro that was added on in May 2019  Continue Pulmicort nebs and DuoNeb twice daily    6/27/2019 6 min walk distance qualification for nasal cannula 2 L with exertion.  O2 sat tara 70%.  Begin home oxygen nasal cannula 2 L exertion and with sleep.    If not improved with treatment plan consideration for prednisone daily and or once monthly zithromax/or other antibiotic of choice monthly  Patient will see Dr. Slater in follow-up in 3 weeks.

## 2019-06-27 NOTE — PROGRESS NOTES
Subjective:      Patient ID: Crystal Romero is a 84 y.o. female.    Patient Active Problem List   Diagnosis    Rheumatoid arthritis    Hiatal hernia    Asthma with COPD    Hyperlipidemia    Hypertension    Diastolic dysfunction    Osteoarthritis    Rotator cuff arthropathy    H/O: GI bleed    Dysplastic colon polyp    Hypothyroid    Nonrheumatic aortic valve stenosis    Nonrheumatic aortic valve insufficiency    Impaired cognition    Gastroesophageal reflux disease without esophagitis    Major depressive disorder, recurrent episode, mild    Psoriasis with arthropathy    Bilateral adhesive capsulitis of shoulders    Pulmonary emphysema    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 female presents to pulmonary clinic scheduled follow-up visit related to frequent COPD flares in review of 6 min walk distance with chronic shortness of breath with exertion.  Patient presents in a wheelchair with her daughter Shannan  Last seen in clinic by me 5/22/2019 when she had acute COPD flare.  At that visit Anoro Ellipta was added.  Prednisone 10 mg daily times 10 days.  No antibiotic therapy added at that visit since she had completed Z-Jonnathan prescribed on 5/02/2019 by Dr. Lee when she was seen for acute COPD flare provided IM cortisone, Z-Jonnathan, prednisone taper.  Patient's daughter reports that once she was off the prednisone 10 mg tabs she began increased wheezing increased chest congestion and  coughing.   She presents today with audible wheezing with chest congestion and nonproductive coughing off and on.  No fever, no chills.  No change in appetite or activity level. No edema.   Over the past 2 weeks Lasix 40 mg daily has been added by Cardiology.      Patient is not currently on home oxygen.    6 min walk distance done 6/27/2019, today revealed desaturations requiring supplemental oxygen 2 L nasal cannula.  O2 sat tara 71% at 3 min into walk.   New order 6/27/2019 for home oxygen nasal cannula 2 L with exertion and sleep.  Add on Daliresp 500 mcg daily with frequent exacerbations    Answers for HPI/ROS submitted by the patient on 6/27/2019   Asthma  In the past 4 weeks, how much of the time did your asthma keep you from getting as much done at work, school, or at home?: most of the time  During the past 4 weeks, how often have you had shortness of breath?: more than once a day  During the past 4 weeks, how often did your asthma symptoms (Wheezing, coughing, shortness of breath, chest tightness or pain) wake you up at night or earlier that usual in the morning?: once or twice  During the past 4 weeks, how often have you used your rescue inhaler or nebulizer medication (such as albuterol)?: 1 or 2 times per day  How would you rate your asthma control during the past 4 weeks?: poorly controlled   : 11    Previous Report Reviewed: lab reports, office notes and radiology reports     Past Medical History: The following portions of the patient's history were reviewed and updated as appropriate:   She  has a past surgical history that includes Tonsillectomy and Mandible surgery.  Her family history includes Cancer in her mother.  She  reports that she quit smoking about 69 years ago. Her smoking use included cigarettes. She has a 32.00 pack-year smoking history. She has never used smokeless tobacco. She reports that she does not drink alcohol or use drugs.  She has a current medication list which includes the  "following prescription(s): acetaminophen, albuterol-ipratropium, aspirin, atorvastatin, budesonide, cholecalciferol (vitamin d3), clonazepam, ferrous sulfate, folic acid, furosemide, inhalation spacing device, levothyroxine, melatonin, mirtazapine, pantoprazole, potassium chloride, sertraline, umeclidinium-vilanterol, valproate, vitamin b complex, cefdinir, and roflumilast, and the following Facility-Administered Medications: albuterol-ipratropium and triamcinolone acetonide.  She is allergic to sulfa (sulfonamide antibiotics).    Review of Systems   Constitutional: Negative for fever, chills, weight loss, weight gain, activity change, appetite change, fatigue and night sweats.   HENT: Negative for postnasal drip, rhinorrhea, sinus pressure, voice change and congestion.    Eyes: Negative for redness and itching.   Respiratory: Positive for cough, shortness of breath, wheezing and dyspnea on extertion. Negative for snoring, sputum production, chest tightness, orthopnea and use of rescue inhaler.    Cardiovascular: Negative.  Negative for chest pain, palpitations and leg swelling.   Genitourinary: Negative for difficulty urinating and hematuria.   Endocrine: Negative for cold intolerance and heat intolerance.    Musculoskeletal: Negative for arthralgias, gait problem, joint swelling and myalgias.   Skin: Negative.    Gastrointestinal: Negative for nausea, vomiting, abdominal pain and acid reflux.   Neurological: Negative for dizziness, weakness, light-headedness and headaches.   Hematological: Negative for adenopathy. No excessive bruising.   All other systems reviewed and are negative.     Objective:   /69   Pulse 82   Resp 18   Ht 5' 2" (1.575 m)   Wt 61 kg (134 lb 7.7 oz)   SpO2 99% Comment: 2 liters  BMI 24.60 kg/m²   Physical Exam   Constitutional: She is oriented to person, place, and time. She appears well-developed and well-nourished. She is active and cooperative.  Non-toxic appearance. She does " not appear ill. No distress.   HENT:   Head: Normocephalic.   Right Ear: External ear normal.   Left Ear: External ear normal.   Nose: Nose normal.   Mouth/Throat: Oropharynx is clear and moist. No oropharyngeal exudate.   Eyes: Conjunctivae are normal.   Neck: Normal range of motion. Neck supple.   Cardiovascular: Normal rate, regular rhythm, normal heart sounds and intact distal pulses.   Pulmonary/Chest: Effort normal. No stridor. She has wheezes ( diffuse expiratory. duo neb in clinic with resolution wheezing post neb evaluation). She has no rales.   Abdominal: Soft. Bowel sounds are normal.   Musculoskeletal: Normal range of motion.   Lymphadenopathy:     She has no cervical adenopathy.   Neurological: She is alert and oriented to person, place, and time.   Skin: Skin is warm and dry.   Psychiatric: She has a normal mood and affect. Her behavior is normal. Judgment and thought content normal.   Vitals reviewed.    2D echo with color flow doppler  Date of Procedure: 06/24/2019    TEST DESCRIPTION     Aorta: The aortic root is normal in size, measuring 2.7 cm at sinotubular junction and 2.8 cm at Sinuses of Valsalva.     Left Atrium: The left atrial volume index is normal, measuring 33.71 cc/m2.     Left Ventricle: The left ventricle is normal in size, with an end-diastolic diameter of 2.4 cm, and an end-systolic diameter of 1.7 cm. Wall thickness is increased, with the septum measuring 1.7 cm and the posterior wall measuring 1.8 cm across. Relative   wall thickness was increased at 1.50, and the LV mass index was increased at 115.3 g/m2 consistent with moderate concentric left ventricular hypertrophy. There are no regional wall motion abnormalities. Left ventricular systolic function appears normal.   Visually estimated ejection fraction is 60-65%. The LV Doppler derived stroke volume equals 83.0 ccs.     Right Atrium: The right atrium is normal in size, measuring 3.7 cm in length and 2.9 cm in width in the  apical view.     Right Ventricle: The right ventricle is normal in size measuring 3.3 cm at the base in the apical right ventricle-focused view. Global right ventricular systolic function appears normal. Tricuspid annular plane systolic excursion (TAPSE) is 1.7 cm. The   estimated PA systolic pressure is 28 mmHg.     Aortic Valve:  The aortic valve is markedly sclerotic with markedly restricted leaflet mobility. The peak velocity obtained across the aortic valve is 4.77 m/s, which translates to a peak gradient of 91 mmHg. The mean gradient is 57 mmHg. Using a left   ventricular outflow tract diameter of 1.8 cm, a left ventricular outflow tract velocity time integral of 34 cm, and a peak instantaneous transvalvular velocity time integral of 83 cm, the calculated aortic valve area is 1.0 cm2(AVAi is 0.62 cm2/m2),   consistent with severe aortic stenosis. Additionally, there is mild aortic regurgitation. There is a pressure half time of 561.0 msec.     Mitral Valve:  The mitral valve is moderately sclerotic. There is marked mitral annular calcification.     Tricuspid Valve:  The tricuspid valve is normal in structure.     Pulmonary Valve:  The pulmonic valve is normal in structure.     IVC: IVC is normal in size and collapses > 50% with a sniff, suggesting normal right atrial pressure of 3 mmHg.     Intracavitary: There is no evidence of pericardial effusion, intracavity mass, thrombi, or vegetation.     CONCLUSIONS     1 - Concentric hypertrophy.     2 - No wall motion abnormalities.     3 - Normal left ventricular systolic function (EF 60-65%).     4 - Normal right ventricular systolic function .     5 - The estimated PA systolic pressure is 33 mmHg.     6 - Severe aortic stenosis, AMANDA = 1.0 cm2, AVAi = 0.62 cm2/m2, peak velocity = 4.77 m/s, mean gradient = 57 mmHg.     7 - Mild aortic regurgitation.     This document has been electronically    SIGNED BY: Freddie Palacios MD On: 06/24/2019 13:53    Assessment:  "    1. Asthma with COPD with exacerbation    2. Pulmonary emphysema, unspecified emphysema type    3. Asthma with COPD    4. Exercise hypoxemia    5. COPD, frequent exacerbations    6. Gastroesophageal reflux disease without esophagitis    7. Diastolic dysfunction    8. Chronic respiratory failure with hypoxia      Orders Placed This Encounter   Procedures    OXYGEN FOR HOME USE     Patient request Battery operated portable oxygen system please, pulse dose acceptable.     Order Specific Question:   Liter Flow     Answer:   2     Order Specific Question:   Duration     Answer:   With activity     Comments:   and sleep     Order Specific Question:   Qualifying SpO2:     Answer:   71%     Order Specific Question:   Testing done at:     Answer:   Exercise/Activity     Comments:   6mwd      Order Specific Question:   Route     Answer:   nasal cannula     Order Specific Question:   Portable mode:     Answer:   pulse dose acceptable     Order Specific Question:   Device     Answer:   home concentrator with portable unit     Order Specific Question:   Length of need (in months):     Answer:   99 mos     Order Specific Question:   Patient condition with qualifying saturation     Answer:   COPD     Order Specific Question:   Height:     Answer:   5' 2" (1.575 m)     Order Specific Question:   Weight:     Answer:   61 kg (134 lb 7.7 oz)     Order Specific Question:   Does patient have medical equipment at home?     Answer:   nebulizer     Order Specific Question:   Does patient have medical equipment at home?     Answer:   walker, standard     Order Specific Question:   Alternative treatment measures have been tried or considered and deemed clinically ineffective.     Answer:   Yes    Ambulatory Referral to Pulmonary Rehab     Referral Priority:   Routine     Referral Type:   Consultation     Referral Reason:   Specialty Services Required     Requested Specialty:   Pulmonary Disease     Number of Visits Requested:   1 "     Plan:   Discussed diagnosis, its evaluation, treatment and usual course. All questions answered.  Problem List Items Addressed This Visit     Pulmonary emphysema     Frequent exacerbations   Add on Daliresp 500 mcg daily  Begin Omnicef time 10 days, Kenalog 40 mg IM.  Continue Anoro that was added on in May 2019  Continue Pulmicort nebs and DuoNeb twice daily  If not improved with treatment plan consideration for prednisone daily.  Patient will see Dr. Slater in follow-up in 3 weeks.  6/27/2019 6 min walk distance qualification for nasal cannula 2 L with exertion.  O2 sat tara 70%.  Begin home oxygen nasal cannula 2 L exertion and with sleep.  If not improved with treatment plan consideration for prednisone daily and or once monthly zithromax/or other antibiotic of choice monthly  Patient will see Dr. Slater in follow-up in 3 weeks.           Relevant Medications    roflumilast (DALIRESP) 500 mcg Tab    Other Relevant Orders    OXYGEN FOR HOME USE    Ambulatory Referral to Pulmonary Rehab    Gastroesophageal reflux disease without esophagitis     Controlled on Protonix         Diastolic dysfunction (Chronic)     Patient was placed on Lasix 40 mg daily June 13th 2019 per Cardiology         Chronic respiratory failure with hypoxia     6/27/2019 6 min walk distance qualification for nasal cannula 2 L with exertion.  O2 sat tara 70%.  Begin home oxygen nasal cannula 2 L exertion and with sleep           Relevant Orders    Ambulatory Referral to Pulmonary Rehab    Asthma with COPD (Chronic)     Frequent exacerbations   Add on Daliresp 500 mcg daily  Begin Omnicef time 10 days, Kenalog 40 mg IM.  Continue Anoro that was added on in May 2019  Continue Pulmicort nebs and DuoNeb twice daily    6/27/2019 6 min walk distance qualification for nasal cannula 2 L with exertion.  O2 sat tara 70%.  Begin home oxygen nasal cannula 2 L exertion and with sleep.    If not improved with treatment plan consideration for prednisone  daily and or once monthly zithromax/or other antibiotic of choice monthly  Patient will see Dr. Slater in follow-up in 3 weeks.         Relevant Medications    roflumilast (DALIRESP) 500 mcg Tab    Other Relevant Orders    OXYGEN FOR HOME USE    Ambulatory Referral to Pulmonary Rehab      Other Visit Diagnoses     Asthma with COPD with exacerbation    -  Primary    Relevant Medications    albuterol-ipratropium 2.5 mg-0.5 mg/3 mL nebulizer solution 3 mL    cefdinir (OMNICEF) 300 MG capsule    triamcinolone acetonide injection 40 mg    Exercise hypoxemia        Relevant Orders    OXYGEN FOR HOME USE    Ambulatory Referral to Pulmonary Rehab    COPD, frequent exacerbations        Relevant Medications    roflumilast (DALIRESP) 500 mcg Tab    cefdinir (OMNICEF) 300 MG capsule        Follow up in about 3 weeks (around 7/16/2019) for COPD, Asthma as scheduled previously for 6mo fu with Dr. Slater.

## 2019-06-27 NOTE — ASSESSMENT & PLAN NOTE
6/27/2019 6 min walk distance qualification for nasal cannula 2 L with exertion.  O2 sat tara 70%.  Begin home oxygen nasal cannula 2 L exertion and with sleep

## 2019-07-01 ENCOUNTER — TELEPHONE (OUTPATIENT)
Dept: PULMONOLOGY | Facility: CLINIC | Age: 84
End: 2019-07-01

## 2019-07-01 ENCOUNTER — TELEPHONE (OUTPATIENT)
Dept: CARDIOLOGY | Facility: CLINIC | Age: 84
End: 2019-07-01

## 2019-07-01 DIAGNOSIS — J43.9 PULMONARY EMPHYSEMA, UNSPECIFIED EMPHYSEMA TYPE: Primary | ICD-10-CM

## 2019-07-01 DIAGNOSIS — I51.89 DIASTOLIC DYSFUNCTION: Primary | Chronic | ICD-10-CM

## 2019-07-01 DIAGNOSIS — R60.9 EDEMA, UNSPECIFIED TYPE: ICD-10-CM

## 2019-07-01 DIAGNOSIS — J96.11 CHRONIC RESPIRATORY FAILURE WITH HYPOXIA: ICD-10-CM

## 2019-07-01 NOTE — TELEPHONE ENCOUNTER
The patient has been notified of this information and all questions answered.    Pts daughter is asking how should pt continue taking lasix and potassium.   Should she continue with lasix 40mg or decrease back to 20mg  And she takes KCl 10 meq TID. This was the dose for lasix 20mg. Does she need to increase with lasix 40mg?  If so she will need a new rx sent to [facility] [address]

## 2019-07-01 NOTE — TELEPHONE ENCOUNTER
----- Message from Orlando Hassan MD sent at 7/1/2019  7:54 AM CDT -----  Echo showed normal EF and sever AS

## 2019-07-01 NOTE — TELEPHONE ENCOUNTER
----- Message from Mamie Freitas MA sent at 7/1/2019  1:41 PM CDT -----  Contact: Tommy      ----- Message -----  From: Kalyn Chavez  Sent: 7/1/2019  10:15 AM  To: [facility] Staff    Requesting an order for a new nebulizer . The older one is not properly working anymore. Please call back at [phone].      Thanks,  Kalyn Chavez

## 2019-07-02 ENCOUNTER — PATIENT MESSAGE (OUTPATIENT)
Dept: CARDIOLOGY | Facility: CLINIC | Age: 84
End: 2019-07-02

## 2019-07-02 ENCOUNTER — PATIENT MESSAGE (OUTPATIENT)
Dept: PULMONOLOGY | Facility: CLINIC | Age: 84
End: 2019-07-02

## 2019-07-02 DIAGNOSIS — J44.1 COPD WITH ACUTE EXACERBATION: Primary | ICD-10-CM

## 2019-07-02 DIAGNOSIS — J43.9 PULMONARY EMPHYSEMA, UNSPECIFIED EMPHYSEMA TYPE: ICD-10-CM

## 2019-07-02 RX ORDER — PREDNISONE 20 MG/1
TABLET ORAL
Qty: 20 TABLET | Refills: 0 | Status: SHIPPED | OUTPATIENT
Start: 2019-07-02 | End: 2019-07-16

## 2019-07-02 RX ORDER — FUROSEMIDE 40 MG/1
40 TABLET ORAL DAILY
Qty: 30 TABLET | Refills: 11 | Status: SHIPPED | OUTPATIENT
Start: 2019-07-02 | End: 2020-07-13

## 2019-07-02 RX ORDER — POTASSIUM CHLORIDE 750 MG/1
10 TABLET, EXTENDED RELEASE ORAL 3 TIMES DAILY
Qty: 90 TABLET | Refills: 1 | Status: SHIPPED | OUTPATIENT
Start: 2019-07-02 | End: 2020-03-11 | Stop reason: SDUPTHER

## 2019-07-02 NOTE — TELEPHONE ENCOUNTER
The patient has been notified of this information and all questions answered.  Labs scheduled in 2 weeks for recheck

## 2019-07-15 ENCOUNTER — PATIENT MESSAGE (OUTPATIENT)
Dept: PULMONOLOGY | Facility: CLINIC | Age: 84
End: 2019-07-15

## 2019-07-16 ENCOUNTER — CLINICAL SUPPORT (OUTPATIENT)
Dept: PULMONOLOGY | Facility: CLINIC | Age: 84
End: 2019-07-16
Payer: MEDICARE

## 2019-07-16 ENCOUNTER — LAB VISIT (OUTPATIENT)
Dept: LAB | Facility: HOSPITAL | Age: 84
End: 2019-07-16
Attending: INTERNAL MEDICINE
Payer: MEDICARE

## 2019-07-16 ENCOUNTER — OFFICE VISIT (OUTPATIENT)
Dept: PULMONOLOGY | Facility: CLINIC | Age: 84
End: 2019-07-16
Payer: MEDICARE

## 2019-07-16 VITALS
OXYGEN SATURATION: 96 % | HEIGHT: 62 IN | HEART RATE: 74 BPM | BODY MASS INDEX: 24.01 KG/M2 | WEIGHT: 130.5 LBS | DIASTOLIC BLOOD PRESSURE: 70 MMHG | SYSTOLIC BLOOD PRESSURE: 114 MMHG | RESPIRATION RATE: 16 BRPM

## 2019-07-16 DIAGNOSIS — J45.21 MILD INTERMITTENT ASTHMATIC BRONCHITIS WITH ACUTE EXACERBATION: ICD-10-CM

## 2019-07-16 DIAGNOSIS — I51.89 DIASTOLIC DYSFUNCTION: Chronic | ICD-10-CM

## 2019-07-16 DIAGNOSIS — J43.9 PULMONARY EMPHYSEMA, UNSPECIFIED EMPHYSEMA TYPE: ICD-10-CM

## 2019-07-16 DIAGNOSIS — J44.89 ASTHMA WITH COPD: Chronic | ICD-10-CM

## 2019-07-16 DIAGNOSIS — J44.89 ASTHMA WITH COPD: ICD-10-CM

## 2019-07-16 LAB
ANION GAP SERPL CALC-SCNC: 11 MMOL/L (ref 8–16)
BUN SERPL-MCNC: 20 MG/DL (ref 8–23)
CALCIUM SERPL-MCNC: 10.2 MG/DL (ref 8.7–10.5)
CHLORIDE SERPL-SCNC: 99 MMOL/L (ref 95–110)
CO2 SERPL-SCNC: 26 MMOL/L (ref 23–29)
CREAT SERPL-MCNC: 1 MG/DL (ref 0.5–1.4)
EST. GFR  (AFRICAN AMERICAN): 59.8 ML/MIN/1.73 M^2
EST. GFR  (NON AFRICAN AMERICAN): 51.9 ML/MIN/1.73 M^2
GLUCOSE SERPL-MCNC: 94 MG/DL (ref 70–110)
POTASSIUM SERPL-SCNC: 4.5 MMOL/L (ref 3.5–5.1)
SODIUM SERPL-SCNC: 136 MMOL/L (ref 136–145)

## 2019-07-16 PROCEDURE — 99214 PR OFFICE/OUTPT VISIT, EST, LEVL IV, 30-39 MIN: ICD-10-PCS | Mod: HCNC,S$GLB,, | Performed by: INTERNAL MEDICINE

## 2019-07-16 PROCEDURE — 3074F SYST BP LT 130 MM HG: CPT | Mod: HCNC,CPTII,S$GLB, | Performed by: INTERNAL MEDICINE

## 2019-07-16 PROCEDURE — 94010 BREATHING CAPACITY TEST: CPT | Mod: HCNC,S$GLB,, | Performed by: INTERNAL MEDICINE

## 2019-07-16 PROCEDURE — 3074F PR MOST RECENT SYSTOLIC BLOOD PRESSURE < 130 MM HG: ICD-10-PCS | Mod: HCNC,CPTII,S$GLB, | Performed by: INTERNAL MEDICINE

## 2019-07-16 PROCEDURE — 3078F PR MOST RECENT DIASTOLIC BLOOD PRESSURE < 80 MM HG: ICD-10-PCS | Mod: HCNC,CPTII,S$GLB, | Performed by: INTERNAL MEDICINE

## 2019-07-16 PROCEDURE — 1101F PR PT FALLS ASSESS DOC 0-1 FALLS W/OUT INJ PAST YR: ICD-10-PCS | Mod: HCNC,CPTII,S$GLB, | Performed by: INTERNAL MEDICINE

## 2019-07-16 PROCEDURE — 82803 PR  BLOOD GASES: PH, PO2 & PCO2: ICD-10-PCS | Mod: HCNC,S$GLB,, | Performed by: INTERNAL MEDICINE

## 2019-07-16 PROCEDURE — 82803 BLOOD GASES ANY COMBINATION: CPT | Mod: HCNC,S$GLB,, | Performed by: INTERNAL MEDICINE

## 2019-07-16 PROCEDURE — 99999 PR PBB SHADOW E&M-EST. PATIENT-LVL IV: ICD-10-PCS | Mod: PBBFAC,HCNC,, | Performed by: INTERNAL MEDICINE

## 2019-07-16 PROCEDURE — 36600 WITHDRAWAL OF ARTERIAL BLOOD: CPT | Mod: HCNC,S$GLB,, | Performed by: INTERNAL MEDICINE

## 2019-07-16 PROCEDURE — 36600 PR WITHDRAWAL OF ARTERIAL BLOOD: ICD-10-PCS | Mod: HCNC,S$GLB,, | Performed by: INTERNAL MEDICINE

## 2019-07-16 PROCEDURE — 94010 BREATHING CAPACITY TEST: ICD-10-PCS | Mod: HCNC,S$GLB,, | Performed by: INTERNAL MEDICINE

## 2019-07-16 PROCEDURE — 1101F PT FALLS ASSESS-DOCD LE1/YR: CPT | Mod: HCNC,CPTII,S$GLB, | Performed by: INTERNAL MEDICINE

## 2019-07-16 PROCEDURE — 3078F DIAST BP <80 MM HG: CPT | Mod: HCNC,CPTII,S$GLB, | Performed by: INTERNAL MEDICINE

## 2019-07-16 PROCEDURE — 36415 COLL VENOUS BLD VENIPUNCTURE: CPT | Mod: HCNC

## 2019-07-16 PROCEDURE — 99999 PR PBB SHADOW E&M-EST. PATIENT-LVL IV: CPT | Mod: PBBFAC,HCNC,, | Performed by: INTERNAL MEDICINE

## 2019-07-16 PROCEDURE — 99214 OFFICE O/P EST MOD 30 MIN: CPT | Mod: HCNC,S$GLB,, | Performed by: INTERNAL MEDICINE

## 2019-07-16 PROCEDURE — 80048 BASIC METABOLIC PNL TOTAL CA: CPT | Mod: HCNC

## 2019-07-16 RX ORDER — BUDESONIDE 0.5 MG/2ML
0.5 INHALANT ORAL 2 TIMES DAILY
Qty: 120 ML | Refills: 11 | Status: SHIPPED | OUTPATIENT
Start: 2019-07-16 | End: 2019-08-20 | Stop reason: ALTCHOICE

## 2019-07-16 RX ORDER — IPRATROPIUM BROMIDE AND ALBUTEROL SULFATE 2.5; .5 MG/3ML; MG/3ML
3 SOLUTION RESPIRATORY (INHALATION) EVERY 6 HOURS PRN
Qty: 360 VIAL | Refills: 5 | Status: SHIPPED | OUTPATIENT
Start: 2019-07-16 | End: 2020-06-22

## 2019-07-16 NOTE — PROCEDURES
See ABG results.     Primary Respiratory Alkalosis, Chronic, with: Secondary Metabolic Alkalosis

## 2019-07-16 NOTE — PROGRESS NOTES
Subjective:       Patient ID: Crystal Romero is a 84 y.o. female.    Chief Complaint: Asthma (with COPD)    Asthma   She complains of cough, shortness of breath and sputum production. Associated symptoms include postnasal drip and rhinorrhea. Pertinent negatives include no chest pain or fever. Her past medical history is significant for asthma.    COPD  She presents for evaluation and treatment of COPD. The patient is not currently having symptoms / an exacerbation. Current symptoms include chronic dyspnea and cough productive of yellow sputum in small amounts. Symptoms have been present since a week ago and have been gradually worsening. She denies increased sputum. Associated symptoms include poor exercise tolerance.  This episode appears to have been triggered by no identifiable factor. Treatments tried for the current exacerbation: albuterol nebulizer and ipratropium nebulizer. The patient has been having similar episodes for approximately 5 years. She uses 1 pillows at night. Patient currently is not on home oxygen therapy.. The patient is having no constitutional symptoms, denying fever, chills, anorexia, or weight loss. The patient has been hospitalized for this condition before. She quit smoking approximately mnay years ago. The patient is experiencing exercise intolerance (difficulty walking 10 feet on flat ground).    Daliresp may be causing problems - neurologic  Combative, not sleeping  Bipolar symptoms      Answers for HPI/ROS submitted by the patient on 7/15/2019   Asthma  In the past 4 weeks, how much of the time did your asthma keep you from getting as much done at work, school, or at home?: most of the time  During the past 4 weeks, how often have you had shortness of breath?: more than once a day  During the past 4 weeks, how often did your asthma symptoms (Wheezing, coughing, shortness of breath, chest tightness or pain) wake you up at night or earlier that usual in the morning?: 2 or 3  nights a week  During the past 4 weeks, how often have you used your rescue inhaler or nebulizer medication (such as albuterol)?: 1 or 2 times per day  How would you rate your asthma control during the past 4 weeks?: somewhat controlled   : 10    Needs to take    Past Medical History:   Diagnosis Date    Arthritis     Cataract     Coronary artery disease     Dementia     Depression     Encounter for blood transfusion     H/O: GI bleed     Hiatal hernia     Hyperlipidemia     Hypertension     Hypothyroid     Rheumatoid arthritis(714.0)     Seizures     Stroke     Syncope and collapse      Past Surgical History:   Procedure Laterality Date    COLONOSCOPY N/A 2015    [facility]    COLONOSCOPY N/A 2014    [facility]    ESOPHAGOGASTRODUODENOSCOPY (EGD) N/A 2014    [facility]    MANDIBLE SURGERY      TONSILLECTOMY       Social History     Socioeconomic History    Marital status:      Spouse name: Not on file    Number of children: 2    Years of education: Not on file    Highest education level: Not on file   Occupational History    Not on file   Social Needs    Financial resource strain: Not on file    Food insecurity:     Worry: Not on file     Inability: Not on file    Transportation needs:     Medical: Not on file     Non-medical: Not on file   Tobacco Use    Smoking status: Former Smoker     Packs/day: 2.00     Years: 16.00     Pack years: 32.00     Types: Cigarettes     Last attempt to quit: 1950     Years since quittin.5    Smokeless tobacco: Never Used   Substance and Sexual Activity    Alcohol use: No     Frequency: Never    Drug use: No    Sexual activity: Never   Lifestyle    Physical activity:     Days per week: Not on file     Minutes per session: Not on file    Stress: Not on file   Relationships    Social connections:      Talks on phone: Not on file     Gets together: Not on file     Attends Jew service: Not on file     Active member of club or organization: Not on file     Attends meetings of clubs or organizations: Not on file     Relationship status: Not on file   Other Topics Concern    Not on file   Social History Narrative    Lives with daughter     Review of Systems   Constitutional: Positive for fatigue. Negative for fever.   HENT: Positive for postnasal drip, rhinorrhea and congestion.    Eyes: Negative for redness and itching.   Respiratory: Positive for cough, sputum production, shortness of breath, dyspnea on extertion, use of rescue inhaler and Paroxysmal Nocturnal Dyspnea.    Cardiovascular: Negative for chest pain, palpitations and leg swelling.   Genitourinary: Negative for difficulty urinating and hematuria.   Endocrine: Negative for cold intolerance and heat intolerance.    Skin: Negative for rash.   Gastrointestinal: Negative for nausea and abdominal pain.   Neurological: Negative for dizziness, syncope, weakness and light-headedness.   Hematological: Negative for adenopathy. Does not bruise/bleed easily.   Psychiatric/Behavioral: Negative for sleep disturbance. The patient is not nervous/anxious.        Objective:      Physical Exam   Constitutional: She is oriented to person, place, and time. She appears well-developed and well-nourished.   HENT:   Head: Normocephalic and atraumatic.   Mouth/Throat: Oropharyngeal exudate present.   Eyes: Pupils are equal, round, and reactive to light. Conjunctivae are normal.   Neck: Neck supple. No JVD present. No tracheal deviation present. No thyromegaly present.   Cardiovascular: Normal rate, regular rhythm and normal heart sounds.   Pulmonary/Chest: Effort normal. No respiratory distress. She has decreased breath sounds. She has wheezes in the right lower field and the left lower field. She has no rhonchi. She has no rales. She exhibits no tenderness.   Abdominal:  Soft. Bowel sounds are normal.   Musculoskeletal: Normal range of motion. She exhibits no edema.   Lymphadenopathy:     She has no cervical adenopathy.   Neurological: She is alert and oriented to person, place, and time.   Skin: Skin is warm and dry.   Nursing note and vitals reviewed.    Personal Diagnostic Review  Chest X-Ray: I personally reviewed the films and findings are:, air trapping/emphysema, hiatal hernia   2D echo with color flow doppler  Date of Procedure: 06/24/2019    TEST DESCRIPTION     Aorta: The aortic root is normal in size, measuring 2.7 cm at sinotubular junction and 2.8 cm at Sinuses of Valsalva.     Left Atrium: The left atrial volume index is normal, measuring 33.71 cc/m2.     Left Ventricle: The left ventricle is normal in size, with an end-diastolic diameter of 2.4 cm, and an end-systolic diameter of 1.7 cm. Wall thickness is increased, with the septum measuring 1.7 cm and the posterior wall measuring 1.8 cm across. Relative   wall thickness was increased at 1.50, and the LV mass index was increased at 115.3 g/m2 consistent with moderate concentric left ventricular hypertrophy. There are no regional wall motion abnormalities. Left ventricular systolic function appears normal.   Visually estimated ejection fraction is 60-65%. The LV Doppler derived stroke volume equals 83.0 ccs.     Right Atrium: The right atrium is normal in size, measuring 3.7 cm in length and 2.9 cm in width in the apical view.     Right Ventricle: The right ventricle is normal in size measuring 3.3 cm at the base in the apical right ventricle-focused view. Global right ventricular systolic function appears normal. Tricuspid annular plane systolic excursion (TAPSE) is 1.7 cm. The   estimated PA systolic pressure is 28 mmHg.     Aortic Valve:  The aortic valve is markedly sclerotic with markedly restricted leaflet mobility. The peak velocity obtained across the aortic valve is 4.77 m/s, which translates to a peak  gradient of 91 mmHg. The mean gradient is 57 mmHg. Using a left   ventricular outflow tract diameter of 1.8 cm, a left ventricular outflow tract velocity time integral of 34 cm, and a peak instantaneous transvalvular velocity time integral of 83 cm, the calculated aortic valve area is 1.0 cm2(AVAi is 0.62 cm2/m2),   consistent with severe aortic stenosis. Additionally, there is mild aortic regurgitation. There is a pressure half time of 561.0 msec.     Mitral Valve:  The mitral valve is moderately sclerotic. There is marked mitral annular calcification.     Tricuspid Valve:  The tricuspid valve is normal in structure.     Pulmonary Valve:  The pulmonic valve is normal in structure.     IVC: IVC is normal in size and collapses > 50% with a sniff, suggesting normal right atrial pressure of 3 mmHg.     Intracavitary: There is no evidence of pericardial effusion, intracavity mass, thrombi, or vegetation.     CONCLUSIONS     1 - Concentric hypertrophy.     2 - No wall motion abnormalities.     3 - Normal left ventricular systolic function (EF 60-65%).     4 - Normal right ventricular systolic function .     5 - The estimated PA systolic pressure is 33 mmHg.     6 - Severe aortic stenosis, AMANDA = 1.0 cm2, AVAi = 0.62 cm2/m2, peak velocity = 4.77 m/s, mean gradient = 57 mmHg.     7 - Mild aortic regurgitation.     This document has been electronically    SIGNED BY: Freddie Palacios MD On: 06/24/2019 13:53        Pulmonary function tests:  No recent    Pulmonary Studies Review 7/16/2019 6/27/2019 6/27/2019 6/13/2019 6/11/2019 5/22/2019 5/3/2019   SpO2 96 99 - - 96 94 90   Ordering Provider - - Yola Hoang NP - - - -   Interpreting Provider - - Dr. Slater - - - -   Performing nurse/tech/RT - - EFREN Ocasio - - - -   Diagnosis - - Emphysema - - - -   Height 61.811 62.000 62 62.000 62.000 62.000 62.000   Weight 2088.2 2151.69 2151.69 2151.69 2119.94 2063.51 2105.83   BMI (Calculated) 24.1 24.6 24.6 24.6 24.3 23.6 24.1    Predicted Distance 237.9 234.78 234.78 234.78 236.65 241.02 237.9   Patient Race - -  - - - -   6MWT Status - - completed without stopping - - - -   Patient Reported - - Dyspnea;Other (Comment) - - - -   Was O2 used? - - Yes - - - -   Delivery Method - - Cannula - - - -   6MW Distance walked (feet) - - 400 - - - -   Distance walked (meters) - - 121.92 - - - -   Type of assistive device(s) used? - - a wheelchair - - - -   Is extra documentation required for this patient? - - Yes - - - -   Oxygen Saturation - - 97 - - - -   Supplemental Oxygen - - Room Air - - - -   Heart Rate - - 79 - - - -   Blood Pressure - - 132/69 - - - -   Shy Dyspnea Rating  - - nothing at all - - - -   Oxygen Saturation - - 95 - - - -   Supplemental Oxygen - - 2 L/M - - - -   Heart Rate - - 91 - - - -   Blood Pressure - - 109/82 - - - -   Shy Dyspnea Rating  - - moderate - - - -   Recovery Time (seconds) - - 240 - - - -   Oxygen Saturation - - 99 - - - -   Supplemental Oxygen - - 2 L/M - - - -   Heart Rate - - 82 - - - -   Blood Pressure - - 111/69 - - - -   Shy Dyspnea Rating  - - very light - - - -   Is procedure ready for interpretation? - - Yes - - - -   Did the patient stop or pause? - - No - - - -   Total Time Walked (Calculated) - - 360 - - - -   Total Laps Walked - - 2 - - - -   Final Partial Lap Distance (feet) - - 0 - - - -   Total Distance Feet (Calculated) - - 400 - - - -   Total Distance Meters (Calculated) - - 121.92 - - - -   Predicted Distance Meters (Calculated) 377.18 374.12 374.12 374.12 376.18 379.84 377.09   Percentage of Predicted (Calculated) - - 32.59 - - - -   Peak VO2 (Calculated) - - 7.64 - - - -   Mets - - 2.18 - - - -   Has The Patient Had a Previous Six Minute Walk Test? - - No - - - -   Comments - - patient has dementia - - - -   Oxygen Qualification? - - Yes - - - -   Oxygen Saturation - - 97 - - - -   Supplemental Oxygen - - Room Air - - - -   Heart Rate - - 79 - - - -   Blood Pressure - - 132/69  - - - -   Shy Dyspnea Rating  - - nothing at all - - - -   Oxygen Saturation - - 95 - - - -   Supplemental Oxygen - - 2 L/M - - - -   Heart Rate - - 91 - - - -   Blood Pressure - - 109/82 - - - -   Shy Dyspnea Rating  - - moderate - - - -   Recovery Time (seconds) - - 240 - - - -   Oxygen Saturation - - 99 - - - -   Supplemental Oxygen - - 2 L/M - - - -   Heart Rate - - 82 - - - -   Blood Pressure - - 111/69 - - - -   Shy Dyspnea Rating  - - very light - - - -     No flowsheet data found.      Assessment:       No diagnosis found.    Outpatient Encounter Medications as of 7/16/2019   Medication Sig Dispense Refill    acetaminophen (TYLENOL) 500 MG tablet Take 500 mg by mouth every 6 (six) hours as needed for Pain.      albuterol-ipratropium (DUO-NEB) 2.5 mg-0.5 mg/3 mL nebulizer solution Take 3 mLs by nebulization every 6 (six) hours as needed for Wheezing. Rescue 360 vial 5    aspirin (ECOTRIN) 81 MG EC tablet Take 81 mg by mouth once daily.      atorvastatin (LIPITOR) 40 MG tablet Take 1 tablet (40 mg total) by mouth once daily. 90 tablet 3    budesonide (PULMICORT) 0.5 mg/2 mL nebulizer solution Take 2 mLs (0.5 mg total) by nebulization 2 (two) times daily. Wash out mouth after using. 120 mL 11    cholecalciferol, vitamin D3, 3,000 unit Tab Take by mouth.      clonazePAM (KLONOPIN) 0.5 MG tablet Take 0.5 mg by mouth 2 (two) times daily as needed for Anxiety.      folic acid (FOLVITE) 1 MG tablet TAKE ONE TABLET BY MOUTH ONE TIME DAILY 90 tablet 0    furosemide (LASIX) 40 MG tablet Take 1 tablet (40 mg total) by mouth once daily. 30 tablet 11    inhalation spacing device (AEROCHAMBER PLUS FLOW-VU) Use with Symbicort and Albuterol inhalers 1 each 0    levothyroxine (SYNTHROID) 25 MCG tablet TAKE ONE TABLET BY MOUTH ONE TIME DAILY BEFORE BREAKFAST 90 tablet 0    mirtazapine (REMERON) 45 MG tablet Take 45 mg by mouth every evening.      pantoprazole (PROTONIX) 40 MG tablet Take 1 tablet (40 mg total)  by mouth once daily. 90 tablet 3    potassium chloride (KLOR-CON) 10 MEQ TbSR Take 1 tablet (10 mEq total) by mouth 3 (three) times daily. 90 tablet 1    roflumilast (DALIRESP) 500 mcg Tab Take 1 tablet (500 mcg total) by mouth once daily. 30 tablet 11    sertraline (ZOLOFT) 50 MG tablet TAKE ONE TABLET BY MOUTH ONE TIME DAILY 90 tablet 1    umeclidinium-vilanterol (ANORO ELLIPTA) 62.5-25 mcg/actuation DsDv Inhale 1 puff into the lungs once daily. Controller 60 each 11    valproate (DEPAKENE) 250 mg/5 mL syrup Take 250 mg by mouth once daily. Take 30 mL at 4 pm daily.      VITAMIN B COMPLEX (SUPER B COMPLEX-B-12 ORAL) Take by mouth.      [DISCONTINUED] ferrous sulfate (FEOSOL) 325 mg (65 mg iron) Tab tablet Take 325 mg by mouth once daily.      [DISCONTINUED] melatonin 5 mg/15 mL Liqd Take by mouth.      [DISCONTINUED] predniSONE (DELTASONE) 20 MG tablet 3 daily x 3 days, 2 daily x 3 days, 1 daily x 3 days, 1/2 daily x 4 days. 20 tablet 0    [DISCONTINUED] furosemide (LASIX) 40 MG tablet Take 1 tablet (40 mg total) by mouth once daily. 30 tablet 11    [DISCONTINUED] potassium chloride (KLOR-CON) 10 MEQ TbSR TAKE ONE TABLET BY MOUTH THREE TIMES DAILY (Patient taking differently: TAKE ONE TABLET BY MOUTH TWO TIMES DAILY) 90 tablet 1     No facility-administered encounter medications on file as of 7/16/2019.      No orders of the defined types were placed in this encounter.    Plan:       Requested Prescriptions      No prescriptions requested or ordered in this encounter     There are no diagnoses linked to this encounter.  No follow-ups on file.    MEDICAL DECISION MAKING: Moderate to high complexity.  Overall, the multiple problems listed are of moderate to high severity that may impact quality of life and activities of daily living. Side effects of medications, treatment plan as well as options and alternatives reviewed and discussed with patient. There was counseling of patient concerning these  issues.    Total time spent in face to face counseling and coordination of care - 40 minutes over 50% of time was used in discussion of prognosis, risks, benefits of treatment, instructions and compliance with regimen . Discussion with other physicians or health care providers (homehealth, durable medical equipment providers).

## 2019-07-17 ENCOUNTER — TELEPHONE (OUTPATIENT)
Dept: CARDIOLOGY | Facility: CLINIC | Age: 84
End: 2019-07-17

## 2019-07-17 LAB
ALLENS TEST: ABNORMAL
BRPFT: ABNORMAL
DELSYS: ABNORMAL
FEF 25 75 LLN: 0.54
FEF 25 75 PRE REF: 31.4 %
FEF 25 75 REF: 1.36
FEV1 FVC LLN: 61
FEV1 FVC PRE REF: 72.9 %
FEV1 FVC REF: 77
FEV1 LLN: 1.16
FEV1 PRE REF: 65.6 %
FEV1 REF: 1.69
FVC LLN: 1.53
FVC PRE REF: 88.7 %
FVC REF: 2.23
HCO3 UR-SCNC: 26.6 MMOL/L (ref 24–28)
PCO2 BLDA: 34.1 MMHG (ref 35–45)
PEF LLN: 2.47
PEF PRE REF: 65.5 %
PEF REF: 4.06
PH SMN: 7.5 [PH] (ref 7.35–7.45)
PO2 BLDA: 84 MMHG (ref 80–100)
POC BE: 3 MMOL/L
POC SATURATED O2: 97 % (ref 95–100)
PRE FEF 25 75: 0.43 L/S (ref 0.54–2.19)
PRE FET 100: 7.92 SEC
PRE FEV1 FVC: 55.98 % (ref 61.49–92.16)
PRE FEV1: 1.11 L (ref 1.16–2.22)
PRE FVC: 1.98 L (ref 1.53–2.93)
PRE PEF: 2.66 L/S (ref 2.47–5.66)
SAMPLE: ABNORMAL
SITE: ABNORMAL

## 2019-07-17 NOTE — TELEPHONE ENCOUNTER
Spoke with Shannan with pts test results.        ----- Message from Leola Mccauley sent at 7/17/2019 11:51 AM CDT -----  Contact: daughter/Shannan  Type:  Patient Returning Call    Who Called:Shannan  Who Left Message for Patient:Davide  Does the patient know what this is regarding?:na  Would the patient rather a call back or a response via [facility]? Call back  Best Call Back Number:[phone]  Additional Information: na

## 2019-07-21 DIAGNOSIS — J43.9 PULMONARY EMPHYSEMA, UNSPECIFIED EMPHYSEMA TYPE: ICD-10-CM

## 2019-07-22 RX ORDER — UMECLIDINIUM BROMIDE AND VILANTEROL TRIFENATATE 62.5; 25 UG/1; UG/1
POWDER RESPIRATORY (INHALATION)
Qty: 60 EACH | Refills: 11 | Status: SHIPPED | OUTPATIENT
Start: 2019-07-22 | End: 2020-07-26

## 2019-08-13 ENCOUNTER — TELEPHONE (OUTPATIENT)
Dept: PULMONOLOGY | Facility: CLINIC | Age: 84
End: 2019-08-13

## 2019-08-13 ENCOUNTER — OFFICE VISIT (OUTPATIENT)
Dept: INTERNAL MEDICINE | Facility: CLINIC | Age: 84
End: 2019-08-13
Payer: MEDICARE

## 2019-08-13 ENCOUNTER — HOSPITAL ENCOUNTER (OUTPATIENT)
Dept: RADIOLOGY | Facility: HOSPITAL | Age: 84
Discharge: HOME | End: 2019-08-13
Attending: INTERNAL MEDICINE
Payer: MEDICARE

## 2019-08-13 VITALS
HEART RATE: 90 BPM | DIASTOLIC BLOOD PRESSURE: 82 MMHG | OXYGEN SATURATION: 95 % | TEMPERATURE: 97 F | SYSTOLIC BLOOD PRESSURE: 122 MMHG | WEIGHT: 130.94 LBS | BODY MASS INDEX: 24.1 KG/M2

## 2019-08-13 DIAGNOSIS — J44.1 COPD EXACERBATION: ICD-10-CM

## 2019-08-13 DIAGNOSIS — J43.9 PULMONARY EMPHYSEMA, UNSPECIFIED EMPHYSEMA TYPE: ICD-10-CM

## 2019-08-13 DIAGNOSIS — R05.8 COUGH PRODUCTIVE OF CLEAR SPUTUM: ICD-10-CM

## 2019-08-13 DIAGNOSIS — J44.1 COPD EXACERBATION: Primary | ICD-10-CM

## 2019-08-13 PROCEDURE — 99999 PR PBB SHADOW E&M-EST. PATIENT-LVL III: CPT | Mod: PBBFAC,HCNC,, | Performed by: INTERNAL MEDICINE

## 2019-08-13 PROCEDURE — 99999 PR PBB SHADOW E&M-EST. PATIENT-LVL III: ICD-10-PCS | Mod: PBBFAC,HCNC,, | Performed by: INTERNAL MEDICINE

## 2019-08-13 PROCEDURE — 3074F SYST BP LT 130 MM HG: CPT | Mod: HCNC,CPTII,S$GLB, | Performed by: INTERNAL MEDICINE

## 2019-08-13 PROCEDURE — 3079F PR MOST RECENT DIASTOLIC BLOOD PRESSURE 80-89 MM HG: ICD-10-PCS | Mod: HCNC,CPTII,S$GLB, | Performed by: INTERNAL MEDICINE

## 2019-08-13 PROCEDURE — 3074F PR MOST RECENT SYSTOLIC BLOOD PRESSURE < 130 MM HG: ICD-10-PCS | Mod: HCNC,CPTII,S$GLB, | Performed by: INTERNAL MEDICINE

## 2019-08-13 PROCEDURE — 3079F DIAST BP 80-89 MM HG: CPT | Mod: HCNC,CPTII,S$GLB, | Performed by: INTERNAL MEDICINE

## 2019-08-13 PROCEDURE — 71046 XR CHEST PA AND LATERAL: ICD-10-PCS | Mod: 26,HCNC,, | Performed by: RADIOLOGY

## 2019-08-13 PROCEDURE — 99214 OFFICE O/P EST MOD 30 MIN: CPT | Mod: HCNC,S$GLB,, | Performed by: INTERNAL MEDICINE

## 2019-08-13 PROCEDURE — 1101F PT FALLS ASSESS-DOCD LE1/YR: CPT | Mod: HCNC,CPTII,S$GLB, | Performed by: INTERNAL MEDICINE

## 2019-08-13 PROCEDURE — 1101F PR PT FALLS ASSESS DOC 0-1 FALLS W/OUT INJ PAST YR: ICD-10-PCS | Mod: HCNC,CPTII,S$GLB, | Performed by: INTERNAL MEDICINE

## 2019-08-13 PROCEDURE — 71046 X-RAY EXAM CHEST 2 VIEWS: CPT | Mod: 26,HCNC,, | Performed by: RADIOLOGY

## 2019-08-13 PROCEDURE — 71046 X-RAY EXAM CHEST 2 VIEWS: CPT | Mod: TC,HCNC

## 2019-08-13 PROCEDURE — 99214 PR OFFICE/OUTPT VISIT, EST, LEVL IV, 30-39 MIN: ICD-10-PCS | Mod: HCNC,S$GLB,, | Performed by: INTERNAL MEDICINE

## 2019-08-13 RX ORDER — AZITHROMYCIN 250 MG/1
TABLET, FILM COATED ORAL
Qty: 6 TABLET | Refills: 0 | Status: SHIPPED | OUTPATIENT
Start: 2019-08-13 | End: 2019-08-20 | Stop reason: ALTCHOICE

## 2019-08-13 RX ORDER — METHYLPREDNISOLONE 4 MG/1
TABLET ORAL
Qty: 1 PACKAGE | Refills: 0 | Status: SHIPPED | OUTPATIENT
Start: 2019-08-13 | End: 2019-08-20 | Stop reason: ALTCHOICE

## 2019-08-13 RX ORDER — PROMETHAZINE HYDROCHLORIDE AND DEXTROMETHORPHAN HYDROBROMIDE 6.25; 15 MG/5ML; MG/5ML
5 SYRUP ORAL NIGHTLY PRN
Qty: 118 ML | Refills: 0 | Status: SHIPPED | OUTPATIENT
Start: 2019-08-13 | End: 2019-08-20 | Stop reason: ALTCHOICE

## 2019-08-13 NOTE — TELEPHONE ENCOUNTER
----- Message from Ernestina Gamboa sent at 8/13/2019  8:52 AM CDT -----  Contact: caregiver stefanie  She's calling in regards to pt ,coughing     Caregiver Stefanie request to speak with nurse TO GET PT WORKED IN TODAY       pls call back at  [phone]

## 2019-08-16 ENCOUNTER — HOSPITAL ENCOUNTER (OUTPATIENT)
Dept: PULMONOLOGY | Facility: HOSPITAL | Age: 84
Discharge: HOME | End: 2019-08-16
Attending: INTERNAL MEDICINE
Payer: MEDICARE

## 2019-08-16 ENCOUNTER — OFFICE VISIT (OUTPATIENT)
Dept: PULMONOLOGY | Facility: CLINIC | Age: 84
End: 2019-08-16
Payer: MEDICARE

## 2019-08-16 VITALS
SYSTOLIC BLOOD PRESSURE: 120 MMHG | RESPIRATION RATE: 20 BRPM | DIASTOLIC BLOOD PRESSURE: 70 MMHG | BODY MASS INDEX: 22.28 KG/M2 | HEART RATE: 71 BPM | WEIGHT: 130.5 LBS | OXYGEN SATURATION: 94 % | HEIGHT: 64 IN

## 2019-08-16 VITALS — WEIGHT: 130 LBS | HEIGHT: 64 IN | BODY MASS INDEX: 22.2 KG/M2

## 2019-08-16 DIAGNOSIS — J43.9 PULMONARY EMPHYSEMA, UNSPECIFIED EMPHYSEMA TYPE: ICD-10-CM

## 2019-08-16 DIAGNOSIS — J44.89 ASTHMA WITH COPD: Chronic | ICD-10-CM

## 2019-08-16 DIAGNOSIS — R09.02 EXERCISE HYPOXEMIA: ICD-10-CM

## 2019-08-16 DIAGNOSIS — J96.11 CHRONIC RESPIRATORY FAILURE WITH HYPOXIA: ICD-10-CM

## 2019-08-16 DIAGNOSIS — I70.0 ATHEROSCLEROSIS OF AORTA: ICD-10-CM

## 2019-08-16 DIAGNOSIS — I50.32 CHRONIC DIASTOLIC CONGESTIVE HEART FAILURE: ICD-10-CM

## 2019-08-16 DIAGNOSIS — I67.3 BINSWANGER'S DEMENTIA: ICD-10-CM

## 2019-08-16 PROCEDURE — 1101F PR PT FALLS ASSESS DOC 0-1 FALLS W/OUT INJ PAST YR: ICD-10-PCS | Mod: HCNC,CPTII,S$GLB, | Performed by: NURSE PRACTITIONER

## 2019-08-16 PROCEDURE — 99999 PR PBB SHADOW E&M-EST. PATIENT-LVL IV: ICD-10-PCS | Mod: PBBFAC,HCNC,, | Performed by: NURSE PRACTITIONER

## 2019-08-16 PROCEDURE — 99214 OFFICE O/P EST MOD 30 MIN: CPT | Mod: HCNC,S$GLB,, | Performed by: NURSE PRACTITIONER

## 2019-08-16 PROCEDURE — 99999 PR PBB SHADOW E&M-EST. PATIENT-LVL IV: CPT | Mod: PBBFAC,HCNC,, | Performed by: NURSE PRACTITIONER

## 2019-08-16 PROCEDURE — 3078F PR MOST RECENT DIASTOLIC BLOOD PRESSURE < 80 MM HG: ICD-10-PCS | Mod: HCNC,CPTII,S$GLB, | Performed by: NURSE PRACTITIONER

## 2019-08-16 PROCEDURE — 3074F SYST BP LT 130 MM HG: CPT | Mod: HCNC,CPTII,S$GLB, | Performed by: NURSE PRACTITIONER

## 2019-08-16 PROCEDURE — 3074F PR MOST RECENT SYSTOLIC BLOOD PRESSURE < 130 MM HG: ICD-10-PCS | Mod: HCNC,CPTII,S$GLB, | Performed by: NURSE PRACTITIONER

## 2019-08-16 PROCEDURE — 1101F PT FALLS ASSESS-DOCD LE1/YR: CPT | Mod: HCNC,CPTII,S$GLB, | Performed by: NURSE PRACTITIONER

## 2019-08-16 PROCEDURE — 99214 PR OFFICE/OUTPT VISIT, EST, LEVL IV, 30-39 MIN: ICD-10-PCS | Mod: HCNC,S$GLB,, | Performed by: NURSE PRACTITIONER

## 2019-08-16 PROCEDURE — G0424 PULMONARY REHAB W EXER: HCPCS | Mod: HCNC

## 2019-08-16 PROCEDURE — 3078F DIAST BP <80 MM HG: CPT | Mod: HCNC,CPTII,S$GLB, | Performed by: NURSE PRACTITIONER

## 2019-08-16 NOTE — PROGRESS NOTES
[facility]  Pulmonary Rehab  Initial Consult    SUMMARY     Referring Physician: Yola Hoang NP   Pt presented today for initial intake to pulmonary rehab. Pt has dementia, Asthma with COPD, Pulmonary emphysema, Chronic respiratory failure with hypoxia, and exercise hypoxemia. She was able to walk 91.44 meters on her initial 6 MWT with no stops. She uses a walker. She will have a caregiver with her to transport and assist with every rehab session. Pt has severe dementia but was very cooperative. Caregiver states that she is active and goes shopping and to get her nails done. Pt is compliant with meds and is not a diabetic. The family is excited for her to start rehab to keep her active and to increase her strength and endurance. She will start rehab on 8/22/19.     Diagnosis:   Problem List as of 8/16/2019 Reviewed: 7/17/2019  4:24 PM by Chuck Slater MD       Neuro    Impaired cognition    Binswanger's dementia    Seizure    Dysphasia    History of transient ischemic attack (TIA)       Psychiatric    Major depressive disorder, recurrent episode, mild    Insomnia secondary to depression with anxiety       Pulmonary    Asthma with COPD    Last Assessment & Plan 6/27/2019 Office Visit Edited 6/27/2019 10:17 AM by Yola Hoang NP     Frequent exacerbations   Add on Daliresp 500 mcg daily  Begin Omnicef time 10 days, Kenalog 40 mg IM.  Continue Anoro that was added on in May 2019  Continue Pulmicort nebs and DuoNeb twice daily    6/27/2019 6 min walk distance qualification for nasal cannula 2 L with exertion.  O2 sat tara 70%.  Begin home oxygen nasal cannula 2 L exertion and with sleep.    If not improved with treatment plan consideration for prednisone daily and or once monthly zithromax/or other antibiotic of choice monthly  Patient will see Dr. Slater in follow-up in 3 weeks.         Pulmonary emphysema    Last Assessment & Plan 6/27/2019 Office Visit Edited 6/27/2019 10:17 AM by  Yola Hoang NP     Frequent exacerbations   Add on Daliresp 500 mcg daily  Begin Omnicef time 10 days, Kenalog 40 mg IM.  Continue Anoro that was added on in May 2019  Continue Pulmicort nebs and DuoNeb twice daily  If not improved with treatment plan consideration for prednisone daily.  Patient will see Dr. Slater in follow-up in 3 weeks.  6/27/2019 6 min walk distance qualification for nasal cannula 2 L with exertion.  O2 sat tara 70%.  Begin home oxygen nasal cannula 2 L exertion and with sleep.  If not improved with treatment plan consideration for prednisone daily and or once monthly zithromax/or other antibiotic of choice monthly  Patient will see Dr. Slater in follow-up in 3 weeks.           Chronic respiratory failure with hypoxia    Last Assessment & Plan 6/27/2019 Office Visit Written 6/27/2019 10:11 AM by Yola Hoang NP     6/27/2019 6 min walk distance qualification for nasal cannula 2 L with exertion.  O2 sat tara 70%.  Begin home oxygen nasal cannula 2 L exertion and with sleep           Exercise hypoxemia       Cardiac/Vascular    Hyperlipidemia    Last Assessment & Plan 5/3/2019 Office Visit Written 5/3/2019  3:43 PM by Abdon Lee MD     Stable monitored by PCP         Hypertension    Last Assessment & Plan 5/3/2019 Office Visit Written 5/3/2019  3:43 PM by Abdon Lee MD     Stable monitored by PCP         Diastolic dysfunction    Last Assessment & Plan 6/27/2019 Office Visit Written 6/27/2019 10:09 AM by Yola Hoang NP     Patient was placed on Lasix 40 mg daily June 13th 2019 per Cardiology         Nonrheumatic aortic valve stenosis    Nonrheumatic aortic valve insufficiency    Chronic diastolic congestive heart failure    Last Assessment & Plan 5/3/2019 Office Visit Written 5/3/2019  3:42 PM by Abdon Lee MD     Optimize Preload and afterload and fliud and salt restriction          Coronary artery calcification    Last Assessment & Plan 5/22/2019  Office Visit Written 5/22/2019 12:11 PM by Yola Hoang NP     Seen on imaging          Atherosclerosis of aorta       Immunology/Multi System    Vasculitis, CNS       Endocrine    Hypothyroid       GI    Hiatal hernia    Last Assessment & Plan 10/29/2018 Office Visit Written 10/29/2018  8:53 AM by Yola Hoang NP     On protonix         H/O: GI bleed    Dysplastic colon polyp    Gastroesophageal reflux disease without esophagitis    Last Assessment & Plan 6/27/2019 Office Visit Written 6/27/2019 10:09 AM by Yola Hoang NP     Controlled on Protonix            Orthopedic    Rheumatoid arthritis    Osteoarthritis    Last Assessment & Plan 2/23/2017 Office Visit Written 2/23/2017  4:08 PM by Keegan Yarbrough MD     Osteoarthritis of multiple joints with bone-on-bone osteoarthritis over shoulders and knees.  Non-steroidal anti-inflammatory medications contraindicated because of history of gastritis.  Try limbrel.         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  With recent worsening of her cognition and high risk for infections along with no significant inflammatory arthritis on exam, discontinue Plaquenil and reduce methotrexate to only 5 mg every week.  Continue daily folic acid.  Continue tramadol at bedtime.  I will write a prescription since her doctor retired from practice.  Take tramadol cautiously once at bedtime.  Refer to hand surgeon for stenosing tenosynovitis over right hand associated with contractures.         Bilateral adhesive capsulitis of shoulders    Last Assessment & Plan 3/10/2017 Hospital Encounter Written 3/10/2017 11:31 PM by Klarissa Wright NP     Outpatient management                 History of  Present Illness:    Smoking History:   Social History     Tobacco Use   Smoking Status Former Smoker    Packs/day: 2.00    Years: 16.00    Pack years: 32.00    Types: Cigarettes    Last attempt to quit: 1950    Years since quittin.6   Smokeless Tobacco Never Used       Medications:   Current Outpatient Medications Ordered in Epic   Medication Sig Dispense Refill    acetaminophen (TYLENOL) 500 MG tablet Take 500 mg by mouth every 6 (six) hours as needed for Pain.      albuterol-ipratropium (DUO-NEB) 2.5 mg-0.5 mg/3 mL nebulizer solution Take 3 mLs by nebulization every 6 (six) hours as needed for Wheezing. Rescue 360 vial 5    ANORO ELLIPTA 62.5-25 mcg/actuation DsDv INHALE ONE PUFF BY MOUTH ONE TIME DAILY 60 each 11    aspirin (ECOTRIN) 81 MG EC tablet Take 81 mg by mouth once daily.      atorvastatin (LIPITOR) 40 MG tablet Take 1 tablet (40 mg total) by mouth once daily. 90 tablet 3    azithromycin (Z-JAMIE) 250 MG tablet Take 2 tablets by mouth on day 1; Take 1 tablet by mouth on days 2-5 6 tablet 0    budesonide (PULMICORT) 0.5 mg/2 mL nebulizer solution Take 2 mLs (0.5 mg total) by nebulization 2 (two) times daily. Wash out mouth after using. 120 mL 11    cholecalciferol, vitamin D3, 3,000 unit Tab Take by mouth.      clonazePAM (KLONOPIN) 0.5 MG tablet Take 0.5 mg by mouth 2 (two) times daily as needed for Anxiety.      folic acid (FOLVITE) 1 MG tablet TAKE ONE TABLET BY MOUTH ONE TIME DAILY 90 tablet 0    furosemide (LASIX) 40 MG tablet Take 1 tablet (40 mg total) by mouth once daily. 30 tablet 11    inhalation spacing device (AEROCHAMBER PLUS FLOW-VU) Use with Symbicort and Albuterol inhalers 1 each 0    levothyroxine (SYNTHROID) 25 MCG tablet TAKE ONE TABLET BY MOUTH ONE TIME DAILY BEFORE BREAKFAST 90 tablet 0    methylPREDNISolone (MEDROL, JAMIE,) 4 mg tablet use as directed 1 Package 0    mirtazapine (REMERON) 45 MG tablet Take 45 mg by mouth every evening.      pantoprazole  (PROTONIX) 40 MG tablet Take 1 tablet (40 mg total) by mouth once daily. 90 tablet 3    potassium chloride (KLOR-CON) 10 MEQ TbSR Take 1 tablet (10 mEq total) by mouth 3 (three) times daily. 90 tablet 1    promethazine-dextromethorphan (PROMETHAZINE-DM) 6.25-15 mg/5 mL Syrp Take 5 mLs by mouth nightly as needed. 118 mL 0    sertraline (ZOLOFT) 50 MG tablet TAKE ONE TABLET BY MOUTH ONE TIME DAILY 90 tablet 1    valproate (DEPAKENE) 250 mg/5 mL syrup Take 250 mg by mouth once daily. Take 30 mL at 4 pm daily.      VITAMIN B COMPLEX (SUPER B COMPLEX-B-12 ORAL) Take by mouth.       No current Epic-ordered facility-administered medications on file.         Pulmonary History Questionnaire:    Pulmonary History  How many times have you been hospitalized in the last year as a result of lung problems?: 0  How many days were you in the hospital in the last year as a result of breathing difficulty?: 4  How many ER visits have you had in the past year as a result of breathing difficulty?: 0  Last hospital admission: 06/12/19  Have you ever attended a pulmonary rehabilitation program?: No    Major Symptomatology  What are your symptoms today?: tired, no energy, no SOB   What were your symptoms last year?: better health all around  What were your symptoms 5 years ago?: no symptoms  When did you realize that you had lung problems?: 10 years    Disease Impact  Do you sleep with your head elevated?: Elevated  If elevated, how high?: 1 pillow  Do you awaken during the night?: Yes  How often?: 1-2 times  Why?: bathroom, roaming around  Do your ankles ever swell up?: No  Do you cough?: Yes  What part of the day?: anytime  Do you cough up sputum?: No  Do you use oxygen?: Yes  How often?: with exertion  Liters per minute: 2 l/min  Type of oxygen delivery system: cocentrator  Supplier: [facility]  Are you on any home respiratory theraphy?: Yes  Type: nebulizer  Do you exercise?: No  Do you have exercise equipment?: No  Has your physician  limited your activities?: No    Depression PHQ:    Depression Patient Health Questionnaire (PHQ-9)  Over the last two weeks how often have you been bothered by little interest or pleasure in doing things: Not at all  Over the last two weeks how often have you been bothered by feeling down, depressed or hopeless: Not at all  Over the last two weeks how often have you been bothered by trouble falling or staying asleep, or sleeping too much: Nearly every day  Over the last two weeks how often have you been bothered by feeling tired or having little energy: More than half the days  Over the last two weeks how often have you been bothered by a poor appetite or overeating: Not at all  Over the last two weeks how often have you been bothered by feeling bad about yourself - or that you are a failure or have let yourself or your family down: Not at all  Over the last two weeks how often have you been bothered by trouble concentrating on things, such as reading the newspaper or watching television: Nearly every day  Over the last two weeks how often have you been bothered by moving or speaking so slowly that other people could have noticed. Or the opposite - being so fidgety or restless that you have been moving around a lot more than usual.: More than half the days  Over the last two weeks how often have you been bothered by thoughts that you would be better off dead, or of hurting yourself: Not at all  If you checked off any problems, how difficult have these problems made it for you to do your work, take care of things at home or get along with other people?: Somewhat difficult  Total Score: 10    Questionnaire Score:   Pulmonary Knowledge Test: 88  Rate Your Plate: 42  SF36 Physical Functionin  SF36 Mental Health: 53      Physical Health Summary  Your Score 21   Your current score indicates that you are well below the general population of similar age and gender.  Compared to this population your health is:    Well  "Below the average in:  Overall physical functioning   Performance at work, home, or school due to physical problems   General health  Same or Better than the average in:  Ability to participate in activities due to bodily pain  Your history    Mental Health Summary  Your Score 53     Your current score indicates that you are the same or better than the general population of similar age and gender.  Compared to this population your health is:    Well Below the average in:  Level of energy  Below the average in:  Ability to participate in social activities  Same or Better than the average in:  Performance at work, home, or school due to emotional problems   Emotional well-being  Your history  Progress  Self Evaluated Transition   Compared to one year ago, you rated your health in general as: Somewhat worse       Pulmonary Function Test Data:     Date: 7/19/19 FEV1: 65.6  FVC: 88.7     Six Minute Walk    Height: 5' 4" (162.6 cm) Weight: 59 kg (130 lb)  Performing RT: Siomara L Heriberto     Phase Oxygen Assessment Supplemental O2 Heart   Rate Blood Pressure Shy Dyspnea Scale Rating   Resting 99 % 2 L/M 67 bpm 136/81 0   Exercise        Minute        1 98 % 2 L/M 72 bpm     2 100 % 2 L/M 76 bpm     3 99 % 2 L/M 72 bpm     4 98 % 2 L/M 76 bpm     5 96 % 2 L/M 78 bpm     6  99 % 2 L/M 78 bpm 143/83 0   Recovery        Minute        1 100 % 2 L/M 68 bpm     2 100 % 2 L/M 65 bpm     3 100 % 2 L/M 66 bpm     4 100 % 2 L/M 65 bpm 138/81 0       Six Minute Walk Summary       Total Time Walked (Calculated): 360 seconds  Total Distance Meters (Calculated): 91.44 meters  Predicted Distance Meters (Calculated): 389.53 meters  Percentage of Predicted (Calculated): 23.47  Peak VO2 (Calculated): 6.72  Mets: 1.92  Symptoms: none reported          Fall Risk:  Fall Risk Assessment (every shift)  History Of Fall (W/I 3 Mos): Y  Cardiovascular Medication: Y  Age Greater Than 65 Years: Y  Altered Elimination: Y  Depression: Y  Mobility " Deficit/Weakness: Y  Male: N    Individual Goal Review  Individual Goal Review  Are you exercising on a regular basis at home?: No  Dietary goal:: gain weight  Are you a diabetic?: No  Are you better able to manage your daily activities, i.e. grooming, bathing, cooking, etc.?: No  Are you smoke free?: Yes  Do you have a positive support system in place?: Yes  Your short term goal was:: be stronger, more energy, be able to be active again  Your long term goal was:: breathe better, learn how to deal with lung condition    Education: Pulmonary rehab program, benefits, pt expectations, compliance, questions answered    Short Term Goals: Be stronger, have more energy, be active again  Long Term Goals: Breathe better, learn how to live with her lung condition  Dietary Goals: none      Checklist                                                                           Response  Chronic Stable Pulmonary Impairment                             Yes  Medical Regimen Optimized                                             Yes  PFT within 12 months                                                       Yes  Impaired Function due to Pulmonary Disease                 Yes  Motivated and Physically able to Participate                     Yes  Rehab likely to result in improvement                               Yes    Orders: Begin pulmonary rehabilitation 2 times a week, see exercise prescription.    [facility]  Pulmonary Rehab  Exercise Prescription    SUMMARY     General Guidelines     · Record Pulse, SPO2, and Blood Pressure before during and after exercise   · Hold exercise for: Oxygen saturation <90% despite supplemental Oxygen, S/Sx Exacerbation, Blood Pressure >180/95 or <80/60, Resting pulse 82>109 Max target heart rate  · Maintain SPO2>90% with exercise    Outpatient Exercises  Days per week: 2  Time: 45 minutes    Home Exercise  Days per week: 2  Time: 45 minutes    Exercise Prescription  Arm ergometer,  Nustep, Hands free weights    Modality  Arm ergometer  Time: 10 minutes  Level: 1    Nustep  Time: 20 minutes  Load: 2  Steps: 1000    Chest  Lbs: 3 (dowel)  Reps: 10  Sets: 2    Biceps  Lbs: 3 (dowel)  Reps: 10  Sets: 2         I certify the patient is physically and psychologically able to participate in pulmonary rehabilitation and is medically stable.

## 2019-08-16 NOTE — ASSESSMENT & PLAN NOTE
Acute flare 8/14/2019 improving with Zithromax and Medrol Dosepak.  Since on home oxygen patient has been improved with less dyspnea.  No longer wheezing on a regular basis.  Failed trial of Daliresp in June, cause combativeness and hallucination.  Continue current regime Pulmicort nebs twice daily duo nebs twice daily may add on duo nebs up to every 6 hr if needed.  Albuterol inhaler if needed  Continue nasal cannula 2 L with exertion and sleep.  Continue with planned pulmonary rehab  Keep follow-up planned January 2020 follow-up sooner if needed.

## 2019-08-16 NOTE — PROGRESS NOTES
Subjective:      Patient ID: Crystal Romero is a 84 y.o. female.    Patient Active Problem List   Diagnosis    Rheumatoid arthritis    Hiatal hernia    Asthma with COPD    Hyperlipidemia    Hypertension    Diastolic dysfunction    Osteoarthritis    Rotator cuff arthropathy    H/O: GI bleed    Dysplastic colon polyp    Hypothyroid    Nonrheumatic aortic valve stenosis    Nonrheumatic aortic valve insufficiency    Impaired cognition    Gastroesophageal reflux disease without esophagitis    Major depressive disorder, recurrent episode, mild    Psoriasis with arthropathy    Bilateral adhesive capsulitis of shoulders    Pulmonary emphysema    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 provider found for evaluation and management for   Chief Complaint   Patient presents with    Asthma    COPD    Cough     Chief Complaint: Asthma; COPD; and Cough    HPI:  Crystal Romero is a 84 y.o. female presents to pulmonary clinic acute care evaluation related to asthma with COPD with cough that developed on 8/13/2019 when patient choked while taking her Depakote liquid oral medication when she was trying to drink and talk the same time.  Her cough persisted that evening, and /her degree on 8/14/2019.  Patient's daughter Shannan and patient's lynnetteter Stefanie schedule today's appointment but then decided to schedule an sooner visit with Dr. Perez.    Seen by Dr. Perez on 8/14/2019 prescribed   azithromycin 250 MG Take 2 tablets by mouth on day 1; Take 1 tablet by mouth on days 2-5     methylprednisolone 4 mg use as directed.    Patient presents to clinic week  with rancho cifuentes patient is improve her coughing improved within at least 48 hr of the choking incident.   Presents to this visit stable, no wheezing, no coughing, no fever.  Patient appears well.     Trial of Daliresp June 2019 not tolerated caused combativeness and hallucinations, took for 3 weeks then discontinued.  Current respiratory regimen:  Pulmicort nebs utilize once daily, (plan to increase to twice daily) duo nebs twice daily.   Ventolin inhaler is use when away from home.  On home oxygen 2 L a minute with exertion new set of June 2019, after qualified with 6 min walk.  Evaluation today to begin pulmonary rehab twice weekly x 4 months.    Previous Report Reviewed: lab reports, office notes and radiology reports     Past Medical History: The following portions of the patient's history were reviewed and updated as appropriate:   She  has a past surgical history that includes Tonsillectomy and Mandible surgery.  Her family history includes Cancer in her mother.  She  reports that she quit smoking about 69 years ago. Her smoking use included cigarettes. She has a 32.00 pack-year smoking history. She has never used smokeless tobacco. She reports that she does not drink alcohol or use drugs.  She has a current medication list which includes the following prescription(s): acetaminophen, albuterol-ipratropium, anoro ellipta, aspirin, atorvastatin, azithromycin, budesonide, cholecalciferol (vitamin d3), clonazepam, folic acid, furosemide, inhalation spacing device, levothyroxine, methylprednisolone, mirtazapine, pantoprazole, potassium chloride, promethazine-dextromethorphan, sertraline, valproate, and vitamin b complex.  She is allergic to sulfa (sulfonamide antibiotics)..    Review of Systems   Constitutional: Negative for fever, chills, weight loss, weight gain, activity change, appetite change, fatigue and night sweats.   HENT: Negative for postnasal drip, rhinorrhea, sinus pressure, voice change and  "congestion.    Eyes: Negative for redness and itching.   Respiratory: Negative for snoring, cough, sputum production, chest tightness, shortness of breath, wheezing, orthopnea, asthma nighttime symptoms, dyspnea on extertion, use of rescue inhaler and somnolence.    Cardiovascular: Negative.  Negative for chest pain, palpitations and leg swelling.   Genitourinary: Negative for difficulty urinating and hematuria.   Endocrine: Negative for cold intolerance and heat intolerance.    Musculoskeletal: Negative for arthralgias, gait problem, joint swelling and myalgias.   Skin: Negative.    Gastrointestinal: Negative for nausea, vomiting, abdominal pain and acid reflux.   Neurological: Negative for dizziness, weakness, light-headedness and headaches.   Hematological: Negative for adenopathy. No excessive bruising.   All other systems reviewed and are negative.       Objective:   /70   Pulse 71   Resp 20   Ht 5' 4" (1.626 m)   Wt 59.2 kg (130 lb 8.2 oz)   SpO2 (!) 94% Comment: 2 liters  BMI 22.40 kg/m²   Physical Exam   Constitutional: She is oriented to person, place, and time. She appears well-developed and well-nourished. She is active and cooperative.  Non-toxic appearance. She does not appear ill. No distress.   HENT:   Head: Normocephalic.   Right Ear: External ear normal.   Left Ear: External ear normal.   Nose: Nose normal.   Mouth/Throat: Oropharynx is clear and moist. No oropharyngeal exudate.   Eyes: Conjunctivae are normal.   Neck: Normal range of motion. Neck supple.   Cardiovascular: Normal rate, regular rhythm and intact distal pulses.   Murmur heard.  Pulmonary/Chest: Effort normal and breath sounds normal. No stridor. She has no wheezes. She has no rales.   Abdominal: Soft.   Musculoskeletal: Normal range of motion.   Lymphadenopathy:     She has no cervical adenopathy.   Neurological: She is alert and oriented to person, place, and time.   Skin: Skin is warm and dry.   Psychiatric: She has a " normal mood and affect. Her behavior is normal. Judgment and thought content normal.   Vitals reviewed.    Personal Diagnostic Review  X-Ray Chest PA And Lateral  Narrative: EXAMINATION:  XR CHEST PA AND LATERAL    CLINICAL HISTORY:  Cough    TECHNIQUE:  PA and lateral views of the chest were performed.    COMPARISON:  05/22/2019    FINDINGS:  Cardiac silhouette remains at the upper limits of normal in size.  Mild ectasia and tortuosity of the thoracic aorta is unchanged.  Moderate-sized hiatal hernia is again noted.    Mild scarring versus subsegmental atelectasis in the bilateral lung bases is unchanged.  Mild chronic compression deformity near the thoracolumbar junction is unchanged.  There are vascular calcifications in the region of the bilateral subclavian arteries.  Impression: Unchanged appearance of the chest as above.  No acute findings.    Electronically signed by: Varinder Rudd MD  Date:    08/14/2019  Time:    08:26    7/16/2019 spirometry  Flow volume loop demonstrate an obstructive defect.   Moderate obstruction.FEV1  65.64 % predicted.    FEV1 and FVC improved since prior study 04/19/2017      Assessment:     1. Asthma with COPD    2. Atherosclerosis of aorta    3. Binswanger's dementia    4. Chronic diastolic congestive heart failure    5. Chronic respiratory failure with hypoxia      No orders of the defined types were placed in this encounter.      Plan:   Discussed diagnosis, its evaluation, treatment and usual course. All questions answered.  Problem List Items Addressed This Visit     Chronic respiratory failure with hypoxia    Overview     Nasal cannula 2 L with exertion and sleep         Current Assessment & Plan     On home oxygen 2 L with exertion and sleep began June 2019         Chronic diastolic congestive heart failure    Current Assessment & Plan     Some restricted diet, stable         Binswanger's dementia    Overview     Dr Siomara Huffman , [facility]          Current Assessment & Plan      Dr. Siomara Huffman, [facility]         Atherosclerosis of aorta    Current Assessment & Plan     Seen on imaging         Asthma with COPD (Chronic)    Current Assessment & Plan     Acute flare 8/14/2019 improving with Zithromax and Medrol Dosepak.  Since on home oxygen patient has been improved with less dyspnea.  No longer wheezing on a regular basis.  Failed trial of Daliresp in June, cause combativeness and hallucination.  Continue current regime Pulmicort nebs twice daily duo nebs twice daily may add on duo nebs up to every 6 hr if needed.  Albuterol inhaler if needed  Continue nasal cannula 2 L with exertion and sleep.  Continue with planned pulmonary rehab  Keep follow-up planned January 2020 follow-up sooner if needed.              Plan summary  Complete the Zithromax and Medrol taper, after choking episode on Depakote no pneumonia seen on imaging, not symptomatic.  Continue current treatment regime for asthma/COPD Pulmicort nebs, duo nebs, albuterol  Nasal cannula 2 L with exertion and sleep  Pulmonary rehab  Patient has not had continue frequent exacerbations since she is on home oxygen.  Still consideration for monthly antibiotic and or daily prednisone if does not remained improved  Follow up in about 22 weeks (around 1/17/2020) for Asthma, COPD as scheduled previously with review chest x-ray.

## 2019-08-17 DIAGNOSIS — L40.50 PSORIASIS WITH ARTHROPATHY: ICD-10-CM

## 2019-08-17 DIAGNOSIS — F33.0 MAJOR DEPRESSIVE DISORDER, RECURRENT EPISODE, MILD: ICD-10-CM

## 2019-08-17 RX ORDER — LEVOTHYROXINE SODIUM 25 UG/1
TABLET ORAL
Qty: 90 TABLET | Refills: 0 | Status: SHIPPED | OUTPATIENT
Start: 2019-08-17 | End: 2019-12-01 | Stop reason: SDUPTHER

## 2019-08-17 RX ORDER — SERTRALINE HYDROCHLORIDE 50 MG/1
TABLET, FILM COATED ORAL
Qty: 90 TABLET | Refills: 0 | Status: SHIPPED | OUTPATIENT
Start: 2019-08-17 | End: 2019-12-05 | Stop reason: SDUPTHER

## 2019-08-17 RX ORDER — FOLIC ACID 1 MG/1
TABLET ORAL
Qty: 90 TABLET | Refills: 0 | Status: SHIPPED | OUTPATIENT
Start: 2019-08-17 | End: 2019-12-15 | Stop reason: SDUPTHER

## 2019-08-17 NOTE — PROGRESS NOTES
Subjective:      Patient ID: Crystal Romero is a 84 y.o. female.    Chief Complaint: Cough    HPI   85 yo with   Patient Active Problem List   Diagnosis    Rheumatoid arthritis    Hiatal hernia    Asthma with COPD    Hyperlipidemia    Hypertension    Diastolic dysfunction    Osteoarthritis    Rotator cuff arthropathy    H/O: GI bleed    Dysplastic colon polyp    Hypothyroid    Nonrheumatic aortic valve stenosis    Nonrheumatic aortic valve insufficiency    Impaired cognition    Gastroesophageal reflux disease without esophagitis    Major depressive disorder, recurrent episode, mild    Psoriasis with arthropathy    Bilateral adhesive capsulitis of shoulders    Pulmonary emphysema    Insomnia secondary to depression with anxiety    Binswanger's dementia    Seizure    Vasculitis, CNS    Dysphasia    History of transient ischemic attack (TIA)    Chronic diastolic congestive heart failure    Coronary artery calcification    Atherosclerosis of aorta    Chronic respiratory failure with hypoxia    Exercise hypoxemia     Past Medical History:   Diagnosis Date    Arthritis     Cataract     Coronary artery disease     Dementia     Depression     Encounter for blood transfusion     H/O: GI bleed     Hiatal hernia     Hyperlipidemia     Hypertension     Hypothyroid     Rheumatoid arthritis(714.0)     Seizures     Stroke     Syncope and collapse      Here today with caregiver. Reports mild coughing starting 4 to 5 days ago. occ mild sputum production. Only occ wheezing. 2 days ago choked on liquid depakote. Since then significant increase in frequency and severity of cough.  Has had some increased sputum production.  No fever.  No change in mental status or energy level.  Cough interfering with rest.  Tessalon Perles are of some help.  Review of Systems   Constitutional: Negative for chills, fatigue, fever and unexpected weight change.   HENT: Negative for postnasal drip,  rhinorrhea and sinus pressure.    Respiratory: Positive for cough and wheezing. Negative for shortness of breath.    Cardiovascular: Negative for chest pain, palpitations and leg swelling.   Gastrointestinal: Negative for abdominal pain.   Skin: Negative for rash and wound.     Objective:   /82 (BP Location: Right arm, Patient Position: Sitting, BP Method: Medium (Manual))   Pulse 90   Temp 97.4 °F (36.3 °C) (Tympanic)   Wt 59.4 kg (130 lb 15.3 oz)   SpO2 95%   BMI 24.10 kg/m²     Physical Exam   Constitutional: She appears well-developed and well-nourished. No distress.   HENT:   Head: Normocephalic and atraumatic.   Eyes: Conjunctivae and EOM are normal.   Cardiovascular: Normal rate.   Pulmonary/Chest: Effort normal. No stridor. No respiratory distress. She has wheezes (Very faint occasional on left). She has no rales.   Decreased breath sounds throughout   Neurological: She is alert.   Skin: Skin is warm and dry.   Psychiatric: She has a normal mood and affect. Her behavior is normal.       Assessment:     1. COPD exacerbation    2. Pulmonary emphysema, unspecified emphysema type    3. Cough productive of clear sputum      Plan:   COPD exacerbation  -     promethazine-dextromethorphan (PROMETHAZINE-DM) 6.25-15 mg/5 mL Syrp; Take 5 mLs by mouth nightly as needed.  Dispense: 118 mL; Refill: 0  -     methylPREDNISolone (MEDROL, JAMIE,) 4 mg tablet; use as directed  Dispense: 1 Package; Refill: 0  -     azithromycin (Z-JAMIE) 250 MG tablet; Take 2 tablets by mouth on day 1; Take 1 tablet by mouth on days 2-5  Dispense: 6 tablet; Refill: 0  -     X-Ray Chest PA And Lateral; Future; Expected date: 08/13/2019    Pulmonary emphysema, unspecified emphysema type    Cough productive of clear sputum  -     X-Ray Chest PA And Lateral; Future; Expected date: 08/13/2019        Lab Frequency Next Occurrence   Basic metabolic panel Once 07/02/2019   X-Ray Chest PA And Lateral Once 07/16/2019       Problem List Items  Addressed This Visit        Pulmonary    Pulmonary emphysema      Other Visit Diagnoses     COPD exacerbation    -  Primary    Relevant Medications    promethazine-dextromethorphan (PROMETHAZINE-DM) 6.25-15 mg/5 mL Syrp    methylPREDNISolone (MEDROL, JAMIE,) 4 mg tablet    azithromycin (Z-JAMIE) 250 MG tablet    Other Relevant Orders    X-Ray Chest PA And Lateral (Completed)    Cough productive of clear sputum        Relevant Orders    X-Ray Chest PA And Lateral (Completed)          Follow up if symptoms worsen or fail to improve.

## 2019-08-20 ENCOUNTER — OFFICE VISIT (OUTPATIENT)
Dept: CARDIOLOGY | Facility: CLINIC | Age: 84
End: 2019-08-20
Payer: MEDICARE

## 2019-08-20 ENCOUNTER — CLINICAL SUPPORT (OUTPATIENT)
Dept: CARDIOLOGY | Facility: CLINIC | Age: 84
End: 2019-08-20
Payer: MEDICARE

## 2019-08-20 VITALS
SYSTOLIC BLOOD PRESSURE: 108 MMHG | DIASTOLIC BLOOD PRESSURE: 68 MMHG | HEIGHT: 64 IN | BODY MASS INDEX: 22.2 KG/M2 | WEIGHT: 130 LBS | HEART RATE: 73 BPM

## 2019-08-20 DIAGNOSIS — Z87.19 H/O: GI BLEED: ICD-10-CM

## 2019-08-20 DIAGNOSIS — I10 HTN (HYPERTENSION): Primary | ICD-10-CM

## 2019-08-20 DIAGNOSIS — I50.32 CHRONIC DIASTOLIC CONGESTIVE HEART FAILURE: Primary | ICD-10-CM

## 2019-08-20 DIAGNOSIS — I25.84 CORONARY ARTERY CALCIFICATION: ICD-10-CM

## 2019-08-20 DIAGNOSIS — I10 HTN (HYPERTENSION): ICD-10-CM

## 2019-08-20 DIAGNOSIS — I10 ESSENTIAL HYPERTENSION: Chronic | ICD-10-CM

## 2019-08-20 DIAGNOSIS — I25.10 CORONARY ARTERY CALCIFICATION: ICD-10-CM

## 2019-08-20 DIAGNOSIS — E78.00 PURE HYPERCHOLESTEROLEMIA: Chronic | ICD-10-CM

## 2019-08-20 DIAGNOSIS — I35.0 NONRHEUMATIC AORTIC VALVE STENOSIS: ICD-10-CM

## 2019-08-20 PROCEDURE — 99999 PR PBB SHADOW E&M-EST. PATIENT-LVL III: CPT | Mod: PBBFAC,HCNC,, | Performed by: INTERNAL MEDICINE

## 2019-08-20 PROCEDURE — 99999 PR PBB SHADOW E&M-EST. PATIENT-LVL III: ICD-10-PCS | Mod: PBBFAC,HCNC,, | Performed by: INTERNAL MEDICINE

## 2019-08-20 PROCEDURE — 3074F PR MOST RECENT SYSTOLIC BLOOD PRESSURE < 130 MM HG: ICD-10-PCS | Mod: HCNC,CPTII,S$GLB, | Performed by: INTERNAL MEDICINE

## 2019-08-20 PROCEDURE — 93010 EKG 12-LEAD: ICD-10-PCS | Mod: HCNC,S$GLB,, | Performed by: INTERNAL MEDICINE

## 2019-08-20 PROCEDURE — 3078F PR MOST RECENT DIASTOLIC BLOOD PRESSURE < 80 MM HG: ICD-10-PCS | Mod: HCNC,CPTII,S$GLB, | Performed by: INTERNAL MEDICINE

## 2019-08-20 PROCEDURE — 93005 ELECTROCARDIOGRAM TRACING: CPT | Mod: HCNC,S$GLB,, | Performed by: INTERNAL MEDICINE

## 2019-08-20 PROCEDURE — 3078F DIAST BP <80 MM HG: CPT | Mod: HCNC,CPTII,S$GLB, | Performed by: INTERNAL MEDICINE

## 2019-08-20 PROCEDURE — 93005 EKG 12-LEAD: ICD-10-PCS | Mod: HCNC,S$GLB,, | Performed by: INTERNAL MEDICINE

## 2019-08-20 PROCEDURE — 1101F PT FALLS ASSESS-DOCD LE1/YR: CPT | Mod: HCNC,CPTII,S$GLB, | Performed by: INTERNAL MEDICINE

## 2019-08-20 PROCEDURE — 1101F PR PT FALLS ASSESS DOC 0-1 FALLS W/OUT INJ PAST YR: ICD-10-PCS | Mod: HCNC,CPTII,S$GLB, | Performed by: INTERNAL MEDICINE

## 2019-08-20 PROCEDURE — 99214 OFFICE O/P EST MOD 30 MIN: CPT | Mod: HCNC,S$GLB,, | Performed by: INTERNAL MEDICINE

## 2019-08-20 PROCEDURE — 99214 PR OFFICE/OUTPT VISIT, EST, LEVL IV, 30-39 MIN: ICD-10-PCS | Mod: HCNC,S$GLB,, | Performed by: INTERNAL MEDICINE

## 2019-08-20 PROCEDURE — 3074F SYST BP LT 130 MM HG: CPT | Mod: HCNC,CPTII,S$GLB, | Performed by: INTERNAL MEDICINE

## 2019-08-20 PROCEDURE — 93010 ELECTROCARDIOGRAM REPORT: CPT | Mod: HCNC,S$GLB,, | Performed by: INTERNAL MEDICINE

## 2019-08-20 RX ORDER — ASPIRIN 81 MG/1
81 TABLET ORAL
Start: 2019-08-20

## 2019-08-20 NOTE — PROGRESS NOTES
Subjective:   Patient ID:  Crystal Romero is a 84 y.o. female who presents for follow up of Nonrheumatic aortic valve stenosis      84, yo female, came in for routine f/u. Severe dementia and discussed with the her daughter Shannan HOWARD.  PMH mod AI, and severe AS, frequent fall, dementia 3 yrs, HTN, HLD, asthma, COPD on home O2, GI bleeding due to prolong Rx of steroid for asthma, RA, TIA x3 in 4 months  H/o TIA/seizure episode. Had impaired swallow and dysphasia. F/u with  neurologist.  Repeat ECHO in , showed normal EF, DD, moderate AI and moderate AS.   Refused surgery for valve per POA.  Frequent fall, no syncope, no bone Fx. Mild skin bruits.  Recent rx of COPD exacerbation.   Has SOB, cough, abd swelling,   Recent worse MOLINA. Question fell once recently. Unstable gait. Walker dependent. Walks at stores.  Decent appetite,   No recurrent seizure.  No orthopnea and chest pain.  Lasix 40 mg daily can keep weight stable          Past Medical History:   Diagnosis Date    Arthritis     Cataract     Coronary artery disease     Dementia     Depression     Encounter for blood transfusion     H/O: GI bleed     Hiatal hernia     Hyperlipidemia     Hypertension     Hypothyroid     Rheumatoid arthritis(714.0)     Seizures     Stroke     Syncope and collapse        Past Surgical History:   Procedure Laterality Date    COLONOSCOPY N/A 2015    Per[facility]    COLONOSCOPY N/A 2014    [facility]    ESOPHAGOGASTRODUODENOSCOPY (EGD) N/A 2014    Per[facility] by [facility] at [facility]    MANDIBLE SURGERY      TONSILLECTOMY         Social History     Tobacco Use    Smoking status: Former Smoker     Packs/day: 2.00     Years: 16.00     Pack years: 32.00     Types: Cigarettes     Last attempt to quit: 1950     Years since quittin.6    Smokeless tobacco: Never Used   Substance  Use Topics    Alcohol use: No     Frequency: Never    Drug use: No       Family History   Problem Relation Age of Onset    Cancer Mother         breast, uterine         Review of Systems   Unable to perform ROS: dementia       Objective:   Physical Exam   Constitutional: She is oriented to person, place, and time. She appears well-nourished.   HENT:   Head: Normocephalic.   Eyes: Pupils are equal, round, and reactive to light.   Neck: Normal carotid pulses and no JVD present. Carotid bruit is not present. No thyromegaly present.   Cardiovascular: Normal rate, regular rhythm and normal pulses.  No extrasystoles are present. PMI is not displaced. Exam reveals no gallop and no S3.   Murmur heard.  Pulses:       Radial pulses are 2+ on the right side, and 2+ on the left side.   3/6 ESM on RUSB, S2 not audible.   Pulmonary/Chest: No stridor. No respiratory distress. She has decreased breath sounds.   Abdominal: Soft. Bowel sounds are normal. There is no tenderness. There is no rebound.   Musculoskeletal: Normal range of motion.   Neurological: She is alert and oriented to person, place, and time.   Skin: Skin is intact. No rash noted.   Psychiatric: Her behavior is normal.       Lab Results   Component Value Date    CHOL 281 (H) 06/13/2019    CHOL 211 (H) 04/04/2018    CHOL 169 07/11/2017     Lab Results   Component Value Date    HDL 76 (H) 06/13/2019    HDL 72 04/04/2018    HDL 58 07/11/2017     Lab Results   Component Value Date    LDLCALC 186.0 (H) 06/13/2019    LDLCALC 122.6 04/04/2018    LDLCALC 95.2 07/11/2017     Lab Results   Component Value Date    TRIG 95 06/13/2019    TRIG 82 04/04/2018    TRIG 79 07/11/2017     Lab Results   Component Value Date    CHOLHDL 27.0 06/13/2019    CHOLHDL 34.1 04/04/2018    CHOLHDL 34.3 07/11/2017       Chemistry        Component Value Date/Time     07/16/2019 1310    K 4.5 07/16/2019 1310    CL 99 07/16/2019 1310    CO2 26 07/16/2019 1310    BUN 20 07/16/2019 1310     CREATININE 1.0 07/16/2019 1310    GLU 94 07/16/2019 1310        Component Value Date/Time    CALCIUM 10.2 07/16/2019 1310    ALKPHOS 45 (L) 06/13/2019 1205    AST 19 06/13/2019 1205    ALT 10 06/13/2019 1205    BILITOT 0.3 06/13/2019 1205    ESTGFRAFRICA 59.8 (A) 07/16/2019 1310    EGFRNONAA 51.9 (A) 07/16/2019 1310          No results found for: LABA1C, HGBA1C  Lab Results   Component Value Date    TSH 2.236 06/13/2019     Lab Results   Component Value Date    INR 0.9 03/10/2017    INR 1.0 09/23/2014     Lab Results   Component Value Date    WBC 6.05 06/13/2019    HGB 12.9 06/13/2019    HCT 41.0 06/13/2019    MCV 97 06/13/2019     06/13/2019     BMP  Sodium   Date Value Ref Range Status   07/16/2019 136 136 - 145 mmol/L Final     Potassium   Date Value Ref Range Status   07/16/2019 4.5 3.5 - 5.1 mmol/L Final     Chloride   Date Value Ref Range Status   07/16/2019 99 95 - 110 mmol/L Final     CO2   Date Value Ref Range Status   07/16/2019 26 23 - 29 mmol/L Final     BUN, Bld   Date Value Ref Range Status   07/16/2019 20 8 - 23 mg/dL Final     Creatinine   Date Value Ref Range Status   07/16/2019 1.0 0.5 - 1.4 mg/dL Final     Calcium   Date Value Ref Range Status   07/16/2019 10.2 8.7 - 10.5 mg/dL Final     Anion Gap   Date Value Ref Range Status   07/16/2019 11 8 - 16 mmol/L Final     eGFR if    Date Value Ref Range Status   07/16/2019 59.8 (A) >60 mL/min/1.73 m^2 Final     eGFR if non    Date Value Ref Range Status   07/16/2019 51.9 (A) >60 mL/min/1.73 m^2 Final     Comment:     Calculation used to obtain the estimated glomerular filtration  rate (eGFR) is the CKD-EPI equation.        BNP  @LABRCNTIP(BNP,BNPTRIAGEBLO)@  @LABRCNTIP(troponini)@  CrCl cannot be calculated (Patient's most recent lab result is older than the maximum 7 days allowed.).  No results found in the last 24 hours.  No results found in the last 24 hours.  No results found in the last 24  hours.    Assessment:      1. Chronic diastolic congestive heart failure    2. Coronary artery calcification    3. Essential hypertension    4. Pure hypercholesterolemia    5. Nonrheumatic aortic valve stenosis    6. H/O: GI bleed      Frequent fall  No cp  CHFpEF compensated  Severe AS and dementia. No invasive procedure  Plan:   Decrease ASA 81mg to q 48 hours  Supportive care  Fall precaution  RTC in 4 months

## 2019-08-22 ENCOUNTER — HOSPITAL ENCOUNTER (OUTPATIENT)
Dept: PULMONOLOGY | Facility: HOSPITAL | Age: 84
Discharge: HOME | End: 2019-08-22
Attending: INTERNAL MEDICINE
Payer: MEDICARE

## 2019-08-22 VITALS — WEIGHT: 131.38 LBS | BODY MASS INDEX: 22.55 KG/M2

## 2019-08-22 PROCEDURE — G0424 PULMONARY REHAB W EXER: HCPCS | Mod: HCNC

## 2019-08-22 NOTE — PROGRESS NOTES
[facility]  Pulmonary Rehab  Session Summary    SUMMARY     Session Data  Session Number: 2  Time In: 0900  Time Out: 1000  Weight: 59.6 kg (131 lb 6.4 oz)  Target Heart Rate Zone: Minimum: 82 bpm  Target Heart Rate Zone: Maximum: 109 bpm  Patient Motivation: Adequate  Patient Effort: Good      Pre Exercise Vitals  SpO2: 99 %  Supplemental O2?: Yes  O2 Device: nasal cannula  O2 Flow (L/min): 2  Pulse: 79  BP: 120/77  Shy Dyspnea Rating : 1      During Exercise Vitals  SpO2: 99 %  Supplemental O2?: Yes  O2 Device: nasal cannula  O2 Flow (L/min): 2  Pulse: 74  BP: 127/78  Shy Dyspnea Rating : 3      Post Exercise Vitals  SpO2: 100 %  Supplemental O2?: Yes  O2 Device: nasal cannula  O2 Flow (L/min): 2  Pulse: 82  BP: 135/71  Shy Dyspnea Rating : 2       Modality  Modality: Arm Ergometer, Hand Free Weights, Nustep    Arm Ergometer  Time: 10 minutes  Level: 1      Nustep  Time: 12 minutes  Steps: 357  Load: 3  Mets: 1.2      Biceps  lbs: 3 lbs(dowel)  Sets: 2  Reps: 10      Chest  lbs: 3 lbs(dowel)  Sets: 2  Reps: 10      Education  Equipment use and safety  Pursed lip breathing   Proper heart rate zone      Therapist Notes   Good effort. Today was pt's first exercise day. She has dementia. She was cooperative. Complained of right shoulder pain on Nustep so arms removed. She tolerated exercise well. Will continue to motivate and educate pt in rehab.    Dr. Slater immediately available as needed.

## 2019-08-27 ENCOUNTER — HOSPITAL ENCOUNTER (OUTPATIENT)
Dept: PULMONOLOGY | Facility: HOSPITAL | Age: 84
Discharge: HOME | End: 2019-08-27
Attending: INTERNAL MEDICINE
Payer: MEDICARE

## 2019-08-27 VITALS — BODY MASS INDEX: 22.55 KG/M2 | WEIGHT: 131.38 LBS

## 2019-08-27 PROCEDURE — G0424 PULMONARY REHAB W EXER: HCPCS | Mod: HCNC

## 2019-08-27 NOTE — PROGRESS NOTES
[facility]  Pulmonary Rehab  Session Summary    SUMMARY     Session Data  Session Number: 3  Time In: 0900  Time Out: 1000  Weight: 59.6 kg (131 lb 6.4 oz)  Target Heart Rate Zone: Minimum: 82 bpm  Target Heart Rate Zone: Maximum: 109 bpm  Patient Effort: Good      Pre Exercise Vitals  SpO2: 96 %  Supplemental O2?: Yes  O2 Device: nasal cannula  O2 Flow (L/min): 2  Pulse: 66  BP: (!) 156/98  Shy Dyspnea Rating : 2      During Exercise Vitals  SpO2: 98 %  Supplemental O2?: Yes  O2 Device: nasal cannula  O2 Flow (L/min): 2  Pulse: 66  BP: (!) 122/92  Shy Dyspnea Rating : 3      Post Exercise Vitals  SpO2: 99 %  Supplemental O2?: Yes  O2 Device: nasal cannula  O2 Flow (L/min): 2  Pulse: 71  BP: 133/71  Shy Dyspnea Rating : 2       Modality  Modality: Arm Ergometer, Hand Free Weights, Nustep      Arm Ergometer  Time: 10 minutes  Level: 1    Nustep  Time: 15 minutes  Steps: 571  Load: 3  Mets: 1       Biceps  lbs: 4 lbs(dowel)  Sets: 2  Reps: 10      Chest  lbs: 4 lbs(dowel)  Sets: 2  Reps: 10      Education  Controlling triggers  Avoiding allergens and irritants      Therapist Notes   Good effort. Increased to 4 lb dowel and increased time on Nustep today. Pt tolerated all changes and exercise well. Will continue to motivate and educate pt in rehab.    Dr. Jeronimo Gaming immediately available as needed.

## 2019-08-29 ENCOUNTER — HOSPITAL ENCOUNTER (OUTPATIENT)
Dept: PULMONOLOGY | Facility: HOSPITAL | Age: 84
Discharge: HOME | End: 2019-08-29
Attending: INTERNAL MEDICINE
Payer: MEDICARE

## 2019-08-29 VITALS — BODY MASS INDEX: 22.55 KG/M2 | WEIGHT: 131.38 LBS

## 2019-08-29 PROCEDURE — G0424 PULMONARY REHAB W EXER: HCPCS | Mod: HCNC

## 2019-08-29 NOTE — PROGRESS NOTES
[facility]  Pulmonary Rehab  Session Summary    SUMMARY     Session Data  Session Number: 4  Time In: 0900  Time Out: 1000  Weight: 59.6 kg (131 lb 6.4 oz)  Target Heart Rate Zone: Minimum: 82 bpm  Target Heart Rate Zone: Maximum: 109 bpm  Patient Motivation: Good  Patient Effort: Good      Pre Exercise Vitals  SpO2: 95 %  Supplemental O2?: No  Pulse: 70  BP: 160/82  Shy Dyspnea Rating : 2      During Exercise Vitals  SpO2: 97 %  Supplemental O2?: No  Pulse: 73  BP: 126/78  Shy Dyspnea Rating : 3      Post Exercise Vitals  SpO2: 95 %  Supplemental O2?: No  Pulse: 74  BP: 132/83  Shy Dyspnea Rating : 2       Modality  Modality: Arm Ergometer, Hand Free Weights, Nustep    Arm Ergometer  Time: 7 minutes  Level: 1      Nustep  Time: 20 minutes  Steps: 875  Load: 3  Mets: 1.1      Biceps  lbs: 4 lbs(dowel)  Sets: 2  Reps: 10      Chest  lbs: 4 lbs(dowel)  Sets: 2  Reps: 10      Education  Controlling triggers  Avoiding allergens and irritants      Therapist Notes   Good effort. Increased time on Nustep to 20 minutes. Pt tolerated all exercises today. Will continue to motivate and educate pt in rehab.    Dr. Jeronimo Gaming immediately available as needed.

## 2019-09-03 ENCOUNTER — HOSPITAL ENCOUNTER (OUTPATIENT)
Dept: PULMONOLOGY | Facility: HOSPITAL | Age: 84
Discharge: HOME | End: 2019-09-03
Attending: INTERNAL MEDICINE
Payer: MEDICARE

## 2019-09-03 VITALS — WEIGHT: 130.19 LBS | BODY MASS INDEX: 22.35 KG/M2

## 2019-09-03 PROCEDURE — G0424 PULMONARY REHAB W EXER: HCPCS | Mod: HCNC

## 2019-09-03 NOTE — PROGRESS NOTES
[facility]  Pulmonary Rehab  Session Summary    SUMMARY     Session Data  Session Number: 5  Time In: 0900  Time Out: 1000  Weight: 59.1 kg (130 lb 3.2 oz)  Target Heart Rate Zone: Minimum: 82 bpm  Target Heart Rate Zone: Maximum: 109 bpm  Patient Motivation: Good  Patient Effort: Good      Pre Exercise Vitals  SpO2: 96 %  Supplemental O2?: No  Pulse: 65  BP: 118/62  Shy Dyspnea Rating : 2      During Exercise Vitals  SpO2: 97 %  Supplemental O2?: No  Pulse: 69  BP: 119/70  Shy Dyspnea Rating : 3      Post Exercise Vitals  SpO2: 96 %  Supplemental O2?: No  Pulse: 72  BP: 126/77  Shy Dyspnea Rating : 2       Modality  Modality: Arm Ergometer, Hand Free Weights, Nustep    Arm Ergometer  Time: 8 minutes  Level: 1       Nustep  Time: 20 minutes  Steps: 1028  Load: 3  Mets: 1.1       Biceps  lbs: 4 lbs(dowel)  Sets: 2  Reps: 10      Chest  lbs: 4 lbs(dowel)  Sets: 2  Reps: 10       Education  Managing medications      Therapist Notes   Good effort. Pt achieved personal best on Nustep today. 1028 steps on load 3 in 20 minutes. She tolerated all exercises well. Will continue to motivate and educate pt in rehab.    Dr. Jeronimo Gaming immediately available as needed.

## 2019-09-05 ENCOUNTER — HOSPITAL ENCOUNTER (OUTPATIENT)
Dept: PULMONOLOGY | Facility: HOSPITAL | Age: 84
Discharge: HOME | End: 2019-09-05
Attending: INTERNAL MEDICINE
Payer: MEDICARE

## 2019-09-05 VITALS — WEIGHT: 129.19 LBS | BODY MASS INDEX: 22.18 KG/M2

## 2019-09-05 PROCEDURE — G0424 PULMONARY REHAB W EXER: HCPCS | Mod: HCNC

## 2019-09-05 NOTE — PROGRESS NOTES
[facility]  Pulmonary Rehab  Session Summary    SUMMARY     Session Data  Session Number: 6  Time In: 0906  Time Out: 1000  Weight: 58.6 kg (129 lb 3.2 oz)  Target Heart Rate Zone: Minimum: 82 bpm  Target Heart Rate Zone: Maximum: 109 bpm  Patient Motivation: Good  Patient Effort: Good      Pre Exercise Vitals  SpO2: 96 %  Supplemental O2?: Yes  O2 Device: nasal cannula  O2 Flow (L/min): 2  Pulse: 72  BP: 145/84  Shy Dyspnea Rating : 2      During Exercise Vitals  SpO2: 99 %  Supplemental O2?: Yes  O2 Device: nasal cannula  O2 Flow (L/min): 2  Pulse: 71  BP: 126/90  Shy Dyspnea Rating : 3      Post Exercise Vitals  SpO2: 100 %  Supplemental O2?: Yes  O2 Device: nasal cannula  O2 Flow (L/min): 2  Pulse: 71  BP: 129/86  Shy Dyspnea Rating : 2       Modality  Modality: Arm Ergometer, Hand Free Weights, Nustep      Arm Ergometer  Time: 8 minutes  Level: 1      Nustep  Time: 20 minutes  Steps: 673  Load: 3  Mets: 1      Biceps  lbs: 4 lbs(dowel)  Sets: 2  Reps: 10      Chest  lbs: 4 lbs(dowel)  Sets: 2  Reps: 10    Education  Exercise tips      Therapist Notes   Good effort. Pt complained of right shoulder hurting. Stopped early on arm ergometer due to this. Will continue to motivate and educate pt in rehab.    Dr. Jeronimo aGming immediately available as needed.

## 2019-09-10 ENCOUNTER — HOSPITAL ENCOUNTER (OUTPATIENT)
Dept: PULMONOLOGY | Facility: HOSPITAL | Age: 84
Discharge: HOME | End: 2019-09-10
Attending: INTERNAL MEDICINE
Payer: MEDICARE

## 2019-09-10 VITALS — BODY MASS INDEX: 22.25 KG/M2 | WEIGHT: 129.63 LBS

## 2019-09-10 PROCEDURE — G0424 PULMONARY REHAB W EXER: HCPCS | Mod: HCNC

## 2019-09-10 NOTE — PROGRESS NOTES
[facility]  Pulmonary Rehab  Session Summary    SUMMARY     Session Data  Session Number: 7  Time In: 0910  Time Out: 1000  Weight: 58.8 kg (129 lb 9.6 oz)  Target Heart Rate Zone: Minimum: 82 bpm  Target Heart Rate Zone: Maximum: 109 bpm  Patient Motivation: Good  Patient Effort: Good      Pre Exercise Vitals  SpO2: 97 %  Supplemental O2?: Yes  O2 Device: nasal cannula  O2 Flow (L/min): 2  Pulse: 70  BP: 125/81  Shy Dyspnea Rating : 2      During Exercise Vitals  SpO2: 96 %  Supplemental O2?: No  Pulse: 69  BP: 127/80  Shy Dyspnea Rating : 3      Post Exercise Vitals  SpO2: 96 %  Supplemental O2?: No  Pulse: 71  BP: 119/83  Shy Dyspnea Rating : 2       Modality  Modality: Nustep, Arm Ergometer, Hand Free Weights      Arm Ergometer  Time: 8 minutes  Level: 1      Nustep  Time: 20 minutes  Steps: 846  Load: 3  Mets: 1.1      Biceps  lbs: 4 lbs(dowel)  Sets: 2  Reps: 10      Chest  lbs: 4 lbs(dowel)  Sets: 2  Reps: 10       Education  COPD action plan      Therapist Notes   Good effort. Pt tolerated exercise well today. No complaints of shoulder pain. Will continue to motivate and educate pt in rehab.    Dr. Jeronimo Gaming immediately available as needed.

## 2019-09-11 ENCOUNTER — OFFICE VISIT (OUTPATIENT)
Dept: INTERNAL MEDICINE | Facility: CLINIC | Age: 84
End: 2019-09-11
Payer: MEDICARE

## 2019-09-11 VITALS
TEMPERATURE: 98 F | OXYGEN SATURATION: 94 % | SYSTOLIC BLOOD PRESSURE: 110 MMHG | HEIGHT: 64 IN | DIASTOLIC BLOOD PRESSURE: 76 MMHG | BODY MASS INDEX: 22.5 KG/M2 | RESPIRATION RATE: 16 BRPM | WEIGHT: 131.81 LBS | HEART RATE: 65 BPM

## 2019-09-11 DIAGNOSIS — E03.9 HYPOTHYROIDISM, UNSPECIFIED TYPE: Chronic | ICD-10-CM

## 2019-09-11 DIAGNOSIS — I70.0 ATHEROSCLEROSIS OF AORTA: ICD-10-CM

## 2019-09-11 DIAGNOSIS — F51.05 INSOMNIA SECONDARY TO DEPRESSION WITH ANXIETY: ICD-10-CM

## 2019-09-11 DIAGNOSIS — I35.1 NONRHEUMATIC AORTIC VALVE INSUFFICIENCY: ICD-10-CM

## 2019-09-11 DIAGNOSIS — F41.8 INSOMNIA SECONDARY TO DEPRESSION WITH ANXIETY: ICD-10-CM

## 2019-09-11 DIAGNOSIS — I67.3 BINSWANGER'S DEMENTIA: ICD-10-CM

## 2019-09-11 DIAGNOSIS — I50.32 CHRONIC DIASTOLIC CONGESTIVE HEART FAILURE: ICD-10-CM

## 2019-09-11 DIAGNOSIS — F33.0 MAJOR DEPRESSIVE DISORDER, RECURRENT EPISODE, MILD: ICD-10-CM

## 2019-09-11 DIAGNOSIS — I10 ESSENTIAL HYPERTENSION: ICD-10-CM

## 2019-09-11 DIAGNOSIS — J44.89 ASTHMA WITH COPD: Primary | Chronic | ICD-10-CM

## 2019-09-11 DIAGNOSIS — E78.49 OTHER HYPERLIPIDEMIA: Chronic | ICD-10-CM

## 2019-09-11 DIAGNOSIS — I35.0 NONRHEUMATIC AORTIC VALVE STENOSIS: ICD-10-CM

## 2019-09-11 DIAGNOSIS — J96.11 CHRONIC RESPIRATORY FAILURE WITH HYPOXIA: ICD-10-CM

## 2019-09-11 PROCEDURE — 3074F PR MOST RECENT SYSTOLIC BLOOD PRESSURE < 130 MM HG: ICD-10-PCS | Mod: HCNC,CPTII,S$GLB, | Performed by: FAMILY MEDICINE

## 2019-09-11 PROCEDURE — 99499 UNLISTED E&M SERVICE: CPT | Mod: S$GLB,,, | Performed by: FAMILY MEDICINE

## 2019-09-11 PROCEDURE — 99999 PR PBB SHADOW E&M-EST. PATIENT-LVL III: CPT | Mod: PBBFAC,HCNC,, | Performed by: FAMILY MEDICINE

## 2019-09-11 PROCEDURE — 90662 IIV NO PRSV INCREASED AG IM: CPT | Mod: HCNC,S$GLB,, | Performed by: FAMILY MEDICINE

## 2019-09-11 PROCEDURE — 1101F PT FALLS ASSESS-DOCD LE1/YR: CPT | Mod: HCNC,CPTII,S$GLB, | Performed by: FAMILY MEDICINE

## 2019-09-11 PROCEDURE — 99999 PR PBB SHADOW E&M-EST. PATIENT-LVL III: ICD-10-PCS | Mod: PBBFAC,HCNC,, | Performed by: FAMILY MEDICINE

## 2019-09-11 PROCEDURE — G0008 FLU VACCINE - HIGH DOSE (65+) PRESERVATIVE FREE IM: ICD-10-PCS | Mod: HCNC,S$GLB,, | Performed by: FAMILY MEDICINE

## 2019-09-11 PROCEDURE — 1101F PR PT FALLS ASSESS DOC 0-1 FALLS W/OUT INJ PAST YR: ICD-10-PCS | Mod: HCNC,CPTII,S$GLB, | Performed by: FAMILY MEDICINE

## 2019-09-11 PROCEDURE — 3078F DIAST BP <80 MM HG: CPT | Mod: HCNC,CPTII,S$GLB, | Performed by: FAMILY MEDICINE

## 2019-09-11 PROCEDURE — 99214 OFFICE O/P EST MOD 30 MIN: CPT | Mod: 25,HCNC,S$GLB, | Performed by: FAMILY MEDICINE

## 2019-09-11 PROCEDURE — G0008 ADMIN INFLUENZA VIRUS VAC: HCPCS | Mod: HCNC,S$GLB,, | Performed by: FAMILY MEDICINE

## 2019-09-11 PROCEDURE — 99214 PR OFFICE/OUTPT VISIT, EST, LEVL IV, 30-39 MIN: ICD-10-PCS | Mod: 25,HCNC,S$GLB, | Performed by: FAMILY MEDICINE

## 2019-09-11 PROCEDURE — 3078F PR MOST RECENT DIASTOLIC BLOOD PRESSURE < 80 MM HG: ICD-10-PCS | Mod: HCNC,CPTII,S$GLB, | Performed by: FAMILY MEDICINE

## 2019-09-11 PROCEDURE — 99499 RISK ADDL DX/OHS AUDIT: ICD-10-PCS | Mod: S$GLB,,, | Performed by: FAMILY MEDICINE

## 2019-09-11 PROCEDURE — 90662 FLU VACCINE - HIGH DOSE (65+) PRESERVATIVE FREE IM: ICD-10-PCS | Mod: HCNC,S$GLB,, | Performed by: FAMILY MEDICINE

## 2019-09-11 PROCEDURE — 3074F SYST BP LT 130 MM HG: CPT | Mod: HCNC,CPTII,S$GLB, | Performed by: FAMILY MEDICINE

## 2019-09-11 RX ORDER — BUDESONIDE 0.5 MG/2ML
0.5 INHALANT ORAL 2 TIMES DAILY
COMMUNITY
End: 2020-08-04

## 2019-09-11 NOTE — PROGRESS NOTES
Subjective:       Patient ID: Crystal Romero is a 84 y.o. female.    Chief Complaint: Follow-up    84-year-old female patient accompanied by her daughter and caretaker with Patient Active Problem List:     Rheumatoid arthritis     Hiatal hernia     Asthma with COPD     Hyperlipidemia     Essential hypertension     Diastolic dysfunction     Osteoarthritis     Rotator cuff arthropathy     H/O: GI bleed     Dysplastic colon polyp     Hypothyroid     Nonrheumatic aortic valve stenosis     Nonrheumatic aortic valve insufficiency     Impaired cognition     Gastroesophageal reflux disease without esophagitis     Major depressive disorder, recurrent episode, mild     Psoriasis with arthropathy     Bilateral adhesive capsulitis of shoulders     Pulmonary emphysema     Insomnia secondary to depression with anxiety     Binswanger's dementia     Seizure     Vasculitis, CNS     Dysphasia     History of transient ischemic attack (TIA)     Chronic diastolic congestive heart failure     Coronary artery calcification     Atherosclerosis of aorta     Chronic respiratory failure with hypoxia     Exercise hypoxemia  Here for follow-up on chronic medical conditions and reports that she has been currently enrolled in pulmonary rehab program twice a week and has been doing well with breathing.  Patient has been placed on 2 L of oxygen, has been less agitated since increasing Klonopin to 3 times daily  Patient was recently treated for COPD exacerbation with antibiotics and steroids a month ago and has been doing well since started on pulmonary rehab program    Patient denies any excessive coughing or wheezing and has not been using albuterol inhaler regularly as before.   Denies any chest pain palpitations or dizziness, does not need any refills at this time  Appetite has been stable and reports that she has been doing well  Has been using walker secondary to gait instability, finished physical therapy with [facility]    Review of Systems   Constitutional: Negative for fatigue.   Eyes: Negative for visual disturbance.   Respiratory: Negative for cough, shortness of breath and wheezing.    Cardiovascular: Negative for chest pain and leg swelling.   Gastrointestinal: Negative for abdominal pain, nausea and vomiting.   Musculoskeletal: Positive for gait problem. Negative for myalgias.   Skin: Negative for rash.   Neurological: Negative for light-headedness and headaches.   Psychiatric/Behavioral: Positive for decreased concentration. Negative for hallucinations and sleep disturbance. The patient is nervous/anxious. The patient is not hyperactive.          /76 (BP Location: Left arm, Patient Position: Sitting, BP Method: Medium (Manual))   Pulse 65   Temp 97.6 °F (36.4 °C) (Tympanic)   Resp 16   Ht 5' 4" (1.626 m)   Wt 59.8 kg (131 lb 13.4 oz)   SpO2 (!) 94%   BMI 22.63 kg/m²   Objective:      Physical Exam   Constitutional: She is oriented to person, place, and time. She appears well-developed and well-nourished.   HENT:   Head: Normocephalic and atraumatic.   Mouth/Throat: Oropharynx is clear and moist.   Cardiovascular: Normal rate and regular rhythm.   Murmur heard.  Pulmonary/Chest: Effort normal. No respiratory distress. She has no wheezes. She exhibits no tenderness.   Decreased breath sounds bilaterally   Abdominal: Soft. Bowel sounds are normal. There is no tenderness.   Musculoskeletal: She exhibits no edema.   Neurological: She is alert and oriented to person, place, and time.   Skin: Skin is warm and dry. No rash noted.   Psychiatric: She has a normal mood and affect.         Assessment/Plan:   1. Asthma with COPD  11. Chronic respiratory failure with hypoxia   Stable on Anoro, pulmicort, albuterol nebulizer as needed  Continue pulmonary rehab program twice a week  Patient was advised to try using spirometer at home to increase lung capacity  Follow-up with pulmonology  Has been using oxygen at 2 L via " nasal cannula    2. Essential hypertension  - Basic metabolic panel; Future  - Lipid panel; Future  Blood pressure stable today currently diet controlled    3. Other hyperlipidemia  - Lipid panel; Future  Currently taking Lipitor 40 mg daily  Will check fasting labs    4. Hypothyroidism, unspecified type  - TSH; Future  Stable on levothyroxine 25 mcg p.o. daily    5. Binswanger's dementia  Followed by Neurology currently increased Klonopin to 3 times daily and has been doing well, on Depakene with seizures    6. Nonrheumatic aortic valve stenosis  7. Nonrheumatic aortic valve insufficiency  10. Chronic diastolic congestive heart failure  Followed by Cardiology, currently taking Lasix 40 mg daily    8. Major depressive disorder, recurrent episode, mild  9. Insomnia secondary to depression with anxiety  Stable on Zoloft 50 mg daily  Taking Remeron 45 mg for sleep with Klonopin doing well    12. Atherosclerosis of aorta       Flu shot given today

## 2019-09-12 ENCOUNTER — HOSPITAL ENCOUNTER (OUTPATIENT)
Dept: PULMONOLOGY | Facility: HOSPITAL | Age: 84
Discharge: HOME | End: 2019-09-12
Attending: INTERNAL MEDICINE
Payer: MEDICARE

## 2019-09-12 ENCOUNTER — LAB VISIT (OUTPATIENT)
Dept: LAB | Facility: HOSPITAL | Age: 84
End: 2019-09-12
Attending: FAMILY MEDICINE
Payer: MEDICARE

## 2019-09-12 VITALS — BODY MASS INDEX: 22.64 KG/M2 | WEIGHT: 131.88 LBS

## 2019-09-12 DIAGNOSIS — E78.49 OTHER HYPERLIPIDEMIA: Chronic | ICD-10-CM

## 2019-09-12 DIAGNOSIS — I10 ESSENTIAL HYPERTENSION: ICD-10-CM

## 2019-09-12 DIAGNOSIS — E03.9 HYPOTHYROIDISM, UNSPECIFIED TYPE: Chronic | ICD-10-CM

## 2019-09-12 LAB
ANION GAP SERPL CALC-SCNC: 10 MMOL/L (ref 8–16)
BUN SERPL-MCNC: 24 MG/DL (ref 8–23)
CALCIUM SERPL-MCNC: 9.6 MG/DL (ref 8.7–10.5)
CHLORIDE SERPL-SCNC: 106 MMOL/L (ref 95–110)
CHOLEST SERPL-MCNC: 258 MG/DL (ref 120–199)
CHOLEST/HDLC SERPL: 4.7 {RATIO} (ref 2–5)
CO2 SERPL-SCNC: 29 MMOL/L (ref 23–29)
CREAT SERPL-MCNC: 1.1 MG/DL (ref 0.5–1.4)
EST. GFR  (AFRICAN AMERICAN): 53.3 ML/MIN/1.73 M^2
EST. GFR  (NON AFRICAN AMERICAN): 46.2 ML/MIN/1.73 M^2
GLUCOSE SERPL-MCNC: 89 MG/DL (ref 70–110)
HDLC SERPL-MCNC: 55 MG/DL (ref 40–75)
HDLC SERPL: 21.3 % (ref 20–50)
LDLC SERPL CALC-MCNC: 168.2 MG/DL (ref 63–159)
NONHDLC SERPL-MCNC: 203 MG/DL
POTASSIUM SERPL-SCNC: 4 MMOL/L (ref 3.5–5.1)
SODIUM SERPL-SCNC: 145 MMOL/L (ref 136–145)
TRIGL SERPL-MCNC: 174 MG/DL (ref 30–150)
TSH SERPL DL<=0.005 MIU/L-ACNC: 2.34 UIU/ML (ref 0.4–4)

## 2019-09-12 PROCEDURE — G0424 PULMONARY REHAB W EXER: HCPCS | Mod: HCNC

## 2019-09-12 PROCEDURE — 80048 BASIC METABOLIC PNL TOTAL CA: CPT | Mod: HCNC

## 2019-09-12 PROCEDURE — 36415 COLL VENOUS BLD VENIPUNCTURE: CPT | Mod: HCNC

## 2019-09-12 PROCEDURE — 84443 ASSAY THYROID STIM HORMONE: CPT | Mod: HCNC

## 2019-09-12 PROCEDURE — 80061 LIPID PANEL: CPT | Mod: HCNC

## 2019-09-12 NOTE — PROGRESS NOTES
[facility]  Pulmonary Rehab  30 Day Evaluation and Recertification    SUMMARY       Summary of Progress: Crystal Romero has been doing good in rehab.  She is walking with her walker for balance.  She has dementia and is brought to rehab my her caregiver.  She has met the goals given to her.  Will continue to work with pt on increasing her strength and endurance during rehab.  She is increasing her exercise tolerance and will continue to benefit from Pulmonary Rehab.    Referring Provider: DOC Hoang    Attends:     Patient attends 2 days a week and has completed 7 visits.  The patient's current compliance is 100%.    Problems this Certification Period:   Dementia     Exercise Capacity Summary    Nustep Date:     8/21/19 Time Load Steps Date:     9/10/19 Time Load Steps     12 3 357  20 3 846   Recumbent Bike Date:  Time Level Date:  Time Level            Treadmill Date:  Time Speed Grade Date:  Time Speed Grade              Arm Ergometer Date:     8/21/19 Time Level Date:     9/10/19 Time Level     10 1  8 1   Free Weights Date:     8/21/19  (dowel) Bicep Curls   Chest Press lbs Sets Reps Date:     9/10/19  (dowel) Bicep Curls   Chest Press lbs Sets Reps      3 2 10   4 2 10           Education:  Pursed lip breathing   Proper HR zone  Exercise and equipment safety  Avoiding allergies and irritants  COPD action plan  Exercise tips  Managing medications  Controlling triggers    Goals:  Goals met    Updated Exercise Prescription:  Endurance Training: increase to 1200 steps on nustep in 20 minutes  Strength Training: increase to 5 lb dowel     I certify that the patient is making progress in pulmonary rehabilitation, is physically able to participate, medically stable and remains motivated.

## 2019-09-12 NOTE — PROGRESS NOTES
[facility]  Pulmonary Rehab  Session Summary    SUMMARY     Session Data  Session Number: 8  Time In: 0910  Time Out: 1000  Weight: 59.8 kg (131 lb 14.4 oz)  Target Heart Rate Zone: Minimum: 82 bpm  Target Heart Rate Zone: Maximum: 109 bpm  Patient Motivation: Good  Patient Effort: Good      Pre Exercise Vitals  SpO2: 96 %  Supplemental O2?: No  Pulse: 77  BP: 127/72  Shy Dyspnea Rating : 2      During Exercise Vitals  SpO2: 96 %  Supplemental O2?: No  Pulse: 77  BP: 122/86  Shy Dyspnea Rating : 3      Post Exercise Vitals  SpO2: 97 %  Supplemental O2?: No  Pulse: 78  BP: (!) 119/91  Shy Dyspnea Rating : 2       Modality  Modality: Hand Free Weights, Nustep      Nustep  Time: 30 minutes  Steps: 1295  Load: 3  Mets: 1    Biceps  lbs: 4 lbs(dowel)  Sets: 2  Reps: 10      Chest  lbs: 4 lbs(dowel)  Sets: 2  Reps: 10       Education  COPD action plan  Pursed lip breathing  Muscles of respiration      Therapist Notes   Good effort. Pt tolerated exercise well. Will continue to motivate and educate pt while striving to meet goals in rehab.    Dr. Jeronimo Gaming immediately available as needed.

## 2019-09-13 DIAGNOSIS — E78.49 OTHER HYPERLIPIDEMIA: Primary | Chronic | ICD-10-CM

## 2019-09-13 RX ORDER — EZETIMIBE 10 MG/1
10 TABLET ORAL DAILY
Qty: 90 TABLET | Refills: 3 | Status: SHIPPED | OUTPATIENT
Start: 2019-09-13 | End: 2020-08-23

## 2019-09-17 ENCOUNTER — HOSPITAL ENCOUNTER (OUTPATIENT)
Dept: PULMONOLOGY | Facility: HOSPITAL | Age: 84
Discharge: HOME | End: 2019-09-17
Attending: INTERNAL MEDICINE
Payer: MEDICARE

## 2019-09-17 VITALS — BODY MASS INDEX: 22.21 KG/M2 | WEIGHT: 129.38 LBS

## 2019-09-17 PROCEDURE — G0424 PULMONARY REHAB W EXER: HCPCS | Mod: HCNC

## 2019-09-17 NOTE — PROGRESS NOTES
[facility]  Pulmonary Rehab  Session Summary    SUMMARY     Session Data  Session Number: 9  Time In: 0900  Time Out: 1000  Weight: 58.7 kg (129 lb 6.4 oz)  Target Heart Rate Zone: Minimum: 82 bpm  Target Heart Rate Zone: Maximum: 109 bpm  Patient Motivation: Good  Patient Effort: Good      Pre Exercise Vitals  SpO2: 95 %  Supplemental O2?: No  Pulse: 74  BP: 141/77  Shy Dyspnea Rating : 2      During Exercise Vitals  SpO2: 94 %  Supplemental O2?: No  Pulse: 70  BP: 110/60  Shy Dyspnea Rating : 3      Post Exercise Vitals  SpO2: 94 %  Supplemental O2?: No  Pulse: 84  BP: 135/80  Shy Dyspnea Rating : 2       Modality  Modality: Arm Ergometer, Hand Free Weights, Nustep       Arm Ergometer  Time: 6 minutes  Level: 1      Nustep  Time: 20 minutes  Steps: 885  Load: 3  Mets: 1      Biceps  lbs: 5 lbs(dowel)  Sets: 2  Reps: 10      Chest  lbs: 5 lbs(dowel)  Sets: 2  Reps: 10    Education  Maximizing energy  Pursed lip breathing      Therapist Notes   Good effort. Increased to 5 lb dowel. Pt tolerated change and all exercises well. Will continue to motivate and educate pt in rehab.    Dr. Jeronimo Gaming immediately available as needed.

## 2019-09-19 ENCOUNTER — HOSPITAL ENCOUNTER (OUTPATIENT)
Dept: PULMONOLOGY | Facility: HOSPITAL | Age: 84
Discharge: HOME | End: 2019-09-19
Attending: INTERNAL MEDICINE
Payer: MEDICARE

## 2019-09-19 VITALS — WEIGHT: 131.81 LBS | BODY MASS INDEX: 22.62 KG/M2

## 2019-09-19 PROCEDURE — G0424 PULMONARY REHAB W EXER: HCPCS | Mod: HCNC

## 2019-09-19 NOTE — PROGRESS NOTES
[facility]  Pulmonary Rehab  Session Summary    SUMMARY     Session Data  Session Number: 10  Time In: 0900  Time Out: 1000  Weight: 59.8 kg (131 lb 12.8 oz)  Target Heart Rate Zone: Minimum: 82 bpm  Target Heart Rate Zone: Maximum: 109 bpm  Patient Motivation: Adequate  Patient Effort: Good      Pre Exercise Vitals  SpO2: 95 %  Supplemental O2?: No  Pulse: 69  BP: 125/61  Shy Dyspnea Rating : 2      During Exercise Vitals  SpO2: 98 %  Supplemental O2?: No  Pulse: 66  BP: 127/87  Shy Dyspnea Rating : 3      Post Exercise Vitals  SpO2: 95 %  Supplemental O2?: No  Pulse: 70  BP: 134/80  Shy Dyspnea Rating : 2       Modality  Modality: Arm Ergometer, Hand Free Weights, Nustep    Arm Ergometer  Time: 5 minutes  Level: 1       Nustep  Time: 22 minutes  Steps: 1071  Load: 3  Mets: 1    Biceps  lbs: 5 lbs(dowel)  Sets: 2  Reps: 10      Chest  lbs: 5 lbs(dowel)  Sets: 2  Reps: 10      Education  Maximizing energy  Pursed lip breathing  Vitamins that aid respiratory system      Therapist Notes   Good effort. Pt states she is tired today. Pt has Alzheimer's and walks with a walker. She is limited in the exercises she can do. She is positive and does what is asked of her. Will continue to motivate and educate pt in rehab.    Dr. Gaffney immediately available as needed.

## 2019-09-24 ENCOUNTER — HOSPITAL ENCOUNTER (OUTPATIENT)
Dept: PULMONOLOGY | Facility: HOSPITAL | Age: 84
Discharge: HOME | End: 2019-09-24
Attending: INTERNAL MEDICINE
Payer: MEDICARE

## 2019-09-24 VITALS — BODY MASS INDEX: 22.55 KG/M2 | WEIGHT: 131.38 LBS

## 2019-09-24 PROCEDURE — G0424 PULMONARY REHAB W EXER: HCPCS | Mod: HCNC

## 2019-09-24 RX ORDER — POTASSIUM CHLORIDE 750 MG/1
TABLET, EXTENDED RELEASE ORAL
Qty: 90 TABLET | Refills: 2 | Status: SHIPPED | OUTPATIENT
Start: 2019-09-24 | End: 2020-03-11 | Stop reason: SDUPTHER

## 2019-09-24 NOTE — PROGRESS NOTES
[facility]  Pulmonary Rehab  Session Summary    SUMMARY     Session Data  Session Number: 11  Time In: 0910  Time Out: 1000  Weight: 59.6 kg (131 lb 6.4 oz)  Target Heart Rate Zone: Minimum: 82 bpm  Target Heart Rate Zone: Maximum: 109 bpm  Patient Motivation: Good  Patient Effort: Good      Pre Exercise Vitals  SpO2: 97 %  Supplemental O2?: No  Pulse: 68  BP: 142/89  Shy Dyspnea Rating : 2      During Exercise Vitals  SpO2: 96 %  Supplemental O2?: No  Pulse: 70  BP: 138/81  Shy Dyspnea Rating : 3      Post Exercise Vitals  SpO2: 96 %  Supplemental O2?: No  Pulse: 74  BP: 136/74  Shy Dyspnea Rating : 2       Modality  Modality: Arm Ergometer, Hand Free Weights, Nustep      Arm Ergometer  Time: 8 minutes  Level: 1    Nustep  Time: 20 minutes  Steps: 1082  Load: 3  Mets: 1.1      Biceps  lbs: 5 lbs(dowel)  Sets: 2  Reps: 10      Chest  lbs: 5 lbs(dowel)  Sets: 2  Reps: 10       Education  Cycle of inactivity  Pursed lip breathing      Therapist Notes   Good effort. Pt felt good today. She worked very hard. Will continue to motivate and educate pt in rehab.    Dr. Jeronimo Gaming immediately available as needed.

## 2019-09-25 ENCOUNTER — OFFICE VISIT (OUTPATIENT)
Dept: PULMONOLOGY | Facility: CLINIC | Age: 84
End: 2019-09-25
Payer: MEDICARE

## 2019-09-25 VITALS
SYSTOLIC BLOOD PRESSURE: 122 MMHG | WEIGHT: 131.5 LBS | BODY MASS INDEX: 22.57 KG/M2 | HEART RATE: 76 BPM | OXYGEN SATURATION: 95 % | DIASTOLIC BLOOD PRESSURE: 66 MMHG | RESPIRATION RATE: 22 BRPM

## 2019-09-25 DIAGNOSIS — J96.11 CHRONIC RESPIRATORY FAILURE WITH HYPOXIA: Chronic | ICD-10-CM

## 2019-09-25 DIAGNOSIS — J43.2 CENTRILOBULAR EMPHYSEMA: Chronic | ICD-10-CM

## 2019-09-25 DIAGNOSIS — J44.89 ASTHMA WITH COPD: Primary | Chronic | ICD-10-CM

## 2019-09-25 PROCEDURE — 1101F PT FALLS ASSESS-DOCD LE1/YR: CPT | Mod: HCNC,CPTII,S$GLB, | Performed by: INTERNAL MEDICINE

## 2019-09-25 PROCEDURE — 99212 OFFICE O/P EST SF 10 MIN: CPT | Mod: HCNC,S$GLB,, | Performed by: INTERNAL MEDICINE

## 2019-09-25 PROCEDURE — 99999 PR PBB SHADOW E&M-EST. PATIENT-LVL III: ICD-10-PCS | Mod: PBBFAC,HCNC,, | Performed by: INTERNAL MEDICINE

## 2019-09-25 PROCEDURE — 1101F PR PT FALLS ASSESS DOC 0-1 FALLS W/OUT INJ PAST YR: ICD-10-PCS | Mod: HCNC,CPTII,S$GLB, | Performed by: INTERNAL MEDICINE

## 2019-09-25 PROCEDURE — 3074F SYST BP LT 130 MM HG: CPT | Mod: HCNC,CPTII,S$GLB, | Performed by: INTERNAL MEDICINE

## 2019-09-25 PROCEDURE — 99999 PR PBB SHADOW E&M-EST. PATIENT-LVL III: CPT | Mod: PBBFAC,HCNC,, | Performed by: INTERNAL MEDICINE

## 2019-09-25 PROCEDURE — 99212 PR OFFICE/OUTPT VISIT, EST, LEVL II, 10-19 MIN: ICD-10-PCS | Mod: HCNC,S$GLB,, | Performed by: INTERNAL MEDICINE

## 2019-09-25 PROCEDURE — 3074F PR MOST RECENT SYSTOLIC BLOOD PRESSURE < 130 MM HG: ICD-10-PCS | Mod: HCNC,CPTII,S$GLB, | Performed by: INTERNAL MEDICINE

## 2019-09-25 PROCEDURE — 3078F PR MOST RECENT DIASTOLIC BLOOD PRESSURE < 80 MM HG: ICD-10-PCS | Mod: HCNC,CPTII,S$GLB, | Performed by: INTERNAL MEDICINE

## 2019-09-25 PROCEDURE — 3078F DIAST BP <80 MM HG: CPT | Mod: HCNC,CPTII,S$GLB, | Performed by: INTERNAL MEDICINE

## 2019-09-26 ENCOUNTER — HOSPITAL ENCOUNTER (OUTPATIENT)
Dept: PULMONOLOGY | Facility: HOSPITAL | Age: 84
Discharge: HOME | End: 2019-09-26
Attending: INTERNAL MEDICINE
Payer: MEDICARE

## 2019-09-26 VITALS — WEIGHT: 131.19 LBS | BODY MASS INDEX: 22.52 KG/M2

## 2019-09-26 PROCEDURE — G0424 PULMONARY REHAB W EXER: HCPCS | Mod: HCNC

## 2019-09-26 NOTE — PATIENT INSTRUCTIONS
Lung Anatomy  Your lungs take air in to give your body oxygen, which the body needs to work. Your lungs, like all the tissues in your body, are made up of billions of tiny specialized cells. Old lung cells die and are replaced by new, identical lung cells. This natural process helps ensure healthy lungs.    Date Last Reviewed: 11/1/2016  © 4225-3754 [employer]. [address] [place] [address], [place] [address]. All rights reserved. This information is not intended as a substitute for professional medical care. Always follow your healthcare professional's instructions.

## 2019-09-26 NOTE — PROGRESS NOTES
Subjective:      Patient ID: Crystal Romero is a 84 y.o. female.    Chief Complaint: Pulmonary Rehabilitation Group Visit.      HPI  Seen and examined in rehab. Chart reviewed.  she  reports that she has moderately improved. Questions answered.    The patient has chronic stable pulmonary impairment on an optimized medical regimen and pulmonary rehabilitation has resulted in significant improvement.        Exercise prescription reviewed.     Rehab Start Date: 08/19/19   Sessions completed: 11  Compliance: 100 %     REHAB PULMONARY DISORDERS: Asthma COPD overlap syndrome, Emphysema, Chronic respiratory failure.    TOPICS DISCUSSED TODAY:    [x]        COPD Pathophysiology.  []        COPD Treatment  []        Oxygen therapy  []        LVRS  []        Endobronchial stent  []        Cardiopulmonary Interaction  [x]        Asthma   [x]        Asthma Pathophysiology  [x]        Asthma treatment      Review of Systems   Constitutional: Negative for fever and chills.   Respiratory: Positive for dyspnea on extertion. Negative for cough and wheezing.    Cardiovascular: Negative for leg swelling.   Musculoskeletal: Positive for arthralgias and back pain.   Hematological: Excessive bruising.   Psychiatric/Behavioral: The patient is nervous/anxious.        Objective:       Vitals:    09/25/19 1547   BP: 122/66   Pulse: 76   Resp: (!) 22   SpO2: 95%   Weight: 59.6 kg (131 lb 8.1 oz)     body mass index is 22.57 kg/m².    Physical Exam   Constitutional: She is oriented to person, place, and time. She appears well-developed and well-nourished. She is active and cooperative.  Non-toxic appearance. She does not appear ill. No distress.     Frail  Ambulates with a walker   HENT:   Head: Normocephalic and atraumatic.   Mouth/Throat: No oropharyngeal exudate.   Eyes: Conjunctivae are normal.   Neck: Normal range of motion. Neck supple.   Cardiovascular: Normal rate, regular rhythm and intact distal pulses.   Murmur  heard.  Pulmonary/Chest: Effort normal. No stridor. She has decreased breath sounds in the right upper field, the right middle field, the right lower field, the left upper field, the left middle field and the left lower field. She has no wheezes. She has no rales.   Musculoskeletal: Normal range of motion.   Lymphadenopathy:     She has no cervical adenopathy.   Neurological: She is alert and oriented to person, place, and time.   Skin: Skin is warm and dry.   Multiple ecchymoses.   Psychiatric: She has a normal mood and affect. Her behavior is normal. Judgment and thought content normal.   Vitals reviewed.      Personal Diagnostic Review  None pertinent.    Assessment;     1. Asthma with COPD Stable   2. Chronic respiratory failure with hypoxia Stable   3. Centrilobular emphysema Stable          Plan:       PULMONARY REHABILITATION:    Clinically stable. Current treatment plan is effective, no change in therapy. She is doing well in pulmonary rehabilitation. she  has completed 11 sessions so far. Remains motivated. She reports that she finds pulmonary rehabilitation beneficial.  I have  reviewed the therapists visit summaries: I concur with her treatment plan.          S. Adjei MD [facility]

## 2019-09-26 NOTE — PROGRESS NOTES
[facility]  Pulmonary Rehab  Session Summary    SUMMARY     Session Data  Session Number: 12  Time In: 0900  Time Out: 1000  Weight: 59.5 kg (131 lb 3.2 oz)  Target Heart Rate Zone: Minimum: 82 bpm  Target Heart Rate Zone: Maximum: 109 bpm  Patient Motivation: Good  Patient Effort: Good      Pre Exercise Vitals  SpO2: 97 %  Supplemental O2?: No  Pulse: 70  BP: 134/80  Shy Dyspnea Rating : 3      During Exercise Vitals  SpO2: 97 %  Supplemental O2?: No  Pulse: 68  BP: 136/82  Shy Dyspnea Rating : 4      Post Exercise Vitals  SpO2: 97 %  Supplemental O2?: No  Pulse: 69  BP: 137/78  Shy Dyspnea Rating : 2       Modality  Modality: Hand Free Weights, Nustep, Recumbent Bike      Nustep  Time: 20 minutes  Steps: 922  Load: 3  Mets: 1.1      Recumbent Bike  Time: 5 minutes  Level: 1      Biceps  lbs: 5 lbs(dowel)  Sets: 2  Reps: 10      Chest  lbs: 5 lbs(dowel)  Sets: 2  Reps: 10       Education  Cycle of inactivity  Exercise tips  Pursed lip breathing review      Therapist Notes   Pt more dyspneic today than usual. Caregiver states she took her respiratory meds as ordered this morning. Pt seemed to have more shoulder pain today. Introduced recumbent bike. Pt tolerated this fair. Spoke with caregiver about her shoulder pain. She said she had a tylenol before rehab but she will watch pt. for more pain. Will continue to motivate and educate pt in rehab.    Dr. Jeronimo Gaming immediately available as needed.

## 2019-10-01 ENCOUNTER — HOSPITAL ENCOUNTER (OUTPATIENT)
Dept: PULMONOLOGY | Facility: HOSPITAL | Age: 84
Discharge: HOME | End: 2019-10-01
Attending: INTERNAL MEDICINE
Payer: MEDICARE

## 2019-10-01 VITALS — BODY MASS INDEX: 22.66 KG/M2 | WEIGHT: 132 LBS

## 2019-10-01 PROCEDURE — G0424 PULMONARY REHAB W EXER: HCPCS | Mod: HCNC

## 2019-10-01 NOTE — PROGRESS NOTES
[facility]  Pulmonary Rehab  Session Summary    SUMMARY     Session Data  Session Number: 13  Time In: 0900  Time Out: 1000  Weight: 59.9 kg (132 lb)  Target Heart Rate Zone: Minimum: 82 bpm  Target Heart Rate Zone: Maximum: 109 bpm  Patient Motivation: Good  Patient Effort: Good      Pre Exercise Vitals  SpO2: 99 %  Supplemental O2?: No  Pulse: 75  BP: (!) 148/92  Shy Dyspnea Rating : 2      During Exercise Vitals  SpO2: 98 %  Supplemental O2?: No  Pulse: 72  BP: 137/69  Shy Dyspnea Rating : 3      Post Exercise Vitals  SpO2: 96 %  Supplemental O2?: No  Pulse: 71  BP: 132/74  Shy Dyspnea Rating : 2       Modality  Modality: Arm Ergometer, Hand Free Weights, Nustep    Arm Ergometer  Time: 3 minutes  Level: 1       Nustep  Time: 25 minutes  Steps: 1152  Load: 3  Mets: 1      Biceps  lbs: 5 lbs(dowel)  Sets: 2  Reps: 10      Chest  lbs: 5 lbs(dowel)  Sets: 2  Reps: 10       Education  COPD and nutrition      Therapist Notes   Good effort. Pt complained of right shoulder pain again. She could not remember if she took Tylenol before coming. She tolerated exercise well on Nustep but had to stop after 3:30 on arm ergometer due to pain. Will speak to caregiver again about this. Will continue to motivate and educate pt in rehab.    Dr. Jeronimo Gaming immediately available as needed.

## 2019-10-03 ENCOUNTER — HOSPITAL ENCOUNTER (OUTPATIENT)
Dept: PULMONOLOGY | Facility: HOSPITAL | Age: 84
Discharge: HOME | End: 2019-10-03
Attending: INTERNAL MEDICINE
Payer: MEDICARE

## 2019-10-03 VITALS — WEIGHT: 131.63 LBS | BODY MASS INDEX: 22.59 KG/M2

## 2019-10-03 PROCEDURE — G0424 PULMONARY REHAB W EXER: HCPCS | Mod: HCNC

## 2019-10-03 NOTE — PROGRESS NOTES
[facility]  Pulmonary Rehab  Session Summary    SUMMARY     Session Data  Session Number: 14  Time In: 0900  Time Out: 1000  Weight: 59.7 kg (131 lb 9.6 oz)  Target Heart Rate Zone: Minimum: 82 bpm  Target Heart Rate Zone: Maximum: 109 bpm  Patient Motivation: Good  Patient Effort: Good      Pre Exercise Vitals  SpO2: 97 %  Supplemental O2?: No  Pulse: 74  BP: 133/69  Shy Dyspnea Rating : 3      During Exercise Vitals  SpO2: 97 %  Supplemental O2?: No  Pulse: 72  BP: 116/71  Shy Dyspnea Rating : 3      Post Exercise Vitals  SpO2: 97 %  Supplemental O2?: No  Pulse: 73  BP: 123/73  Shy Dyspnea Rating : 2       Modality  Modality: Arm Ergometer, Hand Free Weights, Nustep    Arm Ergometer  Time: 5 minutes  Level: 1      Nustep  Time: 20 minutes  Steps: 989  Load: 3  Mets: 1.1       Biceps  lbs: 5 lbs(dowel)  Sets: 2  Reps: 10      Chest  lbs: 5 lbs(dowel)  Sets: 2  Reps: 10       Education  COPD and nutrition      Therapist Notes   Good effort. Pt tolerated all exercises well. Will continue to motivate and educate pt in rehab.    Dr. Jeronimo Gaming immediately available as needed.

## 2019-10-08 ENCOUNTER — HOSPITAL ENCOUNTER (OUTPATIENT)
Dept: PULMONOLOGY | Facility: HOSPITAL | Age: 84
Discharge: HOME | End: 2019-10-08
Attending: INTERNAL MEDICINE
Payer: MEDICARE

## 2019-10-08 VITALS — BODY MASS INDEX: 22.3 KG/M2 | WEIGHT: 129.88 LBS

## 2019-10-08 PROCEDURE — G0424 PULMONARY REHAB W EXER: HCPCS | Mod: HCNC

## 2019-10-08 NOTE — PROGRESS NOTES
[facility]  Pulmonary Rehab  Session Summary    SUMMARY     Session Data  Session Number: 15  Time In: 0900  Time Out: 1000  Weight: 58.9 kg (129 lb 14.4 oz)  Target Heart Rate Zone: Minimum: 82 bpm  Target Heart Rate Zone: Maximum: 109 bpm  Patient Motivation: Good  Patient Effort: Good      Pre Exercise Vitals  SpO2: 95 %  Supplemental O2?: No  Pulse: 73  BP: 142/89  Shy Dyspnea Rating : 3      Post Exercise Vitals  SpO2: 96 %  Supplemental O2?: No  Pulse: 77  BP: 135/83  Shy Dyspnea Rating : 2       Modality  Modality: Hand Free Weights, Nustep         Nustep  Time: 24 minutes  Steps: 1200  Load: 3  Mets: 1.1      Biceps  lbs: 5 lbs(dowel)  Sets: 2  Reps: 10      Chest  lbs: 5 lbs(dowel)  Sets: 2  Reps: 10       Education  Compliance and regular attendance in rehab  Pursed lip breathing      Therapist Notes   Good effort. Pt tolerated exercise well today. Will continue to motivate and educate pt in rehab.    Dr. Jeronimo Gaming immediately available as needed.

## 2019-10-10 ENCOUNTER — HOSPITAL ENCOUNTER (OUTPATIENT)
Dept: PULMONOLOGY | Facility: HOSPITAL | Age: 84
Discharge: HOME | End: 2019-10-10
Attending: INTERNAL MEDICINE
Payer: MEDICARE

## 2019-10-10 VITALS — WEIGHT: 130.19 LBS | BODY MASS INDEX: 22.35 KG/M2

## 2019-10-10 PROCEDURE — G0424 PULMONARY REHAB W EXER: HCPCS | Mod: HCNC

## 2019-10-10 NOTE — PROGRESS NOTES
[facility]  Pulmonary Rehab  Session Summary    SUMMARY     Session Data  Session Number: 16  Time In: 0900  Time Out: 1000  Weight: 59.1 kg (130 lb 3.2 oz)  Target Heart Rate Zone: Minimum: 82 bpm  Target Heart Rate Zone: Maximum: 109 bpm  Patient Motivation: Good  Patient Effort: Good      Pre Exercise Vitals  SpO2: 96 %  Supplemental O2?: No  Pulse: 78  BP: 148/90  Shy Dyspnea Rating : 3      During Exercise Vitals  SpO2: 94 %  Supplemental O2?: No  Pulse: 80  BP: 143/40  Shy Dyspnea Rating : 4      Post Exercise Vitals  SpO2: 97 %  Supplemental O2?: No  Pulse: 75  BP: 129/85  Shy Dyspnea Rating : 2       Modality  Modality: Arm Ergometer, Hand Free Weights, Nustep    Arm Ergometer  Time: 8 minutes  Level: 1      Nustep  Time: 22 minutes  Steps: 1302  Load: 3  Mets: 1.1       Biceps  lbs: 5 lbs(dowel)  Sets: 2  Reps: 10      Chest  lbs: 5 lbs(dowel)  Sets: 2  Reps: 10       Education  Compliance and regular attendance in rehab  Pursed lip breathing      Therapist Notes   Good effort. Pt very dyspneic when arrived at rehab. She rested a few minutes prior to exercising. Vitals stable. She tolerated all exercises well. Will continue to motivate and educate pt in rehab.    Dr. Jeronimo Gaming immediately available as needed.

## 2019-10-11 NOTE — PROGRESS NOTES
[facility]  Pulmonary Rehab  30 Day Evaluation and Recertification    SUMMARY       Summary of Progress: Crystal Romero has been doing good in rehab.  Pt has dementia and is brought to rehab by her caregiver.  She uses a walker for balance.  She continues to meet her goals.  Will continue to work with pt on increasing her strength and endurance during rehab.  She is increasing her exercise tolerance and will continue to benefit from Pulmonary Rehab.    Referring Provider: DOC Hoang    Start Date: 8/16/19    Attends:     Patient attends 2 days a week and has completed 16 visits.  The patient's current compliance is 100%.    Problems this Certification Period:   Alzheimer's - has a sitter that brings her    Exercise Capacity Summary    Nustep Date:     9/10/19 Time Load Steps Date:     10/10/19 Time Load Steps     20 3 846  22 3 1302   Recumbent Bike Date:      Time Level Date:  Time Level            Treadmill Date:  Time Speed Grade Date:  Time Speed Grade              Arm Ergometer Date:     9/10/19 Time Level Date:     10/10/19 Time Level     8 1  8 1   Free Weights Date:     9/10/19  (dowel) Bicep Curls   Chest Press lbs Sets Reps Date:     10/10/19  (dowel) Bicep Curls   Chest Press lbs Sets Reps      4 2 10   5 2 10         Education:  Pursed lip breathing review  COPD and nutrition  Cycle of inactivity  Exercise tips  Muscles of respiration  Maximizing energy  Vitamins that aid the respiratory system  COPD action plan    Goals:  Goals met    Updated Exercise Prescription:  Endurance Training: increase to 10 minutes on level 1 on the arm ergometer  Strength Training: increase to load 4 on the nustep reaching 800 steps       I certify that the patient is making progress in pulmonary rehabilitation, is physically able to participate, medically stable and remains motivated.

## 2019-10-15 ENCOUNTER — HOSPITAL ENCOUNTER (OUTPATIENT)
Dept: PULMONOLOGY | Facility: HOSPITAL | Age: 84
Discharge: HOME | End: 2019-10-15
Attending: INTERNAL MEDICINE
Payer: MEDICARE

## 2019-10-15 VITALS — WEIGHT: 129.13 LBS | BODY MASS INDEX: 22.16 KG/M2

## 2019-10-15 PROCEDURE — G0424 PULMONARY REHAB W EXER: HCPCS | Mod: HCNC

## 2019-10-15 NOTE — PROGRESS NOTES
[facility]  Pulmonary Rehab  Session Summary    SUMMARY     Session Data  Session Number: 17  Time In: 0900  Time Out: 1000  Weight: 58.6 kg (129 lb 1.6 oz)  Target Heart Rate Zone: Minimum: 82 bpm  Target Heart Rate Zone: Maximum: 109 bpm  Patient Motivation: Good  Patient Effort: Good      Pre Exercise Vitals  SpO2: 96 %  Supplemental O2?: No  Pulse: 75  BP: (!) 126/97  Shy Dyspnea Rating : 2      During Exercise Vitals  SpO2: 96 %  Supplemental O2?: No  Pulse: 84  BP: 147/83  Shy Dyspnea Rating : 3      Post Exercise Vitals  SpO2: 96 %  Supplemental O2?: No  Pulse: 75  BP: (!) 139/94  Shy Dyspnea Rating : 2       Modality  Modality: Arm Ergometer, Hand Free Weights, Nustep       Arm Ergometer  Time: 6 minutes  Level: 1    Nustep  Time: 17 minutes  Steps: 876  Load: 4  Mets: 1.3      Biceps  lbs: 5 lbs(dowel)  Sets: 2  Reps: 10      Chest  lbs: 5 lbs(dowel)  Sets: 2  Reps: 10       Education  Maximizing energy  Pursed lip breathing      Therapist Notes   Good effort. Pt met new 30 day goal on Nutep. (876 steps in 17 minutes, load 4). She stopped at 6 minutes on arm ergometer stating she was tired. Will continue to motivate and educate pt in rehab.    Dr. Lee immediately available as needed.

## 2019-10-17 ENCOUNTER — HOSPITAL ENCOUNTER (OUTPATIENT)
Dept: PULMONOLOGY | Facility: HOSPITAL | Age: 84
Discharge: HOME | End: 2019-10-17
Attending: INTERNAL MEDICINE
Payer: MEDICARE

## 2019-10-17 VITALS — WEIGHT: 131.19 LBS | BODY MASS INDEX: 22.52 KG/M2

## 2019-10-17 PROCEDURE — G0424 PULMONARY REHAB W EXER: HCPCS | Mod: HCNC

## 2019-10-17 NOTE — PROGRESS NOTES
[facility]  Pulmonary Rehab  Session Summary    SUMMARY     Session Data  Session Number: 18  Time In: 0900  Time Out: 1000  Weight: 59.5 kg (131 lb 3.2 oz)  Target Heart Rate Zone: Minimum: 82 bpm  Target Heart Rate Zone: Maximum: 109 bpm  Patient Motivation: Good  Patient Effort: Good      Pre Exercise Vitals  SpO2: 97 %  Supplemental O2?: No  Pulse: 77  BP: 124/70  Shy Dyspnea Rating : 1      During Exercise Vitals  SpO2: 97 %  Supplemental O2?: No  Pulse: 75  BP: 120/65  Shy Dyspnea Rating : 2      Post Exercise Vitals  SpO2: 97 %  Supplemental O2?: No  Pulse: 75  BP: 118/68  Rating of Perceived Exertion (RPE) Scale: 1       Modality  Modality: Hand Free Weights, Nustep        Nustep  Time: 30 minutes  Steps: 1440  Load: 4  Mets: 1.2      Biceps  lbs: 5 lbs(dowel)  Sets: 2  Reps: 10      Chest  lbs: 5 lbs(dowel)  Sets: 2  Reps: 10    Education  Maximizing energy,nutrition and pursed lip breathing      Therapist Notes   Patient had shoulder pain so only exercised on Nustep for 30 minutes.Good patient effort.She tolerated exercise well.Will continue to motivate and educate patient in rehab.    Dr. RICCI Gaffney immediately available as needed.

## 2019-10-22 ENCOUNTER — HOSPITAL ENCOUNTER (OUTPATIENT)
Dept: PULMONOLOGY | Facility: HOSPITAL | Age: 84
Discharge: HOME | End: 2019-10-22
Attending: INTERNAL MEDICINE
Payer: MEDICARE

## 2019-10-22 VITALS — BODY MASS INDEX: 22.55 KG/M2 | WEIGHT: 131.38 LBS

## 2019-10-22 PROCEDURE — G0424 PULMONARY REHAB W EXER: HCPCS | Mod: HCNC

## 2019-10-22 NOTE — PROGRESS NOTES
[facility]  Pulmonary Rehab  Session Summary    SUMMARY     Session Data  Session Number: 19  Time In: 0900  Time Out: 1000  Weight: 59.6 kg (131 lb 6.4 oz)  Target Heart Rate Zone: Minimum: 82 bpm  Target Heart Rate Zone: Maximum: 109 bpm  Patient Motivation: Good  Patient Effort: Good      Pre Exercise Vitals  SpO2: 95 %  Supplemental O2?: No  Pulse: 70  BP: (!) 128/92  Shy Dyspnea Rating : 3      During Exercise Vitals  SpO2: 96 %  Supplemental O2?: No  Pulse: 80  BP: 135/77  Shy Dyspnea Rating : 2      Post Exercise Vitals  SpO2: 97 %  Supplemental O2?: No  O2 Flow (L/min): 2  Pulse: 110  BP: 137/86  Shy Dyspnea Rating : 3       Modality  Modality: Arm Ergometer, Hand Free Weights, Nustep    Arm Ergometer  Time: 6 minutes  Level: 1       Nustep  Time: 25 minutes  Steps: 1319  Load: 4  Mets: 1.3       Biceps  lbs: 5 lbs(dowel)  Sets: 2  Reps: 10      Chest  lbs: 5 lbs(dowel)  Sets: 2  Reps: 10       Education  Oxygen therapy      Therapist Notes   Good effort. Pt was wheezing more today. Took more rest breaks but completed exercises. Will continue to motivate and educate pt in rehab.    Dr. Jeronimo Gaming immediately available as needed.

## 2019-10-24 ENCOUNTER — HOSPITAL ENCOUNTER (OUTPATIENT)
Dept: PULMONOLOGY | Facility: HOSPITAL | Age: 84
Discharge: HOME | End: 2019-10-24
Attending: INTERNAL MEDICINE
Payer: MEDICARE

## 2019-10-24 ENCOUNTER — HOSPITAL ENCOUNTER (OUTPATIENT)
Dept: RADIOLOGY | Facility: HOSPITAL | Age: 84
Discharge: HOME | End: 2019-10-24
Attending: NURSE PRACTITIONER
Payer: MEDICARE

## 2019-10-24 ENCOUNTER — OFFICE VISIT (OUTPATIENT)
Dept: PULMONOLOGY | Facility: CLINIC | Age: 84
End: 2019-10-24
Payer: MEDICARE

## 2019-10-24 VITALS
DIASTOLIC BLOOD PRESSURE: 66 MMHG | BODY MASS INDEX: 22.38 KG/M2 | RESPIRATION RATE: 17 BRPM | OXYGEN SATURATION: 98 % | WEIGHT: 130.38 LBS | HEIGHT: 64 IN | WEIGHT: 130.38 LBS | BODY MASS INDEX: 22.26 KG/M2 | SYSTOLIC BLOOD PRESSURE: 110 MMHG | HEART RATE: 70 BPM

## 2019-10-24 DIAGNOSIS — R06.02 SOB (SHORTNESS OF BREATH): ICD-10-CM

## 2019-10-24 DIAGNOSIS — J45.901 ASTHMA WITH COPD WITH EXACERBATION: Primary | ICD-10-CM

## 2019-10-24 DIAGNOSIS — J96.11 CHRONIC HYPOXEMIC RESPIRATORY FAILURE: ICD-10-CM

## 2019-10-24 DIAGNOSIS — J44.1 ASTHMA WITH COPD WITH EXACERBATION: Primary | ICD-10-CM

## 2019-10-24 DIAGNOSIS — R06.02 SOB (SHORTNESS OF BREATH): Primary | ICD-10-CM

## 2019-10-24 DIAGNOSIS — J43.2 CENTRILOBULAR EMPHYSEMA: ICD-10-CM

## 2019-10-24 PROCEDURE — 99214 OFFICE O/P EST MOD 30 MIN: CPT | Mod: HCNC,S$GLB,, | Performed by: INTERNAL MEDICINE

## 2019-10-24 PROCEDURE — G0424 PULMONARY REHAB W EXER: HCPCS | Mod: HCNC

## 2019-10-24 PROCEDURE — 99999 PR PBB SHADOW E&M-EST. PATIENT-LVL III: ICD-10-PCS | Mod: PBBFAC,HCNC,, | Performed by: INTERNAL MEDICINE

## 2019-10-24 PROCEDURE — 3078F DIAST BP <80 MM HG: CPT | Mod: HCNC,CPTII,S$GLB, | Performed by: INTERNAL MEDICINE

## 2019-10-24 PROCEDURE — 99499 RISK ADDL DX/OHS AUDIT: ICD-10-PCS | Mod: HCNC,S$GLB,, | Performed by: INTERNAL MEDICINE

## 2019-10-24 PROCEDURE — 71046 X-RAY EXAM CHEST 2 VIEWS: CPT | Mod: TC,HCNC

## 2019-10-24 PROCEDURE — 99999 PR PBB SHADOW E&M-EST. PATIENT-LVL III: CPT | Mod: PBBFAC,HCNC,, | Performed by: INTERNAL MEDICINE

## 2019-10-24 PROCEDURE — 3074F PR MOST RECENT SYSTOLIC BLOOD PRESSURE < 130 MM HG: ICD-10-PCS | Mod: HCNC,CPTII,S$GLB, | Performed by: INTERNAL MEDICINE

## 2019-10-24 PROCEDURE — 71046 X-RAY EXAM CHEST 2 VIEWS: CPT | Mod: 26,HCNC,, | Performed by: RADIOLOGY

## 2019-10-24 PROCEDURE — 99214 PR OFFICE/OUTPT VISIT, EST, LEVL IV, 30-39 MIN: ICD-10-PCS | Mod: HCNC,S$GLB,, | Performed by: INTERNAL MEDICINE

## 2019-10-24 PROCEDURE — 1101F PR PT FALLS ASSESS DOC 0-1 FALLS W/OUT INJ PAST YR: ICD-10-PCS | Mod: HCNC,CPTII,S$GLB, | Performed by: INTERNAL MEDICINE

## 2019-10-24 PROCEDURE — 3074F SYST BP LT 130 MM HG: CPT | Mod: HCNC,CPTII,S$GLB, | Performed by: INTERNAL MEDICINE

## 2019-10-24 PROCEDURE — 1101F PT FALLS ASSESS-DOCD LE1/YR: CPT | Mod: HCNC,CPTII,S$GLB, | Performed by: INTERNAL MEDICINE

## 2019-10-24 PROCEDURE — 3078F PR MOST RECENT DIASTOLIC BLOOD PRESSURE < 80 MM HG: ICD-10-PCS | Mod: HCNC,CPTII,S$GLB, | Performed by: INTERNAL MEDICINE

## 2019-10-24 PROCEDURE — 99499 UNLISTED E&M SERVICE: CPT | Mod: HCNC,S$GLB,, | Performed by: INTERNAL MEDICINE

## 2019-10-24 PROCEDURE — 71046 XR CHEST PA AND LATERAL: ICD-10-PCS | Mod: 26,HCNC,, | Performed by: RADIOLOGY

## 2019-10-24 RX ORDER — PREDNISONE 10 MG/1
TABLET ORAL
Qty: 18 TABLET | Refills: 0 | Status: SHIPPED | OUTPATIENT
Start: 2019-10-24 | End: 2019-12-03

## 2019-10-24 NOTE — PROGRESS NOTES
[facility]  Pulmonary Rehab  Session Summary    SUMMARY     Session Data  Session Number: 20  Time In: 1100  Time Out: 1200  Weight: 59.1 kg (130 lb 6.4 oz)  Target Heart Rate Zone: Minimum: 82 bpm  Target Heart Rate Zone: Maximum: 109 bpm  Patient Motivation: Good  Patient Effort: Good      Pre Exercise Vitals  SpO2: 100 %  Supplemental O2?: Yes  O2 Device: nasal cannula  O2 Flow (L/min): 2  Pulse: 72  BP: 117/85  Shy Dyspnea Rating : 3    Post Exercise Vitals  SpO2: 98 %  Supplemental O2?: Yes  O2 Device: nasal cannula  O2 Flow (L/min): 2  Pulse: 70  BP: 134/85  Shy Dyspnea Rating : 3       Modality  Modality: Hand Free Weights, Nustep      Nustep  Time: 15 minutes  Steps: 715  Load: 4  Mets: 1.2       Biceps  lbs: 5 lbs(dowel)  Sets: 2  Reps: 10      Chest  lbs: 5 lbs(dowel)  Sets: 2  Reps: 10    Education  Oxygen therapy      Therapist Notes   Good effort. Pt had to leave early for another appt. Caregiver asked that we make appt with a provider concerning her increased SOB. She saw Dr. Gaffney today. Will continue to motivate and educate pt in rehab.    Dr. Jeronimo Gaming immediately available as needed.

## 2019-10-24 NOTE — PROGRESS NOTES
Pulmonary Outpatient Follow Up Visit     Subjective:    This patient is new to me.   Patient ID: Crystal Romero is a 84 y.o. female.    Chief Complaint: Shortness of Breath      HPI        84-year-old female patient with history of asthma and COPD presenting for evaluation.    Last seen in our office July 2019.    As per her caregiver who stays with her from 8:00 a.m. to 8:00 p.m. over the last 2 days patient has been having more wheezing and coughing in the morning.  No fever no chills.  No sputum production.  Chest x-ray done today reviewed.    Patient is on Anoro, Pulmicort via nebulizer.  She is on O2 chronically.  Participating in pulmonary rehab.        Review of Systems   Constitutional: Positive for fatigue. Negative for fever and chills.   HENT: Positive for hearing loss.    Eyes: Negative for itching.        Visual disturbances   Respiratory: Positive for cough, shortness of breath, wheezing, dyspnea on extertion and use of rescue inhaler.    Cardiovascular: Negative for leg swelling.   Genitourinary: Negative for difficulty urinating.   Musculoskeletal: Positive for arthralgias, back pain and gait problem.   Skin: Negative for rash.   Gastrointestinal: Negative for abdominal pain.   Neurological: Negative for syncope.        History of stroke   Hematological: Excessive bruising.   Psychiatric/Behavioral: The patient is nervous/anxious.        Outpatient Encounter Medications as of 10/24/2019   Medication Sig Dispense Refill    acetaminophen (TYLENOL) 500 MG tablet Take 500 mg by mouth every 6 (six) hours as needed for Pain.      albuterol-ipratropium (DUO-NEB) 2.5 mg-0.5 mg/3 mL nebulizer solution Take 3 mLs by nebulization every 6 (six) hours as needed for Wheezing. Rescue 360 vial 5    ANORO ELLIPTA 62.5-25 mcg/actuation DsDv INHALE ONE PUFF BY MOUTH ONE TIME DAILY 60 each 11    aspirin (ECOTRIN) 81 MG EC tablet Take 1 tablet (81 mg total) by mouth  every 48 hours.      atorvastatin (LIPITOR) 40 MG tablet Take 1 tablet (40 mg total) by mouth once daily. 90 tablet 3    budesonide (PULMICORT) 0.5 mg/2 mL nebulizer solution Take 0.5 mg by nebulization once daily. Controller      cholecalciferol, vitamin D3, 3,000 unit Tab Take by mouth.      clonazePAM (KLONOPIN) 0.5 MG tablet Take 0.5 mg by mouth 3 (three) times daily.       ezetimibe (ZETIA) 10 mg tablet Take 1 tablet (10 mg total) by mouth once daily. 90 tablet 3    folic acid (FOLVITE) 1 MG tablet TAKE ONE TABLET BY MOUTH ONE TIME DAILY 90 tablet 0    furosemide (LASIX) 40 MG tablet Take 1 tablet (40 mg total) by mouth once daily. 30 tablet 11    inhalation spacing device (AEROCHAMBER PLUS FLOW-VU) Use with Symbicort and Albuterol inhalers 1 each 0    levothyroxine (SYNTHROID) 25 MCG tablet TAKE ONE TABLET BY MOUTH ONE TIME DAILY BEFORE BREAKFAST 90 tablet 0    mirtazapine (REMERON) 45 MG tablet Take 45 mg by mouth every evening.      pantoprazole (PROTONIX) 40 MG tablet Take 1 tablet (40 mg total) by mouth once daily. 90 tablet 3    potassium chloride (KLOR-CON) 10 MEQ TbSR Take 1 tablet (10 mEq total) by mouth 3 (three) times daily. 90 tablet 1    potassium chloride (KLOR-CON) 10 MEQ TbSR TAKE ONE TABLET BY MOUTH THREE TIMES DAILY 90 tablet 2    sertraline (ZOLOFT) 50 MG tablet TAKE ONE TABLET BY MOUTH ONE TIME DAILY 90 tablet 0    valproate (DEPAKENE) 250 mg/5 mL syrup Take 250 mg by mouth once daily. Take 30 mL at 4 pm daily.      VITAMIN B COMPLEX (SUPER B COMPLEX-B-12 ORAL) Take by mouth.      predniSONE (DELTASONE) 10 MG tablet Prednisone 40 mg daily for 3 days then 20 mg daily for 3 days then 10 mg daily for 3 days 18 tablet 0     No facility-administered encounter medications on file as of 10/24/2019.        Objective:     Vital Signs (Most Recent)  Vital Signs  Pulse: 70  Resp: 17  SpO2: 98 %  BP: 110/66  BP Location: Right arm  Patient Position: Sitting  Pain Score: 0-No  "pain  Height and Weight  Height: 5' 4" (162.6 cm)  Weight: 59.1 kg (130 lb 6.4 oz)  BSA (Calculated - sq m): 1.63 sq meters  BMI (Calculated): 22.4  Weight in (lb) to have BMI = 25: 145.3]  Wt Readings from Last 2 Encounters:   10/24/19 59.1 kg (130 lb 6.4 oz)   10/24/19 59.1 kg (130 lb 6.4 oz)       Physical Exam   Constitutional: She is oriented to person, place, and time. She appears well-developed and well-nourished.   HENT:   Head: Normocephalic.   Neck: Neck supple.   Cardiovascular: Normal rate.   Pulmonary/Chest: Normal expansion and effort normal. No stridor. No respiratory distress. She has decreased breath sounds. She exhibits no tenderness.   Abdominal: Soft.   Musculoskeletal: She exhibits no edema or tenderness.   Lymphadenopathy:     She has no cervical adenopathy.   Neurological: She is alert and oriented to person, place, and time. Gait abnormal.   Skin: Skin is warm.   Psychiatric: She has a normal mood and affect. Her behavior is normal. Judgment and thought content normal.   Nursing note and vitals reviewed.      Laboratory  Lab Results   Component Value Date    WBC 6.05 06/13/2019    RBC 4.25 06/13/2019    HGB 12.9 06/13/2019    HCT 41.0 06/13/2019    MCV 97 06/13/2019    MCH 30.4 06/13/2019    MCHC 31.5 (L) 06/13/2019    RDW 14.7 (H) 06/13/2019     06/13/2019    MPV 12.6 06/13/2019    GRAN 3.4 06/13/2019    GRAN 56.3 06/13/2019    LYMPH 1.8 06/13/2019    LYMPH 30.2 06/13/2019    MONO 0.6 06/13/2019    MONO 10.2 06/13/2019    EOS 0.1 06/13/2019    BASO 0.05 06/13/2019    EOSINOPHIL 2.3 06/13/2019    BASOPHIL 0.8 06/13/2019       BMP  Lab Results   Component Value Date     09/12/2019    K 4.0 09/12/2019     09/12/2019    CO2 29 09/12/2019    BUN 24 (H) 09/12/2019    CREATININE 1.1 09/12/2019    CALCIUM 9.6 09/12/2019    ANIONGAP 10 09/12/2019    ESTGFRAFRICA 53.3 (A) 09/12/2019    EGFRNONAA 46.2 (A) 09/12/2019    AST 19 06/13/2019    ALT 10 06/13/2019    PROT 6.2 06/13/2019 "       Lab Results   Component Value Date     (H) 03/13/2019     (H) 11/19/2018     (H) 03/13/2017    BNP 1,208 (H) 03/10/2017     (H) 09/25/2014    BNP 1,024 (H) 09/23/2014       Lab Results   Component Value Date    TSH 2.342 09/12/2019       Lab Results   Component Value Date    SEDRATE 3 12/29/2015       Lab Results   Component Value Date    CRP 0.8 12/29/2015         Diagnostic Results:  I have personally reviewed today the following studies:    Spirometry July 2019    Moderate COPD.    Chest x-ray October 04/20/2019    Moderate size hiatal hernia.  Clear lungs.    Assessment/Plan:   Asthma with COPD with exacerbation  -     predniSONE (DELTASONE) 10 MG tablet; Prednisone 40 mg daily for 3 days then 20 mg daily for 3 days then 10 mg daily for 3 days  Dispense: 18 tablet; Refill: 0    Chronic hypoxemic respiratory failure    Centrilobular emphysema    Continue O2 2 L.     Continue short-acting bronchodilator p.r.n.    Continue Anoro 1 puff daily.  Continue budesonide twice a day.    Prednisone taper.    Pulmonary rehab next week.    Up-to-date with influenza and pneumococcal vaccines    Follow up in about 3 months (around 1/24/2020).    This note was prepared using voice recognition system and is likely to have sound alike errors that may have been overlooked even after proof reading.  Please call me with any questions    Discussed diagnosis, its evaluation, treatment and usual course. All questions answered.      Iam Gaffney MD

## 2019-10-29 ENCOUNTER — HOSPITAL ENCOUNTER (OUTPATIENT)
Dept: PULMONOLOGY | Facility: HOSPITAL | Age: 84
Discharge: HOME | End: 2019-10-29
Attending: INTERNAL MEDICINE
Payer: MEDICARE

## 2019-10-29 VITALS — WEIGHT: 128.88 LBS | BODY MASS INDEX: 22.13 KG/M2

## 2019-10-29 PROCEDURE — G0424 PULMONARY REHAB W EXER: HCPCS | Mod: HCNC

## 2019-10-29 NOTE — PROGRESS NOTES
[facility]  Pulmonary Rehab  Session Summary    SUMMARY     Session Data  Session Number: 21  Time In: 0900  Time Out: 1000  Weight: 58.5 kg (128 lb 14.4 oz)  Target Heart Rate Zone: Minimum: 82 bpm  Target Heart Rate Zone: Maximum: 109 bpm  Patient Motivation: Adequate  Patient Effort: Adequate      Pre Exercise Vitals  SpO2: 97 %  Supplemental O2?: Yes  O2 Device: nasal cannula  O2 Flow (L/min): 2  Pulse: 71  BP: 159/75  Shy Dyspnea Rating : 2      During Exercise Vitals  SpO2: 98 %  Supplemental O2?: Yes  O2 Device: nasal cannula  O2 Flow (L/min): 2  Pulse: 73  BP: 124/72  Hsy Dyspnea Rating : 3      Post Exercise Vitals  SpO2: 98 %  Supplemental O2?: Yes  O2 Device: nasal cannula  O2 Flow (L/min): 2  Pulse: 74  BP: 130/79  Shy Dyspnea Rating : 2       Modality  Modality: Arm Ergometer, Hand Free Weights, Nustep      Arm Ergometer  Time: 10 minutes  Level: 1      Nustep  Time: 15 minutes  Steps: 621  Load: 4  Mets: 1.1      Biceps  lbs: 5 lbs(dowel)  Sets: 2  Reps: 10      Chest  lbs: 5 lbs(dowel)  Sets: 2  Reps: 10      Education  Preventing lung infections    Therapist Notes   Pt saw Dr. Gaffney last week after her rehab session due to having increased SOB and wheezing.   She was given a prednisone taper.  Pt tolerated exercises well today.  Will continue to work with pt on increasing her strength and endurance during rehab.    Dr. Jeronimo Gaming immediately available as needed.

## 2019-10-31 ENCOUNTER — HOSPITAL ENCOUNTER (OUTPATIENT)
Dept: PULMONOLOGY | Facility: HOSPITAL | Age: 84
Discharge: HOME | End: 2019-10-31
Attending: INTERNAL MEDICINE
Payer: MEDICARE

## 2019-10-31 VITALS — WEIGHT: 131.38 LBS | BODY MASS INDEX: 22.55 KG/M2

## 2019-10-31 PROCEDURE — G0424 PULMONARY REHAB W EXER: HCPCS | Mod: HCNC

## 2019-10-31 NOTE — PROGRESS NOTES
[facility]  Pulmonary Rehab  Session Summary    SUMMARY     Session Data  Session Number: 22  Time In: 0900  Time Out: 1000  Weight: 59.6 kg (131 lb 6.4 oz)  Target Heart Rate Zone: Minimum: 82 bpm  Target Heart Rate Zone: Maximum: 109 bpm  Patient Motivation: Good  Patient Effort: Good      Pre Exercise Vitals  SpO2: 96 %  Supplemental O2?: Yes  O2 Device: nasal cannula  O2 Flow (L/min): 2  Pulse: 73  BP: 130/80  Shy Dyspnea Rating : 2      During Exercise Vitals  SpO2: 96 %  Supplemental O2?: Yes  O2 Device: nasal cannula  O2 Flow (L/min): 2  Pulse: 94  BP: 135/83  Shy Dyspnea Rating : 3      Post Exercise Vitals  SpO2: 98 %  Supplemental O2?: Yes  O2 Device: nasal cannula  O2 Flow (L/min): 2  Pulse: 88  BP: 132/84  Shy Dyspnea Rating : 2       Modality  Modality: Hand Free Weights, Nustep       Nustep  Time: 30 minutes  Steps: 1650  Load: 4  Mets: 1.2      Biceps  lbs: 5 lbs(dowel)  Sets: 2  Reps: 10      Chest  lbs: 5 lbs(dowel)  Sets: 2  Reps: 10    Education  Preventing lung infections      Therapist Notes   Good effort. Pt tolerated all exercise well. Will continue to motivate and educate pt in rehab.    Dr. Jeronimo Gaming immediately available as needed.

## 2019-11-05 ENCOUNTER — HOSPITAL ENCOUNTER (OUTPATIENT)
Dept: PULMONOLOGY | Facility: HOSPITAL | Age: 84
Discharge: HOME | End: 2019-11-05
Attending: INTERNAL MEDICINE
Payer: MEDICARE

## 2019-11-05 VITALS — WEIGHT: 132.88 LBS | BODY MASS INDEX: 22.81 KG/M2

## 2019-11-05 PROCEDURE — G0424 PULMONARY REHAB W EXER: HCPCS | Mod: HCNC

## 2019-11-05 NOTE — PROGRESS NOTES
[facility]  Pulmonary Rehab  Session Summary    SUMMARY     Session Data  Session Number: 23  Time In: 0900  Time Out: 1000  Weight: 60.3 kg (132 lb 14.4 oz)  Target Heart Rate Zone: Minimum: 82 bpm  Target Heart Rate Zone: Maximum: 109 bpm  Patient Motivation: Adequate  Patient Effort: Adequate      Pre Exercise Vitals  SpO2: 96 %  Supplemental O2?: Yes  O2 Device: nasal cannula  O2 Flow (L/min): 2  Pulse: 71  BP: 161/89  Shy Dyspnea Rating : 0      During Exercise Vitals  SpO2: 95 %  Supplemental O2?: Yes  O2 Device: nasal cannula  O2 Flow (L/min): 2  Pulse: 75  BP: 131/63  Shy Dyspnea Rating : 3      Post Exercise Vitals  SpO2: 98 %  Supplemental O2?: Yes  O2 Device: nasal cannula  O2 Flow (L/min): 2  Pulse: 71  BP: 133/84  Shy Dyspnea Rating : 3       Modality  Modality: Hand Free Weights, Nustep      Nustep  Time: 25 minutes  Steps: 1195  Load: 4  Mets: 1.2    Biceps  lbs: 5 lbs(dumbbells)  Sets: 2  Reps: 10      Chest  lbs: 5 lbs(dumbbells)  Sets: 2  Reps: 10    Education  Recognizing flare ups      Therapist Notes   Adequate effort. Pt's shoulder hurting her today so, she only exercised on Nustep. She tolerated this and free weights well. Will continue to motivate and educate pt in rehab.    Dr. Jeronimo Gaming immediately available as needed.

## 2019-11-07 ENCOUNTER — HOSPITAL ENCOUNTER (OUTPATIENT)
Dept: PULMONOLOGY | Facility: HOSPITAL | Age: 84
Discharge: HOME | End: 2019-11-07
Attending: INTERNAL MEDICINE
Payer: MEDICARE

## 2019-11-07 VITALS — BODY MASS INDEX: 22.35 KG/M2 | WEIGHT: 130.19 LBS

## 2019-11-07 PROCEDURE — G0424 PULMONARY REHAB W EXER: HCPCS | Mod: HCNC

## 2019-11-07 NOTE — PROGRESS NOTES
[facility]  Pulmonary Rehab  Session Summary    SUMMARY     Session Data  Session Number: 24  Time In: 0900  Time Out: 1000  Weight: 59.1 kg (130 lb 3.2 oz)  Target Heart Rate Zone: Minimum: 82 bpm  Target Heart Rate Zone: Maximum: 109 bpm  Patient Motivation: Adequate  Patient Effort: Adequate      Pre Exercise Vitals  SpO2: 96 %  Supplemental O2?: Yes  O2 Device: nasal cannula  O2 Flow (L/min): 2  Pulse: 83  BP: 146/86  Shy Dyspnea Rating : 3      During Exercise Vitals  SpO2: 97 %  Supplemental O2?: Yes  O2 Device: nasal cannula  O2 Flow (L/min): 2  Pulse: 74  BP: 113/67  Shy Dyspnea Rating : 4      Post Exercise Vitals  SpO2: 100 %  Supplemental O2?: Yes  O2 Device: nasal cannula  O2 Flow (L/min): 2  Pulse: 77  BP: 138/90  Shy Dyspnea Rating : 3       Modality  Modality: Hand Free Weights, Nustep      Nustep  Time: 26 minutes  Steps: 1538  Load: 4  Mets: 1.2    Biceps  lbs: 5 lbs(dowel)  Sets: 2  Reps: 10      Chest  lbs: 5 lbs(dowel)  Sets: 2  Reps: 10      Education  Recognizing flare ups      Therapist Notes   Adequate effort. Pt tolerated all exercises well. Will continue to motivate and educate pt in rehab.    Dr. Jeronimo Gaming immediately available as needed.

## 2019-11-07 NOTE — PROGRESS NOTES
[facility]  Pulmonary Rehab  30 Day Evaluation and Recertification    SUMMARY       Summary of Progress: Crystal Romero has been doing good in rehab.  Pt has dementia and is brought to her sessions by her caregiver.  She is still using a walker for balance.  She is continuing to meet the goals set for her.  Will continue to work with pt on increasing her strength and endurance during rehab.  She is increasing her exercise tolerance and will continue to benefit from Pulmonary Rehab.    Referring Provider: DOC Hoang    Start Date: 8/16/19    Attends:     Patient attends 2 days a week and has completed 24 visits.  The patient's current compliance is 100%.    Problems this Certification Period:   alzheimers   Seen by Dr. Gaffney for wheezing and increased SOB - placed on prednisone taper    Exercise Capacity Summary    Nustep Date:     10/10/19 Time Load Steps Date:     11/7/19 Time Load Steps     22 3 1302  26 4 1538   Recumbent Bike Date:  Time Level Date:  Time Level            Treadmill Date:  Time Speed Grade Date:  Time Speed Grade              Arm Ergometer Date:     10/10/19 Time Level Date:     10/29/19 Time Level     8 1  10 1   Free Weights Date:     10/10/19  (dowel) Bicep Curls   Chest Press Lbs Sets Reps Date:     11/7/19  (dowel) Bicep Curls   Chest Press lbs Sets Reps      5 2 10   5 2 10         Education:  Pursed lip breathing review  Preventing lung infections   Maximizing energy  Recognizing flare ups  Oxygen therapy    Goals:  Goals met    Updated Exercise Prescription:  Endurance Training: increase to 12 minutes on the arm ergometer on level 1  Strength Training: increase to load 5 on the nustep for 10 minutes reaching 400 steps     I certify that the patient is making progress in pulmonary rehabilitation, is physically able to participate, medically stable and remains motivated.

## 2019-11-14 ENCOUNTER — HOSPITAL ENCOUNTER (OUTPATIENT)
Dept: PULMONOLOGY | Facility: HOSPITAL | Age: 84
Discharge: HOME | End: 2019-11-14
Attending: INTERNAL MEDICINE
Payer: MEDICARE

## 2019-11-14 VITALS — WEIGHT: 129.38 LBS | BODY MASS INDEX: 22.21 KG/M2

## 2019-11-14 PROCEDURE — G0424 PULMONARY REHAB W EXER: HCPCS | Mod: HCNC

## 2019-11-14 NOTE — PROGRESS NOTES
[facility]  Pulmonary Rehab  Session Summary    SUMMARY     Session Data  Session Number: 25  Time In: 0900  Time Out: 1000  Weight: 58.7 kg (129 lb 6.4 oz)  Target Heart Rate Zone: Minimum: 82 bpm  Target Heart Rate Zone: Maximum: 109 bpm  Patient Motivation: Adequate  Patient Effort: Adequate      Pre Exercise Vitals  SpO2: 98 %  Supplemental O2?: No  Pulse: 74  BP: (!) 113/92  Shy Dyspnea Rating : 2      During Exercise Vitals  SpO2: 95 %  Supplemental O2?: No  Pulse: 75  BP: 134/72  Shy Dyspnea Rating : 3      Post Exercise Vitals  SpO2: 97 %  Supplemental O2?: No  Pulse: 73  BP: 121/70  Shy Dyspnea Rating : 2       Modality  Modality: Arm Ergometer, Hand Free Weights, Nustep    Arm Ergometer  Time: 8 minutes  Level: 1      Nustep  Time: 20 minutes  Steps: 1007  Load: 5(load 5 for 10 minutes then dropped to load 4)  Mets: 1.2         Biceps  lbs: 5 lbs(dowel)  Sets: 2  Reps: 10      Chest  lbs: 5 lbs(dowel)  Sets: 2  Reps: 10    Education  Coping with SOB, stress and anxiety      Therapist Notes   Good effort. Pt's left shoulder hurting her, so only 8 minutes done on arm ergometer. She tolerate exercise on Nustep well. Will continue to motivate and educate pt in rehab.    Dr. Gaffney immediately available as needed.

## 2019-11-19 ENCOUNTER — HOSPITAL ENCOUNTER (OUTPATIENT)
Dept: PULMONOLOGY | Facility: HOSPITAL | Age: 84
Discharge: HOME | End: 2019-11-19
Attending: INTERNAL MEDICINE
Payer: MEDICARE

## 2019-11-19 VITALS — BODY MASS INDEX: 22.02 KG/M2 | WEIGHT: 128.31 LBS

## 2019-11-19 PROCEDURE — G0424 PULMONARY REHAB W EXER: HCPCS | Mod: HCNC

## 2019-11-19 NOTE — PROGRESS NOTES
[facility]  Pulmonary Rehab  Session Summary    SUMMARY     Session Data  Session Number: 26  Time In: 0900  Time Out: 1000  Weight: 58.2 kg (128 lb 4.8 oz)  Target Heart Rate Zone: Minimum: 82 bpm  Target Heart Rate Zone: Maximum: 109 bpm  Patient Motivation: Good  Patient Effort: Good      Pre Exercise Vitals  SpO2: 98 %  Supplemental O2?: Yes  O2 Device: nasal cannula  O2 Flow (L/min): 2  Pulse: 66  BP: 113/63  Shy Dyspnea Rating : 1      During Exercise Vitals  SpO2: 99 %  Supplemental O2?: Yes  O2 Device: nasal cannula  O2 Flow (L/min): 2  Pulse: 70  BP: (!) 132/93  Shy Dyspnea Rating : 2      Post Exercise Vitals  SpO2: 93 %  Supplemental O2?: Yes  O2 Device: nasal cannula  O2 Flow (L/min): 2  Pulse: 72  BP: 116/90  Shy Dyspnea Rating : 2       Modality  Modality: Arm Ergometer, Hand Free Weights, Nustep    Arm Ergometer  Time: 6 minutes  Level: 1    Nustep  Time: 20 minutes  Steps: 1219  Load: 5  Mets: 1.5    Biceps  lbs: 5 lbs(dowel)  Sets: 2  Reps: 10      Chest  lbs: 5 lbs(dowel)  Sets: 2  Reps: 10    Education  Compliance and regular attendance in rehab  Pursed lip breathing      Therapist Notes   Good effort. Pt exercised entire 20 minutes on load 5 on Nustep. Pt tolerated exercise well. Will continue to motivate and educate pt in rehab.    Dr. Lee immediately available as needed.

## 2019-11-21 ENCOUNTER — HOSPITAL ENCOUNTER (OUTPATIENT)
Dept: PULMONOLOGY | Facility: HOSPITAL | Age: 84
Discharge: HOME | End: 2019-11-21
Attending: INTERNAL MEDICINE
Payer: MEDICARE

## 2019-11-21 VITALS — BODY MASS INDEX: 22.14 KG/M2 | WEIGHT: 129 LBS

## 2019-11-21 PROCEDURE — G0424 PULMONARY REHAB W EXER: HCPCS | Mod: HCNC

## 2019-11-21 NOTE — PROGRESS NOTES
[facility]  Pulmonary Rehab  Session Summary    SUMMARY     Session Data  Session Number: 27  Time In: 0900  Time Out: 1000  Weight: 58.5 kg (129 lb)  Target Heart Rate Zone: Minimum: 81 bpm  Target Heart Rate Zone: Maximum: 108 bpm  Patient Motivation: Good  Patient Effort: Good      Pre Exercise Vitals  SpO2: 95 %  Supplemental O2?: Yes  O2 Device: nasal cannula  O2 Flow (L/min): 2  Pulse: 75  BP: 128/69  Shy Dyspnea Rating : 2      During Exercise Vitals  SpO2: 96 %  Supplemental O2?: Yes  O2 Device: nasal cannula  O2 Flow (L/min): 2  Pulse: 76  BP: 120/62  Shy Dyspnea Rating : 3      Post Exercise Vitals  SpO2: 98 %  Supplemental O2?: Yes  O2 Device: nasal cannula  O2 Flow (L/min): 2  Pulse: 80  BP: 115/47  Shy Dyspnea Rating : 2       Modality  Modality: Arm Ergometer, Hand Free Weights, Nustep      Arm Ergometer  Time: 10 minutes  Level: 1      Nustep  Time: 20 minutes  Steps: 1036  Load: 5  Mets: 1.6      Biceps  lbs: 5 lbs(dowel)  Sets: 2  Reps: 10      Chest  lbs: 5 lbs(dowel)  Sets: 2  Reps: 10    Education  Compliance and regular attendance in rehab  Pursed lip breathing      Therapist Notes   Good effort. Pt tolerated exercise well. Will continue to motivate and educate pt in rehab.    Dr. Gaffney immediately available as needed.

## 2019-11-26 ENCOUNTER — HOSPITAL ENCOUNTER (OUTPATIENT)
Dept: PULMONOLOGY | Facility: HOSPITAL | Age: 84
Discharge: HOME | End: 2019-11-26
Attending: INTERNAL MEDICINE
Payer: MEDICARE

## 2019-11-26 VITALS — BODY MASS INDEX: 22.49 KG/M2 | WEIGHT: 131 LBS

## 2019-11-26 PROCEDURE — G0424 PULMONARY REHAB W EXER: HCPCS | Mod: HCNC

## 2019-11-26 NOTE — PROGRESS NOTES
[facility]  Pulmonary Rehab  Session Summary    SUMMARY     Session Data  Session Number: 28  Time In: 0907  Time Out: 1000  Weight: 59.4 kg (131 lb)  Target Heart Rate Zone: Minimum: 81 bpm  Target Heart Rate Zone: Maximum: 108 bpm  Patient Motivation: Good  Patient Effort: Good      Pre Exercise Vitals  SpO2: 97 %  Supplemental O2?: Yes  O2 Device: nasal cannula  O2 Flow (L/min): 2  Pulse: 98  BP: 123/89  Shy Dyspnea Rating : 3    Post Exercise Vitals  SpO2: 98 %  Supplemental O2?: Yes  O2 Device: nasal cannula  O2 Flow (L/min): 2  Pulse: 75  BP: 135/74  Shy Dyspnea Rating : 3       Modality  Modality: Hand Free Weights, Nustep    Nustep  Time: 28 minutes  Steps: 1313  Load: 5  Mets: 1.2    Biceps  lbs: 5 lbs(dowel)  Sets: 2  Reps: 10      Chest  lbs: 5 lbs(dowel)  Sets: 2  Reps: 10    Education  maximizing energy      Therapist Notes   Good effort. Pt tolerated exercises well. Will continue to motivate and educate pt in rehab.    Dr. Slater immediately available as needed.

## 2019-12-01 DIAGNOSIS — K21.9 GASTROESOPHAGEAL REFLUX DISEASE, ESOPHAGITIS PRESENCE NOT SPECIFIED: ICD-10-CM

## 2019-12-03 ENCOUNTER — TELEPHONE (OUTPATIENT)
Dept: PULMONOLOGY | Facility: CLINIC | Age: 84
End: 2019-12-03

## 2019-12-03 ENCOUNTER — OFFICE VISIT (OUTPATIENT)
Dept: PULMONOLOGY | Facility: CLINIC | Age: 84
End: 2019-12-03
Payer: MEDICARE

## 2019-12-03 ENCOUNTER — HOSPITAL ENCOUNTER (OUTPATIENT)
Dept: RADIOLOGY | Facility: HOSPITAL | Age: 84
Discharge: HOME | End: 2019-12-03
Attending: INTERNAL MEDICINE
Payer: MEDICARE

## 2019-12-03 VITALS
WEIGHT: 130 LBS | DIASTOLIC BLOOD PRESSURE: 70 MMHG | BODY MASS INDEX: 22.2 KG/M2 | SYSTOLIC BLOOD PRESSURE: 132 MMHG | HEIGHT: 64 IN | OXYGEN SATURATION: 96 % | HEART RATE: 64 BPM | RESPIRATION RATE: 20 BRPM

## 2019-12-03 DIAGNOSIS — R06.02 SOB (SHORTNESS OF BREATH): Primary | ICD-10-CM

## 2019-12-03 DIAGNOSIS — R06.02 SOB (SHORTNESS OF BREATH): ICD-10-CM

## 2019-12-03 DIAGNOSIS — J44.1 COPD WITH ACUTE EXACERBATION: Primary | ICD-10-CM

## 2019-12-03 DIAGNOSIS — J96.11 CHRONIC RESPIRATORY FAILURE WITH HYPOXIA: Chronic | ICD-10-CM

## 2019-12-03 PROCEDURE — 96372 PR INJECTION,THERAP/PROPH/DIAG2ST, IM OR SUBCUT: ICD-10-PCS | Mod: HCNC,S$GLB,, | Performed by: INTERNAL MEDICINE

## 2019-12-03 PROCEDURE — 1159F PR MEDICATION LIST DOCUMENTED IN MEDICAL RECORD: ICD-10-PCS | Mod: HCNC,S$GLB,, | Performed by: INTERNAL MEDICINE

## 2019-12-03 PROCEDURE — 3078F PR MOST RECENT DIASTOLIC BLOOD PRESSURE < 80 MM HG: ICD-10-PCS | Mod: HCNC,CPTII,S$GLB, | Performed by: INTERNAL MEDICINE

## 2019-12-03 PROCEDURE — 1101F PT FALLS ASSESS-DOCD LE1/YR: CPT | Mod: HCNC,CPTII,S$GLB, | Performed by: INTERNAL MEDICINE

## 2019-12-03 PROCEDURE — 99214 OFFICE O/P EST MOD 30 MIN: CPT | Mod: 25,HCNC,S$GLB, | Performed by: INTERNAL MEDICINE

## 2019-12-03 PROCEDURE — 3075F PR MOST RECENT SYSTOLIC BLOOD PRESS GE 130-139MM HG: ICD-10-PCS | Mod: HCNC,CPTII,S$GLB, | Performed by: INTERNAL MEDICINE

## 2019-12-03 PROCEDURE — 3075F SYST BP GE 130 - 139MM HG: CPT | Mod: HCNC,CPTII,S$GLB, | Performed by: INTERNAL MEDICINE

## 2019-12-03 PROCEDURE — 1101F PR PT FALLS ASSESS DOC 0-1 FALLS W/OUT INJ PAST YR: ICD-10-PCS | Mod: HCNC,CPTII,S$GLB, | Performed by: INTERNAL MEDICINE

## 2019-12-03 PROCEDURE — 1159F MED LIST DOCD IN RCRD: CPT | Mod: HCNC,S$GLB,, | Performed by: INTERNAL MEDICINE

## 2019-12-03 PROCEDURE — 96372 THER/PROPH/DIAG INJ SC/IM: CPT | Mod: HCNC,S$GLB,, | Performed by: INTERNAL MEDICINE

## 2019-12-03 PROCEDURE — 71046 XR CHEST PA AND LATERAL: ICD-10-PCS | Mod: 26,HCNC,, | Performed by: RADIOLOGY

## 2019-12-03 PROCEDURE — 71046 X-RAY EXAM CHEST 2 VIEWS: CPT | Mod: TC,HCNC

## 2019-12-03 PROCEDURE — 71046 X-RAY EXAM CHEST 2 VIEWS: CPT | Mod: 26,HCNC,, | Performed by: RADIOLOGY

## 2019-12-03 PROCEDURE — 99214 PR OFFICE/OUTPT VISIT, EST, LEVL IV, 30-39 MIN: ICD-10-PCS | Mod: 25,HCNC,S$GLB, | Performed by: INTERNAL MEDICINE

## 2019-12-03 PROCEDURE — 3078F DIAST BP <80 MM HG: CPT | Mod: HCNC,CPTII,S$GLB, | Performed by: INTERNAL MEDICINE

## 2019-12-03 PROCEDURE — 99999 PR PBB SHADOW E&M-EST. PATIENT-LVL III: ICD-10-PCS | Mod: PBBFAC,HCNC,, | Performed by: INTERNAL MEDICINE

## 2019-12-03 PROCEDURE — 99999 PR PBB SHADOW E&M-EST. PATIENT-LVL III: CPT | Mod: PBBFAC,HCNC,, | Performed by: INTERNAL MEDICINE

## 2019-12-03 RX ORDER — CEFTRIAXONE 1 G/1
1 INJECTION, POWDER, FOR SOLUTION INTRAMUSCULAR; INTRAVENOUS
Status: COMPLETED | OUTPATIENT
Start: 2019-12-03 | End: 2019-12-03

## 2019-12-03 RX ORDER — TRIAMCINOLONE ACETONIDE 40 MG/ML
80 INJECTION, SUSPENSION INTRA-ARTICULAR; INTRAMUSCULAR ONCE
Status: COMPLETED | OUTPATIENT
Start: 2019-12-03 | End: 2019-12-03

## 2019-12-03 RX ORDER — PANTOPRAZOLE SODIUM 40 MG/1
TABLET, DELAYED RELEASE ORAL
Qty: 90 TABLET | Refills: 2 | Status: SHIPPED | OUTPATIENT
Start: 2019-12-03 | End: 2020-09-13

## 2019-12-03 RX ORDER — PREDNISONE 20 MG/1
40 TABLET ORAL DAILY
Qty: 10 TABLET | Refills: 0 | Status: SHIPPED | OUTPATIENT
Start: 2019-12-03 | End: 2019-12-08

## 2019-12-03 RX ORDER — LEVOTHYROXINE SODIUM 25 UG/1
TABLET ORAL
Qty: 90 TABLET | Refills: 2 | Status: SHIPPED | OUTPATIENT
Start: 2019-12-03 | End: 2020-08-23

## 2019-12-03 RX ORDER — AZITHROMYCIN 500 MG/1
500 TABLET, FILM COATED ORAL DAILY
Qty: 5 TABLET | Refills: 0 | Status: SHIPPED | OUTPATIENT
Start: 2019-12-03 | End: 2019-12-08

## 2019-12-03 RX ADMIN — CEFTRIAXONE 1 G: 1 INJECTION, POWDER, FOR SOLUTION INTRAMUSCULAR; INTRAVENOUS at 10:12

## 2019-12-03 RX ADMIN — TRIAMCINOLONE ACETONIDE 80 MG: 40 INJECTION, SUSPENSION INTRA-ARTICULAR; INTRAMUSCULAR at 10:12

## 2019-12-03 NOTE — PROGRESS NOTES
Subjective:      Patient ID: Crystal Romero is a 85 y.o. female.  Patient Active Problem List   Diagnosis    Rheumatoid arthritis    Hiatal hernia    Asthma with COPD    Hyperlipidemia    Essential hypertension    Diastolic dysfunction    Osteoarthritis    Rotator cuff arthropathy    H/O: GI bleed    Dysplastic colon polyp    Hypothyroid    Nonrheumatic aortic valve stenosis    Nonrheumatic aortic valve insufficiency    Impaired cognition    Gastroesophageal reflux disease without esophagitis    Major depressive disorder, recurrent episode, mild    Psoriasis with arthropathy    Bilateral adhesive capsulitis of shoulders    Pulmonary emphysema    Insomnia secondary to depression with anxiety    Binswanger's dementia    Seizure    Vasculitis, CNS    Dysphasia    History of transient ischemic attack (TIA)    Chronic diastolic congestive heart failure    Coronary artery calcification    Atherosclerosis of aorta    Chronic respiratory failure with hypoxia    Exercise hypoxemia    COPD with acute exacerbation     Problem list has been reviewed.    Chief Complaint: COPD; Asthma; Shortness of Breath; and Wheezing      Group Spirometric Classification Exacerbations / year mMRC CAT   Group A: Low risk; less symptom   GOLD 1- 2  < = 1 0 - 1 <10     FEV1 =>  1.11 L ( 65.64 % predicted)       GOLD 1 - mild: FEV1? 80% predicted   GOLD 2 - moderate: 50% ?FEV1 <80% predicted   GOLD 3 - severe: 30% ?FEV1 <50% predicted   GOLD 4 - very severe: FEV1 <30% predicted.      HPI    Recent onset of wheezing and progressive dyspnea unrelieved by her usual inhaler therapy.   Denies cough and hemoptysis.       COPD Questionnaire  How often do you cough?: Almost never  How often do you have phlegm (mucus) in your chest?: Almost never  How often does your chest feel tight?: Never  When you walk up a hill or one flight of stairs, how often are you breathless?: Some of the time  How often are you limited  doing any activities at home?: A little of the time  How often are you confident leaving the house despite your lung condition?: All of the time  How often do you sleep soundly?: All of the time  How often do you have energy?: All of the time  Total score: 7       Her current respiratory therapy regimen is ANORO, PULMICORT, DUONEB  which provides  relief. She is  adherent with her regimen.         A full  review of systems, past , family  and social histories was performed except as mentioned in the note above, these are non contributory to the main issues discussed today.     Previous Report Reviewed: office notes     The following portions of the patient's history were reviewed and updated as appropriate: She  has a past medical history of Arthritis, Cataract, Coronary artery disease, Dementia, Depression, Encounter for blood transfusion, H/O: GI bleed, Hiatal hernia, Hyperlipidemia, Hypertension, Hypothyroid, Rheumatoid arthritis(714.0), Seizures, Stroke, and Syncope and collapse.     She  has a past surgical history that includes Tonsillectomy and Mandible surgery.     Her family history includes Cancer in her mother.  She  reports that she quit smoking about 69 years ago. Her smoking use included cigarettes. She has a 32.00 pack-year smoking history. She has never used smokeless tobacco. She reports that she does not drink alcohol or use drugs.     She has a current medication list which includes the following prescription(s): acetaminophen, albuterol-ipratropium, anoro ellipta, aspirin, atorvastatin, budesonide, cholecalciferol (vitamin d3), clonazepam, ezetimibe, folic acid, furosemide, inhalation spacing device, levothyroxine, mirtazapine, pantoprazole, potassium chloride, potassium chloride, sertraline, valproate, vitamin b complex, azithromycin, and prednisone.     She is allergic to sulfa (sulfonamide antibiotics)..    Review of Systems   Constitutional: Positive for fatigue. Negative for fever and  "chills.   HENT: Positive for hearing loss.    Eyes: Negative for itching.   Respiratory: Positive for cough, wheezing and dyspnea on extertion. Negative for shortness of breath and use of rescue inhaler.    Cardiovascular: Negative for leg swelling.   Genitourinary: Negative for difficulty urinating.   Musculoskeletal: Positive for arthralgias, back pain and gait problem.   Skin: Negative for rash.   Gastrointestinal: Negative for abdominal pain.   Neurological: Negative for syncope.        History of stroke   Hematological: Excessive bruising.   Psychiatric/Behavioral: The patient is nervous/anxious.         Objective:   /70   Pulse 64   Resp 20   Ht 5' 4" (1.626 m)   Wt 59 kg (130 lb)   SpO2 96% Comment: on 3 L  BMI 22.31 kg/m²   Body mass index is 22.31 kg/m².    Physical Exam   Constitutional: She is oriented to person, place, and time. She appears well-developed and well-nourished. She is active and cooperative.  Non-toxic appearance. She does not appear ill. No distress.     Frail  Ambulates with a walker   HENT:   Head: Normocephalic and atraumatic.   Mouth/Throat: No oropharyngeal exudate.   Eyes: Conjunctivae are normal.   Neck: Normal range of motion. Neck supple.   Cardiovascular: Normal rate, regular rhythm and intact distal pulses.   Murmur heard.  Pulmonary/Chest: Effort normal. No stridor. She has no decreased breath sounds. She has wheezes in the right middle field, the right lower field, the left middle field and the left lower field. She has no rales.   Musculoskeletal: Normal range of motion.   Lymphadenopathy:     She has no cervical adenopathy.   Neurological: She is alert and oriented to person, place, and time.   Skin: Skin is warm and dry.   Multiple ecchymoses.   Psychiatric: She has a normal mood and affect. Her behavior is normal. Judgment and thought content normal.   Vitals reviewed.      Personal Diagnostic Review    CXR: 12/03/19: Cardiac silhouette is at the upper limits " of normal in size.  Moderate to large hiatal hernia again noted.  The lungs are clear bilaterally.  No acute osseous findings demonstrated.    Pulmonary function tests:07/17/19:  FEV1: 1.11  (65.6 % predicted), FVC:  1.98 (88.7 % predicted), FEV1/FVC:  56% : Moderate obstruction.            Assessment / Plan:     Discussed diagnosis, its evaluation, treatment and usual course. All questions answered.    Problem List Items Addressed This Visit        Pulmonary    Chronic respiratory failure with hypoxia    Overview     Nasal cannula 2 L with exertion and sleep         Current Assessment & Plan     Increase oxygen supplementation to 3 L /min ( Continuous)         COPD with acute exacerbation - Primary    Current Assessment & Plan     COPD ROS:  notes increased wheezing and dyspnea  for the past day  New concerns: Progressive dyspnea and increasing wheezing.    Exam:  wheezing noted diffusely.   Assessment:  COPD loss of control due to intercurrent illness.   Plan:   1. KENALOG 80MG IM X 1  2. ROCEPHIN 100MG IM X 1  3. AZITHROMYCIN 500MG PO DAILY X 5 DAYS  4. PREDNISONE 40MG PO DAILY X 5 DAYS  5. CONTINUE ANORO, PULMICORT AND DUONEB AS PRESCRIBED.    Re evaluate clinically in 1 month.         Relevant Medications    triamcinolone acetonide injection 80 mg (Completed)    cefTRIAXone injection 1 g (Completed)    predniSONE (DELTASONE) 20 MG tablet    azithromycin (ZITHROMAX) 500 MG tablet              TIME SPENT WITH PATIENT: Time spent: 30 minutes in face to face  discussion concerning diagnosis, prognosis, review of lab and test results, benefits of treatment as well as management of disease, counseling of patient and coordination of care between various health  care providers . Greater than half the time spent was used for coordination of care and counseling of patient.     Follow up in about 1 month (around 1/3/2020) for COPD, Chronic Respiratory Failure.

## 2019-12-03 NOTE — ASSESSMENT & PLAN NOTE
COPD ROS:  notes increased wheezing and dyspnea  for the past day  New concerns: Progressive dyspnea and increasing wheezing.    Exam:  wheezing noted diffusely.   Assessment:  COPD loss of control due to intercurrent illness.   Plan:   1. KENALOG 80MG IM X 1  2. ROCEPHIN 100MG IM X 1  3. AZITHROMYCIN 500MG PO DAILY X 5 DAYS  4. PREDNISONE 40MG PO DAILY X 5 DAYS  5. CONTINUE ANORO, PULMICORT AND DUONEB AS PRESCRIBED.    Re evaluate clinically in 1 month.

## 2019-12-03 NOTE — PATIENT INSTRUCTIONS
Lung Anatomy  Your lungs take air in to give your body oxygen, which the body needs to work. Your lungs, like all the tissues in your body, are made up of billions of tiny specialized cells. Old lung cells die and are replaced by new, identical lung cells. This natural process helps ensure healthy lungs.    Date Last Reviewed: 11/1/2016  © 9427-9791 [employer]. [address] [place] [address], [place] [address]. All rights reserved. This information is not intended as a substitute for professional medical care. Always follow your healthcare professional's instructions.

## 2019-12-05 ENCOUNTER — HOSPITAL ENCOUNTER (OUTPATIENT)
Dept: PULMONOLOGY | Facility: HOSPITAL | Age: 84
Discharge: HOME | End: 2019-12-05
Attending: INTERNAL MEDICINE
Payer: MEDICARE

## 2019-12-05 VITALS — WEIGHT: 131 LBS | BODY MASS INDEX: 22.49 KG/M2

## 2019-12-05 DIAGNOSIS — F33.0 MAJOR DEPRESSIVE DISORDER, RECURRENT EPISODE, MILD: ICD-10-CM

## 2019-12-05 PROCEDURE — G0424 PULMONARY REHAB W EXER: HCPCS | Mod: HCNC

## 2019-12-05 RX ORDER — SERTRALINE HYDROCHLORIDE 50 MG/1
50 TABLET, FILM COATED ORAL DAILY
Qty: 90 TABLET | Refills: 0 | Status: SHIPPED | OUTPATIENT
Start: 2019-12-05 | End: 2020-03-01

## 2019-12-05 NOTE — PROGRESS NOTES
[facility]  Pulmonary Rehab  Session Summary    SUMMARY     Session Data  Session Number: 29  Time In: 0900  Time Out: 1000  Weight: 59.4 kg (131 lb)  Target Heart Rate Zone: Minimum: 81 bpm  Target Heart Rate Zone: Maximum: 108 bpm  Patient Motivation: Good  Patient Effort: Good      Pre Exercise Vitals  SpO2: 98 %  Supplemental O2?: Yes  O2 Flow (L/min): 3  Pulse: 72  BP: (!) 115/95  Shy Dyspnea Rating : 3      Post Exercise Vitals  SpO2: 98 %  Supplemental O2?: Yes  O2 Device: nasal cannula  O2 Flow (L/min): 2  Pulse: 80  BP: 127/70  Shy Dyspnea Rating : 3       Modality  Modality: Hand Free Weights, Nustep       Nustep  Time: 27 minutes  Steps: 1486  Load: 5  Mets: 1.3      Biceps  lbs: 5 lbs(dowel)  Sets: 2  Reps: 10      Chest  lbs: 5 lbs(dowel)  Sets: 2  Reps: 10       Education  COPD and nutrition      Therapist Notes   Good effort. Pt tolerated exercise well. Will continue to motivate and educate pt in rehab.    Dr. Jeronimo Gaming immediately available as needed.

## 2019-12-06 ENCOUNTER — HOSPITAL ENCOUNTER (OUTPATIENT)
Dept: PULMONOLOGY | Facility: HOSPITAL | Age: 84
Discharge: HOME | End: 2019-12-06
Attending: INTERNAL MEDICINE
Payer: MEDICARE

## 2019-12-06 VITALS — BODY MASS INDEX: 22.49 KG/M2 | WEIGHT: 131 LBS

## 2019-12-06 PROCEDURE — G0424 PULMONARY REHAB W EXER: HCPCS | Mod: HCNC

## 2019-12-06 NOTE — PROGRESS NOTES
[facility]  Pulmonary Rehab  Session Summary    SUMMARY     Session Data  Session Number: 30  Time In: 0900  Time Out: 1000  Weight: 59.4 kg (131 lb)  Target Heart Rate Zone: Minimum: 81 bpm  Target Heart Rate Zone: Maximum: 108 bpm  Patient Motivation: Good  Patient Effort: Good      Pre Exercise Vitals  SpO2: 94 %  Supplemental O2?: Yes  O2 Device: nasal cannula  O2 Flow (L/min): 3  Pulse: 65  BP: 151/69  Shy Dyspnea Rating : 4      During Exercise Vitals  SpO2: 95 %  Supplemental O2?: Yes  O2 Device: nasal cannula  O2 Flow (L/min): 3  Pulse: 68  BP: 143/75  Shy Dyspnea Rating : 4      Post Exercise Vitals  SpO2: 100 %  Supplemental O2?: Yes  O2 Device: nasal cannula  O2 Flow (L/min): 3  Pulse: 68  BP: 125/67  Shy Dyspnea Rating : 3       Modality  Modality: Hand Free Weights, Nustep        Nustep  Time: 25 minutes  Steps: 1212  Load: 5  Mets: 1.2      Biceps  lbs: 5 lbs(dowel)  Sets: 2  Reps: 10      Chest  lbs: 5 lbs(dowel)  Sets: 2  Reps: 10      Education  Maximizing energy  Pursed lip breathing review      Therapist Notes   Pt more wheezy and dyspneic today. Had to take more rest breaks than usual. Pt saw Dr. Mekhi Andrew and is on antibiotics and steroids. Vitals stable and pt rested prior to leaving rehab. Will continue to motivate and educate pt in rehab.    Dr. Jeronimo Gaming immediately available as needed.

## 2019-12-10 ENCOUNTER — HOSPITAL ENCOUNTER (OUTPATIENT)
Dept: PULMONOLOGY | Facility: HOSPITAL | Age: 84
Discharge: HOME | End: 2019-12-10
Attending: INTERNAL MEDICINE
Payer: MEDICARE

## 2019-12-10 VITALS — BODY MASS INDEX: 22.4 KG/M2 | WEIGHT: 130.5 LBS

## 2019-12-10 PROCEDURE — G0424 PULMONARY REHAB W EXER: HCPCS | Mod: HCNC

## 2019-12-10 NOTE — PROGRESS NOTES
[facility]  Pulmonary Rehab  Session Summary    SUMMARY     Session Data  Session Number: 31  Time In: 0905  Time Out: 1000  Weight: 59.2 kg (130 lb 8 oz)  Target Heart Rate Zone: Minimum: 81 bpm  Target Heart Rate Zone: Maximum: 108 bpm  Patient Motivation: Good  Patient Effort: Good      Pre Exercise Vitals  SpO2: 97 %  Supplemental O2?: Yes  O2 Device: nasal cannula  O2 Flow (L/min): 3  Pulse: 71  BP: 126/79  Shy Dyspnea Rating : 3      During Exercise Vitals  SpO2: 100 %  Supplemental O2?: Yes  O2 Device: nasal cannula  O2 Flow (L/min): 3  Pulse: 78  BP: 133/87  Shy Dyspnea Rating : 4      Post Exercise Vitals  SpO2: 98 %  Supplemental O2?: Yes  O2 Device: nasal cannula  O2 Flow (L/min): 3  Pulse: 72  BP: 133/66  Shy Dyspnea Rating : 3       Modality  Modality: Arm Ergometer, Hand Free Weights, Nustep      Arm Ergometer  Time: 5 minutes  Level: 1       Nustep  Time: 20 minutes  Steps: 948  Load: 5  Mets: 1.2      Biceps  lbs: 5 lbs(dowel)  Sets: 2  Reps: 10      Chest  lbs: 5 lbs(dowel)  Sets: 2  Reps: 10       Education  Medication management and compliance      Therapist Notes   Good effort. Pt complained of shoulder pain so only completed 5 minutes on arm ergometer. She met 30 day goal on Nustep, achieving 477 steps in 10 minutes on level 5. Will continue to motivate and educate pt in rehab.    Dr. Lee immediately available as needed.

## 2019-12-10 NOTE — PROGRESS NOTES
[facility]  Pulmonary Rehab  30 Day Evaluation and Recertification    SUMMARY       Summary of Progress: Crystal Romero has been doing good in rehab.  Pt sometimes complains of shoulder pain and will shorten her time on the arm ergometer.  Pts sitter states that pt has been experiencing more SOB and wheezing lately.  She was seen by Dr. Gaming and placed on steroids and antibiotics.  She continues to be compliant.  She is coming up on her discharge and wants to continue with the maintenance phase of the program.  She is increasing her exercise tolerance and will continue to benefit from Pulmonary Rehab.    Referring Provider: DOC Hoang    Start Date: 8/16/19    Attends:     Patient attends 2 days a week and has completed 31 visits.  The patient's current compliance is 100%.    Problems this Certification Period:   Alzheimer's - is brought to rehab by her sitter    Exercise Capacity Summary    Nustep Date:     11/7/19 Time Load Steps Date:     12/10/19 Time Load Steps     26 4 1538  20 5 948   Recumbent Bike Date:  Time Level Date:  Time Level            Treadmill Date:  Time Speed Grade Date:  Time Speed Grade              Arm Ergometer Date:     10/29/19 Time Level Date:     12/10/19  (1.32 miles) Time Level     10 1  14 1.5   Free Weights Date:     11/7/19  (dowel) Bicep Curls   Chest Press lbs Sets Reps Date:     12/10/19  (dowel) Bicep Curls   Chest Press lbs Sets Reps      5 2 10   5 2 10         Education:  Pursed lip breathing review  Medication management   Maximizing energy  COPD and nutrition  Coping with SOB, stress and anxiety    Goals:  Goals met    Updated Exercise Prescription:  Endurance Training: increase to 1000 steps on load 5 on the nustep in 20 minutes  Strength Training: increase to 6 lb arm dowel     I certify that the patient is making progress in pulmonary rehabilitation, is physically able to participate, medically stable and remains motivated.

## 2019-12-15 DIAGNOSIS — L40.50 PSORIASIS WITH ARTHROPATHY: ICD-10-CM

## 2019-12-15 RX ORDER — FOLIC ACID 1 MG/1
TABLET ORAL
Qty: 90 TABLET | Refills: 0 | Status: SHIPPED | OUTPATIENT
Start: 2019-12-15 | End: 2019-12-17 | Stop reason: SDUPTHER

## 2019-12-16 RX ORDER — POTASSIUM CHLORIDE 750 MG/1
TABLET, EXTENDED RELEASE ORAL
Qty: 90 TABLET | Refills: 1 | Status: SHIPPED | OUTPATIENT
Start: 2019-12-16 | End: 2019-12-17 | Stop reason: SDUPTHER

## 2019-12-17 ENCOUNTER — OFFICE VISIT (OUTPATIENT)
Dept: CARDIOLOGY | Facility: CLINIC | Age: 84
End: 2019-12-17
Payer: MEDICARE

## 2019-12-17 ENCOUNTER — HOSPITAL ENCOUNTER (OUTPATIENT)
Dept: PULMONOLOGY | Facility: HOSPITAL | Age: 84
Discharge: HOME | End: 2019-12-17
Attending: INTERNAL MEDICINE
Payer: MEDICARE

## 2019-12-17 VITALS — BODY MASS INDEX: 22.5 KG/M2 | WEIGHT: 131.13 LBS

## 2019-12-17 VITALS
WEIGHT: 131.63 LBS | OXYGEN SATURATION: 91 % | BODY MASS INDEX: 22.59 KG/M2 | SYSTOLIC BLOOD PRESSURE: 118 MMHG | HEART RATE: 78 BPM | DIASTOLIC BLOOD PRESSURE: 78 MMHG

## 2019-12-17 DIAGNOSIS — I35.0 NONRHEUMATIC AORTIC VALVE STENOSIS: Primary | ICD-10-CM

## 2019-12-17 DIAGNOSIS — I10 ESSENTIAL HYPERTENSION: ICD-10-CM

## 2019-12-17 DIAGNOSIS — I50.32 CHRONIC DIASTOLIC CONGESTIVE HEART FAILURE: ICD-10-CM

## 2019-12-17 DIAGNOSIS — L40.50 PSORIASIS WITH ARTHROPATHY: ICD-10-CM

## 2019-12-17 DIAGNOSIS — E78.49 OTHER HYPERLIPIDEMIA: Chronic | ICD-10-CM

## 2019-12-17 DIAGNOSIS — I25.10 CORONARY ARTERY CALCIFICATION: ICD-10-CM

## 2019-12-17 DIAGNOSIS — I25.84 CORONARY ARTERY CALCIFICATION: ICD-10-CM

## 2019-12-17 PROCEDURE — 3074F SYST BP LT 130 MM HG: CPT | Mod: HCNC,CPTII,S$GLB, | Performed by: INTERNAL MEDICINE

## 2019-12-17 PROCEDURE — 99499 UNLISTED E&M SERVICE: CPT | Mod: HCNC,S$GLB,, | Performed by: INTERNAL MEDICINE

## 2019-12-17 PROCEDURE — 1126F PR PAIN SEVERITY QUANTIFIED, NO PAIN PRESENT: ICD-10-PCS | Mod: HCNC,S$GLB,, | Performed by: INTERNAL MEDICINE

## 2019-12-17 PROCEDURE — 1159F MED LIST DOCD IN RCRD: CPT | Mod: HCNC,S$GLB,, | Performed by: INTERNAL MEDICINE

## 2019-12-17 PROCEDURE — 99999 PR PBB SHADOW E&M-EST. PATIENT-LVL III: CPT | Mod: PBBFAC,HCNC,, | Performed by: INTERNAL MEDICINE

## 2019-12-17 PROCEDURE — 3078F DIAST BP <80 MM HG: CPT | Mod: HCNC,CPTII,S$GLB, | Performed by: INTERNAL MEDICINE

## 2019-12-17 PROCEDURE — 1101F PR PT FALLS ASSESS DOC 0-1 FALLS W/OUT INJ PAST YR: ICD-10-PCS | Mod: HCNC,CPTII,S$GLB, | Performed by: INTERNAL MEDICINE

## 2019-12-17 PROCEDURE — 99214 OFFICE O/P EST MOD 30 MIN: CPT | Mod: HCNC,S$GLB,, | Performed by: INTERNAL MEDICINE

## 2019-12-17 PROCEDURE — 99499 RISK ADDL DX/OHS AUDIT: ICD-10-PCS | Mod: HCNC,S$GLB,, | Performed by: INTERNAL MEDICINE

## 2019-12-17 PROCEDURE — 99999 PR PBB SHADOW E&M-EST. PATIENT-LVL III: ICD-10-PCS | Mod: PBBFAC,HCNC,, | Performed by: INTERNAL MEDICINE

## 2019-12-17 PROCEDURE — 3078F PR MOST RECENT DIASTOLIC BLOOD PRESSURE < 80 MM HG: ICD-10-PCS | Mod: HCNC,CPTII,S$GLB, | Performed by: INTERNAL MEDICINE

## 2019-12-17 PROCEDURE — 3074F PR MOST RECENT SYSTOLIC BLOOD PRESSURE < 130 MM HG: ICD-10-PCS | Mod: HCNC,CPTII,S$GLB, | Performed by: INTERNAL MEDICINE

## 2019-12-17 PROCEDURE — 1101F PT FALLS ASSESS-DOCD LE1/YR: CPT | Mod: HCNC,CPTII,S$GLB, | Performed by: INTERNAL MEDICINE

## 2019-12-17 PROCEDURE — 1159F PR MEDICATION LIST DOCUMENTED IN MEDICAL RECORD: ICD-10-PCS | Mod: HCNC,S$GLB,, | Performed by: INTERNAL MEDICINE

## 2019-12-17 PROCEDURE — 1126F AMNT PAIN NOTED NONE PRSNT: CPT | Mod: HCNC,S$GLB,, | Performed by: INTERNAL MEDICINE

## 2019-12-17 PROCEDURE — G0424 PULMONARY REHAB W EXER: HCPCS | Mod: HCNC

## 2019-12-17 PROCEDURE — 99214 PR OFFICE/OUTPT VISIT, EST, LEVL IV, 30-39 MIN: ICD-10-PCS | Mod: HCNC,S$GLB,, | Performed by: INTERNAL MEDICINE

## 2019-12-17 NOTE — PROGRESS NOTES
[facility]  Pulmonary Rehab  Session Summary    SUMMARY     Session Data  Session Number: 32  Time In: 0900  Time Out: 1000  Weight: 59.5 kg (131 lb 1.6 oz)  Target Heart Rate Zone: Minimum: 81 bpm  Target Heart Rate Zone: Maximum: 108 bpm  Patient Motivation: Good  Patient Effort: Good      Pre Exercise Vitals  SpO2: 100 %  Supplemental O2?: Yes  O2 Device: nasal cannula  O2 Flow (L/min): 3  Pulse: 73  BP: 132/77  Shy Dyspnea Rating : 3      During Exercise Vitals  SpO2: 100 %  Supplemental O2?: Yes  O2 Device: nasal cannula  O2 Flow (L/min): 3  Pulse: 65  BP: 133/85  Shy Dyspnea Rating : 3      Post Exercise Vitals  SpO2: 99 %  Supplemental O2?: Yes  O2 Device: nasal cannula  O2 Flow (L/min): 3  Pulse: 66  BP: 154/74  Shy Dyspnea Rating : 3       Modality  Modality: Hand Free Weights, Nustep(Walked 100 feet)      Nustep  Time: 20 minutes  Steps: 872  Load: 5  Mets: 1.1    Biceps  lbs: 6 lbs(dumbbells)  Sets: 2  Reps: 10      Chest  lbs: 6 lbs(dumbbells)  Sets: 2  Reps: 10       Education  Oxygen therapy      Therapist Notes   Good effort. Increased to 6 lb dowel. (this charted as dumbbells above, incorrect. Pt used dowel)  Pt complained of right shoulder pain. She exercised on Nustep and walked in gym 100 feet. Arm ergometer not done due to shoulder pain. Will continue to motivate and educate pt in rehab.    Dr. Jeronimo Gaming immediately available as needed.

## 2019-12-17 NOTE — PROGRESS NOTES
Subjective:   Patient ID:  Crystal Romero is a 85 y.o. female who presents for follow up of No chief complaint on file.      85, yo female, came in for routine f/u. Severe dementia and discussed with the her daughter Shannan HOWARD.  PMH mod AI, and severe AS, frequent fall, dementia 3 yrs, HTN, HLD, asthma, COPD on home O2, GI bleeding due to prolong Rx of steroid for asthma, RA, TIA x3 in 4 months  H/o TIA/seizure episode. Had impaired swallow and dysphasia. F/u with  neurologist.  Repeat ECHO in , showed normal EF, DD, moderate AI and moderate AS.   Refused surgery for valve per POA.  Frequent fall, no syncope, no bone Fx. Mild skin bruits.  Recent rx of COPD exacerbation.   Unstable gait. Walker dependent. Walks at stores.    Recent worse MOLINA. Steroid Rx did not help much. No cough and sputum  Decent appetite,   No recurrent seizure.  No orthopnea and chest pain.  Lasix 40 mg daily can keep weight stable          Past Medical History:   Diagnosis Date    Arthritis     Cataract     Coronary artery disease     Dementia     Depression     Encounter for blood transfusion     H/O: GI bleed     Hiatal hernia     Hyperlipidemia     Hypertension     Hypothyroid     Rheumatoid arthritis(714.0)     Seizures     Stroke     Syncope and collapse        Past Surgical History:   Procedure Laterality Date    MANDIBLE SURGERY      TONSILLECTOMY         Social History     Tobacco Use    Smoking status: Former Smoker     Packs/day: 2.00     Years: 16.00     Pack years: 32.00     Types: Cigarettes     Last attempt to quit: 1950     Years since quittin.0    Smokeless tobacco: Never Used   Substance Use Topics    Alcohol use: No     Frequency: Never    Drug use: No       Family History   Problem Relation Age of Onset    Cancer Mother         breast, uterine         Review of Systems   Unable to perform ROS: dementia       Objective:   Physical Exam   Constitutional: She is  oriented to person, place, and time. She appears well-nourished.   HENT:   Head: Normocephalic.   Eyes: Pupils are equal, round, and reactive to light.   Neck: Normal carotid pulses and no JVD present. Carotid bruit is not present. No thyromegaly present.   Cardiovascular: Normal rate, regular rhythm and normal pulses.  No extrasystoles are present. PMI is not displaced. Exam reveals no gallop and no S3.   Murmur heard.  Pulses:       Radial pulses are 2+ on the right side, and 2+ on the left side.   3/6 ESM on RUSB, S2 not audible.   Pulmonary/Chest: No stridor. No respiratory distress. She has decreased breath sounds.   Abdominal: Soft. Bowel sounds are normal. There is no tenderness. There is no rebound.   Musculoskeletal: Normal range of motion.   Neurological: She is alert and oriented to person, place, and time.   Skin: Skin is intact. No rash noted.   Psychiatric: Her behavior is normal.       Lab Results   Component Value Date    CHOL 258 (H) 09/12/2019    CHOL 281 (H) 06/13/2019    CHOL 211 (H) 04/04/2018     Lab Results   Component Value Date    HDL 55 09/12/2019    HDL 76 (H) 06/13/2019    HDL 72 04/04/2018     Lab Results   Component Value Date    LDLCALC 168.2 (H) 09/12/2019    LDLCALC 186.0 (H) 06/13/2019    LDLCALC 122.6 04/04/2018     Lab Results   Component Value Date    TRIG 174 (H) 09/12/2019    TRIG 95 06/13/2019    TRIG 82 04/04/2018     Lab Results   Component Value Date    CHOLHDL 21.3 09/12/2019    CHOLHDL 27.0 06/13/2019    CHOLHDL 34.1 04/04/2018       Chemistry        Component Value Date/Time     09/12/2019 1010    K 4.0 09/12/2019 1010     09/12/2019 1010    CO2 29 09/12/2019 1010    BUN 24 (H) 09/12/2019 1010    CREATININE 1.1 09/12/2019 1010    GLU 89 09/12/2019 1010        Component Value Date/Time    CALCIUM 9.6 09/12/2019 1010    ALKPHOS 45 (L) 06/13/2019 1205    AST 19 06/13/2019 1205    ALT 10 06/13/2019 1205    BILITOT 0.3 06/13/2019 1205    ESTGFRAFRICA 53.3 (A)  09/12/2019 1010    EGFRNONAA 46.2 (A) 09/12/2019 1010          No results found for: LABA1C, HGBA1C  Lab Results   Component Value Date    TSH 2.342 09/12/2019     Lab Results   Component Value Date    INR 0.9 03/10/2017    INR 1.0 09/23/2014     Lab Results   Component Value Date    WBC 6.05 06/13/2019    HGB 12.9 06/13/2019    HCT 41.0 06/13/2019    MCV 97 06/13/2019     06/13/2019     BMP  Sodium   Date Value Ref Range Status   09/12/2019 145 136 - 145 mmol/L Final     Potassium   Date Value Ref Range Status   09/12/2019 4.0 3.5 - 5.1 mmol/L Final     Chloride   Date Value Ref Range Status   09/12/2019 106 95 - 110 mmol/L Final     CO2   Date Value Ref Range Status   09/12/2019 29 23 - 29 mmol/L Final     BUN, Bld   Date Value Ref Range Status   09/12/2019 24 (H) 8 - 23 mg/dL Final     Creatinine   Date Value Ref Range Status   09/12/2019 1.1 0.5 - 1.4 mg/dL Final     Calcium   Date Value Ref Range Status   09/12/2019 9.6 8.7 - 10.5 mg/dL Final     Anion Gap   Date Value Ref Range Status   09/12/2019 10 8 - 16 mmol/L Final     eGFR if    Date Value Ref Range Status   09/12/2019 53.3 (A) >60 mL/min/1.73 m^2 Final     eGFR if non    Date Value Ref Range Status   09/12/2019 46.2 (A) >60 mL/min/1.73 m^2 Final     Comment:     Calculation used to obtain the estimated glomerular filtration  rate (eGFR) is the CKD-EPI equation.        BNP  @LABRCNTIP(BNP,BNPTRIAGEBLO)@  @LABRCNTIP(troponini)@  CrCl cannot be calculated (Patient's most recent lab result is older than the maximum 7 days allowed.).  No results found in the last 24 hours.  No results found in the last 24 hours.  No results found in the last 24 hours.    Assessment:      1. Nonrheumatic aortic valve stenosis    2. Essential hypertension    3. Other hyperlipidemia    4. Coronary artery calcification    5. Chronic diastolic congestive heart failure      Severe MOLINA due to COPD and severe AS  POA refused the procedure for  severe AS.  Plan:   Continue breathing and steroid rx  Continue ASA, Lipitor zetia and lasix  Fall precaution  RTC in 6 months

## 2019-12-18 RX ORDER — FOLIC ACID 1 MG/1
1000 TABLET ORAL DAILY
Qty: 90 TABLET | Refills: 3 | Status: SHIPPED | OUTPATIENT
Start: 2019-12-18

## 2019-12-18 RX ORDER — POTASSIUM CHLORIDE 750 MG/1
10 TABLET, EXTENDED RELEASE ORAL 3 TIMES DAILY
Qty: 90 TABLET | Refills: 1 | Status: SHIPPED | OUTPATIENT
Start: 2019-12-18 | End: 2020-04-14

## 2019-12-19 ENCOUNTER — HOSPITAL ENCOUNTER (OUTPATIENT)
Dept: PULMONOLOGY | Facility: HOSPITAL | Age: 84
Discharge: HOME | End: 2019-12-19
Attending: INTERNAL MEDICINE
Payer: MEDICARE

## 2019-12-19 VITALS — BODY MASS INDEX: 22.64 KG/M2 | WEIGHT: 131.88 LBS

## 2019-12-19 PROCEDURE — G0424 PULMONARY REHAB W EXER: HCPCS | Mod: HCNC

## 2019-12-19 NOTE — PROGRESS NOTES
[facility]  Pulmonary Rehab  Session Summary    SUMMARY     Session Data  Session Number: 33  Time In: 0900  Time Out: 1000  Weight: 59.8 kg (131 lb 14.4 oz)  Target Heart Rate Zone: Minimum: 81 bpm  Target Heart Rate Zone: Maximum: 108 bpm  Patient Motivation: Good  Patient Effort: Good      Pre Exercise Vitals  SpO2: 98 %  Supplemental O2?: Yes  O2 Device: nasal cannula  O2 Flow (L/min): 3  Pulse: 68  BP: 150/81  Shy Dyspnea Rating : 3      Post Exercise Vitals  SpO2: 100 %  Supplemental O2?: Yes  O2 Device: nasal cannula  O2 Flow (L/min): 3  Pulse: 69  BP: 142/75  Shy Dyspnea Rating : 3       Modality  Modality: Hand Free Weights, Nustep       Nustep  Time: 30 minutes  Steps: 1575  Load: 5  Mets: 1.2      Biceps  lbs: 6 lbs(dumbbells)  Sets: 2  Reps: 10      Chest  lbs: 6 lbs(dumbbells)  Sets: 2  Reps: 10    Education  Oxygen therapy  Recognizing flare ups      Therapist Notes   Good effort. Pt tolerated all exercises well. Will continue to motivate and educate pt in rehab.    Dr. Jeronimo Gaming immediately available as needed.

## 2019-12-20 ENCOUNTER — HOSPITAL ENCOUNTER (OUTPATIENT)
Dept: PULMONOLOGY | Facility: HOSPITAL | Age: 84
Discharge: HOME | End: 2019-12-20
Attending: INTERNAL MEDICINE
Payer: MEDICARE

## 2019-12-20 VITALS — WEIGHT: 132.19 LBS | BODY MASS INDEX: 22.69 KG/M2

## 2019-12-20 PROCEDURE — G0424 PULMONARY REHAB W EXER: HCPCS | Mod: HCNC

## 2019-12-20 NOTE — PROGRESS NOTES
[facility]  Pulmonary Rehab  Session Summary    SUMMARY     Session Data  Session Number: 34  Time In: 0900  Time Out: 1000  Weight: 60 kg (132 lb 3.2 oz)  Target Heart Rate Zone: Minimum: 81 bpm  Target Heart Rate Zone: Maximum: 108 bpm  Patient Motivation: Good  Patient Effort: Good      Pre Exercise Vitals  SpO2: 99 %  Supplemental O2?: Yes  O2 Device: nasal cannula  O2 Flow (L/min): 3  Pulse: 70  BP: 148/87  Shy Dyspnea Rating : 3      During Exercise Vitals  SpO2: 100 %  Supplemental O2?: Yes  O2 Device: nasal cannula  O2 Flow (L/min): 3  Pulse: 69  BP: 132/90  Shy Dyspnea Rating : 3      Post Exercise Vitals  SpO2: 100 %  Supplemental O2?: Yes  O2 Device: nasal cannula  O2 Flow (L/min): 3  Pulse: 68  BP: 139/78  Shy Dyspnea Rating : 3       Modality  Modality: Arm Ergometer      Arm Ergometer  Time: 6 minutes  Level: 1    Nustep  Time: 20 minutes  Steps: 1000  Load: 5  Mets: 1.1       Biceps  lbs: 6 lbs(dumbbells)  Sets: 2  Reps: 10      Chest  lbs: 6 lbs(dumbbells)  Sets: 2  Reps: 10    Education  Oxygen therapy      Therapist Notes   Good effort. Pt met 30 day goal on Nustep today. (20 minutes on load 5). She tolerated all exercises well. Will continue to motivate and educate pt in rehab.    Dr. Jeronimo Gaming immediately available as needed.

## 2019-12-31 ENCOUNTER — HOSPITAL ENCOUNTER (OUTPATIENT)
Dept: PULMONOLOGY | Facility: HOSPITAL | Age: 84
Discharge: HOME | End: 2019-12-31
Attending: INTERNAL MEDICINE
Payer: MEDICARE

## 2019-12-31 VITALS — WEIGHT: 127.63 LBS | BODY MASS INDEX: 21.9 KG/M2

## 2019-12-31 PROCEDURE — G0424 PULMONARY REHAB W EXER: HCPCS | Mod: HCNC

## 2019-12-31 NOTE — PROGRESS NOTES
[facility]  Pulmonary Rehab  Session Summary    SUMMARY     Session Data  Session Number: 35  Time In: 0900  Time Out: 1000  Weight: 57.9 kg (127 lb 9.6 oz)  Target Heart Rate Zone: Minimum: 81 bpm  Target Heart Rate Zone: Maximum: 108 bpm  Patient Motivation: Good  Patient Effort: Good      Pre Exercise Vitals  SpO2: 100 %  Supplemental O2?: Yes  O2 Device: nasal cannula  O2 Flow (L/min): 3  Pulse: 73  BP: 108/75  Shy Dyspnea Rating : 2      During Exercise Vitals  SpO2: 95 %  Supplemental O2?: Yes  O2 Device: nasal cannula  O2 Flow (L/min): 3  Pulse: 79  BP: 127/61  Shy Dyspnea Rating : 3      Post Exercise Vitals  SpO2: 100 %  Supplemental O2?: Yes  O2 Device: nasal cannula  O2 Flow (L/min): 3  Pulse: 72  BP: 143/68  Shy Dyspnea Rating : 2       Modality  Modality: Arm Ergometer, Hand Free Weights, Nustep       Arm Ergometer  Time: 10 minutes  Level: 1    Nustep  Time: 20 minutes  Steps: 1008  Load: 5  Mets: 1      Biceps  lbs: 6 lbs(dowel)  Sets: 2  Reps: 10      Chest  lbs: 6 lbs(dowel)  Sets: 2  Reps: 10       Education  COPD action plan      Therapist Notes   Good effort. Pt tolerated all exercises well. Will continue to motivate and educate pt in rehab.    Dr. Jeronimo Gaming immediately available as needed.

## 2020-01-02 ENCOUNTER — HOSPITAL ENCOUNTER (OUTPATIENT)
Dept: PULMONOLOGY | Facility: HOSPITAL | Age: 85
Discharge: HOME | End: 2020-01-02
Attending: INTERNAL MEDICINE
Payer: MEDICARE

## 2020-01-02 VITALS — WEIGHT: 125.13 LBS | HEIGHT: 64 IN | BODY MASS INDEX: 21.36 KG/M2

## 2020-01-02 PROCEDURE — G0424 PULMONARY REHAB W EXER: HCPCS | Mod: HCNC

## 2020-01-02 NOTE — PROGRESS NOTES
[facility]  Pulmonary Rehab  Discharge Summary     SUMMARY      Summary of Progress:   Ms. Romero did well in rehab. She has Alzheimer's disease and right shoulder issue. She was cooperative and dedicated to rehab. She did increase her exercise tolerance and will continue to exercise here through our Maintenance program. She improved on her 6 MWT by 30.49 meters on discharge and her SF36 scores improved in both the Mental and Physical sections. Her family and other physicians (Cardiology) are pleased with her progress in rehab.  The Tips for Safe Exercise handout, Exercise Capacity summary and Pulmonary knowledge test were reviewed with the pt. She and her caregiver expressed understanding. She will begin our Maintenance program next week.      Total Visits: 36    Total Compliance: 97%     Questionnaire Score:               Pulmonary Knowledge Test: 100     SF36 Physical Functionin      SF36 Mental Health: 62     Physical Health Summary  Your Score 33   Your current score indicates that you are well below the general population of similar age and gender.  Compared to this population your health is:    Well Below the average in:  Overall physical functioning   Performance at work, home, or school due to physical problems  Below the average in:  General health  Same or Better than the average in:  Ability to participate in activities due to bodily pain  Your history    Mental Health Summary  Your Score 62   Your current score indicates that you are the same or better than the general population of similar age and gender.  Compared to this population your health is:    Same or Better than the average in:  Level of energy   Ability to participate in social activities   Performance at work, home, or school due to emotional problems   Emotional well-being  Your history  Progress  Physical Health Progress     Date Score    Current: 2020 33 Your score is better than your previous survey  "  Previous: Aug 16, 2019 21      Mental Health Progress     Date Score    Current: Jan 02, 2020 62 Your score is better than your previous survey   Previous: Aug 16, 2019 53      Self Evaluated Transition   Compared to one year ago, you rated your health in general as: About the same          Exercise Summary:      Nustep Date:  8/21/19   Time Load Steps Date:  12/31/19 Time Load Steps     12 3 357  20 5 1008   Recumbent Bike Date:     Time Level Date:   Time Level            Treadmill Date:     Time Speed Grade Date:   Time Speed Grade              Arm Ergometer Date:  8/21/19   Time Level Date:  12/31/19 Time Level     10 1  10 1   Free Weights Date:  8/21/19  ([employer])   Bicep Curls  Chest Press  Triceps  Legs Lbs  3 Sets  2 Reps  10 Date:  12/31/19  (dowel) Bicep Curls  Chest Press  Triceps  Legs Lbs  6 Sets  2 Reps  10                                                      Six Minute Walk:     Height: 5' 4" (162.6 cm)      Weight: 56.7 kg (125 lb 1.6 oz)     Performing RT: Siomara THOM Heriberto              Phase Oxygen Assessment Supplemental O2 Heart   Rate Blood Pressure Shy Dyspnea Scale Rating   Resting 97 % 2 L/M 86 bpm 135/70 1   Exercise        Minute        1 92 % 2 L/M 93 bpm     2 98 % 2 L/M 96 bpm     3 96 % 2 L/M 107 bpm     4 96 % 2 L/M 106 bpm     5 95 % 2 L/M 103 bpm     6  96 % 2 L/M 104 bpm 123/83 4   Recovery        Minute        1 99 % 2 L/M 84 bpm     2 99 % 2 L/M 83 bpm     3 100 % 2 L/M 78 bpm     4 100 % 2 L/M 75 bpm 132/80 1        Six Minute Walk Summary        Total Time Walked (Calculated): 360 seconds  Total Distance Meters (Calculated): 121.92 meters  Predicted Distance Meters (Calculated): 388.84 meters  Percentage of Predicted (Calculated): 31.35  Peak VO2 (Calculated): 7.64  Mets: 2.18  Symptoms: dyspnea           Exercise Results Summary:     Weight:  Initial: 130 lbs  Mid: 131.4 lbs  Discharge: 125.1 lbs    Oxygen Use: NC 2lpm     Dyspnea: 0-4 on SHY dyspnea scale   "   Goals:  Continue to exercise 3-4 times per week, keeping heart rate .     I certify this patient is ready to discharge from pulmonary rehabilitation.

## 2020-01-07 ENCOUNTER — HOSPITAL ENCOUNTER (OUTPATIENT)
Dept: PULMONOLOGY | Facility: HOSPITAL | Age: 85
Discharge: HOME | End: 2020-01-07
Attending: INTERNAL MEDICINE
Payer: MEDICARE

## 2020-01-07 VITALS — BODY MASS INDEX: 21.87 KG/M2 | WEIGHT: 127.38 LBS

## 2020-01-07 PROCEDURE — 41000056 HC PULMONARY REHAB - PHASE IV: Mod: HCNC

## 2020-01-07 NOTE — PROGRESS NOTES
[facility]  Pulmonary Rehab  Maintenance Summary    SUMMARY     Session Data  Session Number: 1  Time In: 1000  Time Out: 1100  Weight: 57.8 kg (127 lb 6.4 oz)  Target Heart Rate Zone: Minimum: 81 bpm  Target Heart Rate Zone: Maximum: 108 bpm  Patient Motivation: Adequate  Patient Effort: Adequate      Pre Exercise Vitals  SpO2: 99 %  Supplemental O2?: Yes  O2 Device: nasal cannula  O2 Flow (L/min): 2  Pulse: 74  BP: 128/76  Shy Dyspnea Rating : 2      Post Exercise Vitals  SpO2: 97 %  Supplemental O2?: Yes  O2 Device: nasal cannula  O2 Flow (L/min): 2  Pulse: 77  BP: 144/80  Shy Dyspnea Rating : 2       Modality  Modality: Arm Ergometer, Nustep      Arm Ergometer  Time: 6 minutes  Level: 1      Nustep  Time: 20 minutes  Steps: 1009  Load: 5      Therapist Notes   Pts first day of the maintenance program.  She is brought by her sitterStefanie.  She walks with a walker for balance.  She was able to complete the exercises with no issues.  Will continue to monitor pt during rehab.      Dr. Slater immediately available as needed.

## 2020-01-09 ENCOUNTER — HOSPITAL ENCOUNTER (OUTPATIENT)
Dept: PULMONOLOGY | Facility: HOSPITAL | Age: 85
Discharge: HOME | End: 2020-01-09
Attending: INTERNAL MEDICINE

## 2020-01-09 VITALS — WEIGHT: 129.5 LBS | BODY MASS INDEX: 22.23 KG/M2

## 2020-01-09 NOTE — PROGRESS NOTES
[facility]  Pulmonary Rehab  Maintenance Summary    SUMMARY     Session Data  Session Number: 2  Time In: 1000  Time Out: 1100  Weight: 58.7 kg (129 lb 8 oz)  Target Heart Rate Zone: Minimum: 81 bpm  Target Heart Rate Zone: Maximum: 108 bpm  Patient Motivation: Good  Patient Effort: Good      Pre Exercise Vitals  SpO2: 100 %  Supplemental O2?: Yes  O2 Device: nasal cannula  O2 Flow (L/min): 3  Pulse: 72  Shy Dyspnea Rating : 4      Post Exercise Vitals  SpO2: 100 %  Supplemental O2?: Yes  O2 Device: nasal cannula  O2 Flow (L/min): 3  Pulse: 76  BP: 132/74  Shy Dyspnea Rating : 4       Modality  Modality: Arm Ergometer, Nustep             Arm Ergometer  Time: 5 minutes  Level: 1             Nustep  Time: 20 minutes  Steps: 708  Load: 5  Mets: 1.2      Therapist Notes   Pt seemed to have increased SOB and wheezing.  Her sitter states that this is normal for her until midmorning and early afternoon.  She also said that she had just given her 2 puffs of her albuterol prior to starting rehab.  Pursed lip breathing technique reviewed with pt.  All vitals stable before and after exercise.  Will continue to monitor pt during rehab.    Dr. Gaffney immediately available as needed.

## 2020-01-14 ENCOUNTER — OFFICE VISIT (OUTPATIENT)
Dept: PULMONOLOGY | Facility: CLINIC | Age: 85
End: 2020-01-14
Payer: MEDICARE

## 2020-01-14 ENCOUNTER — HOSPITAL ENCOUNTER (OUTPATIENT)
Dept: PULMONOLOGY | Facility: HOSPITAL | Age: 85
Discharge: HOME | End: 2020-01-14
Attending: INTERNAL MEDICINE

## 2020-01-14 ENCOUNTER — HOSPITAL ENCOUNTER (OUTPATIENT)
Dept: RADIOLOGY | Facility: HOSPITAL | Age: 85
Discharge: HOME | End: 2020-01-14
Attending: INTERNAL MEDICINE
Payer: MEDICARE

## 2020-01-14 VITALS
SYSTOLIC BLOOD PRESSURE: 116 MMHG | BODY MASS INDEX: 22.02 KG/M2 | DIASTOLIC BLOOD PRESSURE: 70 MMHG | RESPIRATION RATE: 22 BRPM | WEIGHT: 129 LBS | HEART RATE: 79 BPM | OXYGEN SATURATION: 99 % | HEIGHT: 64 IN

## 2020-01-14 VITALS — WEIGHT: 125.63 LBS | BODY MASS INDEX: 21.56 KG/M2

## 2020-01-14 DIAGNOSIS — I70.0 ATHEROSCLEROSIS OF AORTA: Chronic | ICD-10-CM

## 2020-01-14 DIAGNOSIS — I67.3 BINSWANGER'S DEMENTIA: Chronic | ICD-10-CM

## 2020-01-14 DIAGNOSIS — L40.50 PSORIASIS WITH ARTHROPATHY: Chronic | ICD-10-CM

## 2020-01-14 DIAGNOSIS — J44.89 ASTHMA WITH COPD: Primary | Chronic | ICD-10-CM

## 2020-01-14 DIAGNOSIS — J44.89 ASTHMA WITH COPD: ICD-10-CM

## 2020-01-14 DIAGNOSIS — I50.32 CHRONIC DIASTOLIC CONGESTIVE HEART FAILURE: Chronic | ICD-10-CM

## 2020-01-14 DIAGNOSIS — F33.0 MAJOR DEPRESSIVE DISORDER, RECURRENT EPISODE, MILD: Chronic | ICD-10-CM

## 2020-01-14 DIAGNOSIS — J96.11 CHRONIC RESPIRATORY FAILURE WITH HYPOXIA: Chronic | ICD-10-CM

## 2020-01-14 DIAGNOSIS — J43.2 CENTRILOBULAR EMPHYSEMA: Chronic | ICD-10-CM

## 2020-01-14 PROBLEM — R56.9 SEIZURE: Status: RESOLVED | Noted: 2018-05-01 | Resolved: 2020-01-14

## 2020-01-14 PROCEDURE — 99499 RISK ADDL DX/OHS AUDIT: ICD-10-PCS | Mod: HCNC,S$GLB,, | Performed by: INTERNAL MEDICINE

## 2020-01-14 PROCEDURE — 99999 PR PBB SHADOW E&M-EST. PATIENT-LVL III: ICD-10-PCS | Mod: PBBFAC,HCNC,, | Performed by: INTERNAL MEDICINE

## 2020-01-14 PROCEDURE — 1101F PT FALLS ASSESS-DOCD LE1/YR: CPT | Mod: HCNC,CPTII,S$GLB, | Performed by: INTERNAL MEDICINE

## 2020-01-14 PROCEDURE — 1159F MED LIST DOCD IN RCRD: CPT | Mod: HCNC,S$GLB,, | Performed by: INTERNAL MEDICINE

## 2020-01-14 PROCEDURE — 99214 OFFICE O/P EST MOD 30 MIN: CPT | Mod: HCNC,S$GLB,, | Performed by: INTERNAL MEDICINE

## 2020-01-14 PROCEDURE — 71046 X-RAY EXAM CHEST 2 VIEWS: CPT | Mod: 26,HCNC,, | Performed by: RADIOLOGY

## 2020-01-14 PROCEDURE — 99214 PR OFFICE/OUTPT VISIT, EST, LEVL IV, 30-39 MIN: ICD-10-PCS | Mod: HCNC,S$GLB,, | Performed by: INTERNAL MEDICINE

## 2020-01-14 PROCEDURE — 71046 XR CHEST PA AND LATERAL: ICD-10-PCS | Mod: 26,HCNC,, | Performed by: RADIOLOGY

## 2020-01-14 PROCEDURE — 3078F DIAST BP <80 MM HG: CPT | Mod: HCNC,CPTII,S$GLB, | Performed by: INTERNAL MEDICINE

## 2020-01-14 PROCEDURE — 71046 X-RAY EXAM CHEST 2 VIEWS: CPT | Mod: TC,HCNC

## 2020-01-14 PROCEDURE — 99499 UNLISTED E&M SERVICE: CPT | Mod: HCNC,S$GLB,, | Performed by: INTERNAL MEDICINE

## 2020-01-14 PROCEDURE — 3074F SYST BP LT 130 MM HG: CPT | Mod: HCNC,CPTII,S$GLB, | Performed by: INTERNAL MEDICINE

## 2020-01-14 PROCEDURE — 1159F PR MEDICATION LIST DOCUMENTED IN MEDICAL RECORD: ICD-10-PCS | Mod: HCNC,S$GLB,, | Performed by: INTERNAL MEDICINE

## 2020-01-14 PROCEDURE — 3074F PR MOST RECENT SYSTOLIC BLOOD PRESSURE < 130 MM HG: ICD-10-PCS | Mod: HCNC,CPTII,S$GLB, | Performed by: INTERNAL MEDICINE

## 2020-01-14 PROCEDURE — 3078F PR MOST RECENT DIASTOLIC BLOOD PRESSURE < 80 MM HG: ICD-10-PCS | Mod: HCNC,CPTII,S$GLB, | Performed by: INTERNAL MEDICINE

## 2020-01-14 PROCEDURE — 1101F PR PT FALLS ASSESS DOC 0-1 FALLS W/OUT INJ PAST YR: ICD-10-PCS | Mod: HCNC,CPTII,S$GLB, | Performed by: INTERNAL MEDICINE

## 2020-01-14 PROCEDURE — 99999 PR PBB SHADOW E&M-EST. PATIENT-LVL III: CPT | Mod: PBBFAC,HCNC,, | Performed by: INTERNAL MEDICINE

## 2020-01-14 RX ORDER — PREDNISONE 10 MG/1
TABLET ORAL
Qty: 60 TABLET | Refills: 0 | Status: SHIPPED | OUTPATIENT
Start: 2020-01-14 | End: 2020-03-11

## 2020-01-14 RX ORDER — ALBUTEROL SULFATE 90 UG/1
2 AEROSOL, METERED RESPIRATORY (INHALATION) EVERY 4 HOURS PRN
COMMUNITY
Start: 2019-10-21 | End: 2020-11-06

## 2020-01-14 RX ORDER — GABAPENTIN 300 MG/1
CAPSULE ORAL
COMMUNITY
Start: 2019-11-13 | End: 2020-06-18

## 2020-01-14 RX ORDER — CALCIUM CARBONATE 750 MG/1
TABLET, CHEWABLE ORAL
COMMUNITY
Start: 2016-11-08

## 2020-01-14 RX ORDER — AZITHROMYCIN 500 MG/1
500 TABLET, FILM COATED ORAL DAILY
Qty: 5 TABLET | Refills: 0 | Status: SHIPPED | OUTPATIENT
Start: 2020-01-14 | End: 2020-01-19

## 2020-01-14 NOTE — ASSESSMENT & PLAN NOTE
COPD ROS:  notes wheezing and dyspnea  for the past day  New concerns: Progressive dyspnea and increasing wheezing.    Exam:  wheezing noted diffusely.   Assessment:  COPD loss of control due to intercurrent illness.   Plan:     1. AZITHROMYCIN 500MG PO DAILY X 5 DAYS  2. PREDNISONE 40MG taper per orders.   3. CONTINUE ANORO, PULMICORT AND DUONEB AS PRESCRIBED.

## 2020-01-14 NOTE — PATIENT INSTRUCTIONS
Lung Anatomy  Your lungs take air in to give your body oxygen, which the body needs to work. Your lungs, like all the tissues in your body, are made up of billions of tiny specialized cells. Old lung cells die and are replaced by new, identical lung cells. This natural process helps ensure healthy lungs.    Date Last Reviewed: 11/1/2016  © 3520-9107 [employer]. [address] [place] [address], [place] [address]. All rights reserved. This information is not intended as a substitute for professional medical care. Always follow your healthcare professional's instructions.

## 2020-01-14 NOTE — PROGRESS NOTES
Subjective:      Patient ID: Crystal Romero is a 85 y.o. female.  Patient Active Problem List   Diagnosis    Hiatal hernia    Asthma with COPD    Hyperlipidemia    Essential hypertension    Diastolic dysfunction    Osteoarthritis    Rotator cuff arthropathy    H/O: GI bleed    Dysplastic colon polyp    Hypothyroid    Nonrheumatic aortic valve stenosis    Nonrheumatic aortic valve insufficiency    Impaired cognition    Gastroesophageal reflux disease without esophagitis    Major depressive disorder, recurrent episode, mild    Psoriasis with arthropathy    Bilateral adhesive capsulitis of shoulders    Pulmonary emphysema    Insomnia secondary to depression with anxiety    Binswanger's dementia    Dysphasia    History of transient ischemic attack (TIA)    Chronic diastolic congestive heart failure    Coronary artery calcification    Atherosclerosis of aorta    Chronic respiratory failure with hypoxia     Problem list has been reviewed.    Chief Complaint: Wheezing (REV CXR); Stridor; Breathing Problem; COPD; Hypoxia; and Asthma      Group Spirometric Classification Exacerbations / year mMRC CAT   Group A: Low risk; less symptom   GOLD 1- 2  < = 1 0 - 1 <10     FEV1 =>  1.11 L ( 65.64 % predicted)       GOLD 1 - mild: FEV1? 80% predicted   GOLD 2 - moderate: 50% ?FEV1 <80% predicted   GOLD 3 - severe: 30% ?FEV1 <50% predicted   GOLD 4 - very severe: FEV1 <30% predicted.      HPI    CXR reviewd with matty ans her care giver. Both voiced understanding.  Reports  wheezing and progressive dyspnea unrelieved by her usual inhaler therapy.   Denies cough and hemoptysis.       COPD Questionnaire  How often do you cough?: Never  How often do you have phlegm (mucus) in your chest?: Never  How often does your chest feel tight?: Almost never  When you walk up a hill or one flight of stairs, how often are you breathless?: All of the time  How often are you limited doing any activities at home?:  Some of the time  How often are you confident leaving the house despite your lung condition?: All of the time  How often do you sleep soundly?: All of the time  How often do you have energy?: All of the time  Total score: 9       Her current respiratory therapy regimen is ANORO, PULMICORT, DUONEB  which provides  relief. She is  adherent with her regimen.         A full  review of systems, past , family  and social histories was performed except as mentioned in the note above, these are non contributory to the main issues discussed today.     Previous Report Reviewed: office notes     The following portions of the patient's history were reviewed and updated as appropriate: She  has a past medical history of Arthritis, Cataract, Coronary artery disease, Dementia, Depression, Encounter for blood transfusion, H/O: GI bleed, Hiatal hernia, Hyperlipidemia, Hypertension, Hypothyroid, Rheumatoid arthritis(714.0), Seizures, Stroke, and Syncope and collapse.     She  has a past surgical history that includes Tonsillectomy and Mandible surgery.     Her family history includes Cancer in her mother.  She  reports that she quit smoking about 70 years ago. Her smoking use included cigarettes. She has a 32.00 pack-year smoking history. She has never used smokeless tobacco. She reports that she does not drink alcohol or use drugs.     She has a current medication list which includes the following prescription(s): acetaminophen, albuterol-ipratropium, anoro ellipta, aspirin, atorvastatin, blood pressure test kit-medium, budesonide, cholecalciferol (vitamin d3), clonazepam, ezetimibe, folic acid, furosemide, inhalation spacing device, levothyroxine, mirtazapine, pantoprazole, potassium chloride, potassium chloride, potassium chloride, sertraline, valproate, vitamin b complex, azithromycin, gabapentin, prednisone, and ventolin hfa.     She is allergic to sulfa (sulfonamide antibiotics)..    Review of Systems   Constitutional: Positive  "for fatigue. Negative for fever and chills.   HENT: Positive for hearing loss.    Eyes: Negative for itching.   Respiratory: Positive for cough, wheezing, stridor and dyspnea on extertion. Negative for shortness of breath and use of rescue inhaler.    Cardiovascular: Negative for leg swelling.   Genitourinary: Negative for difficulty urinating.   Musculoskeletal: Positive for arthralgias, back pain and gait problem.   Skin: Negative for rash.   Gastrointestinal: Negative for abdominal pain.   Neurological: Negative for syncope.        History of stroke   Hematological: Excessive bruising.   Psychiatric/Behavioral: The patient is nervous/anxious.         Objective:   /70   Pulse 79   Resp (!) 22   Ht 5' 4" (1.626 m)   Wt 58.5 kg (129 lb)   SpO2 99% Comment: 3 LITERS OXYGEN DURING EXERTION  BMI 22.14 kg/m²   Body mass index is 22.14 kg/m².    Physical Exam   Constitutional: She is oriented to person, place, and time. She appears well-developed and well-nourished. She is active and cooperative.  Non-toxic appearance. She does not appear ill. No distress.     Frail  Ambulates with a walker   HENT:   Head: Normocephalic and atraumatic.   Mouth/Throat: No oropharyngeal exudate.   Eyes: Conjunctivae are normal.   Neck: Normal range of motion. Neck supple.   Cardiovascular: Normal rate, regular rhythm and intact distal pulses.   Murmur heard.  Pulmonary/Chest: Effort normal. No stridor. She has no decreased breath sounds. She has wheezes in the right middle field, the right lower field, the left middle field and the left lower field. She has no rales.   Musculoskeletal: Normal range of motion.   Lymphadenopathy:     She has no cervical adenopathy.   Neurological: She is alert and oriented to person, place, and time.   Skin: Skin is warm and dry.   Multiple ecchymoses.   Psychiatric: She has a normal mood and affect. Her behavior is normal. Judgment and thought content normal.   Vitals reviewed.      Personal " Diagnostic Review      CXR: 01/14/20: The lungs are clear and free of infiltrate.  No pleural effusion or pneumothorax. The heart is not enlarged. A large hiatal hernia is noted.  Vascular calcifications are noted.  Degenerative changes noted at both glenohumeral joints.    CXR: 12/03/19: Cardiac silhouette is at the upper limits of normal in size.  Moderate to large hiatal hernia again noted.  The lungs are clear bilaterally.  No acute osseous findings demonstrated.    Pulmonary function tests:07/17/19:  FEV1: 1.11  (65.6 % predicted), FVC:  1.98 (88.7 % predicted), FEV1/FVC:  56% : Moderate obstruction.            Assessment / Plan:     Discussed diagnosis, its evaluation, treatment and usual course. All questions answered.    Problem List Items Addressed This Visit        Neuro    Binswanger's dementia    Overview     [place] Siomara Huffman [place] [facility] [place] [facility] [place]          Current Assessment & Plan     Plan of management per NEUROLOGY.            Psychiatric    Major depressive disorder, recurrent episode, mild    Current Assessment & Plan     Plan of management per PCP            Pulmonary    Asthma with COPD - Primary (Chronic)    Current Assessment & Plan     COPD ROS:  notes wheezing and dyspnea  for the past day  New concerns: Progressive dyspnea and increasing wheezing.    Exam:  wheezing noted diffusely.   Assessment:  COPD loss of control due to intercurrent illness.   Plan:     1. AZITHROMYCIN 500MG PO DAILY X 5 DAYS  2. PREDNISONE 40MG taper per orders.   3. CONTINUE ANORO, PULMICORT AND DUONEB AS PRESCRIBED.             Relevant Medications    predniSONE (DELTASONE) 10 MG tablet    azithromycin (ZITHROMAX) 500 MG tablet    Pulmonary emphysema    Current Assessment & Plan     Stable radiologically.  Annual imaging surveillance.         Chronic respiratory failure with hypoxia    Overview     Oxygen supplementation 3 L / min with exertion and sleep         Current Assessment & Plan     Continue oxygen supplementation to 3 L  /min ( Continuous)            Cardiac/Vascular    Chronic diastolic congestive heart failure    Current Assessment & Plan     Well Compensated.  Optimize CHF regimen.  Preload and afterload reduction.  Daily weighing.  Dietary salt restriction.  Alcohol and tobacco avoidance.         Atherosclerosis of aorta    Overview     Seen on imaging. Stable and controlled.          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         Current Assessment & Plan     Plan of management per RHEUMATOLOGY.                   TIME SPENT WITH PATIENT: Time spent: 30 minutes in face to face  discussion concerning diagnosis, prognosis, review of lab and test results, benefits of treatment as well as management of disease, counseling of patient and coordination of care between various health  care providers . Greater than half the time spent was used for coordination of care and counseling of patient.     Follow up in about 27 days (around 2/10/2020) for Asthma with COPD, Chronic Respiratory Failure.

## 2020-01-14 NOTE — ASSESSMENT & PLAN NOTE
Well Compensated.  Optimize CHF regimen.  Preload and afterload reduction.  Daily weighing.  Dietary salt restriction.  Alcohol and tobacco avoidance.

## 2020-01-14 NOTE — PROGRESS NOTES
[facility]  Pulmonary Rehab  Maintenance Summary    SUMMARY     Session Data  Session Number: 3  Time In: 1000  Time Out: 1035  Weight: 57 kg (125 lb 9.6 oz)  Target Heart Rate Zone: Minimum: 81 bpm  Target Heart Rate Zone: Maximum: 108 bpm  Patient Motivation: Fair  Patient Effort: Fair      Pre Exercise Vitals  SpO2: 100 %  Supplemental O2?: Yes  O2 Device: nasal cannula  O2 Flow (L/min): 3  Pulse: 86  BP: 132/86  Shy Dyspnea Rating : 4      Post Exercise Vitals  SpO2: 100 %  Supplemental O2?: Yes  O2 Device: nasal cannula  O2 Flow (L/min): 3  Pulse: 88  BP: 130/72  Shy Dyspnea Rating : 4       Modality  Modality: Nustep      Nustep  Time: 15 minutes  Steps: 421  Load: 5  Mets: 1.3      Therapist Notes   Pt came into rehab with increased shortness of breath and wheezing.  Her sitter states that she is taking longer to get moving in the mornings.  She had an appointment with Dr. Gaming on Friday, but requested that she be seen today.  Appointment with Dr. Gaming made with pt today.  Will continue to monitor pt during rehab.    Dr. Jeronimo Gaming immediately available as needed.

## 2020-01-16 ENCOUNTER — HOSPITAL ENCOUNTER (OUTPATIENT)
Dept: PULMONOLOGY | Facility: HOSPITAL | Age: 85
Discharge: HOME | End: 2020-01-16
Attending: INTERNAL MEDICINE

## 2020-01-16 VITALS — WEIGHT: 125.63 LBS | BODY MASS INDEX: 21.56 KG/M2

## 2020-01-16 NOTE — PROGRESS NOTES
[facility]  Pulmonary Rehab  Maintenance Summary    SUMMARY     Session Data  Session Number: 4  Time In: 1000  Time Out: 1100  Weight: 57 kg (125 lb 9.6 oz)  Target Heart Rate Zone: Minimum: 81 bpm  Target Heart Rate Zone: Maximum: 108 bpm  Patient Motivation: Adequate  Patient Effort: Adequate      Pre Exercise Vitals  SpO2: 97 %  Supplemental O2?: Yes  O2 Device: nasal cannula  O2 Flow (L/min): 2  Pulse: 73  BP: 124/76  Shy Dyspnea Rating : 2      Post Exercise Vitals  SpO2: 97 %  Supplemental O2?: Yes  O2 Device: nasal cannula  O2 Flow (L/min): 2  Pulse: 74  BP: 128/65  Shy Dyspnea Rating : 3       Modality  Modality: Arm Ergometer, Nustep      Arm Ergometer  Time: 2 minutes  Level: 1      Nustep  Time: 20 minutes  Steps: 1010  Load: 5  Mets: 1.3      Therapist Notes   Pt tolerated exercises well today.  She was seen by Dr. Gaming on Tuesday and is feeling much better today.  Will continue to monitor pt during rehab.    Dr. Jeronimo Gaming immediately available as needed.

## 2020-01-21 ENCOUNTER — HOSPITAL ENCOUNTER (OUTPATIENT)
Dept: PULMONOLOGY | Facility: HOSPITAL | Age: 85
Discharge: HOME | End: 2020-01-21
Attending: INTERNAL MEDICINE

## 2020-01-21 VITALS — BODY MASS INDEX: 21.51 KG/M2 | WEIGHT: 125.31 LBS

## 2020-01-21 NOTE — PROGRESS NOTES
[facility]  Pulmonary Rehab  Maintenance Summary    SUMMARY     Session Data  Session Number: 5  Time In: 1000  Time Out: 1100  Weight: 56.8 kg (125 lb 4.8 oz)  Target Heart Rate Zone: Minimum: 81 bpm  Target Heart Rate Zone: Maximum: 108 bpm  Patient Motivation: Fair  Patient Effort: Fair      Pre Exercise Vitals  SpO2: 99 %  Supplemental O2?: Yes  O2 Device: nasal cannula  O2 Flow (L/min): 2  Pulse: 81  BP: 142/84  Shy Dyspnea Rating : 3      Post Exercise Vitals  SpO2: 100 %  Supplemental O2?: Yes  O2 Device: nasal cannula  O2 Flow (L/min): 2  Pulse: 84  BP: 128/66  Shy Dyspnea Rating : 3       Modality  Modality: Nustep      Nustep  Time: 30 minutes  Steps: 948  Load: 4  Mets: 1.2      Therapist Notes   Pt tolerated exercises well.  Will continue to monitor pt during rehab.    Dr. Jeronimo Gaming immediately available as needed.

## 2020-01-23 ENCOUNTER — HOSPITAL ENCOUNTER (OUTPATIENT)
Dept: PULMONOLOGY | Facility: HOSPITAL | Age: 85
Discharge: HOME | End: 2020-01-23
Attending: INTERNAL MEDICINE

## 2020-01-23 VITALS — BODY MASS INDEX: 21.61 KG/M2 | WEIGHT: 125.88 LBS

## 2020-01-23 NOTE — PROGRESS NOTES
[facility]  Pulmonary Rehab  Maintenance Summary    SUMMARY     Session Data  Session Number: 6  Time In: 1000  Time Out: 1100  Weight: 57.1 kg (125 lb 14.4 oz)  Target Heart Rate Zone: Minimum: 81 bpm  Target Heart Rate Zone: Maximum: 108 bpm  Patient Motivation: Adequate  Patient Effort: Adequate      Pre Exercise Vitals  SpO2: 100 %  Supplemental O2?: Yes  O2 Device: nasal cannula  O2 Flow (L/min): 3  Pulse: 78  BP: 129/68  Shy Dyspnea Rating : 4      Post Exercise Vitals  SpO2: 98 %  Supplemental O2?: Yes  O2 Device: nasal cannula  O2 Flow (L/min): 3  Pulse: 79  BP: 130/68  Shy Dyspnea Rating : 4       Modality  Modality: Arm Ergometer, Nustep      Arm Ergometer  Time: 5 minutes  Level: 1      Nustep  Time: 20 minutes  Steps: 779  Load: 4  Mets: 1.3      Therapist Notes   Pt tolerated exercises well today.  Will continue to monitor pt during rehab.    Dr. Jeronimo Gaming immediately available as needed.

## 2020-01-28 ENCOUNTER — HOSPITAL ENCOUNTER (OUTPATIENT)
Dept: PULMONOLOGY | Facility: HOSPITAL | Age: 85
Discharge: HOME | End: 2020-01-28
Attending: INTERNAL MEDICINE

## 2020-01-28 VITALS — BODY MASS INDEX: 21.47 KG/M2 | WEIGHT: 125.13 LBS

## 2020-01-28 NOTE — PROGRESS NOTES
[facility]  Pulmonary Rehab  [facility] Summary    SUMMARY     Session Data  Session Number: 7  Time In: 1000  Time Out: 1100  Weight: 56.7 kg (125 lb 1.6 oz)  Target Heart Rate Zone: Minimum: 81 bpm  Target Heart Rate Zone: Maximum: 108 bpm  Patient Motivation: Fair  Patient Effort: Fair      Pre Exercise Vitals  SpO2: 98 %  Supplemental O2?: Yes  O2 Device: nasal cannula  O2 Flow (L/min): 2  Pulse: 80  BP: 121/80  Shy Dyspnea Rating : 4      Post Exercise Vitals  SpO2: 99 %  Supplemental O2?: Yes  O2 Device: nasal cannula  O2 Flow (L/min): 2  Pulse: 71  BP: 119/71  Shy Dyspnea Rating : 3       Modality  Modality: Nustep      Nustep  Time: 30 minutes  Steps: 818  Load: 4  Mets: 1.2      Therapist Notes   Pt tolerated exercises well today.  Will continue to monitor pt during rehab.    Dr. Jeronimo Gaming immediately available as needed.

## 2020-01-30 ENCOUNTER — HOSPITAL ENCOUNTER (OUTPATIENT)
Dept: PULMONOLOGY | Facility: HOSPITAL | Age: 85
Discharge: HOME | End: 2020-01-30
Attending: INTERNAL MEDICINE

## 2020-01-30 VITALS — BODY MASS INDEX: 21.65 KG/M2 | WEIGHT: 126.13 LBS

## 2020-01-30 NOTE — PROGRESS NOTES
[facility]  Pulmonary Rehab  Session Summary    SUMMARY     Session Data  Session Number: 8  Time In: 1000  Time Out: 1100  Weight: 57.2 kg (126 lb 1.6 oz)  Target Heart Rate Zone: Minimum: 81 bpm  Target Heart Rate Zone: Maximum: 108 bpm  Patient Motivation: Good  Patient Effort: Good      Pre Exercise Vitals  SpO2: 98 %  Supplemental O2?: Yes  O2 Device: nasal cannula  O2 Flow (L/min): 2  Pulse: 69  BP: 127/63  Shy Dyspnea Rating : 3        Post Exercise Vitals  SpO2: 100 %  Supplemental O2?: Yes  O2 Device: nasal cannula  O2 Flow (L/min): 2  Pulse: 71  BP: 122/73  Shy Dyspnea Rating : 3       Modality  Modality: Arm Ergometer, Nustep      Arm Ergometer  Time: 5 minutes  Level: 1      Nustep  Time: 30 minutes  Steps: 1090  Load: 4  Mets: 1.4             Education      Therapist Notes   Maintenance session.Patient exercised 30 minutes on Nustep and 5 minutes on arm ergometer.Patient seems tired but completed exercises as instructed.Will continue to motivate patient in rehab.    Dr. Jeronimo Gaming immediately available as needed.

## 2020-02-04 ENCOUNTER — HOSPITAL ENCOUNTER (OUTPATIENT)
Dept: PULMONOLOGY | Facility: HOSPITAL | Age: 85
Discharge: HOME | End: 2020-02-04
Attending: INTERNAL MEDICINE

## 2020-02-04 VITALS — WEIGHT: 125.88 LBS | BODY MASS INDEX: 21.61 KG/M2

## 2020-02-04 PROCEDURE — 41000056 HC PULMONARY REHAB - PHASE IV

## 2020-02-04 NOTE — PROGRESS NOTES
[facility]  Pulmonary Rehab  Maintenance Summary    SUMMARY     Session Data  Session Number: 1  Time In: 1000  Time Out: 1100  Weight: 57.1 kg (125 lb 14.4 oz)  Target Heart Rate Zone: Minimum: 81 bpm  Target Heart Rate Zone: Maximum: 108 bpm  Patient Motivation: Good  Patient Effort: Good      Pre Exercise Vitals  SpO2: 97 %  Supplemental O2?: Yes  O2 Device: nasal cannula  O2 Flow (L/min): 2  Pulse: 78  Shy Dyspnea Rating : 3      Post Exercise Vitals  SpO2: 100 %  Supplemental O2?: Yes  O2 Device: nasal cannula  O2 Flow (L/min): 2  Pulse: 77  BP: 128/75  Shy Dyspnea Rating : 3       Modality  Modality: Arm Ergometer, Nustep      Arm Ergometer  Time: 10 minutes  Level: 1      Nustep  Time: 20 minutes  Steps: 1003  Load: 4      Therapist Notes   Pt seems to be feeling better today.  She was able to tolerate the exercises given to her with minimum breaks.  Will continue to monitor pt during rehab.    Dr. Slater immediately available as needed.

## 2020-02-06 ENCOUNTER — HOSPITAL ENCOUNTER (OUTPATIENT)
Dept: PULMONOLOGY | Facility: HOSPITAL | Age: 85
Discharge: HOME | End: 2020-02-06
Attending: INTERNAL MEDICINE

## 2020-02-06 VITALS — BODY MASS INDEX: 21.9 KG/M2 | WEIGHT: 127.63 LBS

## 2020-02-10 ENCOUNTER — OFFICE VISIT (OUTPATIENT)
Dept: PULMONOLOGY | Facility: CLINIC | Age: 85
End: 2020-02-10
Payer: MEDICARE

## 2020-02-10 VITALS
BODY MASS INDEX: 20.92 KG/M2 | DIASTOLIC BLOOD PRESSURE: 80 MMHG | RESPIRATION RATE: 15 BRPM | SYSTOLIC BLOOD PRESSURE: 126 MMHG | HEART RATE: 76 BPM | HEIGHT: 64 IN | WEIGHT: 122.56 LBS | OXYGEN SATURATION: 96 %

## 2020-02-10 DIAGNOSIS — J44.89 ASTHMA WITH COPD: Primary | Chronic | ICD-10-CM

## 2020-02-10 DIAGNOSIS — J96.11 CHRONIC RESPIRATORY FAILURE WITH HYPOXIA: Chronic | ICD-10-CM

## 2020-02-10 DIAGNOSIS — J43.2 CENTRILOBULAR EMPHYSEMA: Chronic | ICD-10-CM

## 2020-02-10 PROCEDURE — 3074F PR MOST RECENT SYSTOLIC BLOOD PRESSURE < 130 MM HG: ICD-10-PCS | Mod: HCNC,CPTII,S$GLB, | Performed by: INTERNAL MEDICINE

## 2020-02-10 PROCEDURE — 3074F SYST BP LT 130 MM HG: CPT | Mod: HCNC,CPTII,S$GLB, | Performed by: INTERNAL MEDICINE

## 2020-02-10 PROCEDURE — 99214 PR OFFICE/OUTPT VISIT, EST, LEVL IV, 30-39 MIN: ICD-10-PCS | Mod: HCNC,S$GLB,, | Performed by: INTERNAL MEDICINE

## 2020-02-10 PROCEDURE — 1159F PR MEDICATION LIST DOCUMENTED IN MEDICAL RECORD: ICD-10-PCS | Mod: HCNC,S$GLB,, | Performed by: INTERNAL MEDICINE

## 2020-02-10 PROCEDURE — 1101F PT FALLS ASSESS-DOCD LE1/YR: CPT | Mod: HCNC,CPTII,S$GLB, | Performed by: INTERNAL MEDICINE

## 2020-02-10 PROCEDURE — 99999 PR PBB SHADOW E&M-EST. PATIENT-LVL III: CPT | Mod: PBBFAC,HCNC,, | Performed by: INTERNAL MEDICINE

## 2020-02-10 PROCEDURE — 3079F DIAST BP 80-89 MM HG: CPT | Mod: HCNC,CPTII,S$GLB, | Performed by: INTERNAL MEDICINE

## 2020-02-10 PROCEDURE — 99999 PR PBB SHADOW E&M-EST. PATIENT-LVL III: ICD-10-PCS | Mod: PBBFAC,HCNC,, | Performed by: INTERNAL MEDICINE

## 2020-02-10 PROCEDURE — 3079F PR MOST RECENT DIASTOLIC BLOOD PRESSURE 80-89 MM HG: ICD-10-PCS | Mod: HCNC,CPTII,S$GLB, | Performed by: INTERNAL MEDICINE

## 2020-02-10 PROCEDURE — 1101F PR PT FALLS ASSESS DOC 0-1 FALLS W/OUT INJ PAST YR: ICD-10-PCS | Mod: HCNC,CPTII,S$GLB, | Performed by: INTERNAL MEDICINE

## 2020-02-10 PROCEDURE — 99214 OFFICE O/P EST MOD 30 MIN: CPT | Mod: HCNC,S$GLB,, | Performed by: INTERNAL MEDICINE

## 2020-02-10 PROCEDURE — 1159F MED LIST DOCD IN RCRD: CPT | Mod: HCNC,S$GLB,, | Performed by: INTERNAL MEDICINE

## 2020-02-10 NOTE — PROGRESS NOTES
Subjective:      Patient ID: Crystal Romero is a 85 y.o. female.  Patient Active Problem List   Diagnosis    Hiatal hernia    Asthma with COPD    Hyperlipidemia    Essential hypertension    Diastolic dysfunction    Osteoarthritis    Rotator cuff arthropathy    H/O: GI bleed    Dysplastic colon polyp    Hypothyroid    Nonrheumatic aortic valve stenosis    Nonrheumatic aortic valve insufficiency    Impaired cognition    Gastroesophageal reflux disease without esophagitis    Major depressive disorder, recurrent episode, mild    Psoriasis with arthropathy    Bilateral adhesive capsulitis of shoulders    Pulmonary emphysema    Insomnia secondary to depression with anxiety    Binswanger's dementia    Dysphasia    History of transient ischemic attack (TIA)    Chronic diastolic congestive heart failure    Coronary artery calcification    Atherosclerosis of aorta    Chronic respiratory failure with hypoxia     Problem list has been reviewed.    Chief Complaint: Asthma and COPD      Group Spirometric Classification Exacerbations / year [facility] CAT   Group A: Low risk; less symptom   GOLD 1- 2  < = 1 0 - 1 <10     FEV1 =>  1.11 L ( 65.64 % predicted)       GOLD 1 - mild: FEV1? 80% predicted   GOLD 2 - moderate: 50% ?FEV1 <80% predicted   GOLD 3 - severe: 30% ?FEV1 <50% predicted   GOLD 4 - very severe: FEV1 <30% predicted.      HPI    Doing better.   Reports stable dyspnea and wheezing inhaler therapy.   Denies cough and hemoptysis.       COPD Questionnaire  How often do you cough?: Never  How often do you have phlegm (mucus) in your chest?: Never  How often does your chest feel tight?: Never  When you walk up a hill or one flight of stairs, how often are you breathless?: Most of the time  How often are you limited doing any activities at home?: Most of the time  How often are you confident leaving the house despite your lung condition?: Some of the time  How often do you sleep soundly?: All  of the time  How often do you have energy?: All of the time  Total score: 10       Her current respiratory therapy regimen is ANORO, PULMICORT, DUONEB  which provides  relief. She is  adherent with her regimen.      Immunization status reviewed and up to date.       A full  review of systems, past , family  and social histories was performed except as mentioned in the note above, these are non contributory to the main issues discussed today.     Previous Report Reviewed: office notes     The following portions of the patient's history were reviewed and updated as appropriate: She  has a past medical history of Arthritis, Cataract, Coronary artery disease, Dementia, Depression, Encounter for blood transfusion, H/O: GI bleed, Hiatal hernia, Hyperlipidemia, Hypertension, Hypothyroid, Rheumatoid arthritis(714.0), Seizures, Stroke, and Syncope and collapse.     She  has a past surgical history that includes Tonsillectomy and Mandible surgery.     Her family history includes Cancer in her mother.  She  reports that she quit smoking about 70 years ago. Her smoking use included cigarettes. She has a 32.00 pack-year smoking history. She has never used smokeless tobacco. She reports that she does not drink alcohol or use drugs.     She has a current medication list which includes the following prescription(s): acetaminophen, albuterol-ipratropium, anoro ellipta, aspirin, atorvastatin, blood pressure test kit-medium, budesonide, cholecalciferol (vitamin d3), clonazepam, ezetimibe, folic acid, furosemide, gabapentin, inhalation spacing device, levothyroxine, mirtazapine, pantoprazole, potassium chloride, potassium chloride, potassium chloride, prednisone, sertraline, valproate, ventolin hfa, and vitamin b complex.     She is allergic to sulfa (sulfonamide antibiotics)..    Review of Systems   Constitutional: Positive for fatigue. Negative for fever and chills.   HENT: Positive for hearing loss.    Eyes: Negative for itching.  "  Respiratory: Positive for cough, wheezing and dyspnea on extertion. Negative for shortness of breath and use of rescue inhaler.    Cardiovascular: Negative for leg swelling.   Genitourinary: Negative for difficulty urinating.   Musculoskeletal: Positive for arthralgias, back pain and gait problem.   Skin: Negative for rash.   Gastrointestinal: Negative for abdominal pain.   Neurological: Negative for syncope.        History of stroke   Hematological: Excessive bruising.   Psychiatric/Behavioral: The patient is nervous/anxious.         Objective:   /80   Pulse 76   Resp 15   Ht 5' 4" (1.626 m)   Wt 55.6 kg (122 lb 9.2 oz)   SpO2 96%   BMI 21.04 kg/m²   Body mass index is 21.04 kg/m².    Physical Exam   Constitutional: She is oriented to person, place, and time. She appears well-developed and well-nourished. She is active and cooperative.  Non-toxic appearance. She does not appear ill. No distress.     Frail  Ambulates with a walker   HENT:   Head: Normocephalic and atraumatic.   Mouth/Throat: No oropharyngeal exudate.   Eyes: Conjunctivae are normal.   Neck: Normal range of motion. Neck supple.   Cardiovascular: Normal rate, regular rhythm and intact distal pulses.   Murmur heard.  Pulmonary/Chest: Effort normal. No stridor. She has no decreased breath sounds. She has no wheezes. She has no rales.   Musculoskeletal: Normal range of motion.   Lymphadenopathy:     She has no cervical adenopathy.   Neurological: She is alert and oriented to person, place, and time.   Skin: Skin is warm and dry.   Multiple ecchymoses.   Psychiatric: She has a normal mood and affect. Her behavior is normal. Judgment and thought content normal.   Vitals reviewed.      Personal Diagnostic Review      CXR: 01/14/20: The lungs are clear and free of infiltrate.  No pleural effusion or pneumothorax. The heart is not enlarged. A large hiatal hernia is noted.  Vascular calcifications are noted.  Degenerative changes noted at both " glenohumeral joints.    CXR: 12/03/19: Cardiac silhouette is at the upper limits of normal in size.  Moderate to large hiatal hernia again noted.  The lungs are clear bilaterally.  No acute osseous findings demonstrated.    Pulmonary function tests:07/17/19:  FEV1: 1.11  (65.6 % predicted), FVC:  1.98 (88.7 % predicted), FEV1/FVC:  56% : Moderate obstruction.            Assessment / Plan:     Discussed diagnosis, its evaluation, treatment and usual course. All questions answered.    Problem List Items Addressed This Visit        Pulmonary    Asthma with COPD - Primary (Chronic)    Current Assessment & Plan     COPD ROS:  notes wheezing and dyspnea  for the past day  New concerns: Progressive dyspnea and increasing wheezing.    Exam:  wheezing noted diffusely.   Assessment:  COPD reasonably well controlled.   Plan: CONTINUE ANORO, PULMICORT AND DUONEB AS PRESCRIBED. PFT, 6MWT, CXR in 5 months.          Relevant Orders    Complete PFT without bronchodilator    X-Ray Chest PA And Lateral    Pulmonary stress test    Pulmonary emphysema    Current Assessment & Plan     Stable radiologically.  Annual imaging surveillance.         Chronic respiratory failure with hypoxia    Overview     Oxygen supplementation 3 L / min with exertion and sleep         Current Assessment & Plan     Continue oxygen supplementation to 3 L /min ( Continuous)               TIME SPENT WITH PATIENT: Time spent: 40 minutes in face to face  discussion concerning diagnosis, prognosis, review of lab and test results, benefits of treatment as well as management of disease, counseling of patient and coordination of care between various health  care providers . Greater than half the time spent was used for coordination of care and counseling of patient.     Follow up in about 6 months (around 8/10/2020) for Asthma with COPD, Emphysema, Chronic exertional dyspnea.

## 2020-02-10 NOTE — PATIENT INSTRUCTIONS
Chronic Lung Disease: If Oxygen Is Prescribed   Supplemental oxygen is prescribed if tests show that the level of oxygen in your blood is too low. If the level stays too low for too long, serious problems can develop in many parts of the body. Supplemental oxygen helps to relieve your symptoms and prevent future problems by getting more oxygen to the blood. Depending on your test results, you may need oxygen all the time. Or it may only be needed during certain activities, such as exercise or sleep. When oxygen is prescribed, youll be referred to a medical equipment company. They will set up the oxygen unit and teach you how to use it.  Nasal cannula     A nasal cannula should be placed in the nose with the prongs arching toward you.     Oxygen is most often inhaled through a nasal cannula (lightweight tube with two hollow prongs that fit just inside the nose).  Types of supplemental oxygen    Compressed oxygen   Oxygen concentrator   Liquid oxygen   Prescribed oxygen comes in several forms. You may use more than one type, depending on when you need oxygen:  · Compressed oxygen is oxygen gas stored in a tank. Because the oxygen is stored under pressure, these tanks must be handled carefully. Gauges on the tank can be used to adjust the oxygen flow rate. Your healthcare provider will determine what this should be. Small tanks can be carried. Larger tanks are on wheels and can be pulled around the house.  · An oxygen concentrator is a machine about the size of a large suitcase. It plugs into an electrical outlet. (A back-up oxygen supply is recommended in case of a power outage.) The machine takes oxygen from the air and concentrates it. Its then delivered to you through plastic tubing. The tubing is long enough so that you can move around the house. When youre using the concentrator, it must be kept somewhere that has a good supply of fresh air. (Dont keep it in a confined space, like a closet.) You may be set  up on a concentrator if you need oxygen all the time or while youre sleeping.  · Liquid oxygen results when oxygen gas is cooled to a very low temperature. Its kept in special containers that maintain this low temperature. When you use liquid oxygen, its warmed and becomes gas before reaching the cannula. Most tanks come with a portable unit that you can carry or pull on a cart. Some of these weigh only a few pounds. Liquid oxygen units are easy to carry around. If you need oxygen all the time or during activity, this kind of unit can help you stay active.  Oxygen is prescribed just for you  Your healthcare provider will prescribe oxygen based on your needs. Here are a few things you should know:  · A therapist from the medical equipment company will explain when to use oxygen and what type to use. Youll be taught how to use and maintain your oxygen equipment.  · You must use the exact rate of oxygen prescribed for each activity. Dont increase or decrease the amount without asking your healthcare provider first.  · Supplemental oxygen is a medicine. Its not addictive and causes no side effects when used as directed.   Date Last Reviewed: 5/1/2016  © 7676-2541 [employer]. [address] [place] [address], [place] [address]. All rights reserved. This information is not intended as a substitute for professional medical care. Always follow your healthcare professional's instructions.

## 2020-02-10 NOTE — ASSESSMENT & PLAN NOTE
COPD ROS:  notes wheezing and dyspnea  for the past day  New concerns: Progressive dyspnea and increasing wheezing.    Exam:  wheezing noted diffusely.   Assessment:  COPD reasonably well controlled.   Plan: CONTINUE ANORO, PULMICORT AND DUONEB AS PRESCRIBED. PFT, 6MWT, CXR in 5 months.

## 2020-02-11 ENCOUNTER — HOSPITAL ENCOUNTER (OUTPATIENT)
Dept: PULMONOLOGY | Facility: HOSPITAL | Age: 85
Discharge: HOME | End: 2020-02-11
Attending: INTERNAL MEDICINE

## 2020-02-11 VITALS — WEIGHT: 126.31 LBS | BODY MASS INDEX: 21.68 KG/M2

## 2020-02-11 NOTE — PROGRESS NOTES
[facility]  Pulmonary Rehab  Maintenance Summary    SUMMARY     Session Data  Session Number: 3  Time In: 1000  Time Out: 1100  Weight: 57.3 kg (126 lb 4.8 oz)  Target Heart Rate Zone: Minimum: 81 bpm  Target Heart Rate Zone: Maximum: 108 bpm  Patient Motivation: Good  Patient Effort: Good      Pre Exercise Vitals  SpO2: 100 %  Supplemental O2?: Yes  O2 Device: nasal cannula  O2 Flow (L/min): 2  Pulse: 68  BP: 130/73  Shy Dyspnea Rating : 2      Post Exercise Vitals  SpO2: 98 %  Supplemental O2?: Yes  O2 Device: nasal cannula  O2 Flow (L/min): 2  Pulse: 90  BP: 136/84  Shy Dyspnea Rating : 2       Modality  Modality: Arm Ergometer, Nustep      Arm Ergometer  Time: 10 minutes  Level: 1      Nustep  Time: 20 minutes  Steps: 851  Load: 4  Mets: 1.2      Therapist Notes   Pt tolerated exercises well today.  Will continue to monitor pt during rehab.    Dr. Jeronimo Gaming immediately available as needed.

## 2020-02-13 ENCOUNTER — HOSPITAL ENCOUNTER (OUTPATIENT)
Dept: PULMONOLOGY | Facility: HOSPITAL | Age: 85
Discharge: HOME | End: 2020-02-13
Attending: INTERNAL MEDICINE

## 2020-02-13 VITALS — WEIGHT: 129.88 LBS | BODY MASS INDEX: 22.3 KG/M2

## 2020-02-13 NOTE — PROGRESS NOTES
[facility]  Pulmonary Rehab  Session Summary    SUMMARY     Session Data  Session Number: 4  Time In: 1000  Time Out: 1100  Weight: 58.9 kg (129 lb 14.4 oz)  Target Heart Rate Zone: Minimum: 81 bpm  Target Heart Rate Zone: Maximum: 108 bpm  Patient Motivation: Good  Patient Effort: Good      Pre Exercise Vitals  SpO2: 98 %  Supplemental O2?: Yes  O2 Device: nasal cannula  O2 Flow (L/min): 2  Pulse: 76  BP: 124/83  Shy Dyspnea Rating : 3    Post Exercise Vitals  SpO2: 99 %  Supplemental O2?: Yes  O2 Device: nasal cannula  O2 Flow (L/min): 2  Pulse: 77  BP: 130/63  Shy Dyspnea Rating : 2       Modality  Modality: Arm Ergometer, Hand Free Weights, Nustep       Arm Ergometer  Time: 3 minutes  Level: 1      Nustep  Time: 20 minutes  Steps: 960  Load: 4  Mets: 1.2       Biceps  lbs: 5 lbs(dowel)  Sets: 2  Reps: 10      Chest  lbs: 5 lbs(dowel)  Sets: 2  Reps: 10      Therapist Notes   Maintenance session. Pt was breathing better today. Hands free weights (5 lb dowel) for chest and bicep exercises done. Pt complained of shoulder hurting on arm ergometer, so only completed 3 minutes on this machine. She tolerated all other exercises well. Maintenance session. Vitals stable.    Dr. Jeronimo Gaming immediately available as needed.

## 2020-02-18 ENCOUNTER — HOSPITAL ENCOUNTER (OUTPATIENT)
Dept: PULMONOLOGY | Facility: HOSPITAL | Age: 85
Discharge: HOME | End: 2020-02-18
Attending: INTERNAL MEDICINE

## 2020-02-18 VITALS — WEIGHT: 125.88 LBS | BODY MASS INDEX: 21.61 KG/M2

## 2020-02-18 NOTE — PROGRESS NOTES
[facility]  Pulmonary Rehab  Maintenance Summary    SUMMARY     Session Data  Session Number: 5  Time In: 1000  Time Out: 1100  Weight: 57.1 kg (125 lb 14.4 oz)  Target Heart Rate Zone: Minimum: 81 bpm  Target Heart Rate Zone: Maximum: 108 bpm  Patient Motivation: Good  Patient Effort: Good      Pre Exercise Vitals  SpO2: 99 %  Supplemental O2?: Yes  O2 Device: nasal cannula  O2 Flow (L/min): 2  Pulse: 78  BP: 124/72  Shy Dyspnea Rating : 2      Post Exercise Vitals  SpO2: 99 %  Supplemental O2?: Yes  O2 Device: nasal cannula  O2 Flow (L/min): 2  Pulse: 84  BP: 126/77  Shy Dyspnea Rating : 2       Modality  Modality: Nustep      Nustep  Time: 30 minutes  Steps: 1345  Load: 4  Mets: 1.1      Therapist Notes   Pt tolerated exercises well.  Will continue to monitor pt during rehab.    Dr. Jeronimo Gaming immediately available as needed.

## 2020-02-20 ENCOUNTER — HOSPITAL ENCOUNTER (OUTPATIENT)
Dept: PULMONOLOGY | Facility: HOSPITAL | Age: 85
Discharge: HOME | End: 2020-02-20
Attending: INTERNAL MEDICINE

## 2020-02-20 VITALS — WEIGHT: 128.38 LBS | BODY MASS INDEX: 22.04 KG/M2

## 2020-02-20 NOTE — PROGRESS NOTES
[facility]  Pulmonary Rehab  Session Summary    SUMMARY     Session Data  Session Number: 6  Time In: 1000  Time Out: 1100  Weight: 58.2 kg (128 lb 6.4 oz)  Target Heart Rate Zone: Minimum: 81 bpm  Target Heart Rate Zone: Maximum: 108 bpm  Patient Motivation: Good  Patient Effort: Good      Pre Exercise Vitals  SpO2: 96 %  Supplemental O2?: Yes  O2 Device: nasal cannula  O2 Flow (L/min): 2  Pulse: 65  BP: 145/88  Shy Dyspnea Rating : 2      Post Exercise Vitals  SpO2: 99 %  Supplemental O2?: Yes  O2 Device: nasal cannula  O2 Flow (L/min): 2  Pulse: 71  BP: 137/84  Shy Dyspnea Rating : 2       Modality  Modality: Arm Ergometer, Nustep      Arm Ergometer  Time: 10 minutes  Level: 1      Nustep  Time: 20 minutes  Steps: 751  Load: 4  Mets: 1.4      Therapist Notes   Pt tolerated exercises well.  Will continue to monitor pt during rehab.    Dr. Jeronimo Gaming immediately available as needed.

## 2020-02-25 ENCOUNTER — HOSPITAL ENCOUNTER (OUTPATIENT)
Dept: PULMONOLOGY | Facility: HOSPITAL | Age: 85
Discharge: HOME | End: 2020-02-25
Attending: INTERNAL MEDICINE

## 2020-02-25 VITALS — WEIGHT: 127.19 LBS | BODY MASS INDEX: 21.83 KG/M2

## 2020-02-25 NOTE — PROGRESS NOTES
[facility]  Pulmonary Rehab  Session Summary    SUMMARY     Session Data  Session Number: 7  Time In: 1000  Time Out: 1100  Weight: 57.7 kg (127 lb 3.2 oz)  Target Heart Rate Zone: Minimum: 81 bpm  Target Heart Rate Zone: Maximum: 108 bpm  Patient Motivation: Good  Patient Effort: Good      Pre Exercise Vitals  SpO2: 100 %  Supplemental O2?: Yes  O2 Device: nasal cannula  O2 Flow (L/min): 2  Pulse: 71  BP: 142/70  Shy Dyspnea Rating : 2      Post Exercise Vitals  SpO2: 100 %  Supplemental O2?: Yes  O2 Device: nasal cannula  O2 Flow (L/min): 2  Pulse: 68  BP: 122/63  Shy Dyspnea Rating : 2       Modality  Modality: Arm Ergometer, Hand Free Weights, Nustep      Arm Ergometer  Time: 10 minutes  Level: 1      Nustep  Time: 22 minutes  Steps: 1291  Load: 4  Mets: 1.3      Therapist Notes   Maintenance session. Pt tolerated all exercises well. Vitals stable.     Dr. Jeronimo Gaming immediately available as needed.

## 2020-02-29 DIAGNOSIS — F33.0 MAJOR DEPRESSIVE DISORDER, RECURRENT EPISODE, MILD: ICD-10-CM

## 2020-03-01 RX ORDER — SERTRALINE HYDROCHLORIDE 50 MG/1
TABLET, FILM COATED ORAL
Qty: 90 TABLET | Refills: 0 | Status: SHIPPED | OUTPATIENT
Start: 2020-03-01 | End: 2020-05-25

## 2020-03-03 ENCOUNTER — HOSPITAL ENCOUNTER (OUTPATIENT)
Dept: PULMONOLOGY | Facility: HOSPITAL | Age: 85
Discharge: HOME | End: 2020-03-03
Attending: INTERNAL MEDICINE

## 2020-03-03 VITALS — BODY MASS INDEX: 21.59 KG/M2 | WEIGHT: 125.81 LBS

## 2020-03-03 NOTE — PROGRESS NOTES
[facility]  Pulmonary Rehab  Maintenance Summary    SUMMARY     Session Data  Session Number: 8  Time In: 1000  Time Out: 1100  Weight: 57.1 kg (125 lb 12.8 oz)  Target Heart Rate Zone: Minimum: 81 bpm  Target Heart Rate Zone: Maximum: 108 bpm  Patient Motivation: Adequate  Patient Effort: Adequate      Pre Exercise Vitals  SpO2: 100 %  Supplemental O2?: Yes  O2 Device: nasal cannula  O2 Flow (L/min): 3  Pulse: 134  BP: 123/78  Shy Dyspnea Rating : 2      Post Exercise Vitals  SpO2: 100 %  Supplemental O2?: Yes  O2 Device: nasal cannula  O2 Flow (L/min): 3  Pulse: 76  BP: 136/64  Shy Dyspnea Rating : 2       Modality  Modality: Nustep      Nustep  Time: 30 minutes  Steps: 1023  Load: 4  Mets: 1.2      Therapist Notes   Pt tolerated exercises well today.  Will continue to monitor pt during rehab.    Dr. Slater immediately available as needed.

## 2020-03-05 ENCOUNTER — HOSPITAL ENCOUNTER (OUTPATIENT)
Dept: PULMONOLOGY | Facility: HOSPITAL | Age: 85
Discharge: HOME | End: 2020-03-05
Attending: INTERNAL MEDICINE

## 2020-03-05 VITALS — BODY MASS INDEX: 21.75 KG/M2 | WEIGHT: 126.69 LBS

## 2020-03-05 PROCEDURE — 41000056 HC PULMONARY REHAB - PHASE IV

## 2020-03-05 NOTE — PROGRESS NOTES
[facility]  Pulmonary Rehab  Maintenance Summary    SUMMARY     Session Data  Session Number: 1  Time In: 1000  Time Out: 1100  Weight: 57.5 kg (126 lb 11.2 oz)  Target Heart Rate Zone: Minimum: 81 bpm  Target Heart Rate Zone: Maximum: 108 bpm  Patient Motivation: Adequate  Patient Effort: Good      Pre Exercise Vitals  SpO2: 100 %  Supplemental O2?: No  Pulse: 71  BP: 136/65  Shy Dyspnea Rating : 2      Post Exercise Vitals  SpO2: 99 %  Supplemental O2?: No  Pulse: 82  BP: 120/81  Shy Dyspnea Rating : 2       Modality  Modality: Arm Ergometer, Nustep      Arm Ergometer  Time: 10 minutes  Level: 1      Nustep  Time: 25 minutes  Steps: 1061  Load: 4  Mets: 1.4      Therapist Notes   Pt renewed her maintenance sessions today.  She tolerated her exercises well.  Will continue to monitor pt during rehab.    Dr. Gaffney immediately available as needed.

## 2020-03-10 ENCOUNTER — HOSPITAL ENCOUNTER (OUTPATIENT)
Dept: PULMONOLOGY | Facility: HOSPITAL | Age: 85
Discharge: HOME | End: 2020-03-10
Attending: INTERNAL MEDICINE

## 2020-03-10 VITALS — WEIGHT: 126.5 LBS | BODY MASS INDEX: 21.71 KG/M2

## 2020-03-10 NOTE — PROGRESS NOTES
[facility]  Pulmonary Rehab  Maintenance Summary    SUMMARY     Session Data  Session Number: 2  Time In: 1000  Time Out: 1100  Weight: 57.4 kg (126 lb 8 oz)  Target Heart Rate Zone: Minimum: 81 bpm  Target Heart Rate Zone: Maximum: 108 bpm  Patient Motivation: Good  Patient Effort: Good      Pre Exercise Vitals  SpO2: 95 %  Supplemental O2?: Yes  O2 Device: nasal cannula  O2 Flow (L/min): 2  Pulse: 79  BP: 118/71  Shy Dyspnea Rating : 1      Post Exercise Vitals  SpO2: 100 %  Supplemental O2?: Yes  O2 Device: nasal cannula  O2 Flow (L/min): 2  Pulse: 71  BP: 126/79  Shy Dyspnea Rating : 1       Modality  Modality: Nustep      Nustep  Time: 40 minutes  Steps: 1334  Load: 4  Mets: 1.3      Therapist Notes   Pt tolerated exercises well today.  Will continue to monitor pt during rehab.    Dr. Jeronimo Gaming immediately available as needed.

## 2020-03-11 ENCOUNTER — OFFICE VISIT (OUTPATIENT)
Dept: INTERNAL MEDICINE | Facility: CLINIC | Age: 85
End: 2020-03-11
Payer: MEDICARE

## 2020-03-11 VITALS
RESPIRATION RATE: 20 BRPM | TEMPERATURE: 98 F | WEIGHT: 127.63 LBS | DIASTOLIC BLOOD PRESSURE: 70 MMHG | OXYGEN SATURATION: 95 % | HEART RATE: 95 BPM | HEIGHT: 64 IN | SYSTOLIC BLOOD PRESSURE: 128 MMHG | BODY MASS INDEX: 21.79 KG/M2

## 2020-03-11 DIAGNOSIS — I70.0 ATHEROSCLEROSIS OF AORTA: ICD-10-CM

## 2020-03-11 DIAGNOSIS — R41.89 IMPAIRED COGNITION: ICD-10-CM

## 2020-03-11 DIAGNOSIS — F33.0 MAJOR DEPRESSIVE DISORDER, RECURRENT EPISODE, MILD: ICD-10-CM

## 2020-03-11 DIAGNOSIS — E03.9 HYPOTHYROIDISM, UNSPECIFIED TYPE: Chronic | ICD-10-CM

## 2020-03-11 DIAGNOSIS — I10 ESSENTIAL HYPERTENSION: ICD-10-CM

## 2020-03-11 DIAGNOSIS — I50.32 CHRONIC DIASTOLIC CONGESTIVE HEART FAILURE: ICD-10-CM

## 2020-03-11 DIAGNOSIS — F41.8 INSOMNIA SECONDARY TO DEPRESSION WITH ANXIETY: ICD-10-CM

## 2020-03-11 DIAGNOSIS — Z86.73 HISTORY OF TRANSIENT ISCHEMIC ATTACK (TIA): ICD-10-CM

## 2020-03-11 DIAGNOSIS — I25.84 CORONARY ARTERY CALCIFICATION: ICD-10-CM

## 2020-03-11 DIAGNOSIS — E78.49 OTHER HYPERLIPIDEMIA: Chronic | ICD-10-CM

## 2020-03-11 DIAGNOSIS — F51.05 INSOMNIA SECONDARY TO DEPRESSION WITH ANXIETY: ICD-10-CM

## 2020-03-11 DIAGNOSIS — I25.10 CORONARY ARTERY CALCIFICATION: ICD-10-CM

## 2020-03-11 DIAGNOSIS — I35.1 NONRHEUMATIC AORTIC VALVE INSUFFICIENCY: ICD-10-CM

## 2020-03-11 DIAGNOSIS — M15.9 PRIMARY OSTEOARTHRITIS INVOLVING MULTIPLE JOINTS: ICD-10-CM

## 2020-03-11 DIAGNOSIS — R44.1 VISUAL HALLUCINATION: Primary | ICD-10-CM

## 2020-03-11 DIAGNOSIS — J96.11 CHRONIC RESPIRATORY FAILURE WITH HYPOXIA: ICD-10-CM

## 2020-03-11 DIAGNOSIS — I67.3 BINSWANGER'S DEMENTIA: ICD-10-CM

## 2020-03-11 DIAGNOSIS — J43.2 CENTRILOBULAR EMPHYSEMA: ICD-10-CM

## 2020-03-11 DIAGNOSIS — I35.0 NONRHEUMATIC AORTIC VALVE STENOSIS: ICD-10-CM

## 2020-03-11 DIAGNOSIS — J44.89 ASTHMA WITH COPD: Chronic | ICD-10-CM

## 2020-03-11 PROCEDURE — 1126F AMNT PAIN NOTED NONE PRSNT: CPT | Mod: HCNC,S$GLB,, | Performed by: FAMILY MEDICINE

## 2020-03-11 PROCEDURE — 3078F PR MOST RECENT DIASTOLIC BLOOD PRESSURE < 80 MM HG: ICD-10-PCS | Mod: HCNC,CPTII,S$GLB, | Performed by: FAMILY MEDICINE

## 2020-03-11 PROCEDURE — 1101F PT FALLS ASSESS-DOCD LE1/YR: CPT | Mod: HCNC,CPTII,S$GLB, | Performed by: FAMILY MEDICINE

## 2020-03-11 PROCEDURE — 3074F PR MOST RECENT SYSTOLIC BLOOD PRESSURE < 130 MM HG: ICD-10-PCS | Mod: HCNC,CPTII,S$GLB, | Performed by: FAMILY MEDICINE

## 2020-03-11 PROCEDURE — 99999 PR PBB SHADOW E&M-EST. PATIENT-LVL III: ICD-10-PCS | Mod: PBBFAC,HCNC,, | Performed by: FAMILY MEDICINE

## 2020-03-11 PROCEDURE — 99499 UNLISTED E&M SERVICE: CPT | Mod: HCNC,S$GLB,, | Performed by: FAMILY MEDICINE

## 2020-03-11 PROCEDURE — 1126F PR PAIN SEVERITY QUANTIFIED, NO PAIN PRESENT: ICD-10-PCS | Mod: HCNC,S$GLB,, | Performed by: FAMILY MEDICINE

## 2020-03-11 PROCEDURE — 99214 PR OFFICE/OUTPT VISIT, EST, LEVL IV, 30-39 MIN: ICD-10-PCS | Mod: HCNC,S$GLB,, | Performed by: FAMILY MEDICINE

## 2020-03-11 PROCEDURE — 3078F DIAST BP <80 MM HG: CPT | Mod: HCNC,CPTII,S$GLB, | Performed by: FAMILY MEDICINE

## 2020-03-11 PROCEDURE — 99499 RISK ADDL DX/OHS AUDIT: ICD-10-PCS | Mod: HCNC,S$GLB,, | Performed by: FAMILY MEDICINE

## 2020-03-11 PROCEDURE — 1159F MED LIST DOCD IN RCRD: CPT | Mod: HCNC,S$GLB,, | Performed by: FAMILY MEDICINE

## 2020-03-11 PROCEDURE — 1159F PR MEDICATION LIST DOCUMENTED IN MEDICAL RECORD: ICD-10-PCS | Mod: HCNC,S$GLB,, | Performed by: FAMILY MEDICINE

## 2020-03-11 PROCEDURE — 1101F PR PT FALLS ASSESS DOC 0-1 FALLS W/OUT INJ PAST YR: ICD-10-PCS | Mod: HCNC,CPTII,S$GLB, | Performed by: FAMILY MEDICINE

## 2020-03-11 PROCEDURE — 99214 OFFICE O/P EST MOD 30 MIN: CPT | Mod: HCNC,S$GLB,, | Performed by: FAMILY MEDICINE

## 2020-03-11 PROCEDURE — 99999 PR PBB SHADOW E&M-EST. PATIENT-LVL III: CPT | Mod: PBBFAC,HCNC,, | Performed by: FAMILY MEDICINE

## 2020-03-11 PROCEDURE — 3074F SYST BP LT 130 MM HG: CPT | Mod: HCNC,CPTII,S$GLB, | Performed by: FAMILY MEDICINE

## 2020-03-11 NOTE — PROGRESS NOTES
Subjective:       Patient ID: Crystal Romero is a 85 y.o. female.    Chief Complaint: Follow-up (6 Month) and Hallucinations    85-year-old female patient accompanied by caretaker with Patient Active Problem List:     Hiatal hernia     Asthma with COPD     Hyperlipidemia     Essential hypertension     Diastolic dysfunction     Osteoarthritis     Rotator cuff arthropathy     H/O: GI bleed     Dysplastic colon polyp     Hypothyroid     Nonrheumatic aortic valve stenosis     Nonrheumatic aortic valve insufficiency     Impaired cognition     Gastroesophageal reflux disease without esophagitis     Major depressive disorder, recurrent episode, mild     Psoriasis with arthropathy     Bilateral adhesive capsulitis of shoulders     Pulmonary emphysema     Insomnia secondary to depression with anxiety     Binswanger's dementia     Dysphasia     History of transient ischemic attack (TIA)     Chronic diastolic congestive heart failure     Coronary artery calcification     Atherosclerosis of aorta     Chronic respiratory failure with hypoxia  Here reports that patient has been having occasional visual hallucinations and had 1 episode this morning, at lights patient was thinking that she knew someone and was trying to get down from the moving car.  Patient has been fidgety and has been bruising all over trying to get out of the bed at bedtime.  Has been using oxygen couple of hours during the day.   Denies any chest pain or difficulty breathing and reports her appetite has been stable, denies any trouble with bowel movements or urine, denies any fever with chills  Has been taking her medications regularly  Patient has been followed by Dr. Siomara Huffman  for dementia    Review of Systems   Constitutional: Negative for chills, fatigue and fever.   Eyes: Negative for visual disturbance.   Respiratory: Negative for cough, shortness of breath and wheezing.    Cardiovascular: Negative for chest pain and leg swelling.  "  Gastrointestinal: Negative for abdominal pain, constipation, diarrhea, nausea and vomiting.   Genitourinary: Negative for dysuria.   Musculoskeletal: Negative for myalgias.   Skin: Negative for rash.   Neurological: Negative for light-headedness and headaches.   Psychiatric/Behavioral: Positive for confusion and hallucinations. Negative for behavioral problems and sleep disturbance.         /70 (BP Location: Left arm, Patient Position: Sitting, BP Method: Medium (Manual))   Pulse 95   Temp 98.1 °F (36.7 °C) (Oral)   Resp 20   Ht 5' 4" (1.626 m)   Wt 57.9 kg (127 lb 10.3 oz)   SpO2 95%   BMI 21.91 kg/m²   Objective:      Physical Exam   Constitutional: She is oriented to person, place, and time. She appears well-developed and well-nourished.   HENT:   Head: Normocephalic and atraumatic.   Mouth/Throat: Oropharynx is clear and moist.   Cardiovascular: Normal rate and regular rhythm.   Murmur heard.  Pulmonary/Chest: Effort normal and breath sounds normal. She has no wheezes.   Abdominal: Soft. Bowel sounds are normal. There is no tenderness.   Musculoskeletal: She exhibits no edema.   Neurological: She is alert and oriented to person, place, and time.   Skin: Skin is warm and dry. No rash noted.   Psychiatric: She has a normal mood and affect.         Assessment/Plan:   1. Visual hallucination  - CBC auto differential; Future  - Comprehensive metabolic panel; Future  - TSH; Future  - Urinalysis; Future  On clinical exam patient has been doing well.   If having repeated episodes advised to discuss further with Neurology  Will check further labs to rule out any acute infectious etiology causing confusion episode    2. Essential hypertension  - Comprehensive metabolic panel; Future  - Lipid panel; Future  - TSH; Future  - Urinalysis; Future  Blood pressure is stable today currently diet controlled    3. Other hyperlipidemia  - Lipid panel; Future  Stable on Lipitor 40 mg daily and Zetia 10 mg daily    4. " Asthma with COPD  Currently using pulmicort, anoro inahler, albuterol nebulizer/inhaler as needed    5. Hypothyroidism, unspecified type  - TSH; Future  Currently taking levothyroxine 25 p.o. daily    6. Impaired cognition  7. Major depressive disorder, recurrent episode, mild  8. Insomnia secondary to depression with anxiety  9. Binswanger's dementia  Followed by neurology Dr. Siomara Huffman   Currently taking Klonopin 0.5 mg 3 times daily, Remeron 30 mg 1 and half tablet daily, Zoloft 50 mg daily and Depakene    10. Chronic diastolic congestive heart failure  Followed by Cardiology    11. History of transient ischemic attack (TIA)    12. Atherosclerosis of aorta    13. Chronic respiratory failure with hypoxia  Using 2 L of oxygen as needed    14. Coronary artery calcification    15. Centrilobular emphysema  Followed by pulmonology    16. Primary osteoarthritis involving multiple joints    17. Nonrheumatic aortic valve stenosis    18. Nonrheumatic aortic valve insufficiency

## 2020-03-12 ENCOUNTER — HOSPITAL ENCOUNTER (OUTPATIENT)
Dept: PULMONOLOGY | Facility: HOSPITAL | Age: 85
Discharge: HOME | End: 2020-03-12
Attending: INTERNAL MEDICINE

## 2020-03-12 ENCOUNTER — LAB VISIT (OUTPATIENT)
Dept: LAB | Facility: HOSPITAL | Age: 85
End: 2020-03-12
Attending: FAMILY MEDICINE
Payer: MEDICARE

## 2020-03-12 VITALS — BODY MASS INDEX: 21.56 KG/M2 | WEIGHT: 125.63 LBS

## 2020-03-12 DIAGNOSIS — E03.9 HYPOTHYROIDISM, UNSPECIFIED TYPE: Chronic | ICD-10-CM

## 2020-03-12 DIAGNOSIS — E78.49 OTHER HYPERLIPIDEMIA: Chronic | ICD-10-CM

## 2020-03-12 DIAGNOSIS — R44.1 VISUAL HALLUCINATION: ICD-10-CM

## 2020-03-12 DIAGNOSIS — I10 ESSENTIAL HYPERTENSION: ICD-10-CM

## 2020-03-12 LAB
BASOPHILS # BLD AUTO: 0.06 K/UL (ref 0–0.2)
BASOPHILS NFR BLD: 1 % (ref 0–1.9)
DIFFERENTIAL METHOD: ABNORMAL
EOSINOPHIL # BLD AUTO: 0.1 K/UL (ref 0–0.5)
EOSINOPHIL NFR BLD: 1.4 % (ref 0–8)
ERYTHROCYTE [DISTWIDTH] IN BLOOD BY AUTOMATED COUNT: 20.5 % (ref 11.5–14.5)
HCT VFR BLD AUTO: 32.3 % (ref 37–48.5)
HGB BLD-MCNC: 8.6 G/DL (ref 12–16)
IMM GRANULOCYTES # BLD AUTO: 0 K/UL (ref 0–0.04)
IMM GRANULOCYTES NFR BLD AUTO: 0 % (ref 0–0.5)
LYMPHOCYTES # BLD AUTO: 1.5 K/UL (ref 1–4.8)
LYMPHOCYTES NFR BLD: 25.2 % (ref 18–48)
MCH RBC QN AUTO: 23.3 PG (ref 27–31)
MCHC RBC AUTO-ENTMCNC: 26.6 G/DL (ref 32–36)
MCV RBC AUTO: 88 FL (ref 82–98)
MONOCYTES # BLD AUTO: 0.8 K/UL (ref 0.3–1)
MONOCYTES NFR BLD: 12.8 % (ref 4–15)
NEUTROPHILS # BLD AUTO: 3.5 K/UL (ref 1.8–7.7)
NEUTROPHILS NFR BLD: 59.6 % (ref 38–73)
NRBC BLD-RTO: 0 /100 WBC
PLATELET # BLD AUTO: 228 K/UL (ref 150–350)
PMV BLD AUTO: 12.5 FL (ref 9.2–12.9)
RBC # BLD AUTO: 3.69 M/UL (ref 4–5.4)
WBC # BLD AUTO: 5.92 K/UL (ref 3.9–12.7)

## 2020-03-12 PROCEDURE — 85025 COMPLETE CBC W/AUTO DIFF WBC: CPT | Mod: HCNC

## 2020-03-12 PROCEDURE — 80053 COMPREHEN METABOLIC PANEL: CPT | Mod: HCNC

## 2020-03-12 PROCEDURE — 84443 ASSAY THYROID STIM HORMONE: CPT | Mod: HCNC

## 2020-03-12 PROCEDURE — 80061 LIPID PANEL: CPT | Mod: HCNC

## 2020-03-12 PROCEDURE — 36415 COLL VENOUS BLD VENIPUNCTURE: CPT | Mod: HCNC

## 2020-03-12 NOTE — PROGRESS NOTES
[facility]  Pulmonary Rehab  Maintenance Summary    SUMMARY     Session Data  Session Number: 3  Time In: 1000  Time Out: 1100  Weight: 57 kg (125 lb 9.6 oz)  Target Heart Rate Zone: Minimum: 81 bpm  Target Heart Rate Zone: Maximum: 108 bpm  Patient Motivation: Good  Patient Effort: Good      Pre Exercise Vitals  SpO2: 100 %  Supplemental O2?: Yes  O2 Device: nasal cannula  O2 Flow (L/min): 2  Pulse: 80  BP: 114/56  Shy Dyspnea Rating : 3      Post Exercise Vitals  SpO2: 100 %  Supplemental O2?: Yes  O2 Device: nasal cannula  O2 Flow (L/min): 2  Pulse: 80  BP: 128/68  Shy Dyspnea Rating : 3       Modality  Modality: Arm Ergometer, Nustep      Arm Ergometer  Time: 4 minutes  Level: 1      Nustep  Time: 20 minutes  Steps: 755  Load: 4  Mets: 1.3      Therapist Notes   Pt tolerated exercises well today.  Will continue to monitor pt during rehab.    Dr. Jeronimo Gaming immediately available as needed.

## 2020-03-13 LAB
ALBUMIN SERPL BCP-MCNC: 3.7 G/DL (ref 3.5–5.2)
ALP SERPL-CCNC: 39 U/L (ref 55–135)
ALT SERPL W/O P-5'-P-CCNC: 11 U/L (ref 10–44)
ANION GAP SERPL CALC-SCNC: 16 MMOL/L (ref 8–16)
AST SERPL-CCNC: 19 U/L (ref 10–40)
BILIRUB SERPL-MCNC: 0.2 MG/DL (ref 0.1–1)
BUN SERPL-MCNC: 17 MG/DL (ref 8–23)
CALCIUM SERPL-MCNC: 9.7 MG/DL (ref 8.7–10.5)
CHLORIDE SERPL-SCNC: 102 MMOL/L (ref 95–110)
CHOLEST SERPL-MCNC: 176 MG/DL (ref 120–199)
CHOLEST/HDLC SERPL: 2.7 {RATIO} (ref 2–5)
CO2 SERPL-SCNC: 24 MMOL/L (ref 23–29)
CREAT SERPL-MCNC: 1 MG/DL (ref 0.5–1.4)
EST. GFR  (AFRICAN AMERICAN): 59.4 ML/MIN/1.73 M^2
EST. GFR  (NON AFRICAN AMERICAN): 51.5 ML/MIN/1.73 M^2
GLUCOSE SERPL-MCNC: 75 MG/DL (ref 70–110)
HDLC SERPL-MCNC: 65 MG/DL (ref 40–75)
HDLC SERPL: 36.9 % (ref 20–50)
LDLC SERPL CALC-MCNC: 89.2 MG/DL (ref 63–159)
NONHDLC SERPL-MCNC: 111 MG/DL
POTASSIUM SERPL-SCNC: 4.3 MMOL/L (ref 3.5–5.1)
PROT SERPL-MCNC: 6.4 G/DL (ref 6–8.4)
SODIUM SERPL-SCNC: 142 MMOL/L (ref 136–145)
TRIGL SERPL-MCNC: 109 MG/DL (ref 30–150)
TSH SERPL DL<=0.005 MIU/L-ACNC: 1.78 UIU/ML (ref 0.4–4)

## 2020-03-16 ENCOUNTER — PATIENT MESSAGE (OUTPATIENT)
Dept: GASTROENTEROLOGY | Facility: CLINIC | Age: 85
End: 2020-03-16

## 2020-03-16 ENCOUNTER — TELEPHONE (OUTPATIENT)
Dept: INTERNAL MEDICINE | Facility: CLINIC | Age: 85
End: 2020-03-16

## 2020-03-16 ENCOUNTER — PATIENT MESSAGE (OUTPATIENT)
Dept: CARDIOLOGY | Facility: CLINIC | Age: 85
End: 2020-03-16

## 2020-03-16 ENCOUNTER — PATIENT MESSAGE (OUTPATIENT)
Dept: INTERNAL MEDICINE | Facility: CLINIC | Age: 85
End: 2020-03-16

## 2020-03-16 ENCOUNTER — PATIENT MESSAGE (OUTPATIENT)
Dept: PULMONOLOGY | Facility: CLINIC | Age: 85
End: 2020-03-16

## 2020-03-16 DIAGNOSIS — D64.9 ANEMIA, UNSPECIFIED TYPE: Primary | ICD-10-CM

## 2020-03-16 DIAGNOSIS — Z79.899 OTHER LONG TERM (CURRENT) DRUG THERAPY: ICD-10-CM

## 2020-03-16 RX ORDER — FERROUS SULFATE 325(65) MG
325 TABLET ORAL 2 TIMES DAILY
Qty: 60 TABLET | Refills: 3 | Status: SHIPPED | OUTPATIENT
Start: 2020-03-16 | End: 2020-07-05

## 2020-03-16 NOTE — TELEPHONE ENCOUNTER
Orders has been placed for anemia workup as requested by patient's daughter.   Secondary to acute drop, was concerned about anemia whether she is losing blood from anywhere.   Patient to discuss further with Gastroenterology    Iron supplements will be sent to pharmacy

## 2020-03-17 NOTE — TELEPHONE ENCOUNTER
Does her symptoms worse recently ?  Any active bleeding?  OK to hold 1 to 2 weeks.  If not going to ER, better contact PCP 's office on weekly base

## 2020-03-30 RX ORDER — PREDNISONE 20 MG/1
TABLET ORAL
Qty: 10 TABLET | Refills: 0 | Status: SHIPPED | OUTPATIENT
Start: 2020-03-30 | End: 2020-06-18

## 2020-04-14 RX ORDER — POTASSIUM CHLORIDE 750 MG/1
TABLET, EXTENDED RELEASE ORAL
Qty: 90 TABLET | Refills: 3 | Status: SHIPPED | OUTPATIENT
Start: 2020-04-14 | End: 2020-09-01

## 2020-05-24 DIAGNOSIS — F33.0 MAJOR DEPRESSIVE DISORDER, RECURRENT EPISODE, MILD: ICD-10-CM

## 2020-05-25 RX ORDER — SERTRALINE HYDROCHLORIDE 50 MG/1
TABLET, FILM COATED ORAL
Qty: 90 TABLET | Refills: 0 | Status: SHIPPED | OUTPATIENT
Start: 2020-05-25 | End: 2020-08-23

## 2020-06-12 DIAGNOSIS — I10 ESSENTIAL HYPERTENSION: Primary | ICD-10-CM

## 2020-06-18 ENCOUNTER — OFFICE VISIT (OUTPATIENT)
Dept: CARDIOLOGY | Facility: CLINIC | Age: 85
End: 2020-06-18
Payer: MEDICARE

## 2020-06-18 ENCOUNTER — OFFICE VISIT (OUTPATIENT)
Dept: HOME HEALTH SERVICES | Facility: CLINIC | Age: 85
End: 2020-06-18
Payer: MEDICARE

## 2020-06-18 VITALS
SYSTOLIC BLOOD PRESSURE: 113 MMHG | DIASTOLIC BLOOD PRESSURE: 68 MMHG | TEMPERATURE: 98 F | WEIGHT: 123.19 LBS | HEART RATE: 78 BPM | BODY MASS INDEX: 22.67 KG/M2 | OXYGEN SATURATION: 95 % | HEIGHT: 62 IN

## 2020-06-18 DIAGNOSIS — Z86.73 HISTORY OF TRANSIENT ISCHEMIC ATTACK (TIA): ICD-10-CM

## 2020-06-18 DIAGNOSIS — Z87.19 H/O: GI BLEED: ICD-10-CM

## 2020-06-18 DIAGNOSIS — R26.9 ABNORMALITY OF GAIT AND MOBILITY: ICD-10-CM

## 2020-06-18 DIAGNOSIS — R41.89 IMPAIRED COGNITION: ICD-10-CM

## 2020-06-18 DIAGNOSIS — M15.9 PRIMARY OSTEOARTHRITIS INVOLVING MULTIPLE JOINTS: ICD-10-CM

## 2020-06-18 DIAGNOSIS — F33.0 MAJOR DEPRESSIVE DISORDER, RECURRENT EPISODE, MILD: ICD-10-CM

## 2020-06-18 DIAGNOSIS — I50.32 CHRONIC DIASTOLIC CONGESTIVE HEART FAILURE: ICD-10-CM

## 2020-06-18 DIAGNOSIS — I25.84 CORONARY ARTERY CALCIFICATION: ICD-10-CM

## 2020-06-18 DIAGNOSIS — R47.02 DYSPHASIA: ICD-10-CM

## 2020-06-18 DIAGNOSIS — I67.3 BINSWANGER'S DEMENTIA: ICD-10-CM

## 2020-06-18 DIAGNOSIS — I25.10 CORONARY ARTERY CALCIFICATION: ICD-10-CM

## 2020-06-18 DIAGNOSIS — D64.9 ANEMIA, UNSPECIFIED TYPE: ICD-10-CM

## 2020-06-18 DIAGNOSIS — Z99.81 DEPENDENCE ON SUPPLEMENTAL OXYGEN: ICD-10-CM

## 2020-06-18 DIAGNOSIS — L40.50 PSORIASIS WITH ARTHROPATHY: ICD-10-CM

## 2020-06-18 DIAGNOSIS — I35.0 NONRHEUMATIC AORTIC VALVE STENOSIS: Primary | ICD-10-CM

## 2020-06-18 DIAGNOSIS — J96.11 CHRONIC RESPIRATORY FAILURE WITH HYPOXIA: ICD-10-CM

## 2020-06-18 DIAGNOSIS — Z00.00 ENCOUNTER FOR PREVENTIVE HEALTH EXAMINATION: Primary | ICD-10-CM

## 2020-06-18 DIAGNOSIS — G40.909 SEIZURE DISORDER: ICD-10-CM

## 2020-06-18 DIAGNOSIS — J44.89 ASTHMA WITH COPD: Chronic | ICD-10-CM

## 2020-06-18 DIAGNOSIS — M75.02 BILATERAL ADHESIVE CAPSULITIS OF SHOULDERS: ICD-10-CM

## 2020-06-18 DIAGNOSIS — I10 ESSENTIAL HYPERTENSION: ICD-10-CM

## 2020-06-18 DIAGNOSIS — F41.8 INSOMNIA SECONDARY TO DEPRESSION WITH ANXIETY: ICD-10-CM

## 2020-06-18 DIAGNOSIS — I35.0 NONRHEUMATIC AORTIC VALVE STENOSIS: ICD-10-CM

## 2020-06-18 DIAGNOSIS — E78.49 OTHER HYPERLIPIDEMIA: Chronic | ICD-10-CM

## 2020-06-18 DIAGNOSIS — K21.9 GASTROESOPHAGEAL REFLUX DISEASE WITHOUT ESOPHAGITIS: ICD-10-CM

## 2020-06-18 DIAGNOSIS — Z74.09 OTHER REDUCED MOBILITY: ICD-10-CM

## 2020-06-18 DIAGNOSIS — M75.01 BILATERAL ADHESIVE CAPSULITIS OF SHOULDERS: ICD-10-CM

## 2020-06-18 DIAGNOSIS — I70.0 ATHEROSCLEROSIS OF AORTA: ICD-10-CM

## 2020-06-18 DIAGNOSIS — E03.9 HYPOTHYROIDISM, UNSPECIFIED TYPE: Chronic | ICD-10-CM

## 2020-06-18 DIAGNOSIS — J43.9 PULMONARY EMPHYSEMA, UNSPECIFIED EMPHYSEMA TYPE: ICD-10-CM

## 2020-06-18 DIAGNOSIS — N18.30 CHRONIC KIDNEY DISEASE, STAGE III (MODERATE): ICD-10-CM

## 2020-06-18 DIAGNOSIS — F51.05 INSOMNIA SECONDARY TO DEPRESSION WITH ANXIETY: ICD-10-CM

## 2020-06-18 PROCEDURE — 3078F PR MOST RECENT DIASTOLIC BLOOD PRESSURE < 80 MM HG: ICD-10-PCS | Mod: CPTII,S$GLB,, | Performed by: NURSE PRACTITIONER

## 2020-06-18 PROCEDURE — 1159F PR MEDICATION LIST DOCUMENTED IN MEDICAL RECORD: ICD-10-PCS | Mod: 95,,, | Performed by: INTERNAL MEDICINE

## 2020-06-18 PROCEDURE — 3078F DIAST BP <80 MM HG: CPT | Mod: CPTII,95,, | Performed by: INTERNAL MEDICINE

## 2020-06-18 PROCEDURE — 3078F DIAST BP <80 MM HG: CPT | Mod: CPTII,S$GLB,, | Performed by: NURSE PRACTITIONER

## 2020-06-18 PROCEDURE — 3074F PR MOST RECENT SYSTOLIC BLOOD PRESSURE < 130 MM HG: ICD-10-PCS | Mod: CPTII,S$GLB,, | Performed by: NURSE PRACTITIONER

## 2020-06-18 PROCEDURE — 3074F PR MOST RECENT SYSTOLIC BLOOD PRESSURE < 130 MM HG: ICD-10-PCS | Mod: CPTII,95,, | Performed by: INTERNAL MEDICINE

## 2020-06-18 PROCEDURE — 3078F PR MOST RECENT DIASTOLIC BLOOD PRESSURE < 80 MM HG: ICD-10-PCS | Mod: CPTII,95,, | Performed by: INTERNAL MEDICINE

## 2020-06-18 PROCEDURE — 3074F SYST BP LT 130 MM HG: CPT | Mod: CPTII,95,, | Performed by: INTERNAL MEDICINE

## 2020-06-18 PROCEDURE — 99499 RISK ADDL DX/OHS AUDIT: ICD-10-PCS | Mod: 95,,, | Performed by: INTERNAL MEDICINE

## 2020-06-18 PROCEDURE — 1101F PR PT FALLS ASSESS DOC 0-1 FALLS W/OUT INJ PAST YR: ICD-10-PCS | Mod: CPTII,95,, | Performed by: INTERNAL MEDICINE

## 2020-06-18 PROCEDURE — G0439 PPPS, SUBSEQ VISIT: HCPCS | Mod: S$GLB,,, | Performed by: NURSE PRACTITIONER

## 2020-06-18 PROCEDURE — 99499 UNLISTED E&M SERVICE: CPT | Mod: 95,,, | Performed by: INTERNAL MEDICINE

## 2020-06-18 PROCEDURE — 1159F MED LIST DOCD IN RCRD: CPT | Mod: 95,,, | Performed by: INTERNAL MEDICINE

## 2020-06-18 PROCEDURE — 99214 OFFICE O/P EST MOD 30 MIN: CPT | Mod: 95,,, | Performed by: INTERNAL MEDICINE

## 2020-06-18 PROCEDURE — 3074F SYST BP LT 130 MM HG: CPT | Mod: CPTII,S$GLB,, | Performed by: NURSE PRACTITIONER

## 2020-06-18 PROCEDURE — G0439 PR MEDICARE ANNUAL WELLNESS SUBSEQUENT VISIT: ICD-10-PCS | Mod: S$GLB,,, | Performed by: NURSE PRACTITIONER

## 2020-06-18 PROCEDURE — 99214 PR OFFICE/OUTPT VISIT, EST, LEVL IV, 30-39 MIN: ICD-10-PCS | Mod: 95,,, | Performed by: INTERNAL MEDICINE

## 2020-06-18 PROCEDURE — 1101F PT FALLS ASSESS-DOCD LE1/YR: CPT | Mod: CPTII,95,, | Performed by: INTERNAL MEDICINE

## 2020-06-18 RX ORDER — DOCUSATE SODIUM 100 MG/1
100 CAPSULE, LIQUID FILLED ORAL DAILY PRN
COMMUNITY

## 2020-06-18 RX ORDER — MULTIVITAMIN
1 TABLET ORAL DAILY
COMMUNITY

## 2020-06-18 NOTE — PROGRESS NOTES
"Crystal Romero presented for Medicare AWV today. The following components were reviewed and updated:    · Medical history  · Family History  · Social history  · Allergies and Current Medications  · Health Risk Assessment  · Health Maintenance  · Care Team    **See Completed Assessments for Annual Wellness visit with in the encounter summary    The following assessments were completed:  · Depression Screening  · Cognitive function Screening    · Timed Get Up Test  · Whisper Test    Vitals:    06/18/20 1108   BP: 113/68   Pulse: 78   Temp: 97.5 °F (36.4 °C)   TempSrc: Temporal   SpO2: 95%   Weight: 55.9 kg (123 lb 3.2 oz)   Height: 5' 2" (1.575 m)     Body mass index is 22.53 kg/m².   ]    Physical Exam  Vitals signs and nursing note reviewed.   Constitutional:       Appearance: She is ill-appearing (chronic ill appearing).      Comments: frail   HENT:      Head: Normocephalic and atraumatic.      Nose: Nose normal.      Mouth/Throat:      Mouth: Mucous membranes are moist.      Pharynx: Oropharynx is clear.   Eyes:      Conjunctiva/sclera: Conjunctivae normal.   Neck:      Musculoskeletal: Neck supple.   Cardiovascular:      Rate and Rhythm: Normal rate and regular rhythm.      Pulses: Normal pulses.      Heart sounds: Murmur present.   Pulmonary:      Breath sounds: Wheezing present.      Comments: Increased respiratory effort after ambulation  Musculoskeletal:      Comments: Limited rom to upper extremities. Arthritic changes noted to bilateral hands.   Skin:     General: Skin is warm and dry.      Findings: Bruising present.   Neurological:      Mental Status: She is alert. She is disoriented.      Motor: Weakness (generalized weakness) present.      Coordination: Coordination abnormal.      Gait: Gait abnormal.   Psychiatric:         Mood and Affect: Mood normal.         Behavior: Behavior normal.      Comments: Failed cognitive function screen.          Outpatient Medications Marked as Taking for the 6/18/20 " encounter (Office Visit) with JOSE ROBERTO Ca   Medication Sig Dispense Refill    acetaminophen (TYLENOL) 500 MG tablet Take 500 mg by mouth every 6 (six) hours as needed for Pain.      albuterol-ipratropium (DUO-NEB) 2.5 mg-0.5 mg/3 mL nebulizer solution Take 3 mLs by nebulization every 6 (six) hours as needed for Wheezing. Rescue 360 vial 5    ANORO ELLIPTA 62.5-25 mcg/actuation DsDv INHALE ONE PUFF BY MOUTH ONE TIME DAILY 60 each 11    aspirin (ECOTRIN) 81 MG EC tablet Take 1 tablet (81 mg total) by mouth every 48 hours.      atorvastatin (LIPITOR) 40 MG tablet Take 1 tablet (40 mg total) by mouth once daily. 90 tablet 3    blood pressure test kit-medium Kit Pt needs device to monitor blood pressure daily      budesonide (PULMICORT) 0.5 mg/2 mL nebulizer solution Take 0.5 mg by nebulization 2 (two) times a day. Controller       clonazePAM (KLONOPIN) 0.5 MG tablet Take 0.5 mg by mouth every evening. 3 tablets at night      docusate sodium (COLACE) 100 MG capsule Take 100 mg by mouth daily as needed for Constipation.      ezetimibe (ZETIA) 10 mg tablet Take 1 tablet (10 mg total) by mouth once daily. 90 tablet 3    ferrous sulfate (FEOSOL) 325 mg (65 mg iron) Tab tablet Take 1 tablet (325 mg total) by mouth 2 (two) times daily. 60 tablet 3    folic acid (FOLVITE) 1 MG tablet Take 1 tablet (1,000 mcg total) by mouth once daily. 90 tablet 3    furosemide (LASIX) 40 MG tablet Take 1 tablet (40 mg total) by mouth once daily. 30 tablet 11    inhalation spacing device (AEROCHAMBER PLUS FLOW-VU) Use with Symbicort and Albuterol inhalers 1 each 0    levothyroxine (SYNTHROID) 25 MCG tablet TAKE 1 TABLET BY MOUTH ONCE DAILY BEFORE BREAKFAST 90 tablet 2    mirtazapine (REMERON) 45 MG tablet Take 45 mg by mouth every evening.      multivitamin (THERAGRAN) per tablet Take 1 tablet by mouth once daily.      pantoprazole (PROTONIX) 40 MG tablet TAKE ONE TABLET BY MOUTH ONE TIME DAILY 90 tablet 2     potassium chloride (KLOR-CON) 10 MEQ TbSR TAKE ONE TABLET BY MOUTH THREE TIMES DAILY 90 tablet 3    sertraline (ZOLOFT) 50 MG tablet TAKE ONE TABLET BY MOUTH ONE TIME DAILY 90 tablet 0    valproate (DEPAKENE) 250 mg/5 mL syrup Take 250 mg by mouth once daily. Take 30 mL at 4 pm daily.      VENTOLIN HFA 90 mcg/actuation inhaler Inhale 2 puffs into the lungs every 4 (four) hours as needed for Wheezing or Shortness of Breath.       VITAMIN B COMPLEX (SUPER B COMPLEX-B-12 ORAL) Take 1 tablet by mouth once daily.           Diagnoses and health risks identified today and associated recommendations/orders:  1. Encounter for preventive health examination  Completed. Shingles vaccine due. DEXA scan due. Instructed patient's daughter ANITA Campbell, to discuss the Shingles vaccine and DEXA with PCP. Declined colonoscopy.   Labs: consider repeat Vitamin D level and Vitamin B12 in addition to routine annual labwork.    2. Essential hypertension  Chronic and stable. No acute issues. Continue current management on Lasix. Follow up with PCP and cardiologist.     3. Atherosclerosis of aorta  Chronic and stable. No acute issues. Continue current management with blood pressure control, statin therapy, and aspirin. Follow up with PCP.    4. Chronic diastolic congestive heart failure  Chronic and stable. No acute issues. Continue current management on Lasix. No edema. Recommend weight monitoring and low salt diet. Follow up with PCP and cardiologist.     5. Coronary artery calcification  Chronic and stable. No acute issues. Continue current management on statin therapy and aspirin. Follow up with PCP.    6. Nonrheumatic aortic valve stenosis  Chronic and stable. No acute issues. Continue current management on Lasix. Follow up with PCP and cardiologist.     7. Asthma with COPD  Chronic and stable. No acute issues. Continue current management on Anoro Ellipta, pulmicort, duoneb, ventolin. Follow up with PCP and pulmonologist.     8.  Chronic respiratory failure with hypoxia  Chronic and stable. No acute issues. Continue current management on Anoro Ellipta, pulmicort, duoneb, ventolin. Follow up with PCP and pulmonologist.     9. Binswanger's dementia  Chronic and stable. No acute issues. Patient has around the clock sitters and patient lives with her daughter and sitter. Continue current management. No safety issues identified. Follow up with PCP and neurologist.     10. Pulmonary emphysema, unspecified emphysema type  Chronic and stable. No acute issues. Continue current management on Anoro Ellipta, pulmicort, duoneb, ventolin. Follow up with PCP and pulmonologist.     11. Major depressive disorder, recurrent episode, mild  Chronic and stable. No acute issues. Continue current management on Zoloft. Follow up with PCP.    12. Other hyperlipidemia  Chronic and stable. No acute issues. Continue current management on Atorvastatin and Zetia. Follow up with PCP.    13. Other reduced mobility, Abnormality of gait and mobility, and frequent falls  Chronic and stable. No acute issues. Patient requires much assistance with ambulation. Has around the clock sitter. Fall precautions. Patient had physical therapy in the past, but the last time home health PT was consulted, there wasn't much more that could be done to improve per patient's daughter. Daughter declined PT consult. Continue current management. Follow up with PCP.    14. Insomnia secondary to depression with anxiety  Chronic and stable. No acute issues. Continue current management on Klonopin and Zoloft. Follow up with PCP and neurologist.    15. Dependence on supplemental oxygen  Chronic and stable. No acute issues. Continue current management on Anoro Ellipta, pulmicort, duoneb, ventolin. Follow up with PCP and pulmonologist.     16. Seizure disorder  Chronic and stable. No acute issues. Continue current management on Valproate. Follow up with PCP.    17. Impaired cognition likely secondary to  dementia  Chronic and stable. No acute issues. Patient has around the clock sitters and patient lives with her daughter and sitter. Continue current management. No safety issues identified. Follow up with PCP and neurologist.     19. History of transient ischemic attack (TIA)  Chronic and stable. No acute issues. Continue current management with BP control. On aspirin and atorvastatin. Follow up with PCP.    20. Hypothyroidism, unspecified type  Chronic and stable. No acute issues. Continue current management on synthroid. Follow up with PCP.    21. Gastroesophageal reflux disease without esophagitis  Chronic and stable. No acute issues. Continue current management on protonix. Follow up with PCP.    22. Anemia with H/O: GI bleed  Chronic. Decrease in H and H 3/2020. Anemia workup ordered by PCP but not obtained. No acute issues. On ferrous sulfate and B12. No acute issues. No signs of acute bleeding. Continue current management. Follow up with PCP.    23. Bilateral adhesive capsulitis of shoulders  Chronic and stable. No acute issues. Continue current management. Follow up with PCP.    24. Primary osteoarthritis involving multiple joints  Chronic and stable. No acute issues. Continue current management. Follow up with PCP.    25. Dysphasia  Chronic and stable. No acute issues. No signs of aspiration. Weight stable. Continue mechanical soft diet. Continue current management. Follow up with PCP.    26. Chronic kidney disease, stage III (moderate)  Chronic and stable. No acute issues. Continue current management. Renal dose medications and avoid NSAIDS.  Follow up with PCP.    27. Psoriasis with arthropathy  Chronic and stable. No acute issues. Continue current management on Tylenol daily and prn. Follow up with PCP.      Provided Crystal with a 5-10 year written screening schedule and personal prevention plan. Recommendations were developed using the USPSTF age appropriate recommendations. Education, counseling, and  referrals were provided as needed.  After Visit Summary printed and given to patient which includes a list of additional screenings\tests needed.    Follow up in about 1 year (around 6/18/2021) for annual wellness visit.      JOSE ROBERTO Ca     I offered to discuss end of life issues, including information on how to make advance directives that the patient could use to name someone who would make medical decisions on their behalf if they became too ill to make themselves.    Patient has dementia and would not be able to fill out medical living will. POA is patient's daughter, Shannan. Discussed this with her.

## 2020-06-18 NOTE — PROGRESS NOTES
Subjective:   Patient ID:  Crystal Romero is a 85 y.o. female who presents for follow up of No chief complaint on file.      85, yo female, came in for routine f/u. Severe dementia and discussed with the her daughter Shannan HOWARD.  PMH mod AI, and severe AS, frequent fall, dementia 3 yrs, HTN, HLD, asthma, COPD on home O2, GI bleeding due to prolong Rx of steroid for asthma, RA, TIA x3 in 4 months  H/o TIA/seizure episode. Had impaired swallow and dysphasia. F/u with  neurologist.  Repeat ECHO in , showed normal EF, DD, moderate AI and moderate AS.   Refused surgery for valve per POA.  Frequent fall, no syncope, no bone Fx. Mild skin bruits.  Recent rx of COPD exacerbation.   Unstable gait. Walker dependent. Walks at stores.    Recent worse MOLINA. Steroid Rx did not help much. No cough and sputum  Decent appetite,   No recurrent seizure.  No orthopnea and chest pain.  More time to sit  Lasix 40 mg daily can keep weight stable        Past Medical History:   Diagnosis Date    Anemia     Anxiety     Arthritis     Asthma     Cataract     Chronic respiratory failure with hypoxia, on home O2 therapy     COPD (chronic obstructive pulmonary disease)     Coronary artery disease     Dementia     Depression     Dysplastic colon polyp 2015    Emphysema of lung     Encounter for blood transfusion     H/O: GI bleed     Hiatal hernia     Hyperlipidemia     Hypertension     Hypothyroid     Rheumatoid arthritis(714.0)     Seizures     Stroke     Syncope and collapse        Past Surgical History:   Procedure Laterality Date    MANDIBLE SURGERY      TONSILLECTOMY         Social History     Tobacco Use    Smoking status: Former Smoker     Packs/day: 2.00     Years: 16.00     Pack years: 32.00     Types: Cigarettes     Quit date: 1970     Years since quittin.4    Smokeless tobacco: Never Used   Substance Use Topics    Alcohol use: No     Frequency: Never    Drug use: No        Family History   Problem Relation Age of Onset    Cancer Mother         breast, uterine         Review of Systems   Unable to perform ROS: dementia       Objective:   Physical Exam    Lab Results   Component Value Date    CHOL 176 03/12/2020    CHOL 258 (H) 09/12/2019    CHOL 281 (H) 06/13/2019     Lab Results   Component Value Date    HDL 65 03/12/2020    HDL 55 09/12/2019    HDL 76 (H) 06/13/2019     Lab Results   Component Value Date    LDLCALC 89.2 03/12/2020    LDLCALC 168.2 (H) 09/12/2019    LDLCALC 186.0 (H) 06/13/2019     Lab Results   Component Value Date    TRIG 109 03/12/2020    TRIG 174 (H) 09/12/2019    TRIG 95 06/13/2019     Lab Results   Component Value Date    CHOLHDL 36.9 03/12/2020    CHOLHDL 21.3 09/12/2019    CHOLHDL 27.0 06/13/2019       Chemistry        Component Value Date/Time     03/12/2020 1122    K 4.3 03/12/2020 1122     03/12/2020 1122    CO2 24 03/12/2020 1122    BUN 17 03/12/2020 1122    CREATININE 1.0 03/12/2020 1122    GLU 75 03/12/2020 1122        Component Value Date/Time    CALCIUM 9.7 03/12/2020 1122    ALKPHOS 39 (L) 03/12/2020 1122    AST 19 03/12/2020 1122    ALT 11 03/12/2020 1122    BILITOT 0.2 03/12/2020 1122    ESTGFRAFRICA 59.4 (A) 03/12/2020 1122    EGFRNONAA 51.5 (A) 03/12/2020 1122          No results found for: LABA1C, HGBA1C  Lab Results   Component Value Date    TSH 1.783 03/12/2020     Lab Results   Component Value Date    INR 0.9 03/10/2017    INR 1.0 09/23/2014     Lab Results   Component Value Date    WBC 5.92 03/12/2020    HGB 8.6 (L) 03/12/2020    HCT 32.3 (L) 03/12/2020    MCV 88 03/12/2020     03/12/2020     BMP  Sodium   Date Value Ref Range Status   03/12/2020 142 136 - 145 mmol/L Final     Potassium   Date Value Ref Range Status   03/12/2020 4.3 3.5 - 5.1 mmol/L Final     Chloride   Date Value Ref Range Status   03/12/2020 102 95 - 110 mmol/L Final     CO2   Date Value Ref Range Status   03/12/2020 24 23 - 29 mmol/L Final      BUN, Bld   Date Value Ref Range Status   03/12/2020 17 8 - 23 mg/dL Final     Creatinine   Date Value Ref Range Status   03/12/2020 1.0 0.5 - 1.4 mg/dL Final     Calcium   Date Value Ref Range Status   03/12/2020 9.7 8.7 - 10.5 mg/dL Final     Anion Gap   Date Value Ref Range Status   03/12/2020 16 8 - 16 mmol/L Final     eGFR if    Date Value Ref Range Status   03/12/2020 59.4 (A) >60 mL/min/1.73 m^2 Final     eGFR if non    Date Value Ref Range Status   03/12/2020 51.5 (A) >60 mL/min/1.73 m^2 Final     Comment:     Calculation used to obtain the estimated glomerular filtration  rate (eGFR) is the CKD-EPI equation.        BNP  @LABRCNTIP(BNP,BNPTRIAGEBLO)@  @LABRCNTIP(troponini)@  CrCl cannot be calculated (Patient's most recent lab result is older than the maximum 7 days allowed.).  No results found in the last 24 hours.  No results found in the last 24 hours.  No results found in the last 24 hours.    Assessment:      1. Nonrheumatic aortic valve stenosis    2. Essential hypertension    3. Other hyperlipidemia    4. Coronary artery calcification    5. Asthma with COPD    6. Chronic diastolic congestive heart failure      Bp 113/80 mmHG    Plan:   Continue Lipitor , Zetia,   Continue Lasix prn for leg swelling  Fall precaution  Supportive care  RTC in 6 months    The patient location is: HOME due to COVID 19 pandemic    The chief complaint leading to consultation is: severe AS  Visit type: audiovisual    Face to Face time with patient: 15 minutes of total time spent on the encounter, which includes face to face time and non-face to face time preparing to see the patient (eg, review of tests), Obtaining and/or reviewing separately obtained history, Documenting clinical information in the electronic or other health record, Independently interpreting results (not separately reported) and communicating results to the patient/family/caregiver, or Care coordination (not separately  reported).         Each patient to whom he or she provides medical services by telemedicine is:  (1) informed of the relationship between the physician and patient and the respective role of any other health care provider with respect to management of the patient; and (2) notified that he or she may decline to receive medical services by telemedicine and may withdraw from such care at any time.

## 2020-06-18 NOTE — Clinical Note
AWV completed. Shingles vaccine due. DEXA scan due. Instructed patient's daughter ANITA Campbell, to discuss the Shingles vaccine and DEXA with PCP. Declined colonoscopy.   Labs: consider repeat Vitamin D level and Vitamin B12 in addition to routine annual labwork.

## 2020-08-27 DIAGNOSIS — J44.89 ASTHMA WITH COPD: Primary | ICD-10-CM

## 2020-09-09 ENCOUNTER — PATIENT OUTREACH (OUTPATIENT)
Dept: ADMINISTRATIVE | Facility: OTHER | Age: 85
End: 2020-09-09

## 2020-09-09 NOTE — PROGRESS NOTES
Health Maintenance Due   Topic Date Due    Shingles Vaccine (1 of 2) 11/21/1984    DEXA SCAN  02/10/2020    Influenza Vaccine (1) 08/01/2020     Updates were requested from care everywhere.  Chart was reviewed for overdue Proactive [facility] Encounters ([facility]) topics (CRS, Breast Cancer Screening, Eye exam)  Health Maintenance has been updated.  [facility] immunization registry triggered.  Immunizations were reconciled.

## 2020-09-11 ENCOUNTER — HOSPITAL ENCOUNTER (OUTPATIENT)
Dept: RADIOLOGY | Facility: HOSPITAL | Age: 85
Discharge: HOME | End: 2020-09-11
Attending: INTERNAL MEDICINE
Payer: MEDICARE

## 2020-09-11 ENCOUNTER — OFFICE VISIT (OUTPATIENT)
Dept: PULMONOLOGY | Facility: CLINIC | Age: 85
End: 2020-09-11
Payer: MEDICARE

## 2020-09-11 ENCOUNTER — CLINICAL SUPPORT (OUTPATIENT)
Dept: PULMONOLOGY | Facility: CLINIC | Age: 85
End: 2020-09-11
Payer: MEDICARE

## 2020-09-11 VITALS
RESPIRATION RATE: 20 BRPM | SYSTOLIC BLOOD PRESSURE: 118 MMHG | WEIGHT: 123 LBS | HEART RATE: 78 BPM | OXYGEN SATURATION: 95 % | BODY MASS INDEX: 22.63 KG/M2 | HEIGHT: 62 IN | DIASTOLIC BLOOD PRESSURE: 72 MMHG

## 2020-09-11 VITALS — WEIGHT: 123 LBS | HEIGHT: 62 IN | BODY MASS INDEX: 22.63 KG/M2

## 2020-09-11 DIAGNOSIS — J96.11 CHRONIC RESPIRATORY FAILURE WITH HYPOXIA: Chronic | ICD-10-CM

## 2020-09-11 DIAGNOSIS — J44.89 ASTHMA WITH COPD: Chronic | ICD-10-CM

## 2020-09-11 DIAGNOSIS — J44.89 ASTHMA WITH COPD: ICD-10-CM

## 2020-09-11 DIAGNOSIS — J45.20 MILD INTERMITTENT ASTHMA WITHOUT COMPLICATION: Primary | Chronic | ICD-10-CM

## 2020-09-11 DIAGNOSIS — J43.2 CENTRILOBULAR EMPHYSEMA: Chronic | ICD-10-CM

## 2020-09-11 PROCEDURE — 99213 PR OFFICE/OUTPT VISIT, EST, LEVL III, 20-29 MIN: ICD-10-PCS | Mod: HCNC,S$GLB,, | Performed by: INTERNAL MEDICINE

## 2020-09-11 PROCEDURE — 99499 RISK ADDL DX/OHS AUDIT: ICD-10-PCS | Mod: HCNC,S$GLB,, | Performed by: INTERNAL MEDICINE

## 2020-09-11 PROCEDURE — 3078F DIAST BP <80 MM HG: CPT | Mod: HCNC,CPTII,S$GLB, | Performed by: INTERNAL MEDICINE

## 2020-09-11 PROCEDURE — 99999 PR PBB SHADOW E&M-EST. PATIENT-LVL V: ICD-10-PCS | Mod: PBBFAC,HCNC,, | Performed by: INTERNAL MEDICINE

## 2020-09-11 PROCEDURE — 94618 PULMONARY STRESS TESTING: ICD-10-PCS | Mod: HCNC,S$GLB,, | Performed by: INTERNAL MEDICINE

## 2020-09-11 PROCEDURE — 99999 PR PBB SHADOW E&M-EST. PATIENT-LVL I: CPT | Mod: PBBFAC,HCNC,,

## 2020-09-11 PROCEDURE — 99213 OFFICE O/P EST LOW 20 MIN: CPT | Mod: HCNC,S$GLB,, | Performed by: INTERNAL MEDICINE

## 2020-09-11 PROCEDURE — 1159F MED LIST DOCD IN RCRD: CPT | Mod: HCNC,S$GLB,, | Performed by: INTERNAL MEDICINE

## 2020-09-11 PROCEDURE — 99499 UNLISTED E&M SERVICE: CPT | Mod: HCNC,S$GLB,, | Performed by: INTERNAL MEDICINE

## 2020-09-11 PROCEDURE — 71046 XR CHEST PA AND LATERAL: ICD-10-PCS | Mod: 26,HCNC,, | Performed by: RADIOLOGY

## 2020-09-11 PROCEDURE — 94618 PULMONARY STRESS TESTING: CPT | Mod: HCNC,S$GLB,, | Performed by: INTERNAL MEDICINE

## 2020-09-11 PROCEDURE — 1100F PTFALLS ASSESS-DOCD GE2>/YR: CPT | Mod: HCNC,CPTII,S$GLB, | Performed by: INTERNAL MEDICINE

## 2020-09-11 PROCEDURE — 3074F PR MOST RECENT SYSTOLIC BLOOD PRESSURE < 130 MM HG: ICD-10-PCS | Mod: HCNC,CPTII,S$GLB, | Performed by: INTERNAL MEDICINE

## 2020-09-11 PROCEDURE — 71046 X-RAY EXAM CHEST 2 VIEWS: CPT | Mod: TC,HCNC

## 2020-09-11 PROCEDURE — 1159F PR MEDICATION LIST DOCUMENTED IN MEDICAL RECORD: ICD-10-PCS | Mod: HCNC,S$GLB,, | Performed by: INTERNAL MEDICINE

## 2020-09-11 PROCEDURE — 3288F PR FALLS RISK ASSESSMENT DOCUMENTED: ICD-10-PCS | Mod: HCNC,CPTII,S$GLB, | Performed by: INTERNAL MEDICINE

## 2020-09-11 PROCEDURE — 99999 PR PBB SHADOW E&M-EST. PATIENT-LVL I: ICD-10-PCS | Mod: PBBFAC,HCNC,,

## 2020-09-11 PROCEDURE — 3078F PR MOST RECENT DIASTOLIC BLOOD PRESSURE < 80 MM HG: ICD-10-PCS | Mod: HCNC,CPTII,S$GLB, | Performed by: INTERNAL MEDICINE

## 2020-09-11 PROCEDURE — 3288F FALL RISK ASSESSMENT DOCD: CPT | Mod: HCNC,CPTII,S$GLB, | Performed by: INTERNAL MEDICINE

## 2020-09-11 PROCEDURE — 1100F PR PT FALLS ASSESS DOC 2+ FALLS/FALL W/INJURY/YR: ICD-10-PCS | Mod: HCNC,CPTII,S$GLB, | Performed by: INTERNAL MEDICINE

## 2020-09-11 PROCEDURE — 71046 X-RAY EXAM CHEST 2 VIEWS: CPT | Mod: 26,HCNC,, | Performed by: RADIOLOGY

## 2020-09-11 PROCEDURE — 3074F SYST BP LT 130 MM HG: CPT | Mod: HCNC,CPTII,S$GLB, | Performed by: INTERNAL MEDICINE

## 2020-09-11 PROCEDURE — 99999 PR PBB SHADOW E&M-EST. PATIENT-LVL V: CPT | Mod: PBBFAC,HCNC,, | Performed by: INTERNAL MEDICINE

## 2020-09-11 NOTE — PROGRESS NOTES
Subjective:      Patient ID: Crystal Romero is a 85 y.o. female.    Patient Active Problem List   Diagnosis    Hiatal hernia    Mild intermittent asthma without complication    Hyperlipidemia    Essential hypertension    Diastolic dysfunction    Osteoarthritis    Rotator cuff arthropathy    H/O: GI bleed    Hypothyroid    Nonrheumatic aortic valve stenosis    Nonrheumatic aortic valve insufficiency    Impaired cognition    Gastroesophageal reflux disease without esophagitis    Major depressive disorder, recurrent episode, mild    Psoriasis with arthropathy    Bilateral adhesive capsulitis of shoulders    Pulmonary emphysema    Insomnia secondary to depression with anxiety    Binswanger's dementia    Dysphasia    History of transient ischemic attack (TIA)    Chronic diastolic congestive heart failure    Coronary artery calcification    Atherosclerosis of aorta    Chronic respiratory failure with hypoxia    Chronic kidney disease, stage III (moderate)    Seizure disorder    Anemia     Problem list has been reviewed.    Chief Complaint: Asthma, COPD, and Emphysema      Group Spirometric Classification Exacerbations / year mMRC CAT   Group A: Low risk; less symptom   GOLD 1- 2  < = 1 0 - 1 <10     FEV1 =>  1.11 L ( 65.64 % predicted)       GOLD 1 - mild: FEV1? 80% predicted   GOLD 2 - moderate: 50% ?FEV1 <80% predicted   GOLD 3 - severe: 30% ?FEV1 <50% predicted   GOLD 4 - very severe: FEV1 <30% predicted.      HPI      CXR and 6MWT  reviewed with patient who voiced understanding      Doing better.   Reports stable dyspnea and wheezing. Denies cough and hemoptysis.       COPD Questionnaire  How often do you cough?: Never  How often do you have phlegm (mucus) in your chest?: Never  How often does your chest feel tight?: Never  When you walk up a hill or one flight of stairs, how often are you breathless?: All of the time  How often are you limited doing any activities at home?: All  of the time  How often are you confident leaving the house despite your lung condition?: Most of the time  How often do you sleep soundly?: All of the time  How often do you have energy?: All of the time  Total score: 11       Her current respiratory therapy regimen is ANORO, PULMICORT, DUONEB  which provides  relief. She is  adherent with her regimen.      Immunization status reviewed and up to date.       A full  review of systems, past , family  and social histories was performed except as mentioned in the note above, these are non contributory to the main issues discussed today.     Previous Report Reviewed: office notes     The following portions of the patient's history were reviewed and updated as appropriate: She  has a past medical history of Anemia, Anxiety, Arthritis, Asthma, Cataract, Chronic respiratory failure with hypoxia, on home O2 therapy, COPD (chronic obstructive pulmonary disease), Coronary artery disease, Dementia, Depression, Dysplastic colon polyp (4/7/2015), Emphysema of lung, Encounter for blood transfusion, H/O: GI bleed, Hiatal hernia, Hyperlipidemia, Hypertension, Hypothyroid, Rheumatoid arthritis(714.0), Seizures, Stroke, and Syncope and collapse.     She  has a past surgical history that includes Tonsillectomy and Mandible surgery.     Her family history includes Cancer in her mother.  She  reports that she quit smoking about 50 years ago. Her smoking use included cigarettes. She has a 32.00 pack-year smoking history. She has never used smokeless tobacco. She reports that she does not drink alcohol or use drugs.     She has a current medication list which includes the following prescription(s): acetaminophen, albuterol-ipratropium, anoro ellipta, aspirin, atorvastatin, blood pressure test kit-medium, budesonide, clonazepam, docusate sodium, ezetimibe, ferrous sulfate, folic acid, furosemide, inhalation spacing device, levothyroxine, mirtazapine, multivitamin, pantoprazole, potassium  "chloride, sertraline, valproate, ventolin hfa, and vitamin b complex.     She is allergic to sulfa (sulfonamide antibiotics)..    Review of Systems   Constitutional: Positive for fatigue. Negative for fever and chills.   HENT: Positive for hearing loss.    Eyes: Negative for itching.   Respiratory: Positive for cough, wheezing and dyspnea on extertion. Negative for shortness of breath and use of rescue inhaler.    Cardiovascular: Negative for leg swelling.   Genitourinary: Negative for difficulty urinating.   Musculoskeletal: Positive for arthralgias, back pain and gait problem.   Skin: Negative for rash.   Gastrointestinal: Negative for abdominal pain.   Neurological: Negative for syncope.        History of stroke   Hematological: Excessive bruising.   Psychiatric/Behavioral: The patient is nervous/anxious.         Objective:   /72   Pulse 78   Resp 20   Ht 5' 2" (1.575 m)   Wt 55.8 kg (123 lb)   SpO2 95%   BMI 22.50 kg/m²   Body mass index is 22.5 kg/m².    Physical Exam  Vitals signs reviewed.   Constitutional:       General: She is not in acute distress.     Appearance: She is well-developed. She is not ill-appearing or toxic-appearing.      Comments:   Frail  Ambulates with a walker   HENT:      Head: Normocephalic and atraumatic.      Mouth/Throat:      Pharynx: No oropharyngeal exudate.   Eyes:      Conjunctiva/sclera: Conjunctivae normal.   Neck:      Musculoskeletal: Normal range of motion and neck supple.   Cardiovascular:      Rate and Rhythm: Normal rate and regular rhythm.      Heart sounds: Murmur present.   Pulmonary:      Effort: Pulmonary effort is normal.      Breath sounds: No stridor. No decreased breath sounds, wheezing or rales.   Musculoskeletal: Normal range of motion.   Lymphadenopathy:      Cervical: No cervical adenopathy.   Skin:     General: Skin is warm and dry.      Comments: Multiple ecchymoses.   Neurological:      Mental Status: She is alert and oriented to person, " place, and time.   Psychiatric:         Behavior: Behavior normal. Behavior is cooperative.         Thought Content: Thought content normal.         Judgment: Judgment normal.         Personal Diagnostic Review    Six Minute walk test: 09/11/20:     Six minute walk distance is 60.96 / 380.24 meters (16.03 % predicted) with very heavy dyspnea.     Peak VO2 during walking was 5.81  Ml/min/kg [ 1.66 METS].     Baseline oxygen saturation (at rest) was 96 %.  Lowest oxygen saturation during walking was 96 %.     During exercise, there was no significant desaturation while breathing room air.    A desaturation event was defined as a decrease of saturation by 4 or more.    Blood pressure remained stable and Heart rate increased significantly with walking.  Normotension was present prior to exercise.  This may represent a normal cardiovascular response to exercise.  The patient reported non-pulmonary symptoms during exercise - fatigue and wheezing   There was Severe exercise impairment during walking.  Severe exercise impairment is likely due to subjective symptoms.  The patient did complete the study, walking 235 seconds of the 360 second test.  The patient did not require oxygen supplementation during walking.  The patient may not benefit from using supplemental oxygen during exertion.  Since the previous study in 06/27/19, exercise capacity is significantly worse.  Based upon age and body mass index, exercise capacity is less than predicted.      CXR: 09/11/20: Mild pleuroparenchymal scarring is seen at the lung apices.  Otherwise, lungs are clear without focal consolidation, edema, or mass.  There is no pneumothorax or pleural effusion.     Heart size is normal.  Thoracic aorta demonstrates unchanged mild tortuosity.     Air-fluid level is seen within the middle mediastinum indicative of small to moderate sized hiatal hernia, unchanged since prior exam.     Atherosclerotic calcifications are seen within the right  subclavian and axillary vasculature.  Degenerative changes are noted within the shoulders bilaterally.  No acute osseous abnormality.      Pulmonary function tests: 07/17/19:  FEV1: 1.11  (65.6 % predicted), FVC:  1.98 (88.7 % predicted), FEV1/FVC:  56% : Moderate obstruction.            Assessment / Plan:     Discussed diagnosis, its evaluation, treatment and usual course. All questions answered.    Problem List Items Addressed This Visit        Pulmonary    Mild intermittent asthma without complication - Primary (Chronic)    Current Assessment & Plan     COPD ROS:  notes wheezing and dyspnea  for the past day  New concerns: Progressive dyspnea and increasing wheezing.    Exam:  wheezing noted diffusely.   Assessment:  COPD reasonably well controlled.   Plan: CONTINUE ANORO, PULMICORT AND DUONEB AS PRESCRIBED.          Pulmonary emphysema    Current Assessment & Plan       EMPHYSEMA ROS: taking medications as instructed, not taking medications regularly as instructed.   New concerns: NONE   Exam: appears well, vitals normal, no respiratory distress, acyanotic, normal RR.   Assessment:  EMPHYSEMA  stable.   Plan: current treatment plan is effective, no change in therapy. CXR IN 1 YEAR           Relevant Orders    X-Ray Chest PA And Lateral    Chronic respiratory failure with hypoxia    Overview     Oxygen supplementation 3 L / min with exertion and sleep         Current Assessment & Plan     Continue oxygen supplementation to 3 L /min ( Continuous)  6MWT IN 1 YEAR.          Relevant Orders    Pulmonary stress test (Completed)          TIME SPENT WITH PATIENT: Time spent: 25 minutes in face to face  discussion concerning diagnosis, prognosis, review of lab and test results, benefits of treatment as well as management of disease, counseling of patient and coordination of care between various health  care providers . Greater than half the time spent was used for coordination of care and counseling of patient.      Follow up in about 1 year (around 9/11/2021) for Asthma, Emphysema, Chronic Respiratory Failure.

## 2020-09-11 NOTE — PATIENT INSTRUCTIONS
Lung Anatomy  Your lungs take air in to give your body oxygen, which the body needs to work. Your lungs, like all the tissues in your body, are made up of billions of tiny specialized cells. Old lung cells die and are replaced by new, identical lung cells. This natural process helps ensure healthy lungs.    Date Last Reviewed: 11/1/2016  © 7297-9370 [employer]. [address] [place] [address], [place] [address]. All rights reserved. This information is not intended as a substitute for professional medical care. Always follow your healthcare professional's instructions.

## 2020-09-11 NOTE — PROCEDURES
"[facility]  Six Minute Walk     SUMMARY     Ordering Provider: Dr. Gaming   Interpreting Provider: Dr. Gaming  Performing nurse/tech/RT: YOCASTA Francois crt  Diagnosis: (chronic respiratory failure with hypoxia)  Height: 5' 2" (157.5 cm)  Weight: 55.8 kg (123 lb 0.3 oz)  BMI (Calculated): 22.5   Patient Race:             Phase Oxygen Assessment Supplemental O2 Heart   Rate Blood Pressure Shy Dyspnea Scale Rating   Resting 96 % Room Air 93 bpm 127/60 3   Exercise        Minute        1 97 % Room Air 105 bpm     2 97 % Room Air 108 bpm     3 97 % Room Air 109 bpm     4 97 % Room Air 97 bpm     5 97 % Room Air 103 bpm     6  96 % Room Air 107 bpm 129/63 7-8   Recovery        Minute        1 94 % Room Air 107 bpm     2 96 % Room Air 101 bpm     3 91 % Room Air 91 bpm     4 97 % Room Air 84 bpm 134/62 4     Six Minute Walk Summary  6MWT Status: completed with stops  Patient Reported: Chest pain, Dyspnea(wheezing, very tired)     Interpretation:  Did the patient stop or pause?: Yes  How many times did the patient stop or pause?: 1  Stop Time 1: 185  Restart Time 1: 310  Pause Time 1: 125 seconds                             Total Time Walked (Calculated): 235 seconds  Final Partial Lap Distance (feet): 0 feet  Total Distance Meters (Calculated): 60.96 meters  Predicted Distance Meters (Calculated): 380.24 meters  Percentage of Predicted (Calculated): 16.03  Peak VO2 (Calculated): 5.81  Mets: 1.66  Has The Patient Had a Previous Six Minute Walk Test?: Yes       Previous 6MWT Results  Has The Patient Had a Previous Six Minute Walk Test?: Yes  Date of Previous Test: 06/27/19  Total Time Walked: 360 seconds  Total Distance (meters): 121.92  Predicted Distance (meters): 374.12 meters  Percentage of Predicted: 32.59  Percent Change (Calculated): 0.5          PHYSICIAN INTERPRETATION AND COMMENTS:      Six minute walk distance is 60.96 / 380.24 meters (16.03 % predicted) with very heavy dyspnea.     Peak VO2 " during walking was 5.81  Ml/min/kg [ 1.66 METS].     Baseline oxygen saturation (at rest) was 96 %.  Lowest oxygen saturation during walking was 96 %.     During exercise, there was no significant desaturation while breathing room air.    A desaturation event was defined as a decrease of saturation by 4 or more.    Blood pressure remained stable and Heart rate increased significantly with walking.  Normotension was present prior to exercise.  This may represent a normal cardiovascular response to exercise.  The patient reported non-pulmonary symptoms during exercise - fatigue and wheezing   There was Severe exercise impairment during walking.  Severe exercise impairment is likely due to subjective symptoms.  The patient did complete the study, walking 235 seconds of the 360 second test.  The patient did not require oxygen supplementation during walking.  The patient may not benefit from using supplemental oxygen during exertion.  Since the previous study in 06/27/19, exercise capacity is significantly worse.  Based upon age and body mass index, exercise capacity is less than predicted.

## 2020-09-11 NOTE — ASSESSMENT & PLAN NOTE
EMPHYSEMA ROS: taking medications as instructed, not taking medications regularly as instructed.   New concerns: NONE   Exam: appears well, vitals normal, no respiratory distress, acyanotic, normal RR.   Assessment:  EMPHYSEMA  stable.   Plan: current treatment plan is effective, no change in therapy. CXR IN 1 YEAR

## 2020-09-11 NOTE — ASSESSMENT & PLAN NOTE
COPD ROS:  notes wheezing and dyspnea  for the past day  New concerns: Progressive dyspnea and increasing wheezing.    Exam:  wheezing noted diffusely.   Assessment:  COPD reasonably well controlled.   Plan: CONTINUE ANORO, PULMICORT AND DUONEB AS PRESCRIBED.

## 2020-09-23 ENCOUNTER — LAB VISIT (OUTPATIENT)
Dept: LAB | Facility: HOSPITAL | Age: 85
End: 2020-09-23
Attending: FAMILY MEDICINE
Payer: MEDICARE

## 2020-09-23 ENCOUNTER — OFFICE VISIT (OUTPATIENT)
Dept: INTERNAL MEDICINE | Facility: CLINIC | Age: 85
End: 2020-09-23
Payer: MEDICARE

## 2020-09-23 VITALS
TEMPERATURE: 98 F | BODY MASS INDEX: 21.77 KG/M2 | WEIGHT: 119.06 LBS | SYSTOLIC BLOOD PRESSURE: 110 MMHG | OXYGEN SATURATION: 96 % | DIASTOLIC BLOOD PRESSURE: 60 MMHG | HEART RATE: 79 BPM

## 2020-09-23 DIAGNOSIS — I50.32 CHRONIC DIASTOLIC CONGESTIVE HEART FAILURE: ICD-10-CM

## 2020-09-23 DIAGNOSIS — D64.9 NORMOCYTIC ANEMIA: ICD-10-CM

## 2020-09-23 DIAGNOSIS — I35.0 NONRHEUMATIC AORTIC VALVE STENOSIS: ICD-10-CM

## 2020-09-23 DIAGNOSIS — Z86.73 HISTORY OF TRANSIENT ISCHEMIC ATTACK (TIA): ICD-10-CM

## 2020-09-23 DIAGNOSIS — K21.9 GASTROESOPHAGEAL REFLUX DISEASE WITHOUT ESOPHAGITIS: ICD-10-CM

## 2020-09-23 DIAGNOSIS — J96.11 CHRONIC RESPIRATORY FAILURE WITH HYPOXIA: ICD-10-CM

## 2020-09-23 DIAGNOSIS — I25.10 CORONARY ARTERY CALCIFICATION: ICD-10-CM

## 2020-09-23 DIAGNOSIS — F51.05 INSOMNIA SECONDARY TO DEPRESSION WITH ANXIETY: ICD-10-CM

## 2020-09-23 DIAGNOSIS — I10 ESSENTIAL HYPERTENSION: ICD-10-CM

## 2020-09-23 DIAGNOSIS — F41.8 INSOMNIA SECONDARY TO DEPRESSION WITH ANXIETY: ICD-10-CM

## 2020-09-23 DIAGNOSIS — I35.1 NONRHEUMATIC AORTIC VALVE INSUFFICIENCY: ICD-10-CM

## 2020-09-23 DIAGNOSIS — E03.9 HYPOTHYROIDISM, UNSPECIFIED TYPE: Chronic | ICD-10-CM

## 2020-09-23 DIAGNOSIS — G40.909 SEIZURE DISORDER: ICD-10-CM

## 2020-09-23 DIAGNOSIS — S81.802A OPEN WOUND OF LEFT LOWER EXTREMITY, INITIAL ENCOUNTER: Primary | ICD-10-CM

## 2020-09-23 DIAGNOSIS — N18.30 CHRONIC KIDNEY DISEASE, STAGE III (MODERATE): ICD-10-CM

## 2020-09-23 DIAGNOSIS — J43.2 CENTRILOBULAR EMPHYSEMA: ICD-10-CM

## 2020-09-23 DIAGNOSIS — F33.0 MAJOR DEPRESSIVE DISORDER, RECURRENT EPISODE, MILD: ICD-10-CM

## 2020-09-23 DIAGNOSIS — I70.0 ATHEROSCLEROSIS OF AORTA: ICD-10-CM

## 2020-09-23 DIAGNOSIS — E78.2 MIXED HYPERLIPIDEMIA: Chronic | ICD-10-CM

## 2020-09-23 DIAGNOSIS — I25.84 CORONARY ARTERY CALCIFICATION: ICD-10-CM

## 2020-09-23 DIAGNOSIS — I67.3 BINSWANGER'S DEMENTIA: ICD-10-CM

## 2020-09-23 DIAGNOSIS — J45.20 MILD INTERMITTENT ASTHMA WITHOUT COMPLICATION: Chronic | ICD-10-CM

## 2020-09-23 DIAGNOSIS — S81.802A OPEN WOUND OF LEFT LOWER EXTREMITY, INITIAL ENCOUNTER: ICD-10-CM

## 2020-09-23 DIAGNOSIS — L40.50 PSORIASIS WITH ARTHROPATHY: ICD-10-CM

## 2020-09-23 PROCEDURE — 99999 PR PBB SHADOW E&M-EST. PATIENT-LVL V: CPT | Mod: PBBFAC,HCNC,, | Performed by: FAMILY MEDICINE

## 2020-09-23 PROCEDURE — 1159F MED LIST DOCD IN RCRD: CPT | Mod: HCNC,S$GLB,, | Performed by: FAMILY MEDICINE

## 2020-09-23 PROCEDURE — 90694 FLU VACCINE - QUADRIVALENT - ADJUVANTED: ICD-10-PCS | Mod: HCNC,S$GLB,, | Performed by: FAMILY MEDICINE

## 2020-09-23 PROCEDURE — 1159F PR MEDICATION LIST DOCUMENTED IN MEDICAL RECORD: ICD-10-PCS | Mod: HCNC,S$GLB,, | Performed by: FAMILY MEDICINE

## 2020-09-23 PROCEDURE — 83540 ASSAY OF IRON: CPT | Mod: HCNC

## 2020-09-23 PROCEDURE — 1101F PR PT FALLS ASSESS DOC 0-1 FALLS W/OUT INJ PAST YR: ICD-10-PCS | Mod: HCNC,CPTII,S$GLB, | Performed by: FAMILY MEDICINE

## 2020-09-23 PROCEDURE — 99499 RISK ADDL DX/OHS AUDIT: ICD-10-PCS | Mod: HCNC,S$GLB,, | Performed by: FAMILY MEDICINE

## 2020-09-23 PROCEDURE — 3074F SYST BP LT 130 MM HG: CPT | Mod: HCNC,CPTII,S$GLB, | Performed by: FAMILY MEDICINE

## 2020-09-23 PROCEDURE — G0008 PR ADMIN INFLUENZA VIRUS VAC: ICD-10-PCS | Mod: HCNC,S$GLB,, | Performed by: FAMILY MEDICINE

## 2020-09-23 PROCEDURE — 82728 ASSAY OF FERRITIN: CPT | Mod: HCNC

## 2020-09-23 PROCEDURE — 3078F DIAST BP <80 MM HG: CPT | Mod: HCNC,CPTII,S$GLB, | Performed by: FAMILY MEDICINE

## 2020-09-23 PROCEDURE — 99214 OFFICE O/P EST MOD 30 MIN: CPT | Mod: 25,HCNC,S$GLB, | Performed by: FAMILY MEDICINE

## 2020-09-23 PROCEDURE — 85025 COMPLETE CBC W/AUTO DIFF WBC: CPT | Mod: HCNC

## 2020-09-23 PROCEDURE — 1126F PR PAIN SEVERITY QUANTIFIED, NO PAIN PRESENT: ICD-10-PCS | Mod: HCNC,S$GLB,, | Performed by: FAMILY MEDICINE

## 2020-09-23 PROCEDURE — 3074F PR MOST RECENT SYSTOLIC BLOOD PRESSURE < 130 MM HG: ICD-10-PCS | Mod: HCNC,CPTII,S$GLB, | Performed by: FAMILY MEDICINE

## 2020-09-23 PROCEDURE — 84443 ASSAY THYROID STIM HORMONE: CPT | Mod: HCNC

## 2020-09-23 PROCEDURE — G0008 ADMIN INFLUENZA VIRUS VAC: HCPCS | Mod: HCNC,S$GLB,, | Performed by: FAMILY MEDICINE

## 2020-09-23 PROCEDURE — 99214 PR OFFICE/OUTPT VISIT, EST, LEVL IV, 30-39 MIN: ICD-10-PCS | Mod: 25,HCNC,S$GLB, | Performed by: FAMILY MEDICINE

## 2020-09-23 PROCEDURE — 3078F PR MOST RECENT DIASTOLIC BLOOD PRESSURE < 80 MM HG: ICD-10-PCS | Mod: HCNC,CPTII,S$GLB, | Performed by: FAMILY MEDICINE

## 2020-09-23 PROCEDURE — 36415 COLL VENOUS BLD VENIPUNCTURE: CPT | Mod: HCNC

## 2020-09-23 PROCEDURE — 1101F PT FALLS ASSESS-DOCD LE1/YR: CPT | Mod: HCNC,CPTII,S$GLB, | Performed by: FAMILY MEDICINE

## 2020-09-23 PROCEDURE — 99499 UNLISTED E&M SERVICE: CPT | Mod: HCNC,S$GLB,, | Performed by: FAMILY MEDICINE

## 2020-09-23 PROCEDURE — 80053 COMPREHEN METABOLIC PANEL: CPT | Mod: HCNC

## 2020-09-23 PROCEDURE — 1126F AMNT PAIN NOTED NONE PRSNT: CPT | Mod: HCNC,S$GLB,, | Performed by: FAMILY MEDICINE

## 2020-09-23 PROCEDURE — 90694 VACC AIIV4 NO PRSRV 0.5ML IM: CPT | Mod: HCNC,S$GLB,, | Performed by: FAMILY MEDICINE

## 2020-09-23 PROCEDURE — 99999 PR PBB SHADOW E&M-EST. PATIENT-LVL V: ICD-10-PCS | Mod: PBBFAC,HCNC,, | Performed by: FAMILY MEDICINE

## 2020-09-23 RX ORDER — CEPHALEXIN 500 MG/1
500 CAPSULE ORAL EVERY 12 HOURS
Qty: 20 CAPSULE | Refills: 0 | Status: SHIPPED | OUTPATIENT
Start: 2020-09-23 | End: 2020-10-01

## 2020-09-23 RX ORDER — MUPIROCIN 20 MG/G
OINTMENT TOPICAL 2 TIMES DAILY
Qty: 22 G | Refills: 0 | Status: SHIPPED | OUTPATIENT
Start: 2020-09-23

## 2020-09-24 ENCOUNTER — TELEPHONE (OUTPATIENT)
Dept: INTERNAL MEDICINE | Facility: CLINIC | Age: 85
End: 2020-09-24

## 2020-09-24 DIAGNOSIS — N30.00 ACUTE CYSTITIS WITHOUT HEMATURIA: Primary | ICD-10-CM

## 2020-09-24 LAB
ALBUMIN SERPL BCP-MCNC: 3.4 G/DL (ref 3.5–5.2)
ALP SERPL-CCNC: 45 U/L (ref 55–135)
ALT SERPL W/O P-5'-P-CCNC: 11 U/L (ref 10–44)
ANION GAP SERPL CALC-SCNC: 9 MMOL/L (ref 8–16)
AST SERPL-CCNC: 19 U/L (ref 10–40)
BASOPHILS # BLD AUTO: 0.02 K/UL (ref 0–0.2)
BASOPHILS NFR BLD: 0.3 % (ref 0–1.9)
BILIRUB SERPL-MCNC: 0.2 MG/DL (ref 0.1–1)
BUN SERPL-MCNC: 22 MG/DL (ref 8–23)
CALCIUM SERPL-MCNC: 8.2 MG/DL (ref 8.7–10.5)
CHLORIDE SERPL-SCNC: 104 MMOL/L (ref 95–110)
CO2 SERPL-SCNC: 31 MMOL/L (ref 23–29)
CREAT SERPL-MCNC: 1.1 MG/DL (ref 0.5–1.4)
DIFFERENTIAL METHOD: ABNORMAL
EOSINOPHIL # BLD AUTO: 0.1 K/UL (ref 0–0.5)
EOSINOPHIL NFR BLD: 0.9 % (ref 0–8)
ERYTHROCYTE [DISTWIDTH] IN BLOOD BY AUTOMATED COUNT: 15.7 % (ref 11.5–14.5)
EST. GFR  (AFRICAN AMERICAN): 52.9 ML/MIN/1.73 M^2
EST. GFR  (NON AFRICAN AMERICAN): 45.9 ML/MIN/1.73 M^2
FERRITIN SERPL-MCNC: 50 NG/ML (ref 20–300)
GLUCOSE SERPL-MCNC: 92 MG/DL (ref 70–110)
HCT VFR BLD AUTO: 38.9 % (ref 37–48.5)
HGB BLD-MCNC: 11.6 G/DL (ref 12–16)
IMM GRANULOCYTES # BLD AUTO: 0.02 K/UL (ref 0–0.04)
IMM GRANULOCYTES NFR BLD AUTO: 0.3 % (ref 0–0.5)
IRON SERPL-MCNC: 101 UG/DL (ref 30–160)
LYMPHOCYTES # BLD AUTO: 1.3 K/UL (ref 1–4.8)
LYMPHOCYTES NFR BLD: 20.1 % (ref 18–48)
MCH RBC QN AUTO: 31.6 PG (ref 27–31)
MCHC RBC AUTO-ENTMCNC: 29.8 G/DL (ref 32–36)
MCV RBC AUTO: 106 FL (ref 82–98)
MONOCYTES # BLD AUTO: 0.5 K/UL (ref 0.3–1)
MONOCYTES NFR BLD: 8.1 % (ref 4–15)
NEUTROPHILS # BLD AUTO: 4.7 K/UL (ref 1.8–7.7)
NEUTROPHILS NFR BLD: 70.3 % (ref 38–73)
NRBC BLD-RTO: 0 /100 WBC
PLATELET # BLD AUTO: 222 K/UL (ref 150–350)
PMV BLD AUTO: 13.3 FL (ref 9.2–12.9)
POTASSIUM SERPL-SCNC: 4.2 MMOL/L (ref 3.5–5.1)
PROT SERPL-MCNC: 6.1 G/DL (ref 6–8.4)
RBC # BLD AUTO: 3.67 M/UL (ref 4–5.4)
SATURATED IRON: 31 % (ref 20–50)
SODIUM SERPL-SCNC: 144 MMOL/L (ref 136–145)
TOTAL IRON BINDING CAPACITY: 321 UG/DL (ref 250–450)
TRANSFERRIN SERPL-MCNC: 217 MG/DL (ref 200–375)
TSH SERPL DL<=0.005 MIU/L-ACNC: 1.78 UIU/ML (ref 0.4–4)
WBC # BLD AUTO: 6.63 K/UL (ref 3.9–12.7)

## 2020-09-24 RX ORDER — NITROFURANTOIN 25; 75 MG/1; MG/1
100 CAPSULE ORAL 2 TIMES DAILY
Qty: 10 CAPSULE | Refills: 0 | Status: SHIPPED | OUTPATIENT
Start: 2020-09-24 | End: 2020-09-29

## 2020-09-24 NOTE — PROGRESS NOTES
Subjective:       Patient ID: Crystal Romero is a 85 y.o. female.    Chief Complaint: Discuss hospice/palliative care and Left leg wound    85-year-old female patient accompanied by caretaker with Patient Active Problem List:     Hiatal hernia     Mild intermittent asthma without complication     Hyperlipidemia     Essential hypertension     Diastolic dysfunction     Osteoarthritis     Rotator cuff arthropathy     H/O: GI bleed     Hypothyroid     Nonrheumatic aortic valve stenosis     Nonrheumatic aortic valve insufficiency     Impaired cognition     Gastroesophageal reflux disease without esophagitis     Major depressive disorder, recurrent episode, mild     Psoriasis with arthropathy     Bilateral adhesive capsulitis of shoulders     Pulmonary emphysema     Insomnia secondary to depression with anxiety     Binswanger's dementia     Dysphasia     History of transient ischemic attack (TIA)     Chronic diastolic congestive heart failure     Coronary artery calcification     Atherosclerosis of aorta     Chronic respiratory failure with hypoxia     Chronic kidney disease, stage III (moderate)     Seizure disorder     Anemia  Reports that she has been accidentally hitting her left leg off and on lately and keeps picking on it, has been trying to do daily dressing changes by the caretaker at home.  Patient has been having sundowning more early lately.  Secondary to multiple chronic medical conditions, family(patient's daughter) is interested to consider hospice at home care  Has been taking her medications regularly  Denies any fever with chills nausea vomiting    Review of Systems   Constitutional: Negative for fatigue and fever.   Eyes: Negative for visual disturbance.   Respiratory: Negative for shortness of breath.    Cardiovascular: Negative for chest pain and leg swelling.   Gastrointestinal: Negative for abdominal pain, nausea and vomiting.   Genitourinary: Negative for dysuria and frequency.    Musculoskeletal: Negative for myalgias.   Skin: Positive for wound. Negative for rash.   Neurological: Negative for light-headedness and headaches.   Psychiatric/Behavioral: Positive for behavioral problems. Negative for sleep disturbance. The patient is nervous/anxious.          /60 (BP Location: Right arm, Patient Position: Sitting, BP Method: Medium (Manual))   Pulse 79   Temp 97.7 °F (36.5 °C) (Tympanic)   Wt 54 kg (119 lb 0.8 oz)   SpO2 96%   BMI 21.77 kg/m²   Objective:      Physical Exam  Constitutional:       Appearance: She is well-developed.   HENT:      Head: Normocephalic and atraumatic.   Cardiovascular:      Rate and Rhythm: Normal rate and regular rhythm.      Heart sounds: Normal heart sounds. No murmur.   Pulmonary:      Effort: Pulmonary effort is normal.      Breath sounds: Normal breath sounds. No wheezing.   Abdominal:      General: Bowel sounds are normal.      Palpations: Abdomen is soft.      Tenderness: There is no abdominal tenderness.   Skin:     General: Skin is warm and dry.      Findings: Lesion present. No rash.      Comments: Positive for open wound to the left lower extremity, 2 x 2 cm anterior   Neurological:      Mental Status: She is alert and oriented to person, place, and time.   Psychiatric:         Mood and Affect: Mood normal.           Assessment/Plan:   1. Open wound of left lower extremity, initial encounter  - CBC auto differential; Future  - cephALEXin (KEFLEX) 500 MG capsule; Take 1 capsule (500 mg total) by mouth every 12 (twelve) hours. for 10 days  Dispense: 20 capsule; Refill: 0  - mupirocin (BACTROBAN) 2 % ointment; Apply topically 2 (two) times daily. Apply to left leg wound  Dispense: 22 g; Refill: 0  Keflex will be prescribed today for symptomatic relief and Bactroban given today to be applied twice daily  Continue daily dressing changes and advised to avoid touching the area more frequently to help with infection healing    2. Essential  hypertension  - Comprehensive metabolic panel; Future  - TSH; Future  - Urinalysis; Future  Blood pressure stable currently diet controlled    3. Mixed hyperlipidemia  Currently on Lipitor 40 mg daily    4. Hypothyroidism, unspecified type  - TSH; Future  Currently taking levothyroxine 25 p.o. daily    5. Mild intermittent asthma without complication  6. Centrilobular emphysema  7. Chronic respiratory failure with hypoxia  - Ambulatory referral/consult to [facility]; Future  Followed by pulmonology  Currently using Anoro inhaler, pulmicort  and DuoNeb as needed    8. Chronic diastolic congestive heart failure  - Ambulatory referral/consult to [facility]; Future  9. Nonrheumatic aortic valve stenosis  10. Nonrheumatic aortic valve insufficiency  Followed by Cardiology    Secondary to multiple chronic medical conditions patient will benefit from palliative care at home    11. Gastroesophageal reflux disease without esophagitis  Stable on pantoprazole 40 mg daily    12. Major depressive disorder, recurrent episode, mild  14. Insomnia secondary to depression with anxiety  Currently taking Zoloft 50 mg daily and Klonopin as needed, remeron    13. Psoriasis with arthropathy    15. Binswanger's dementia  - Ambulatory referral/consult to [facility]; Future  Followed by neurology     16. History of transient ischemic attack (TIA)  17. Coronary artery calcification  18. Atherosclerosis of aorta    19. Chronic kidney disease, stage III (moderate)    20. Seizure disorder  Currently on Depakene    21. Normocytic anemia  - CBC auto differential; Future  - Iron and TIBC; Future  - Ferritin; Future   Will check further labs, denies any blood in the stool or persistent fatigue    Flu shot given today

## 2020-10-01 ENCOUNTER — CARE AT HOME (OUTPATIENT)
Dept: HOME HEALTH SERVICES | Facility: CLINIC | Age: 85
End: 2020-10-01
Payer: MEDICARE

## 2020-10-01 VITALS
RESPIRATION RATE: 16 BRPM | TEMPERATURE: 97 F | SYSTOLIC BLOOD PRESSURE: 151 MMHG | DIASTOLIC BLOOD PRESSURE: 91 MMHG | OXYGEN SATURATION: 97 % | HEART RATE: 68 BPM

## 2020-10-01 DIAGNOSIS — J96.11 CHRONIC RESPIRATORY FAILURE WITH HYPOXIA: Primary | ICD-10-CM

## 2020-10-01 DIAGNOSIS — I67.3 BINSWANGER'S DEMENTIA: ICD-10-CM

## 2020-10-01 DIAGNOSIS — I50.32 CHRONIC DIASTOLIC CONGESTIVE HEART FAILURE: ICD-10-CM

## 2020-10-01 PROCEDURE — G0180 MD CERTIFICATION HHA PATIENT: HCPCS | Mod: 59,S$GLB,, | Performed by: NURSE PRACTITIONER

## 2020-10-01 PROCEDURE — 3077F SYST BP >= 140 MM HG: CPT | Mod: CPTII,S$GLB,, | Performed by: NURSE PRACTITIONER

## 2020-10-01 PROCEDURE — 99497 ADVNCD CARE PLAN 30 MIN: CPT | Mod: S$GLB,,, | Performed by: NURSE PRACTITIONER

## 2020-10-01 PROCEDURE — 99350 PR HOME VISIT,ESTAB PATIENT,LEVEL IV: ICD-10-PCS | Mod: 25,,, | Performed by: NURSE PRACTITIONER

## 2020-10-01 PROCEDURE — 99350 HOME/RES VST EST HIGH MDM 60: CPT | Mod: 25,,, | Performed by: NURSE PRACTITIONER

## 2020-10-01 PROCEDURE — G0180 PR HOME HEALTH MD CERTIFICATION: ICD-10-PCS | Mod: 59,S$GLB,, | Performed by: NURSE PRACTITIONER

## 2020-10-01 PROCEDURE — 1126F AMNT PAIN NOTED NONE PRSNT: CPT | Mod: S$GLB,,, | Performed by: NURSE PRACTITIONER

## 2020-10-01 PROCEDURE — 1126F PR PAIN SEVERITY QUANTIFIED, NO PAIN PRESENT: ICD-10-PCS | Mod: S$GLB,,, | Performed by: NURSE PRACTITIONER

## 2020-10-01 PROCEDURE — 1159F PR MEDICATION LIST DOCUMENTED IN MEDICAL RECORD: ICD-10-PCS | Mod: S$GLB,,, | Performed by: NURSE PRACTITIONER

## 2020-10-01 PROCEDURE — 1159F MED LIST DOCD IN RCRD: CPT | Mod: S$GLB,,, | Performed by: NURSE PRACTITIONER

## 2020-10-01 PROCEDURE — 3080F PR MOST RECENT DIASTOLIC BLOOD PRESSURE >= 90 MM HG: ICD-10-PCS | Mod: CPTII,S$GLB,, | Performed by: NURSE PRACTITIONER

## 2020-10-01 PROCEDURE — 1101F PT FALLS ASSESS-DOCD LE1/YR: CPT | Mod: CPTII,S$GLB,, | Performed by: NURSE PRACTITIONER

## 2020-10-01 PROCEDURE — 99497 PR ADVNCD CARE PLAN 30 MIN: ICD-10-PCS | Mod: S$GLB,,, | Performed by: NURSE PRACTITIONER

## 2020-10-01 PROCEDURE — 3077F PR MOST RECENT SYSTOLIC BLOOD PRESSURE >= 140 MM HG: ICD-10-PCS | Mod: CPTII,S$GLB,, | Performed by: NURSE PRACTITIONER

## 2020-10-01 PROCEDURE — 1101F PR PT FALLS ASSESS DOC 0-1 FALLS W/OUT INJ PAST YR: ICD-10-PCS | Mod: CPTII,S$GLB,, | Performed by: NURSE PRACTITIONER

## 2020-10-01 PROCEDURE — 3080F DIAST BP >= 90 MM HG: CPT | Mod: CPTII,S$GLB,, | Performed by: NURSE PRACTITIONER

## 2020-10-01 NOTE — PATIENT INSTRUCTIONS
- [facility] to schedule follow-up visit with patient in 4-6 weeks or as needed.  - Continue all medications, treatments and therapies as ordered.   - Follow all instructions, recommendations as discussed.  - Maintain Safety Precautions at all times.  - Attend all medical appointments as scheduled.  - For worsening symptoms: call Primary Care Physician or Nurse Practitioner.  - For emergencies, call 911 or immediately report to the nearest emergency room.  - Limit Risks of environmental exposure to coronavirus as discussed including: social distancing, hand hygiene, and limiting departures from the home for necessities only.     Your care is important to us. If your provider recommended a follow-up appointment or test, we are happy to help you coordinate your recommended care. It is important that you complete your recommended follow-up. If you need help scheduling, please call [phone]. Appointments can also be made online through the patient portal.  While scheduling and attending your appointments is your responsibility, our goal is to support and empower you throughout that process.    [facility] Nurse Care Line - 24/7 Assistance  Unless otherwise directed by your provider, please contact [facility], our nurse care line that is available for 24/7 assistance.  Registered nurses in the [facility] provide: appointment scheduling, clinical advisement, health education, and other advisory services.  Call: [phone] (toll free)    COVID-19 Prevention   Avoid close contact with people and stay home if youre sick, except to get medical care.   Cover coughs and sneezes with a tissue, or use the inside of your elbow. Immediately wash your hands or use hand .  For more information, see CDC link below:  https://www.cdc.gov/coronavirus/2019-ncov/hcp/guidance-prevent-spread.html#precautions     The following information is provided to all patients.  This information is  to help you find resources for any of the problems found today that may be affecting your health:            Living healthy guide: www.[facility].[place]    Understanding Diabetes: www.diabetes.org   Eating healthy: www.cdc.gov/healthyweight   CDC home safety checklist: www.cdc.gov/steadi/patient.html  Agency on Aging: www.goea.[place].[facility]    Alcoholics anonymous (AA): www.aa.org   Physical Activity: www.josefa.nih.gov/hc6lwoi    Tobacco use: www.quitwithusla.org

## 2020-10-02 NOTE — PROGRESS NOTES
"[facility]  Palliative Care Home Visit    Visit Date: 10/2/2020  Encounter Provider: Marvin Garay NP  PCP:  Maria Guadalupe Herring MD    Subjective:      Patient ID: Crystal Romero is a 85 y.o. female.    Consult Requested By:  Dr. Maria Guadalupe Herring  Reason for Consult:  Palliative Care Visit by Home Care Provider    The patient is being seen at home due to a physical debility that presents a taxing effort to leave the home, to mitigate high risk of hospital readmission or due to the limited availability of reliable or safe options for transportation to the point of access to the provider. The visit meets the criteria for medical necessity as defined by CMS as "health-care services needed to prevent, diagnose, or treat an illness, injury, condition, disease, or its symptoms and that meet accepted standards of medicine." Prior to treatment on this visit the chart was reviewed and patient consent was obtained.    Today:  Ms. Crystal Romero is a 85 y.o. female Crystal presents at baseline state of health as reported by patient and caregiver. VSS. Family denies any acute issues, concerns or complaints to address on today's visit. She is confused, disoriented to person, place and time. Denies pain, no apparent distress noted. Family reports administering all medications as prescribed. She is tended for by a personal sitter 5 days a week AND BY his daughter and son-in-law nights and weekends. Patient is appropriate for hospice modality of care. Family agrees to an informational visit for future consideration. For now,  will be ordered to treat the patient's wound to her left foreleg. The wound is a stage II break in the skin with small amount of purulent drainage, not warm to the site. Patient is afebrile and without chills. No other needs identified at this time. Risks of environmental exposure to coronavirus discussed including: social distancing, hand hygiene, and limiting departures from the " home for necessities only.  Reports understanding and willingness to comply.      Chief Complaint: Encounter for Palliative Care    Social History: Lives with her adult daughter and son-in-law in a single family home.     Goals of Care: Comfort, palliation of symptoms    Review of Systems    Assessments:  · Environmental: single story home, no steps to enter, adequate lighting and temeprature control  · Functional Status: Dependent with ADL's/IADL's, Does not ambulate, incontinent of bowel and bladder, bedbound  · Safety: Fall Precautions, COVID Precautions/Social Distancing/Mask Use  · Nutritional: Adequate  · Home Health: [facility]   · DME/Supplies: Hospital bed, oxygen nebulizer, wound care supplies    Symptom Assessment (ESAS 0-10 scale)     ESAS 0 1 2 3 4 5 6 7 8 9 10   Pain x             Dyspnea x             Anxiety x             Nausea x             Depression  x             Anorexia    x          Fatigue    x          Insomnia    x          Restlessness  x             Agitation x               Constipation    no  Bowel Management Plan (BMP): no  Diarrhea        no    Performance Status:   PPS Score 30  Karnofsky Score:  40  FAST: 6  EGOC: 4    History:  Past Medical History:   Diagnosis Date    Anemia     Anxiety     Arthritis     Asthma     Cataract     Chronic respiratory failure with hypoxia, on home O2 therapy     COPD (chronic obstructive pulmonary disease)     Coronary artery disease     Dementia     Depression     Dysplastic colon polyp 4/7/2015    Emphysema of lung     Encounter for blood transfusion     H/O: GI bleed     Hiatal hernia     Hyperlipidemia     Hypertension     Hypothyroid     Rheumatoid arthritis(714.0)     Seizures     Stroke     Syncope and collapse      Family History   Problem Relation Age of Onset    Cancer Mother         breast, uterine     Past Surgical History:   Procedure Laterality Date    MANDIBLE SURGERY      TONSILLECTOMY       Review of patient's  allergies indicates:   Allergen Reactions    Sulfa (sulfonamide antibiotics) Shortness Of Breath       Medications:    Current Outpatient Medications:     acetaminophen (TYLENOL) 500 MG tablet, Take 500 mg by mouth every 6 (six) hours as needed for Pain., Disp: , Rfl:     albuterol-ipratropium (DUO-NEB) 2.5 mg-0.5 mg/3 mL nebulizer solution, INHALE ONE AMPULE VIA NEBULIZER EVERY SIX HOURS AS NEEDED FOR WHEEZING, Disp: 360 mL, Rfl: 3    ANORO ELLIPTA 62.5-25 mcg/actuation DsDv, INHALE ONE PUFF BY MOUTH ONE TIME DAILY - CONTROLLER , Disp: 60 each, Rfl: 11    aspirin (ECOTRIN) 81 MG EC tablet, Take 1 tablet (81 mg total) by mouth every 48 hours., Disp: , Rfl:     atorvastatin (LIPITOR) 40 MG tablet, TAKE ONE TABLET BY MOUTH ONE TIME DAILY , Disp: 90 tablet, Rfl: 3    blood pressure test kit-medium Kit, Pt needs device to monitor blood pressure daily, Disp: , Rfl:     budesonide (PULMICORT) 0.5 mg/2 mL nebulizer solution, INHALE ONE AMPULE CONTENTS VIA NEBULIZER TWICE DAILY - WASH OUT MOUTH AFTER USING , Disp: 120 mL, Rfl: 11    clonazePAM (KLONOPIN) 0.5 MG tablet, Take 0.5 mg by mouth every evening. 3 tablets at night, Disp: , Rfl:     docusate sodium (COLACE) 100 MG capsule, Take 100 mg by mouth daily as needed for Constipation., Disp: , Rfl:     ezetimibe (ZETIA) 10 mg tablet, TAKE ONE TABLET BY MOUTH ONE TIME DAILY , Disp: 90 tablet, Rfl: 3    ferrous sulfate (FEOSOL) 325 mg (65 mg iron) Tab tablet, TAKE ONE TABLET BY MOUTH TWICE DAILY , Disp: 60 tablet, Rfl: 3    folic acid (FOLVITE) 1 MG tablet, Take 1 tablet (1,000 mcg total) by mouth once daily., Disp: 90 tablet, Rfl: 3    furosemide (LASIX) 40 MG tablet, TAKE ONE TABLET BY MOUTH ONE TIME DAILY , Disp: 30 tablet, Rfl: 11    inhalation spacing device (AEROCHAMBER PLUS FLOW-VU), Use with Symbicort and Albuterol inhalers, Disp: 1 each, Rfl: 0    levothyroxine (SYNTHROID) 25 MCG tablet, TAKE ONE TABLET BY MOUTH ONE TIME DAILY BEFORE BREAKFAST, Disp:  90 tablet, Rfl: 2    mirtazapine (REMERON) 45 MG tablet, Take 45 mg by mouth every evening., Disp: , Rfl:     multivitamin (THERAGRAN) per tablet, Take 1 tablet by mouth once daily., Disp: , Rfl:     mupirocin (BACTROBAN) 2 % ointment, Apply topically 2 (two) times daily. Apply to left leg wound, Disp: 22 g, Rfl: 0    pantoprazole (PROTONIX) 40 MG tablet, TAKE ONE TABLET BY MOUTH ONE TIME DAILY , Disp: 90 tablet, Rfl: 1    potassium chloride (KLOR-CON) 10 MEQ TbSR, TAKE ONE TABLET BY MOUTH THREE TIMES DAILY , Disp: 90 tablet, Rfl: 3    sertraline (ZOLOFT) 50 MG tablet, TAKE ONE TABLET BY MOUTH ONE TIME DAILY , Disp: 90 tablet, Rfl: 0    valproate (DEPAKENE) 250 mg/5 mL syrup, Take 250 mg by mouth once daily. Take 30 mL at 4 pm daily., Disp: , Rfl:     VENTOLIN HFA 90 mcg/actuation inhaler, Inhale 2 puffs into the lungs every 4 (four) hours as needed for Wheezing or Shortness of Breath. , Disp: , Rfl:     VITAMIN B COMPLEX (SUPER B COMPLEX-B-12 ORAL), Take 1 tablet by mouth once daily. , Disp: , Rfl:     24h Oral Morphine Equivalents (OME): 0    Objective:     Physical Exam:  Vitals:    10/01/20 0945   BP: (!) 151/91   Pulse: 68   Resp: 16   Temp: 97.2 °F (36.2 °C)   TempSrc: Temporal   SpO2: 97%   PainSc: 0-No pain     There is no height or weight on file to calculate BMI.    Physical Exam    Labs:  CBC:   WBC   Date Value Ref Range Status   09/23/2020 6.63 3.90 - 12.70 K/uL Final     Hemoglobin   Date Value Ref Range Status   09/23/2020 11.6 (L) 12.0 - 16.0 g/dL Final     Hematocrit   Date Value Ref Range Status   09/23/2020 38.9 37.0 - 48.5 % Final     Mean Corpuscular Volume   Date Value Ref Range Status   09/23/2020 106 (H) 82 - 98 fL Final     Platelets   Date Value Ref Range Status   09/23/2020 222 150 - 350 K/uL Final       LFT:   Lab Results   Component Value Date    AST 19 09/23/2020    ALKPHOS 45 (L) 09/23/2020    BILITOT 0.2 09/23/2020       Albumin:   Albumin   Date Value Ref Range Status    09/23/2020 3.4 (L) 3.5 - 5.2 g/dL Final     Psychosocial/Cultural/Spiritual:  · F- Arin and Belief:  Druze  · I - Importance: high'C   · C - Community: Local  · A - Address in Care: yes    Assessment:     1. Chronic respiratory failure with hypoxia    2. Chronic diastolic congestive heart failure    3. Binswanger's dementia        Plan:     Ethical / Legal: Advance Care Planning   Capacity to make medical decisions:  no, Conflict no  · Surrogate decision maker:  Name Shannan Raya, Relationship: daughter  · Advance Directives:  none  · HCPOA: none  · [place]:  none  · Code Status:  DNR    Advanced Care Directives, HCPOA and [place] forms left in the home for family review, discussion and signing with instructions to return upon their next provider encounter for inclusion to the medical record.     Crystal was seen today for follow-up.  Diagnoses and all orders for this visit:    Binswanger's dementia  1. - Continue dementia medications as prescribed:  2. Reinforce non-pharmacologic interventions to prevent delirium:  a. Avoid unnecessary disturbances between 10PM-6AM,   b. Promote activity during the day: up in chair, ambulate as tolerated  c. open blinds, provide glasses/hearing aids  d. Continue patient exposure to familiar stimuli: television shows, friends and family, pictures, favorite activities  3. Reorient patient to reality when false statements or observations are made.     Encounter for Palliative Care   - [facility] NP to f/u with patient in 4 weeks or PRN   - patient to continue all medications as prescribed   - patient/family encouraged to call PCP or NP for worsening symptoms    - in case of an emergency, call 911 or report to the nearest Emergency Room   - initiate hospice referral if/when patient/family is ready    - Notify NP if/when the patient is ready to be transitioned to hospice modality of care.    Were controlled substances prescribed?  No    Follow Up  Appointments:   Future Appointments   Date Time Provider Department Center   11/5/2020 11:00 AM Marvin Garay NP [id number] [place]   12/22/2020  1:20 PM Orlando Hassan MD [facility]       Attestation: Screening criteria to assess the level of the patient's risk for infection with COVID-19 as recommended by the CDC at the time of the above documented home visit concluded appropriateness to proceed. Universal precautions were maintained at all times, including provider use of >60% alcohol gel hand  immediately prior to entry and upon departing the patient's home as well as cleaning of equipment used in home visit with antibacterial/germicidal disposable wipes.     Signature:     Marvin Garay, MSN, APRN, FNP-C  [facility]    Total face-to-face time was 70 min, >50% of this was spent on counseling and coordination of care. The following issues were discussed: primary and secondary diagnoses, co-morbidities, prescribed medications, treatment modalities, importance of compliance with medical advice and directives for follow-up care     With the consent of the patient and/or caregiver(s), an additional 20 minutes included a comprehensive discussion regarding Advanced Care Planning. Sources of emotional and spiritual support were identified and encouraged for involvement to assist in finalization of decisions regarding the patient's goals of care (Living Will). Topics discussed include statutory guidelines of the [facility] to determine/delineate the legal surrogate medical decision maker should the patient be deemed incompetent to self-direct medical care (next of kin vs.POA) and the required documentation to ensure legal validity thereof ([place] Directives/[place]).

## 2020-10-22 ENCOUNTER — DOCUMENT SCAN (OUTPATIENT)
Dept: HOME HEALTH SERVICES | Facility: HOSPITAL | Age: 85
End: 2020-10-22
Payer: MEDICARE

## 2020-10-23 ENCOUNTER — EXTERNAL HOME HEALTH (OUTPATIENT)
Dept: HOME HEALTH SERVICES | Facility: HOSPITAL | Age: 85
End: 2020-10-23

## 2020-10-23 ENCOUNTER — DOCUMENT SCAN (OUTPATIENT)
Dept: HOME HEALTH SERVICES | Facility: HOSPITAL | Age: 85
End: 2020-10-23
Payer: MEDICARE

## 2020-10-26 ENCOUNTER — DOCUMENT SCAN (OUTPATIENT)
Dept: HOME HEALTH SERVICES | Facility: HOSPITAL | Age: 85
End: 2020-10-26
Payer: MEDICARE

## 2020-11-05 ENCOUNTER — CARE AT HOME (OUTPATIENT)
Dept: HOME HEALTH SERVICES | Facility: CLINIC | Age: 85
End: 2020-11-05
Payer: MEDICARE

## 2020-11-05 VITALS
SYSTOLIC BLOOD PRESSURE: 95 MMHG | RESPIRATION RATE: 18 BRPM | HEART RATE: 68 BPM | TEMPERATURE: 97 F | DIASTOLIC BLOOD PRESSURE: 58 MMHG | OXYGEN SATURATION: 97 %

## 2020-11-05 DIAGNOSIS — J96.11 CHRONIC RESPIRATORY FAILURE WITH HYPOXIA: ICD-10-CM

## 2020-11-05 DIAGNOSIS — L89.90 PRESSURE INJURY OF SKIN, UNSPECIFIED INJURY STAGE, UNSPECIFIED LOCATION: Primary | ICD-10-CM

## 2020-11-05 PROCEDURE — 1159F MED LIST DOCD IN RCRD: CPT | Mod: S$GLB,,, | Performed by: NURSE PRACTITIONER

## 2020-11-05 PROCEDURE — 99349 HOME/RES VST EST MOD MDM 40: CPT | Mod: ICN,,, | Performed by: NURSE PRACTITIONER

## 2020-11-05 PROCEDURE — 1126F AMNT PAIN NOTED NONE PRSNT: CPT | Mod: S$GLB,,, | Performed by: NURSE PRACTITIONER

## 2020-11-05 PROCEDURE — 1159F PR MEDICATION LIST DOCUMENTED IN MEDICAL RECORD: ICD-10-PCS | Mod: S$GLB,,, | Performed by: NURSE PRACTITIONER

## 2020-11-05 PROCEDURE — 1126F PR PAIN SEVERITY QUANTIFIED, NO PAIN PRESENT: ICD-10-PCS | Mod: S$GLB,,, | Performed by: NURSE PRACTITIONER

## 2020-11-05 PROCEDURE — 3074F PR MOST RECENT SYSTOLIC BLOOD PRESSURE < 130 MM HG: ICD-10-PCS | Mod: CPTII,S$GLB,, | Performed by: NURSE PRACTITIONER

## 2020-11-05 PROCEDURE — 3074F SYST BP LT 130 MM HG: CPT | Mod: CPTII,S$GLB,, | Performed by: NURSE PRACTITIONER

## 2020-11-05 PROCEDURE — 1101F PR PT FALLS ASSESS DOC 0-1 FALLS W/OUT INJ PAST YR: ICD-10-PCS | Mod: CPTII,S$GLB,, | Performed by: NURSE PRACTITIONER

## 2020-11-05 PROCEDURE — 3078F DIAST BP <80 MM HG: CPT | Mod: CPTII,S$GLB,, | Performed by: NURSE PRACTITIONER

## 2020-11-05 PROCEDURE — 99349 PR HOME VISIT,ESTAB PATIENT,LEVEL III: ICD-10-PCS | Mod: ICN,,, | Performed by: NURSE PRACTITIONER

## 2020-11-05 PROCEDURE — 1101F PT FALLS ASSESS-DOCD LE1/YR: CPT | Mod: CPTII,S$GLB,, | Performed by: NURSE PRACTITIONER

## 2020-11-05 PROCEDURE — 3078F PR MOST RECENT DIASTOLIC BLOOD PRESSURE < 80 MM HG: ICD-10-PCS | Mod: CPTII,S$GLB,, | Performed by: NURSE PRACTITIONER

## 2020-11-05 NOTE — PROGRESS NOTES
"[facility]  Palliative Care Home Visit    Visit Date: 11/5/2020  Encounter Provider: Marvin Garay NP  PCP:  Maria Guadalupe Herring MD    Subjective:      Patient ID: Crystal Romero is a 85 y.o. female.    Consult Requested By:  Marvin Garay  Reason for Consult:  Palliative Care Visit by Home Care Provider    The patient is being seen at home due to a physical debility that presents a taxing effort to leave the home, to mitigate high risk of hospital readmission or due to the limited availability of reliable or safe options for transportation to the point of access to the provider. The visit meets the criteria for medical necessity as defined by CMS as "health-care services needed to prevent, diagnose, or treat an illness, injury, condition, disease, or its symptoms and that meet accepted standards of medicine." Prior to treatment on this visit the chart was reviewed and patient consent was obtained.    Today:  Ms. Crystal Romero is a 85 y.o. female Crystal presents at baseline state of health as reported by patient and caregiver. VSS. Family denies any acute issues, concerns or complaints to address on today's visit. She is confused, disoriented to person, place and time. Denies pain, no apparent distress noted. Family reports administering all medications as prescribed. She is tended for by a personal sitter 5 days a week AND BY his daughter and son-in-law nights and weekends. Patient continues to be appropriate for hospice modality of care. Family agrees to delay informational hospice visit for now. For now,  is currently treating the patient's wound to her left foreleg. The wound is a stage II is still a break in the skin without warmth oir exudate. Patient is afebrile and without chills. No other needs identified at this time. Risks of environmental exposure to coronavirus discussed including: social distancing, hand hygiene, and limiting departures from the home for " necessities only.  Reports understanding and willingness to comply.      Chief Complaint: Encounter for Palliative Care    Social History: Lives with her adult daughter and son-in-law in a single family home.     Goals of Care: Comfort, palliation of symptoms    Review of Systems   Unable to perform ROS: Dementia (collateral from caregiver)   Constitutional: Positive for activity change (IMPROVED).   HENT: Positive for dental problem. Negative for trouble swallowing.    Eyes: Negative.    Respiratory: Positive for wheezing. Negative for choking. Stridor: MOLINA.    Cardiovascular: Negative for leg swelling.   Gastrointestinal: Negative for constipation, diarrhea and vomiting.   Endocrine: Negative.    Genitourinary: Positive for enuresis.   Musculoskeletal: Negative.    Skin: Positive for wound (improved).   Allergic/Immunologic: Negative.    Neurological: Positive for weakness. Negative for syncope.   Psychiatric/Behavioral: Positive for behavioral problems (after dark), confusion and sleep disturbance (at times).   All other systems reviewed and are negative.    Assessments:  · Environmental: single story home, no steps to enter, adequate lighting and temeprature control  · Functional Status: Dependent with ADL's/IADL's, Does not ambulate, incontinent of bowel and bladder, bedbound  · Safety: Fall Precautions, COVID Precautions/Social Distancing/Mask Use  · Nutritional: Adequate  · Home Health: [facility]   · DME/Supplies: Hospital bed, oxygen nebulizer, wound care supplies    Symptom Assessment (ESAS 0-10 scale)     ESAS 0 1 2 3 4 5 6 7 8 9 10   Pain x             Dyspnea x             Anxiety x             Nausea x             Depression  x             Anorexia    x          Fatigue    x          Insomnia    x          Restlessness      x         Agitation x               Constipation    no  Bowel Management Plan (BMP): no  Diarrhea        no    Performance Status:   PPS Score 30  Karnofsky Score:  40  FAST:  6  EGOC: 4    History:  Past Medical History:   Diagnosis Date    Anemia     Anxiety     Arthritis     Asthma     Cataract     Chronic respiratory failure with hypoxia, on home O2 therapy     COPD (chronic obstructive pulmonary disease)     Coronary artery disease     Dementia     Depression     Dysplastic colon polyp 4/7/2015    Emphysema of lung     Encounter for blood transfusion     H/O: GI bleed     Hiatal hernia     Hyperlipidemia     Hypertension     Hypothyroid     Rheumatoid arthritis(714.0)     Seizures     Stroke     Syncope and collapse      Family History   Problem Relation Age of Onset    Cancer Mother         breast, uterine     Past Surgical History:   Procedure Laterality Date    MANDIBLE SURGERY      TONSILLECTOMY       Review of patient's allergies indicates:   Allergen Reactions    Sulfa (sulfonamide antibiotics) Shortness Of Breath       Medications:    Current Outpatient Medications:     acetaminophen (TYLENOL) 500 MG tablet, Take 500 mg by mouth every 6 (six) hours as needed for Pain., Disp: , Rfl:     albuterol-ipratropium (DUO-NEB) 2.5 mg-0.5 mg/3 mL nebulizer solution, INHALE ONE AMPULE VIA NEBULIZER EVERY SIX HOURS AS NEEDED FOR WHEEZING, Disp: 360 mL, Rfl: 3    ANORO ELLIPTA 62.5-25 mcg/actuation DsDv, INHALE ONE PUFF BY MOUTH ONE TIME DAILY - CONTROLLER , Disp: 60 each, Rfl: 11    aspirin (ECOTRIN) 81 MG EC tablet, Take 1 tablet (81 mg total) by mouth every 48 hours., Disp: , Rfl:     atorvastatin (LIPITOR) 40 MG tablet, TAKE ONE TABLET BY MOUTH ONE TIME DAILY , Disp: 90 tablet, Rfl: 3    blood pressure test kit-medium Kit, Pt needs device to monitor blood pressure daily, Disp: , Rfl:     budesonide (PULMICORT) 0.5 mg/2 mL nebulizer solution, INHALE ONE AMPULE CONTENTS VIA NEBULIZER TWICE DAILY - WASH OUT MOUTH AFTER USING , Disp: 120 mL, Rfl: 11    clonazePAM (KLONOPIN) 0.5 MG tablet, Take 0.5 mg by mouth every evening. 3 tablets at night, Disp: , Rfl:      docusate sodium (COLACE) 100 MG capsule, Take 100 mg by mouth daily as needed for Constipation., Disp: , Rfl:     ezetimibe (ZETIA) 10 mg tablet, TAKE ONE TABLET BY MOUTH ONE TIME DAILY , Disp: 90 tablet, Rfl: 3    ferrous sulfate (FEOSOL) 325 mg (65 mg iron) Tab tablet, TAKE ONE TABLET BY MOUTH TWICE DAILY , Disp: 60 tablet, Rfl: 3    folic acid (FOLVITE) 1 MG tablet, Take 1 tablet (1,000 mcg total) by mouth once daily., Disp: 90 tablet, Rfl: 3    furosemide (LASIX) 40 MG tablet, TAKE ONE TABLET BY MOUTH ONE TIME DAILY , Disp: 30 tablet, Rfl: 11    inhalation spacing device (AEROCHAMBER PLUS FLOW-VU), Use with Symbicort and Albuterol inhalers, Disp: 1 each, Rfl: 0    levothyroxine (SYNTHROID) 25 MCG tablet, TAKE ONE TABLET BY MOUTH ONE TIME DAILY BEFORE BREAKFAST, Disp: 90 tablet, Rfl: 2    mirtazapine (REMERON) 45 MG tablet, Take 45 mg by mouth every evening., Disp: , Rfl:     multivitamin (THERAGRAN) per tablet, Take 1 tablet by mouth once daily., Disp: , Rfl:     mupirocin (BACTROBAN) 2 % ointment, Apply topically 2 (two) times daily. Apply to left leg wound, Disp: 22 g, Rfl: 0    pantoprazole (PROTONIX) 40 MG tablet, TAKE ONE TABLET BY MOUTH ONE TIME DAILY , Disp: 90 tablet, Rfl: 1    potassium chloride (KLOR-CON) 10 MEQ TbSR, TAKE ONE TABLET BY MOUTH THREE TIMES DAILY , Disp: 90 tablet, Rfl: 3    sertraline (ZOLOFT) 50 MG tablet, TAKE ONE TABLET BY MOUTH ONE TIME DAILY , Disp: 90 tablet, Rfl: 0    valproate (DEPAKENE) 250 mg/5 mL syrup, Take 250 mg by mouth once daily. Take 30 mL at 4 pm daily., Disp: , Rfl:     VENTOLIN HFA 90 mcg/actuation inhaler, Inhale 2 puffs into the lungs every 4 (four) hours as needed for Wheezing or Shortness of Breath. , Disp: , Rfl:     VITAMIN B COMPLEX (SUPER B COMPLEX-B-12 ORAL), Take 1 tablet by mouth once daily. , Disp: , Rfl:     Current Facility-Administered Medications:     collagenase ointment, , Topical (Top), 1 time in Clinic/HOD, Marvin Pacheco  [place], NP    24h Oral Morphine Equivalents (OME): 0    Objective:     Physical Exam:  Vitals:    11/05/20 1112   BP: (!) 95/58   Pulse: 68   Resp: 18   Temp: 97.4 °F (36.3 °C)   TempSrc: Temporal   SpO2: 97%   PainSc: 0-No pain     There is no height or weight on file to calculate BMI.    Physical Exam  Vitals signs reviewed.   Constitutional:       General: She is not in acute distress.     Appearance: Normal appearance. She is ill-appearing.   HENT:      Mouth/Throat:      Mouth: Mucous membranes are moist.   Eyes:      Pupils: Pupils are equal, round, and reactive to light.   Neck:      Musculoskeletal: Normal range of motion and neck supple.   Cardiovascular:      Rate and Rhythm: Normal rate and regular rhythm.      Pulses: Normal pulses.      Heart sounds: Normal heart sounds.   Pulmonary:      Effort: Pulmonary effort is normal.      Breath sounds: Wheezing and rhonchi present.   Abdominal:      General: Abdomen is flat. There is distension.      Tenderness: There is no guarding.   Musculoskeletal: Normal range of motion.   Skin:     General: Skin is warm and dry.      Capillary Refill: Capillary refill takes less than 2 seconds.      Findings: Wound (left shin) present.   Neurological:      Mental Status: She is alert. Mental status is at baseline. She is disoriented.      Motor: Weakness present.      Gait: Gait abnormal.   Psychiatric:         Mood and Affect: Mood normal.         Behavior: Behavior normal.       Labs:  CBC:   WBC   Date Value Ref Range Status   09/23/2020 6.63 3.90 - 12.70 K/uL Final       Hemoglobin   Date Value Ref Range Status   09/23/2020 11.6 (L) 12.0 - 16.0 g/dL Final       Hematocrit   Date Value Ref Range Status   09/23/2020 38.9 37.0 - 48.5 % Final       MCV   Date Value Ref Range Status   09/23/2020 106 (H) 82 - 98 fL Final       Platelets   Date Value Ref Range Status   09/23/2020 222 150 - 350 K/uL Final       LFT:   Lab Results   Component Value Date    AST 19 09/23/2020     ALKPHOS 45 (L) 09/23/2020    BILITOT 0.2 09/23/2020       Albumin:   Albumin   Date Value Ref Range Status   09/23/2020 3.4 (L) 3.5 - 5.2 g/dL Final     Psychosocial/Cultural/Spiritual:  · F- Arin and Belief:  Anabaptism  · I - Importance: high'C   · C - Community: Local  · A - Address in Care: yes    Assessment:     1. Chronic respiratory failure with hypoxia        Plan:     Ethical / Legal: Advance Care Planning   Capacity to make medical decisions:  no, Conflict no  · Surrogate decision maker:  Name Shannan Raya, Relationship: daughter  · Advance Directives:  none  · HCPOA: none  · [place]POST:  none  · Code Status:  DNR    Advanced Care Directives, HCPOA and LaPost forms left in the home for family review, discussion and signing with instructions to return upon their next provider encounter for inclusion to the medical record.     Crystal was seen today for follow-up.  Diagnoses and all orders for this visit:    Binswanger's dementia  1. - Continue dementia medications as prescribed:  2. Reinforce non-pharmacologic interventions to prevent delirium:  a. Avoid unnecessary disturbances between 10PM-6AM,   b. Promote activity during the day: up in chair, ambulate as tolerated  c. open blinds, provide glasses/hearing aids  d. Continue patient exposure to familiar stimuli: television shows, friends and family, pictures, favorite activities  3. Reorient patient to reality when false statements or observations are made.     Encounter for Palliative Care   - [facility] NP to f/u with patient in 4 weeks or PRN   - patient to continue all medications as prescribed   - patient/family encouraged to call PCP or NP for worsening symptoms    - in case of an emergency, call 911 or report to the nearest Emergency Room   - initiate hospice referral if/when patient/family is ready    - Notify NP if/when the patient is ready to be transitioned to hospice modality of care.    Were controlled substances  prescribed?  No    Follow Up Appointments:   Future Appointments   Date Time Provider Department Center   12/9/2020  9:30 AM Marvin Garay NP [id number]   12/22/2020  1:20 PM Orlando Hassan MD [facility]       Attestation: Screening criteria to assess the level of the patient's risk for infection with COVID-19 as recommended by the CDC at the time of the above documented home visit concluded appropriateness to proceed. Universal precautions were maintained at all times, including provider use of >60% alcohol gel hand  immediately prior to entry and upon departing the patient's home as well as cleaning of equipment used in home visit with antibacterial/germicidal disposable wipes.     Signature:     Marvin Garay, MSN, APRN, FNP-C  [facility]    Total face-to-face time was 40 min, >50% of this was spent on counseling and coordination of care. The following issues were discussed: primary and secondary diagnoses, co-morbidities, prescribed medications, treatment modalities, importance of compliance with medical advice and directives for follow-up care

## 2020-11-05 NOTE — PATIENT INSTRUCTIONS
- [facility] to schedule follow-up visit with patient in 4-6 weeks or as needed.  - Continue all medications, treatments and therapies as ordered.   - Follow all instructions, recommendations as discussed.  - Maintain Safety Precautions at all times.  - Attend all medical appointments as scheduled.  - For worsening symptoms: call Primary Care Physician or Nurse Practitioner.  - For emergencies, call 911 or immediately report to the nearest emergency room.  - Limit Risks of environmental exposure to coronavirus as discussed including: social distancing, hand hygiene, and limiting departures from the home for necessities only.     Your care is important to us. If your provider recommended a follow-up appointment or test, we are happy to help you coordinate your recommended care. It is important that you complete your recommended follow-up. If you need help scheduling, please call [phone]. Appointments can also be made online through the patient portal.  While scheduling and attending your appointments is your responsibility, our goal is to support and empower you throughout that process.    [facility] Nurse Care Line - 24/7 Assistance  Unless otherwise directed by your provider, please contact [facility], our nurse care line that is available for 24/7 assistance.  Registered nurses in the [facility] provide: appointment scheduling, clinical advisement, health education, and other advisory services.  Call: [phone] (toll free)    COVID-19 Prevention   Avoid close contact with people and stay home if youre sick, except to get medical care.   Cover coughs and sneezes with a tissue, or use the inside of your elbow. Immediately wash your hands or use hand .  For more information, see CDC link below:  https://www.cdc.gov/coronavirus/2019-ncov/hcp/guidance-prevent-spread.html#precautions     The following information is provided to all patients.  This information is  to help you find resources for any of the problems found today that may be affecting your health:            Living healthy guide: www.[facility].[place].[facility]    Understanding Diabetes: www.diabetes.org   Eating healthy: www.cdc.gov/healthyweight   CDC home safety checklist: www.cdc.gov/steadi/patient.html  Agency on Aging: www.goea.[place].[facility]    Alcoholics anonymous (AA): www.aa.org   Physical Activity: www.josefa.nih.gov/sp4kwkz    Tobacco use: www.quitwithusla.org

## 2020-11-17 ENCOUNTER — DOCUMENT SCAN (OUTPATIENT)
Dept: HOME HEALTH SERVICES | Facility: HOSPITAL | Age: 85
End: 2020-11-17
Payer: MEDICARE

## 2020-11-23 ENCOUNTER — DOCUMENT SCAN (OUTPATIENT)
Dept: HOME HEALTH SERVICES | Facility: HOSPITAL | Age: 85
End: 2020-11-23
Payer: MEDICARE

## 2020-12-09 ENCOUNTER — CARE AT HOME (OUTPATIENT)
Dept: HOME HEALTH SERVICES | Facility: CLINIC | Age: 85
End: 2020-12-09
Payer: MEDICARE

## 2020-12-09 VITALS
RESPIRATION RATE: 18 BRPM | TEMPERATURE: 97 F | DIASTOLIC BLOOD PRESSURE: 99 MMHG | OXYGEN SATURATION: 97 % | SYSTOLIC BLOOD PRESSURE: 180 MMHG | HEART RATE: 76 BPM

## 2020-12-09 DIAGNOSIS — Z09 FOLLOW UP: Primary | ICD-10-CM

## 2020-12-09 PROCEDURE — 99350 PR HOME VISIT,ESTAB PATIENT,LEVEL IV: ICD-10-PCS | Mod: ,,, | Performed by: NURSE PRACTITIONER

## 2020-12-09 PROCEDURE — 1159F PR MEDICATION LIST DOCUMENTED IN MEDICAL RECORD: ICD-10-PCS | Mod: S$GLB,,, | Performed by: NURSE PRACTITIONER

## 2020-12-09 PROCEDURE — 99350 HOME/RES VST EST HIGH MDM 60: CPT | Mod: ,,, | Performed by: NURSE PRACTITIONER

## 2020-12-09 PROCEDURE — 1159F MED LIST DOCD IN RCRD: CPT | Mod: S$GLB,,, | Performed by: NURSE PRACTITIONER

## 2020-12-11 ENCOUNTER — EXTERNAL HOME HEALTH (OUTPATIENT)
Dept: HOME HEALTH SERVICES | Facility: HOSPITAL | Age: 85
End: 2020-12-11
Payer: MEDICARE

## 2020-12-12 PROBLEM — Z09 FOLLOW UP: Status: ACTIVE | Noted: 2020-12-12

## 2020-12-12 NOTE — PROGRESS NOTES
"[facility]  Palliative Care Home Visit    Visit Date: 12/12/2020  Encounter Provider: Marvin Garay NP  PCP:  Maria Guadalupe Herring MD    Subjective:      Patient ID: Crystal Romero is a 86 y.o. female.    Consult Requested By:  Marvin Garay  Reason for Consult:  Palliative Care Visit by Home Care Provider    The patient is being seen at home due to a physical debility that presents a taxing effort to leave the home, to mitigate high risk of hospital readmission or due to the limited availability of reliable or safe options for transportation to the point of access to the provider. The visit meets the criteria for medical necessity as defined by CMS as "health-care services needed to prevent, diagnose, or treat an illness, injury, condition, disease, or its symptoms and that meet accepted standards of medicine." Prior to treatment on this visit the chart was reviewed and patient consent was obtained.    Today:  Ms. Crystal Romero is a 86 y.o. female Crystal presents at baseline state of health as reported by patient and caregiver. VSS. Family denies any acute issues, concerns or complaints to address on today's visit except for a recently worsening stage II pressure injury to the top aspect of the 2nd toe of her left foot. Wound care consult pending scheduling. Wound today looks improved compared to documented imaging by Home Health SN this past week. Will continue wound care as ordered. Patient is afebrile and without chills. She remains confused, disoriented to person, place and time. Denies pain, no apparent distress noted. Family and caregiver report administering all medications as prescribed. No other needs identified at this time. Risks of environmental exposure to coronavirus discussed including: social distancing, hand hygiene, and limiting departures from the home for necessities only. Reports understanding and willingness to comply.      Chief Complaint: Palliative " Care Follow-up    Social History: Lives with her adult daughter and son-in-law in a single family home.     Goals of Care: Comfort, palliation of symptoms    Review of Systems   Unable to perform ROS: Dementia (collateral from caregiver)   Constitutional: Positive for activity change (IMPROVED).   HENT: Positive for dental problem. Negative for trouble swallowing.    Eyes: Negative.    Respiratory: Positive for wheezing. Negative for choking. Stridor: MOLINA.    Cardiovascular: Negative for leg swelling.   Gastrointestinal: Negative for constipation, diarrhea and vomiting.   Endocrine: Negative.    Genitourinary: Positive for enuresis.   Musculoskeletal: Negative.    Skin: Positive for wound (2nd toe of left foot with St II pressure injury).   Allergic/Immunologic: Negative.    Neurological: Positive for weakness. Negative for syncope.   Psychiatric/Behavioral: Positive for behavioral problems (after dark), confusion, self-injury and sleep disturbance (at times).   All other systems reviewed and are negative.    Assessments:  · Environmental: single story home, no steps to enter, adequate lighting and temeprature control  · Functional Status: Dependent with ADL's/IADL's, Does not ambulate, incontinent of bowel and bladder, bedbound  · Safety: Fall Precautions, COVID Precautions/Social Distancing/Mask Use  · Nutritional: Adequate  · Home Health: [facility]   · DME/Supplies: Hospital bed, oxygen nebulizer, wound care supplies    Symptom Assessment (ESAS 0-10 scale)     ESAS 0 1 2 3 4 5 6 7 8 9 10   Pain x             Dyspnea x             Anxiety x             Nausea x             Depression  x             Anorexia    x          Fatigue    x          Insomnia    x          Restlessness      x         Agitation x               Constipation    no  Bowel Management Plan (BMP): no  Diarrhea        no    Performance Status:   PPS Score 30  Karnofsky Score:  40  FAST: 6  EGOC: 4    History:  Past Medical History:   Diagnosis  Date    Anemia     Anxiety     Arthritis     Asthma     Cataract     Chronic respiratory failure with hypoxia, on home O2 therapy     COPD (chronic obstructive pulmonary disease)     Coronary artery disease     Dementia     Depression     Dysplastic colon polyp 4/7/2015    Emphysema of lung     Encounter for blood transfusion     H/O: GI bleed     Hiatal hernia     Hyperlipidemia     Hypertension     Hypothyroid     Rheumatoid arthritis(714.0)     Seizures     Stroke     Syncope and collapse      Family History   Problem Relation Age of Onset    Cancer Mother         breast, uterine     Past Surgical History:   Procedure Laterality Date    MANDIBLE SURGERY      TONSILLECTOMY       Review of patient's allergies indicates:   Allergen Reactions    Sulfa (sulfonamide antibiotics) Shortness Of Breath     Medications:    Current Outpatient Medications:     acetaminophen (TYLENOL) 500 MG tablet, Take 500 mg by mouth every 6 (six) hours as needed for Pain., Disp: , Rfl:     albuterol-ipratropium (DUO-NEB) 2.5 mg-0.5 mg/3 mL nebulizer solution, INHALE ONE AMPULE VIA NEBULIZER EVERY SIX HOURS AS NEEDED FOR WHEEZING, Disp: 360 mL, Rfl: 3    ANORO ELLIPTA 62.5-25 mcg/actuation DsDv, INHALE ONE PUFF BY MOUTH ONE TIME DAILY - CONTROLLER , Disp: 60 each, Rfl: 11    aspirin (ECOTRIN) 81 MG EC tablet, Take 1 tablet (81 mg total) by mouth every 48 hours., Disp: , Rfl:     atorvastatin (LIPITOR) 40 MG tablet, TAKE ONE TABLET BY MOUTH ONE TIME DAILY , Disp: 90 tablet, Rfl: 3    blood pressure test kit-medium Kit, Pt needs device to monitor blood pressure daily, Disp: , Rfl:     budesonide (PULMICORT) 0.5 mg/2 mL nebulizer solution, INHALE ONE AMPULE CONTENTS VIA NEBULIZER TWICE DAILY - WASH OUT MOUTH AFTER USING , Disp: 120 mL, Rfl: 11    clonazePAM (KLONOPIN) 0.5 MG tablet, Take 0.5 mg by mouth every evening. 3 tablets at night, Disp: , Rfl:     docusate sodium (COLACE) 100 MG capsule, Take 100 mg by  mouth daily as needed for Constipation., Disp: , Rfl:     ezetimibe (ZETIA) 10 mg tablet, TAKE ONE TABLET BY MOUTH ONE TIME DAILY , Disp: 90 tablet, Rfl: 3    ferrous sulfate (FEOSOL) 325 mg (65 mg iron) Tab tablet, TAKE ONE TABLET BY MOUTH TWICE DAILY , Disp: 60 tablet, Rfl: 3    folic acid (FOLVITE) 1 MG tablet, Take 1 tablet (1,000 mcg total) by mouth once daily., Disp: 90 tablet, Rfl: 3    furosemide (LASIX) 40 MG tablet, TAKE ONE TABLET BY MOUTH ONE TIME DAILY , Disp: 30 tablet, Rfl: 11    inhalation spacing device (AEROCHAMBER PLUS FLOW-VU), Use with Symbicort and Albuterol inhalers, Disp: 1 each, Rfl: 0    levothyroxine (SYNTHROID) 25 MCG tablet, TAKE ONE TABLET BY MOUTH ONE TIME DAILY BEFORE BREAKFAST, Disp: 90 tablet, Rfl: 2    mirtazapine (REMERON) 45 MG tablet, Take 45 mg by mouth every evening., Disp: , Rfl:     multivitamin (THERAGRAN) per tablet, Take 1 tablet by mouth once daily., Disp: , Rfl:     mupirocin (BACTROBAN) 2 % ointment, Apply topically 2 (two) times daily. Apply to left leg wound, Disp: 22 g, Rfl: 0    pantoprazole (PROTONIX) 40 MG tablet, TAKE ONE TABLET BY MOUTH ONE TIME DAILY , Disp: 90 tablet, Rfl: 1    potassium chloride (KLOR-CON) 10 MEQ TbSR, TAKE ONE TABLET BY MOUTH THREE TIMES DAILY , Disp: 90 tablet, Rfl: 3    sertraline (ZOLOFT) 50 MG tablet, TAKE ONE TABLET BY MOUTH ONE TIME DAILY , Disp: 90 tablet, Rfl: 0    valproate (DEPAKENE) 250 mg/5 mL syrup, Take 250 mg by mouth once daily. Take 30 mL at 4 pm daily., Disp: , Rfl:     VENTOLIN HFA 90 mcg/actuation inhaler, INHALE 2 PUFFS INTO THE LUNGS EVERY 4 (FOUR) HOURS AS NEEDED FOR WHEEZING OR SHORTNESS OF BREATH., Disp: 18 g, Rfl: 11    VITAMIN B COMPLEX (SUPER B COMPLEX-B-12 ORAL), Take 1 tablet by mouth once daily. , Disp: , Rfl:     Current Facility-Administered Medications:     collagenase ointment, , Topical (Top), 1 time in Clinic/HOD, Marvin Osorio, NP    24h Oral Morphine Equivalents (OME):  0    Objective:     Physical Exam:  Vitals:    12/09/20 1218   BP: (!) 180/99   Pulse: 76   Resp: 18   Temp: 97.3 °F (36.3 °C)   TempSrc: Temporal   SpO2: 97%   PF: (!) 3 L/min   PainSc: 0-No pain     There is no height or weight on file to calculate BMI.    Physical Exam  Vitals signs reviewed.   Constitutional:       General: She is not in acute distress.     Appearance: Normal appearance. She is ill-appearing.   HENT:      Mouth/Throat:      Mouth: Mucous membranes are moist.   Eyes:      Pupils: Pupils are equal, round, and reactive to light.   Neck:      Musculoskeletal: Normal range of motion and neck supple.   Cardiovascular:      Rate and Rhythm: Normal rate and regular rhythm.      Pulses: Normal pulses.      Heart sounds: Normal heart sounds.   Pulmonary:      Effort: Pulmonary effort is normal.      Breath sounds: Wheezing and rhonchi present.   Abdominal:      General: Abdomen is flat. There is distension.      Tenderness: There is no guarding.   Musculoskeletal: Normal range of motion.   Skin:     General: Skin is warm and dry.      Capillary Refill: Capillary refill takes less than 2 seconds.      Findings: Wound (left shin) present.   Neurological:      Mental Status: She is alert. Mental status is at baseline. She is disoriented.      Motor: Weakness present.      Gait: Gait abnormal.   Psychiatric:         Mood and Affect: Mood normal.         Behavior: Behavior normal.       Labs:  CBC:   WBC   Date Value Ref Range Status   09/23/2020 6.63 3.90 - 12.70 K/uL Final     MCV   Date Value Ref Range Status   09/23/2020 106 (H) 82 - 98 fL Final          Hematocrit   Date Value Ref Range Status   09/23/2020 38.9 37.0 - 48.5 % Final                        Albumin:   Albumin   Date Value Ref Range Status   09/23/2020 3.4 (L) 3.5 - 5.2 g/dL Final     Psychosocial/Cultural/Spiritual:  · F- Arin and Belief:  Protestant  · I - Importance: high'C   · C - Community: Local  · A - Address in Care: yes    Assessment:      1. Follow up        Plan:     Ethical / Legal: Advance Care Planning   Capacity to make medical decisions:  no, Conflict no  · Surrogate decision maker:  Name Shannan Raya, Relationship: daughter  · Advance Directives:  none  · HCPOA: none  · LaPOST:  none  · Code Status:  DNR    Advanced Care Directives, HCPOA and LaPost forms left in the home for family review, discussion and signing with instructions to return upon their next provider encounter for inclusion to the medical record.     Crystal was seen today for follow-up.  Diagnoses and all orders for this visit:    Binswanger's dementia  1. - Continue dementia medications as prescribed:  2. Reinforce non-pharmacologic interventions to prevent delirium:  a. Avoid unnecessary disturbances between 10PM-6AM,   b. Promote activity during the day: up in chair, ambulate as tolerated  c. open blinds, provide glasses/hearing aids  d. Continue patient exposure to familiar stimuli: television shows, friends and family, pictures, favorite activities  3. Reorient patient to reality when false statements or observations are made.     Encounter for Palliative Care   - [facility] NP to f/u with patient in 4 weeks or PRN   - patient to continue all medications as prescribed   - patient/family encouraged to call PCP or NP for worsening symptoms    - in case of an emergency, call 911 or report to the nearest Emergency Room   - initiate hospice referral if/when patient/family is ready    - Notify NP if/when the patient is ready to be transitioned to hospice modality of care.    Were controlled substances prescribed?  No    Follow Up Appointments:   Future Appointments   Date Time Provider Department Center   12/22/2020  1:20 PM Orlando Hassan MD [facility] CARDIO [place]   1/6/2021 11:00 AM Wilfred Andrade MD [facility] VASSGY [place]   1/14/2021  9:30 AM Marvin Osorio NP [address] [place]     Attestation: Screening criteria to assess the level of  the patient's risk for infection with COVID-19 as recommended by the CDC at the time of the above documented home visit concluded appropriateness to proceed. Universal precautions were maintained at all times, including provider use of >60% alcohol gel hand  immediately prior to entry and upon departing the patient's home as well as cleaning of equipment used in home visit with antibacterial/germicidal disposable wipes.     Signature:     Marvin Garay, MSN, APRN, FNP-C  [facility]    Total face-to-face time was 60 min, >50% of this was spent on counseling and coordination of care. The following issues were discussed: primary and secondary diagnoses, co-morbidities, prescribed medications, treatment modalities, importance of compliance with medical advice and directives for follow-up care

## 2020-12-12 NOTE — PATIENT INSTRUCTIONS
- [facility] to schedule follow-up visit with patient in 4-6 weeks or as needed.  - Continue all medications, treatments and therapies as ordered.   - Follow all instructions, recommendations as discussed.  - Maintain Safety Precautions at all times.  - Attend all medical appointments as scheduled.  - For worsening symptoms: call Primary Care Physician or Nurse Practitioner.  - For emergencies, call 911 or immediately report to the nearest emergency room.  - Limit Risks of environmental exposure to coronavirus as discussed including: social distancing, hand hygiene, and limiting departures from the home for necessities only.     Your care is important to us. If your provider recommended a follow-up appointment or test, we are happy to help you coordinate your recommended care. It is important that you complete your recommended follow-up. If you need help scheduling, please call [phone]. Appointments can also be made online through the patient portal.  While scheduling and attending your appointments is your responsibility, our goal is to support and empower you throughout that process.    [facility] Nurse Care Line - 24/7 Assistance  Unless otherwise directed by your provider, please contact [facility], our nurse care line that is available for 24/7 assistance.  Registered nurses in the [facility] provide: appointment scheduling, clinical advisement, health education, and other advisory services.  Call: [phone] (toll free)    COVID-19 Prevention   Avoid close contact with people and stay home if youre sick, except to get medical care.   Cover coughs and sneezes with a tissue, or use the inside of your elbow. Immediately wash your hands or use hand .  For more information, see CDC link below:  https://www.cdc.gov/coronavirus/2019-ncov/hcp/guidance-prevent-spread.html#precautions     The following information is provided to all patients.  This information is  to help you find resources for any of the problems found today that may be affecting your health:            Living healthy guide: www.[facility].[place].[facility]    Understanding Diabetes: www.diabetes.org   Eating healthy: www.cdc.gov/healthyweight   CDC home safety checklist: www.cdc.gov/steadi/patient.html  Agency on Aging: www.goea.[place].[facility]    Alcoholics anonymous (AA): www.aa.org   Physical Activity: www.josefa.nih.gov/ye6xkrw    Tobacco use: www.quitwithusla.org

## 2020-12-22 ENCOUNTER — OFFICE VISIT (OUTPATIENT)
Dept: CARDIOLOGY | Facility: CLINIC | Age: 85
End: 2020-12-22
Payer: MEDICARE

## 2020-12-22 ENCOUNTER — PATIENT MESSAGE (OUTPATIENT)
Dept: INTERNAL MEDICINE | Facility: CLINIC | Age: 85
End: 2020-12-22

## 2020-12-22 DIAGNOSIS — I50.32 CHRONIC DIASTOLIC CONGESTIVE HEART FAILURE: ICD-10-CM

## 2020-12-22 DIAGNOSIS — I25.84 CORONARY ARTERY CALCIFICATION: Primary | ICD-10-CM

## 2020-12-22 DIAGNOSIS — I67.3 BINSWANGER'S DEMENTIA: ICD-10-CM

## 2020-12-22 DIAGNOSIS — I35.0 NONRHEUMATIC AORTIC VALVE STENOSIS: ICD-10-CM

## 2020-12-22 DIAGNOSIS — I25.10 CORONARY ARTERY CALCIFICATION: Primary | ICD-10-CM

## 2020-12-22 DIAGNOSIS — I70.0 ATHEROSCLEROSIS OF AORTA: ICD-10-CM

## 2020-12-22 PROCEDURE — 99214 PR OFFICE/OUTPT VISIT, EST, LEVL IV, 30-39 MIN: ICD-10-PCS | Mod: 95,,, | Performed by: INTERNAL MEDICINE

## 2020-12-22 PROCEDURE — 1159F MED LIST DOCD IN RCRD: CPT | Mod: 95,,, | Performed by: INTERNAL MEDICINE

## 2020-12-22 PROCEDURE — 1159F PR MEDICATION LIST DOCUMENTED IN MEDICAL RECORD: ICD-10-PCS | Mod: 95,,, | Performed by: INTERNAL MEDICINE

## 2020-12-22 PROCEDURE — 99214 OFFICE O/P EST MOD 30 MIN: CPT | Mod: 95,,, | Performed by: INTERNAL MEDICINE

## 2020-12-22 NOTE — PROGRESS NOTES
Subjective:   Patient ID:  Crystal Romero is a 86 y.o. female who presents for follow up of No chief complaint on file.      86, yo female, came in for routine f/u. Severe dementia and discussed with the her daughter Shannan HOWARD.  PMH mod AI, and severe AS, frequent fall, dementia 3 yrs, HTN, HLD, asthma, COPD on home O2, GI bleeding due to prolong Rx of steroid for asthma, RA, TIA x3 in 4 months  H/o TIA/seizure episode. Had impaired swallow and dysphasia. F/u with  neurologist.  Repeat ECHO in , showed normal EF, DD, moderate AI and moderate AS.   Refused surgery for valve per POA.  Frequent fall, no syncope, no bone Fx. Mild skin bruits.  Recent rx of COPD exacerbation.   Unstable gait. Walker dependent. Walks at stores.    Recent worse MOLINA. Steroid Rx did not help much. No cough and sputum  Decent appetite,   No recurrent seizure.  No orthopnea and chest pain.  More time to sit  Lasix 40 mg daily can keep weight stable    Per daughter, could not walk and has traumatic wound on the left foot and toe. F[facility]. And told blood flow ok  Decreased appetite and difficult to swallow the pills.               Past Medical History:   Diagnosis Date    Anemia     Anxiety     Arthritis     Asthma     Cataract     Chronic respiratory failure with hypoxia, on home O2 therapy     COPD (chronic obstructive pulmonary disease)     Coronary artery disease     Dementia     Depression     Dysplastic colon polyp 4/7/2015    Emphysema of lung     Encounter for blood transfusion     H/O: GI bleed     Hiatal hernia     Hyperlipidemia     Hypertension     Hypothyroid     Rheumatoid arthritis(714.0)     Seizures     Stroke     Syncope and collapse        Past Surgical History:   Procedure Laterality Date    MANDIBLE SURGERY      TONSILLECTOMY         Social History     Tobacco Use    Smoking status: Former Smoker     Packs/day: 2.00     Years: 16.00     Pack years: 32.00     Types:  Cigarettes     Quit date: 1970     Years since quittin.0    Smokeless tobacco: Never Used   Substance Use Topics    Alcohol use: No     Frequency: Never    Drug use: No       Family History   Problem Relation Age of Onset    Cancer Mother         breast, uterine         ROS    Objective:   Physical Exam    Lab Results   Component Value Date    CHOL 176 2020    CHOL 258 (H) 2019    CHOL 281 (H) 2019     Lab Results   Component Value Date    HDL 65 2020    HDL 55 2019    HDL 76 (H) 2019     Lab Results   Component Value Date    LDLCALC 89.2 2020    LDLCALC 168.2 (H) 2019    LDLCALC 186.0 (H) 2019     Lab Results   Component Value Date    TRIG 109 2020    TRIG 174 (H) 2019    TRIG 95 2019     Lab Results   Component Value Date    CHOLHDL 36.9 2020    CHOLHDL 21.3 2019    CHOLHDL 27.0 2019       Chemistry        Component Value Date/Time     2020 1716    K 4.2 2020 1716     2020 1716    CO2 31 (H) 2020 1716    BUN 22 2020 1716    CREATININE 1.1 2020 1716    GLU 92 2020 1716        Component Value Date/Time    CALCIUM 8.2 (L) 2020 1716    ALKPHOS 45 (L) 2020 1716    AST 19 2020 1716    ALT 11 2020 1716    BILITOT 0.2 2020 1716    ESTGFRAFRICA 52.9 (A) 2020 1716    EGFRNONAA 45.9 (A) 2020 1716          No results found for: LABA1C, HGBA1C  Lab Results   Component Value Date    TSH 1.780 2020     Lab Results   Component Value Date    INR 0.9 03/10/2017    INR 1.0 2014     Lab Results   Component Value Date    WBC 6.63 2020    HGB 11.6 (L) 2020    HCT 38.9 2020     (H) 2020     2020     BMP  Sodium   Date Value Ref Range Status   2020 144 136 - 145 mmol/L Final     Potassium   Date Value Ref Range Status   2020 4.2 3.5 - 5.1 mmol/L Final     Chloride   Date  Value Ref Range Status   09/23/2020 104 95 - 110 mmol/L Final     CO2   Date Value Ref Range Status   09/23/2020 31 (H) 23 - 29 mmol/L Final     BUN   Date Value Ref Range Status   09/23/2020 22 8 - 23 mg/dL Final     Creatinine   Date Value Ref Range Status   09/23/2020 1.1 0.5 - 1.4 mg/dL Final     Calcium   Date Value Ref Range Status   09/23/2020 8.2 (L) 8.7 - 10.5 mg/dL Final     Anion Gap   Date Value Ref Range Status   09/23/2020 9 8 - 16 mmol/L Final     eGFR if    Date Value Ref Range Status   09/23/2020 52.9 (A) >60 mL/min/1.73 m^2 Final     eGFR if non    Date Value Ref Range Status   09/23/2020 45.9 (A) >60 mL/min/1.73 m^2 Final     Comment:     Calculation used to obtain the estimated glomerular filtration  rate (eGFR) is the CKD-EPI equation.        BNP  @LABRCNTIP(BNP,BNPTRIAGEBLO)@  @LABRCNTIP(troponini)@  CrCl cannot be calculated (Patient's most recent lab result is older than the maximum 7 days allowed.).  No results found in the last 24 hours.  No results found in the last 24 hours.  No results found in the last 24 hours.    Assessment:      1. Coronary artery calcification    2. Nonrheumatic aortic valve stenosis    3. Chronic diastolic congestive heart failure    4. Atherosclerosis of aorta    5. Binswanger's dementia        Plan:   Advanced dementia with difficult to swallow  D/c Lipitor and zetia due to difficult to swallow  Continue ASA Lasix and KCL  Ok to switch to liquid formulor as needed  Fall precaution  RTC in 6 months    The patient location is: home  The chief complaint leading to consultation is: aortic stenosis    Visit type: audiovisual    Face to Face time with patient: 15 minutes of total time spent on the encounter, which includes face to face time and non-face to face time preparing to see the patient (eg, review of tests), Obtaining and/or reviewing separately obtained history, Documenting clinical information in the electronic or other health  record, Independently interpreting results (not separately reported) and communicating results to the patient/family/caregiver, or Care coordination (not separately reported).         Each patient to whom he or she provides medical services by telemedicine is:  (1) informed of the relationship between the physician and patient and the respective role of any other health care provider with respect to management of the patient; and (2) notified that he or she may decline to receive medical services by telemedicine and may withdraw from such care at any time.    Notes:

## 2020-12-28 ENCOUNTER — DOCUMENT SCAN (OUTPATIENT)
Dept: HOME HEALTH SERVICES | Facility: HOSPITAL | Age: 85
End: 2020-12-28
Payer: MEDICARE

## 2020-12-29 ENCOUNTER — PATIENT MESSAGE (OUTPATIENT)
Dept: CARDIOLOGY | Facility: CLINIC | Age: 85
End: 2020-12-29

## 2020-12-29 DIAGNOSIS — R60.9 EDEMA, UNSPECIFIED TYPE: ICD-10-CM

## 2020-12-29 RX ORDER — POTASSIUM CHLORIDE 750 MG/1
10 TABLET, EXTENDED RELEASE ORAL 3 TIMES DAILY
Qty: 90 TABLET | Refills: 3 | Status: SHIPPED | OUTPATIENT
Start: 2020-12-29

## 2020-12-29 RX ORDER — ATORVASTATIN CALCIUM 40 MG/1
40 TABLET, FILM COATED ORAL DAILY
Qty: 90 TABLET | Refills: 3 | Status: SHIPPED | OUTPATIENT
Start: 2020-12-29 | End: 2021-12-29

## 2020-12-29 RX ORDER — FUROSEMIDE 40 MG/1
40 TABLET ORAL DAILY
Qty: 90 TABLET | Refills: 3 | Status: SHIPPED | OUTPATIENT
Start: 2020-12-29

## 2021-01-12 ENCOUNTER — IMMUNIZATION (OUTPATIENT)
Dept: INTERNAL MEDICINE | Facility: CLINIC | Age: 86
End: 2021-01-12
Payer: MEDICARE

## 2021-01-12 DIAGNOSIS — Z23 NEED FOR VACCINATION: ICD-10-CM

## 2021-01-12 PROCEDURE — 91300 COVID-19, MRNA, LNP-S, PF, 30 MCG/0.3 ML DOSE VACCINE: CPT | Mod: PBBFAC | Performed by: FAMILY MEDICINE

## 2021-01-14 ENCOUNTER — CARE AT HOME (OUTPATIENT)
Dept: HOME HEALTH SERVICES | Facility: CLINIC | Age: 86
End: 2021-01-14
Payer: MEDICARE

## 2021-01-14 VITALS
DIASTOLIC BLOOD PRESSURE: 86 MMHG | HEART RATE: 67 BPM | RESPIRATION RATE: 16 BRPM | TEMPERATURE: 98 F | SYSTOLIC BLOOD PRESSURE: 131 MMHG

## 2021-01-14 DIAGNOSIS — Z09 FOLLOW UP: Primary | ICD-10-CM

## 2021-01-14 PROCEDURE — 1159F PR MEDICATION LIST DOCUMENTED IN MEDICAL RECORD: ICD-10-PCS | Mod: S$GLB,,, | Performed by: NURSE PRACTITIONER

## 2021-01-14 PROCEDURE — 99350 HOME/RES VST EST HIGH MDM 60: CPT | Mod: ,,, | Performed by: NURSE PRACTITIONER

## 2021-01-14 PROCEDURE — 1159F MED LIST DOCD IN RCRD: CPT | Mod: S$GLB,,, | Performed by: NURSE PRACTITIONER

## 2021-01-14 PROCEDURE — 99350 PR HOME VISIT,ESTAB PATIENT,LEVEL IV: ICD-10-PCS | Mod: ,,, | Performed by: NURSE PRACTITIONER

## 2021-02-02 ENCOUNTER — IMMUNIZATION (OUTPATIENT)
Dept: INTERNAL MEDICINE | Facility: CLINIC | Age: 86
End: 2021-02-02
Payer: MEDICARE

## 2021-02-02 DIAGNOSIS — Z23 NEED FOR VACCINATION: Primary | ICD-10-CM

## 2021-02-02 PROCEDURE — 91300 COVID-19, MRNA, LNP-S, PF, 30 MCG/0.3 ML DOSE VACCINE: CPT | Mod: PBBFAC | Performed by: INTERNAL MEDICINE

## 2021-02-02 PROCEDURE — 0002A COVID-19, MRNA, LNP-S, PF, 30 MCG/0.3 ML DOSE VACCINE: CPT | Mod: PBBFAC | Performed by: INTERNAL MEDICINE

## 2021-02-05 ENCOUNTER — EXTERNAL HOME HEALTH (OUTPATIENT)
Dept: HOME HEALTH SERVICES | Facility: HOSPITAL | Age: 86
End: 2021-02-05
Payer: MEDICARE

## 2021-02-18 DIAGNOSIS — J44.89 ASTHMA WITH COPD: Primary | ICD-10-CM

## 2021-02-25 ENCOUNTER — CARE AT HOME (OUTPATIENT)
Dept: HOME HEALTH SERVICES | Facility: CLINIC | Age: 86
End: 2021-02-25
Payer: MEDICARE

## 2021-02-25 VITALS
RESPIRATION RATE: 18 BRPM | SYSTOLIC BLOOD PRESSURE: 111 MMHG | OXYGEN SATURATION: 99 % | DIASTOLIC BLOOD PRESSURE: 55 MMHG | HEART RATE: 66 BPM | TEMPERATURE: 98 F

## 2021-02-25 DIAGNOSIS — Z09 FOLLOW UP: Primary | ICD-10-CM

## 2021-02-25 PROCEDURE — 1159F PR MEDICATION LIST DOCUMENTED IN MEDICAL RECORD: ICD-10-PCS | Mod: S$GLB,,, | Performed by: NURSE PRACTITIONER

## 2021-02-25 PROCEDURE — 1159F MED LIST DOCD IN RCRD: CPT | Mod: S$GLB,,, | Performed by: NURSE PRACTITIONER

## 2021-02-25 PROCEDURE — 99350 PR HOME VISIT,ESTAB PATIENT,LEVEL IV: ICD-10-PCS | Mod: ,,, | Performed by: NURSE PRACTITIONER

## 2021-02-25 PROCEDURE — 99350 HOME/RES VST EST HIGH MDM 60: CPT | Mod: ,,, | Performed by: NURSE PRACTITIONER

## 2021-03-15 PROBLEM — Z09 FOLLOW UP: Status: RESOLVED | Noted: 2020-12-12 | Resolved: 2021-03-15

## 2021-03-31 ENCOUNTER — CARE AT HOME (OUTPATIENT)
Dept: HOME HEALTH SERVICES | Facility: CLINIC | Age: 86
End: 2021-03-31
Payer: MEDICARE

## 2021-03-31 VITALS
TEMPERATURE: 98 F | RESPIRATION RATE: 16 BRPM | SYSTOLIC BLOOD PRESSURE: 100 MMHG | HEART RATE: 63 BPM | DIASTOLIC BLOOD PRESSURE: 72 MMHG | OXYGEN SATURATION: 98 %

## 2021-03-31 DIAGNOSIS — L03.116 CELLULITIS OF BOTH FEET: ICD-10-CM

## 2021-03-31 DIAGNOSIS — M79.672 FOOT PAIN, BILATERAL: ICD-10-CM

## 2021-03-31 DIAGNOSIS — M79.671 FOOT PAIN, BILATERAL: ICD-10-CM

## 2021-03-31 DIAGNOSIS — L03.115 CELLULITIS OF BOTH FEET: ICD-10-CM

## 2021-03-31 DIAGNOSIS — Z09 FOLLOW UP: Primary | ICD-10-CM

## 2021-03-31 DIAGNOSIS — Z71.89 ADVANCED DIRECTIVES, COUNSELING/DISCUSSION: ICD-10-CM

## 2021-03-31 DIAGNOSIS — R53.2 FUNCTIONAL QUADRIPLEGIA: ICD-10-CM

## 2021-03-31 PROCEDURE — 99499 UNLISTED E&M SERVICE: CPT | Mod: S$GLB,,, | Performed by: NURSE PRACTITIONER

## 2021-03-31 PROCEDURE — 99497 PR ADVNCD CARE PLAN 30 MIN: ICD-10-PCS | Mod: S$GLB,,, | Performed by: NURSE PRACTITIONER

## 2021-03-31 PROCEDURE — 1159F MED LIST DOCD IN RCRD: CPT | Mod: S$GLB,,, | Performed by: NURSE PRACTITIONER

## 2021-03-31 PROCEDURE — 99499 RISK ADDL DX/OHS AUDIT: ICD-10-PCS | Mod: S$GLB,,, | Performed by: NURSE PRACTITIONER

## 2021-03-31 PROCEDURE — 99350 HOME/RES VST EST HIGH MDM 60: CPT | Mod: ,,, | Performed by: NURSE PRACTITIONER

## 2021-03-31 PROCEDURE — 99350 PR HOME VISIT,ESTAB PATIENT,LEVEL IV: ICD-10-PCS | Mod: ,,, | Performed by: NURSE PRACTITIONER

## 2021-03-31 PROCEDURE — 1159F PR MEDICATION LIST DOCUMENTED IN MEDICAL RECORD: ICD-10-PCS | Mod: S$GLB,,, | Performed by: NURSE PRACTITIONER

## 2021-03-31 PROCEDURE — 99497 ADVNCD CARE PLAN 30 MIN: CPT | Mod: S$GLB,,, | Performed by: NURSE PRACTITIONER

## 2021-04-01 RX ORDER — DOXYCYCLINE HYCLATE 100 MG
100 TABLET ORAL 2 TIMES DAILY
Qty: 14 TABLET | Refills: 0 | Status: SHIPPED | OUTPATIENT
Start: 2021-04-01 | End: 2021-04-08

## 2021-04-01 RX ORDER — GABAPENTIN 100 MG/1
100 CAPSULE ORAL 2 TIMES DAILY
Qty: 60 CAPSULE | Refills: 11 | Status: SHIPPED | OUTPATIENT
Start: 2021-04-01 | End: 2022-04-01

## 2021-04-05 ENCOUNTER — EXTERNAL HOME HEALTH (OUTPATIENT)
Dept: HOME HEALTH SERVICES | Facility: HOSPITAL | Age: 86
End: 2021-04-05
Payer: MEDICARE

## 2021-04-09 DIAGNOSIS — R53.2 FUNCTIONAL QUADRIPLEGIA: Primary | ICD-10-CM

## 2021-04-23 ENCOUNTER — DOCUMENT SCAN (OUTPATIENT)
Dept: HOME HEALTH SERVICES | Facility: HOSPITAL | Age: 86
End: 2021-04-23
Payer: MEDICARE

## 2021-04-30 ENCOUNTER — PES CALL (OUTPATIENT)
Dept: ADMINISTRATIVE | Facility: CLINIC | Age: 86
End: 2021-04-30

## 2021-05-12 ENCOUNTER — CARE AT HOME (OUTPATIENT)
Dept: HOME HEALTH SERVICES | Facility: CLINIC | Age: 86
End: 2021-05-12
Payer: MEDICARE

## 2021-05-12 DIAGNOSIS — Z09 FOLLOW UP: Primary | ICD-10-CM

## 2021-05-12 PROCEDURE — 99443 PR PHYSICIAN TELEPHONE EVALUATION 21-30 MIN: ICD-10-PCS | Mod: 95,,, | Performed by: NURSE PRACTITIONER

## 2021-05-12 PROCEDURE — 99443 PR PHYSICIAN TELEPHONE EVALUATION 21-30 MIN: CPT | Mod: 95,,, | Performed by: NURSE PRACTITIONER

## 2021-06-14 ENCOUNTER — EXTERNAL HOME HEALTH (OUTPATIENT)
Dept: HOME HEALTH SERVICES | Facility: HOSPITAL | Age: 86
End: 2021-06-14
Payer: MEDICARE

## 2021-06-16 ENCOUNTER — CARE AT HOME (OUTPATIENT)
Dept: HOME HEALTH SERVICES | Facility: CLINIC | Age: 86
End: 2021-06-16
Payer: MEDICARE

## 2021-06-16 DIAGNOSIS — Z09 FOLLOW UP: Primary | ICD-10-CM

## 2021-06-16 PROCEDURE — 99443 PR PHYSICIAN TELEPHONE EVALUATION 21-30 MIN: CPT | Mod: 95,,, | Performed by: NURSE PRACTITIONER

## 2021-06-16 PROCEDURE — 99443 PR PHYSICIAN TELEPHONE EVALUATION 21-30 MIN: ICD-10-PCS | Mod: 95,,, | Performed by: NURSE PRACTITIONER

## 2021-07-16 ENCOUNTER — PES CALL (OUTPATIENT)
Dept: ADMINISTRATIVE | Facility: CLINIC | Age: 86
End: 2021-07-16

## 2021-07-19 ENCOUNTER — DOCUMENT SCAN (OUTPATIENT)
Dept: HOME HEALTH SERVICES | Facility: HOSPITAL | Age: 86
End: 2021-07-19
Payer: MEDICARE

## 2021-09-12 ENCOUNTER — PES CALL (OUTPATIENT)
Dept: ADMINISTRATIVE | Facility: CLINIC | Age: 86
End: 2021-09-12

## 2022-02-02 DIAGNOSIS — I10 ESSENTIAL HYPERTENSION: ICD-10-CM

## 2022-04-27 ENCOUNTER — PATIENT MESSAGE (OUTPATIENT)
Dept: ADMINISTRATIVE | Facility: HOSPITAL | Age: 87
End: 2022-04-27
Payer: MEDICARE

## 2023-07-31 NOTE — ADDENDUM NOTE
Addended by: BRENDON MCQUEEN III on: 1/25/2019 08:57 AM     Modules accepted: Orders     Azithromycin Pregnancy And Lactation Text: This medication is considered safe during pregnancy and is also secreted in breast milk.